# Patient Record
Sex: MALE | Race: WHITE | NOT HISPANIC OR LATINO | Employment: OTHER | URBAN - METROPOLITAN AREA
[De-identification: names, ages, dates, MRNs, and addresses within clinical notes are randomized per-mention and may not be internally consistent; named-entity substitution may affect disease eponyms.]

---

## 2017-03-31 DIAGNOSIS — E78.2 MIXED HYPERLIPIDEMIA: ICD-10-CM

## 2017-03-31 RX ORDER — FENOFIBRATE 160 MG/1
160 TABLET ORAL DAILY
Qty: 90 TABLET | Refills: 0 | Status: SHIPPED | OUTPATIENT
Start: 2017-03-31 | End: 2017-07-14

## 2017-04-27 ENCOUNTER — TRANSFERRED RECORDS (OUTPATIENT)
Dept: CARDIOLOGY | Facility: CLINIC | Age: 72
End: 2017-04-27

## 2017-07-14 DIAGNOSIS — E78.2 MIXED HYPERLIPIDEMIA: ICD-10-CM

## 2017-07-14 RX ORDER — FENOFIBRATE 160 MG/1
160 TABLET ORAL DAILY
Qty: 90 TABLET | Refills: 0 | Status: SHIPPED | OUTPATIENT
Start: 2017-07-14 | End: 2017-10-11

## 2017-08-10 ENCOUNTER — OFFICE VISIT (OUTPATIENT)
Dept: CARDIOLOGY | Facility: CLINIC | Age: 72
End: 2017-08-10
Attending: INTERNAL MEDICINE
Payer: COMMERCIAL

## 2017-08-10 VITALS
DIASTOLIC BLOOD PRESSURE: 87 MMHG | HEART RATE: 64 BPM | BODY MASS INDEX: 30.36 KG/M2 | HEIGHT: 69 IN | SYSTOLIC BLOOD PRESSURE: 135 MMHG | WEIGHT: 205 LBS

## 2017-08-10 DIAGNOSIS — E78.2 MIXED HYPERLIPIDEMIA: ICD-10-CM

## 2017-08-10 DIAGNOSIS — I73.9 PAD (PERIPHERAL ARTERY DISEASE) (H): ICD-10-CM

## 2017-08-10 DIAGNOSIS — I48.20 CHRONIC ATRIAL FIBRILLATION (H): Primary | ICD-10-CM

## 2017-08-10 DIAGNOSIS — I67.2 ATHEROSCLEROTIC CEREBROVASCULAR DISEASE: ICD-10-CM

## 2017-08-10 DIAGNOSIS — I10 ESSENTIAL HYPERTENSION WITH GOAL BLOOD PRESSURE LESS THAN 140/90: ICD-10-CM

## 2017-08-10 PROCEDURE — 99214 OFFICE O/P EST MOD 30 MIN: CPT | Performed by: INTERNAL MEDICINE

## 2017-08-10 NOTE — PROGRESS NOTES
HISTORY OF PRESENT ILLNESS:  I again had the pleasure of seeing your patient, Jonny Goldberg, at [facility] for evaluation of mixed hyperlipidemia, atherosclerosis and chronic atrial fibrillation.  The patient has a history of hypertriglyceridemia and has been placed on fenofibrate and Crestor.  One time he was on Zetia but this did little for lipid control.  He denies myalgias or myopathy.  He continues to have a sore right knee.  The patient was exercising, walking 3-1/2 miles each day in February and March, but as the summer came and did more fishing, he stopped exercising and changed his diet.  Now when he fishes with his buddies he eats more potatoes and bread.  He does not make time for exercise.  A fasting lipid profile performed on 04/26/2017 includes LDL 97, VLDL 23, HDL 46, triglycerides 115 and total cholesterol 166.  At that same time, his BUN was 26, glucose 102, AST of 46, ALT of 29.  He has a history of atrial fibrillation since at least 11/2011.  Stress echocardiogram indicated no areas of ischemia.  He had a possible atrial flutter ablation in the early 1980s.  He has had a small embolic stroke prior to that diagnosis and has remained on warfarin anticoagulation for chronic atrial fibrillation.  INR today is 2.7.  He has a history of hypertension which has been under reasonably good control, although borderline diastolic hypertension.  He continues on chlorthalidone and I do not have a recent creatinine.  Of note, he is also on verapamil, rosuvastatin and fenofibrate.  He is on metoprolol.  He denies myalgias or myopathy.      PHYSICAL EXAMINATION:  As listed below and relatively unchanged from last year.      ASSESSMENT:   1.  Jonny Goldberg is a pleasant 72-year-old male with history of mixed hyperlipidemia that has remained stable on his current medications.  He remains at goal and I have suggested continued weight loss, diet and exercise.  Of his 30-minute visit, greater than  50% of that time was spent on diet, exercise and weight loss.  He is concerned about his blood sugar of 102 and I suggested that this is readily improved with diet, exercise and weight loss.   2.  The patient's systolic and diastolic blood pressure is currently borderline.  His diastolic blood pressure is 87.  I suggested continued salt restriction and weight loss.  Exercise would also be of benefit.      It is my pleasure to assist in the care of Diana Moreira.  We continue monitoring his atrial fibrillation.  He has excellent rate control.  He denies bleeding diathesis.  It is my intention to see him again in 1 year or earlier on a p.r.n. basis.  All his questions were answered to his satisfaction.      cc:   Golden Chacko MD   [facility]   [address] [place] [address], [place] [address]         BAN FELIZ MD, [facility]             D: 08/10/2017 11:27   T: 08/10/2017 13:44   MT: [email or web address]      Name:     DIANA MOREIRA   MRN:      [id number]        Account:      [id number]   :      1945           Service Date: 08/10/2017      Document: [id number]

## 2017-08-10 NOTE — LETTER
8/10/2017    Golden Chacko MD  [address]  [place] [address]    RE: Jonny Goldberg       Dear Colleague,    I again had the pleasure of seeing your patient, Jonny Goldberg, at [facility] for evaluation of mixed hyperlipidemia, atherosclerosis and chronic atrial fibrillation.  The patient has a history of hypertriglyceridemia and has been placed on fenofibrate and Crestor.  One time he was on Zetia but this did little for lipid control.  He denies myalgias or myopathy.  He continues to have a sore right knee.  The patient was exercising, walking 3-1/2 miles each day in February and March, but as the summer came and did more fishing, he stopped exercising and changed his diet.  Now when he fishes with his buddies he eats more potatoes and bread.  He does not make time for exercise.  A fasting lipid profile performed on 04/26/2017 includes LDL 97, VLDL 23, HDL 46, triglycerides 115 and total cholesterol 166.  At that same time, his BUN was 26, glucose 102, AST of 46, ALT of 29.  He has a history of atrial fibrillation since at least 11/2011.  Stress echocardiogram indicated no areas of ischemia.  He had a possible atrial flutter ablation in the early 1980s.  He has had a small embolic stroke prior to that diagnosis and has remained on warfarin anticoagulation for chronic atrial fibrillation.  INR today is 2.7.  He has a history of hypertension which has been under reasonably good control, although borderline diastolic hypertension.  He continues on chlorthalidone and I do not have a recent creatinine.  Of note, he is also on verapamil, rosuvastatin and fenofibrate.  He is on metoprolol.  He denies myalgias or myopathy.      PHYSICAL EXAMINATION:  As listed below and relatively unchanged from last year.     Outpatient Encounter Prescriptions as of 8/10/2017   Medication Sig Dispense Refill     fenofibrate 160 MG tablet Take 1 tablet (160 mg) by mouth daily 90 tablet 0      chlorthalidone (HYGROTON) 50 MG tablet daily       rosuvastatin (CRESTOR) 40 MG tablet Take 40 mg by mouth daily       Omega-3 Fatty Acids (OMEGA 3 PO) Take by mouth daily 180 EPA/ 20 DHH       Ubiquinol 100 MG CAPS Take by mouth daily       Cholecalciferol (VITAMIN D) 400 UNITS tablet Take 1 tablet by mouth daily       multivitamin, therapeutic with minerals (MULTI-VITAMIN) TABS Take 1 tablet by mouth daily       ascorbic acid (VITAMIN C) 1000 MG TABS Take 1,000 mg by mouth daily       ALPHA LIPOIC ACID PO Take 600 mg by mouth daily       Verapamil HCl 240 MG CP24 Take by mouth daily        warfarin (COUMADIN) 5 MG tablet Take 5 mg by mouth daily        metoprolol (TOPROL-XL) 50 MG 24 hr tablet Take 50 mg by mouth daily.       Omeprazole 20 MG tablet Take 20 mg by mouth as needed        No facility-administered encounter medications on file as of 8/10/2017.       ASSESSMENT:   1.  Jonny Goldberg is a pleasant 72-year-old male with history of mixed hyperlipidemia that has remained stable on his current medications.  He remains at goal and I have suggested continued weight loss, diet and exercise.  Of his 30-minute visit, greater than 50% of that time was spent on diet, exercise and weight loss.  He is concerned about his blood sugar of 102 and I suggested that this is readily improved with diet, exercise and weight loss.   2.  The patient's systolic and diastolic blood pressure is currently borderline.  His diastolic blood pressure is 87.  I suggested continued salt restriction and weight loss.  Exercise would also be of benefit.      It is my pleasure to assist in the care of Jonny Goldberg.  We continue monitoring his atrial fibrillation.  He has excellent rate control.  He denies bleeding diathesis.  It is my intention to see him again in 1 year or earlier on a p.r.n. basis.  All his questions were answered to his satisfaction.     Sincerely,    Boy Jones MD     [facility]

## 2017-08-10 NOTE — MR AVS SNAPSHOT
"              After Visit Summary   8/10/2017    Jonny Goldberg    MRN: [id number]           Patient Information     Date Of Birth          1945        Visit Information        Provider Department      8/10/2017 10:45 AM Boy Jones MD [facility]        Today's Diagnoses     Chronic atrial fibrillation (H)    -  1    Mixed hyperlipidemia        Essential hypertension with goal blood pressure less than 140/90        Atherosclerotic cerebrovascular disease        PAD (peripheral artery disease) (H)           Follow-ups after your visit        Additional Services     Follow-Up with Cardiologist                 Future tests that were ordered for you today     Open Future Orders        Priority Expected Expires Ordered    Follow-Up with Cardiologist Routine 8/10/2018 8/11/2018 8/10/2017            Who to contact     If you have questions or need follow up information about today's clinic visit or your schedule please contact [facility] directly at [phone].  Normal or non-critical lab and imaging results will be communicated to you by MyChart, letter or phone within 4 business days after the clinic has received the results. If you do not hear from us within 7 days, please contact the clinic through [employer] or phone. If you have a critical or abnormal lab result, we will notify you by phone as soon as possible.  Submit refill requests through [employer] or call your pharmacy and they will forward the refill request to us. Please allow 3 business days for your refill to be completed.          Additional Information About Your Visit        MyChart Information     [employer] lets you send messages to your doctor, view your test results, renew your prescriptions, schedule appointments and more. To sign up, go to [email or web address] . Click on \"Log in\" on the left side of the screen, which will take you to the Welcome page. Then " "click on \"Sign up Now\" on the right side of the page.     You will be asked to enter the access code listed below, as well as some personal information. Please follow the directions to create your username and password.     Your access code is: [email or web address]  Expires: 2017 11:16 AM     Your access code will  in 90 days. If you need help or a new code, please call your [facility] clinic or [phone].        Care EveryWhere ID     This is your Care EveryWhere ID. This could be used by other organizations to access your [facility] medical records  [id number]        Your Vitals Were     Pulse Height BMI (Body Mass Index)             64 1.74 m (5' 8.5\") 30.72 kg/m2          Blood Pressure from Last 3 Encounters:   08/10/17 135/87   16 134/84   12/16/15 130/80    Weight from Last 3 Encounters:   08/10/17 93 kg (205 lb)   16 92.1 kg (203 lb)   12/16/15 96.2 kg (212 lb)              We Performed the Following     Follow-Up with Cardiologist        Primary Care Provider Office Phone # Fax #    Golden Chacko [phone] [address] [place]  [facility] [address]        Equal Access to Services     [facility] Center[facility]ARAY : Hadii aad ku hadasho Soomaali, waaxda luqadaha, qaybta kaalmada adeegyada, clayton chase. So [facility] [phone].    ATENCIÓN: Si habla español, tiene a hackett disposición servicios gratuitos de asistencia lingüística. Von al [phone].    We comply with applicable federal civil rights laws and [place] laws. We do not discriminate on the basis of race, color, national origin, age, disability sex, sexual orientation or gender identity.            Thank you!     Thank you for choosing [facility] [place]  for your care. Our goal is always to provide you with excellent care. Hearing back from our patients is one way we can continue to improve our services. Please take a few minutes to complete the written " survey that you may receive in the mail after your visit with us. Thank you!             Your Updated Medication List - Protect others around you: Learn how to safely use, store and throw away your medicines at www.disposemymeds.org.          This list is accurate as of: 8/10/17 11:16 AM.  Always use your most recent med list.                   Brand Name Dispense Instructions for use Diagnosis    ALPHA LIPOIC ACID PO      Take 600 mg by mouth daily        ascorbic acid 1000 MG Tabs    vitamin C     Take 1,000 mg by mouth daily        chlorthalidone 50 MG tablet    HYGROTON     daily        CRESTOR 40 MG tablet   Generic drug:  rosuvastatin      Take 40 mg by mouth daily        fenofibrate 160 MG tablet     90 tablet    Take 1 tablet (160 mg) by mouth daily    Mixed hyperlipidemia       metoprolol 50 MG 24 hr tablet    TOPROL-XL     Take 50 mg by mouth daily.        Multi-vitamin Tabs tablet      Take 1 tablet by mouth daily        OMEGA 3 PO      Take by mouth daily 180 EPA/ 20 DHH        omeprazole 20 MG tablet      Take 20 mg by mouth as needed        Ubiquinol 100 MG Caps      Take by mouth daily        verapamil HCl 240 MG Cp24      Take by mouth daily        vitamin D 400 UNITS tablet      Take 1 tablet by mouth daily        warfarin 5 MG tablet    COUMADIN     Take 5 mg by mouth daily

## 2017-08-10 NOTE — PROGRESS NOTES
HPI and Plan:   See dictation:[id number]    Orders Placed This Encounter   Procedures     Follow-Up with Cardiologist       No orders of the defined types were placed in this encounter.      There are no discontinued medications.      Encounter Diagnoses   Name Primary?     Chronic atrial fibrillation (H) Yes     Mixed hyperlipidemia      Essential hypertension with goal blood pressure less than 140/90      Atherosclerotic cerebrovascular disease      PAD (peripheral artery disease) (H)        CURRENT MEDICATIONS:  Current Outpatient Prescriptions   Medication Sig Dispense Refill     fenofibrate 160 MG tablet Take 1 tablet (160 mg) by mouth daily 90 tablet 0     chlorthalidone (HYGROTON) 50 MG tablet daily       rosuvastatin (CRESTOR) 40 MG tablet Take 40 mg by mouth daily       Omega-3 Fatty Acids (OMEGA 3 PO) Take by mouth daily 180 EPA/ 20 DHH       Ubiquinol 100 MG CAPS Take by mouth daily       Cholecalciferol (VITAMIN D) 400 UNITS tablet Take 1 tablet by mouth daily       multivitamin, therapeutic with minerals (MULTI-VITAMIN) TABS Take 1 tablet by mouth daily       ascorbic acid (VITAMIN C) 1000 MG TABS Take 1,000 mg by mouth daily       ALPHA LIPOIC ACID PO Take 600 mg by mouth daily       Verapamil HCl 240 MG CP24 Take by mouth daily        warfarin (COUMADIN) 5 MG tablet Take 5 mg by mouth daily        metoprolol (TOPROL-XL) 50 MG 24 hr tablet Take 50 mg by mouth daily.       Omeprazole 20 MG tablet Take 20 mg by mouth as needed          ALLERGIES     Allergies   Allergen Reactions     Penicillin G Hives     hives       PAST MEDICAL HISTORY:  Past Medical History:   Diagnosis Date     AF (atrial fibrillation) (H)     AFIB     Blood clot in the legs     left leg after surgery     Gastro-oesophageal reflux disease      Hodgkins lymphoma (H)     bone marrow biopsy     Hyperlipidemia      Hypertension      PAD (peripheral artery disease) (H)      Unspecified cerebral artery occlusion with cerebral infarction oct.  2011       PAST SURGICAL HISTORY:  Past Surgical History:   Procedure Laterality Date     APPENDECTOMY       ARTHROPLASTY REVISION HIP  12/27/2011    Procedure:ARTHROPLASTY REVISION HIP; RIGHT TOTAL HIP REVISION WITH BONE GRAFT  ; Surgeon:ANGIE HARRINGTON; Location:[facility] OR     BIOPSY  hodgkins lymphoma    bone marrow biopsy, chemo and radiation     CARDIAC SURGERY      cardiac ablation     COLONOSCOPY       ORTHOPEDIC SURGERY      hip and knee surgeries     ORTHOPEDIC SURGERY      carpal tunnel  right hand       FAMILY HISTORY:  Family History   Problem Relation Age of Onset     Hypertension Mother      Heart Failure Mother      Coronary Artery Disease Mother      CABG     Arrhythmia Mother      CEREBROVASCULAR DISEASE Father      Heart Failure Father      Colon Cancer Father      Coronary Artery Disease Father      CABG     Coronary Artery Disease Brother      CABG     Arrhythmia Brother        SOCIAL HISTORY:  Social History     Social History     Marital status:      Spouse name: N/A     Number of children: N/A     Years of education: N/A     Social History Main Topics     Smoking status: Former Smoker     Quit date: 4/5/1987     Smokeless tobacco: Never Used      Comment: quit late 1980s     Alcohol use 0.0 oz/week     0 Standard drinks or equivalent per week      Comment: 0-3 a day  ( beer, wine)     Drug use: No     Sexual activity: Not Asked     Other Topics Concern     Caffeine Concern No     2-3 a day     Sleep Concern Yes     depends     Weight Concern No     weight loss     Special Diet No     Exercise No     limited due to knee pain     Seat Belt Yes     Social History Narrative       Review of Systems:  Skin:  Positive for lumps or bumps;scaling psoriasis on legs    Eyes:  Positive for glasses    ENT:  Negative      Respiratory:  Negative       Cardiovascular:  Negative      Gastroenterology: Positive for heartburn    Genitourinary:  not assessed      Musculoskeletal:  Positive for joint  "pain;joint stiffness;arthritis    Neurologic:  Positive for numbness or tingling of feet    Psychiatric:  Negative      Heme/Lymph/Imm:  Positive for allergies    Endocrine:  Negative        Physical Exam:  Vitals: /87 (BP Location: Right arm, Cuff Size: Adult Large)  Pulse 64  Ht 1.74 m (5' 8.5\")  Wt 93 kg (205 lb)  BMI 30.72 kg/m2    Constitutional:  cooperative, alert and oriented, well developed, well nourished, in no acute distress        Skin:  warm and dry to the touch, no apparent skin lesions or masses noted        Head:  normocephalic, no masses or lesions        Eyes:  pupils equal and round, conjunctivae and lids unremarkable, sclera white, no xanthalasma, EOMS intact, no nystagmus        ENT:  no pallor or cyanosis, dentition good        Neck:  carotid pulses are full and equal bilaterally, JVP normal, no carotid bruit, no thyromegaly        Chest:  normal breath sounds, clear to auscultation, normal A-P diameter, normal symmetry, normal respiratory excursion, no use of accessory muscles          Cardiac: normal S1 and S2;no S3 or S4;apical impulse not displaced irregularly irregular rhythm   no presence of murmur            Abdomen:  abdomen soft, non-tender, BS normoactive, no mass, no HSM, no bruits        Vascular: pulses full and equal, no bruits auscultated                                        Extremities and Back:  no deformities, clubbing, cyanosis, erythema observed;no edema              Neurological:  affect appropriate, oriented to time, person and place;no gross motor deficits              CC  Boy Jones MD  [address]  [place] [address]              "

## 2017-10-11 DIAGNOSIS — E78.2 MIXED HYPERLIPIDEMIA: ICD-10-CM

## 2017-10-11 RX ORDER — FENOFIBRATE 160 MG/1
160 TABLET ORAL DAILY
Qty: 90 TABLET | Refills: 3 | Status: SHIPPED | OUTPATIENT
Start: 2017-10-11 | End: 2018-09-27

## 2017-10-17 ENCOUNTER — TRANSFERRED RECORDS (OUTPATIENT)
Dept: HEALTH INFORMATION MANAGEMENT | Facility: CLINIC | Age: 72
End: 2017-10-17

## 2017-10-17 LAB
ALBUMIN SERPL-MCNC: 4.5 G/DL
ALP SERPL-CCNC: 37 U/L
ALT SERPL-CCNC: 30 U/L
ANION GAP SERPL CALCULATED.3IONS-SCNC: NORMAL MMOL/L
AST SERPL-CCNC: 38 U/L
BILIRUB SERPL-MCNC: 0.6 MG/DL
BUN SERPL-MCNC: 27 MG/DL
CALCIUM SERPL-MCNC: 9.5 MG/DL
CHLORIDE SERPLBLD-SCNC: 99 MMOL/L
CHOLEST SERPL-MCNC: 168 MG/DL
CO2 SERPL-SCNC: 29 MMOL/L
CREAT SERPL-MCNC: 1.07 MG/DL
GFR SERPL CREATININE-BSD FRML MDRD: 70 ML/MIN/1.73M2
GLUCOSE SERPL-MCNC: 104 MG/DL (ref 70–99)
HBA1C MFR BLD: 6.1 % (ref 0–5.7)
HDLC SERPL-MCNC: 47 MG/DL
LDLC SERPL CALC-MCNC: 98 MG/DL
NONHDLC SERPL-MCNC: NORMAL MG/DL
POTASSIUM SERPL-SCNC: 4.7 MMOL/L
PROT SERPL-MCNC: 7.2 G/DL
SODIUM SERPL-SCNC: 143 MMOL/L
TRIGL SERPL-MCNC: 115 MG/DL
VLDL CHOLESTEROL: 23 MG/DL

## 2017-10-19 DIAGNOSIS — I73.9 PAD (PERIPHERAL ARTERY DISEASE) (H): Primary | ICD-10-CM

## 2018-04-30 ENCOUNTER — TRANSFERRED RECORDS (OUTPATIENT)
Dept: HEALTH INFORMATION MANAGEMENT | Facility: CLINIC | Age: 73
End: 2018-04-30

## 2018-09-27 DIAGNOSIS — E78.2 MIXED HYPERLIPIDEMIA: ICD-10-CM

## 2018-09-27 RX ORDER — FENOFIBRATE 160 MG/1
160 TABLET ORAL DAILY
Qty: 90 TABLET | Refills: 0 | Status: SHIPPED | OUTPATIENT
Start: 2018-09-27 | End: 2018-12-11

## 2018-10-18 ENCOUNTER — TRANSFERRED RECORDS (OUTPATIENT)
Dept: HEALTH INFORMATION MANAGEMENT | Facility: CLINIC | Age: 73
End: 2018-10-18

## 2018-10-19 LAB
ALBUMIN SERPL-MCNC: 4.5 G/DL (ref 3.5–4.8)
ALP SERPL-CCNC: 37 U/L (ref 39–117)
ALT SERPL-CCNC: 24 U/L (ref ?–44)
ANION GAP SERPL CALCULATED.3IONS-SCNC: ABNORMAL MMOL/L
AST SERPL-CCNC: 35 U/L (ref ?–40)
BILIRUB SERPL-MCNC: 0.7 MG/DL (ref 0–1.2)
BUN SERPL-MCNC: 21 MG/DL (ref 8–27)
CALCIUM SERPL-MCNC: 9.6 MG/DL (ref 8.6–10.2)
CHLORIDE SERPLBLD-SCNC: 99 MMOL/L (ref 96–106)
CHOLEST SERPL-MCNC: 155 MG/DL (ref ?–199)
CO2 SERPL-SCNC: 28 MMOL/L (ref 20–29)
CREAT SERPL-MCNC: 1.07 MG/DL (ref 0.76–1.27)
GFR SERPL CREATININE-BSD FRML MDRD: 69 ML/MIN/1.73M2 (ref 60–?)
GLUCOSE SERPL-MCNC: 103 MG/DL (ref 65–99)
HDLC SERPL-MCNC: 43 MG/DL (ref 39–?)
LDLC SERPL CALC-MCNC: 81 MG/DL (ref ?–99)
NONHDLC SERPL-MCNC: ABNORMAL MG/DL
POTASSIUM SERPL-SCNC: 3.9 MMOL/L (ref 3.5–5.2)
PROT SERPL-MCNC: 7 G/DL (ref 6–8.5)
SODIUM SERPL-SCNC: 140 MMOL/L (ref 134–144)
TRIGL SERPL-MCNC: 154 MG/DL (ref ?–149)

## 2018-10-25 ENCOUNTER — DOCUMENTATION ONLY (OUTPATIENT)
Dept: CARDIOLOGY | Facility: CLINIC | Age: 73
End: 2018-10-25

## 2018-10-30 ENCOUNTER — OFFICE VISIT (OUTPATIENT)
Dept: CARDIOLOGY | Facility: CLINIC | Age: 73
End: 2018-10-30
Payer: COMMERCIAL

## 2018-10-30 VITALS
DIASTOLIC BLOOD PRESSURE: 78 MMHG | BODY MASS INDEX: 31.1 KG/M2 | WEIGHT: 210 LBS | HEART RATE: 80 BPM | HEIGHT: 69 IN | SYSTOLIC BLOOD PRESSURE: 140 MMHG

## 2018-10-30 DIAGNOSIS — I73.9 PAD (PERIPHERAL ARTERY DISEASE) (H): ICD-10-CM

## 2018-10-30 DIAGNOSIS — I10 ESSENTIAL HYPERTENSION WITH GOAL BLOOD PRESSURE LESS THAN 140/90: ICD-10-CM

## 2018-10-30 DIAGNOSIS — I48.20 CHRONIC ATRIAL FIBRILLATION (H): Primary | ICD-10-CM

## 2018-10-30 DIAGNOSIS — E78.2 MIXED HYPERLIPIDEMIA: ICD-10-CM

## 2018-10-30 DIAGNOSIS — I67.2 ATHEROSCLEROTIC CEREBROVASCULAR DISEASE: ICD-10-CM

## 2018-10-30 PROCEDURE — 99214 OFFICE O/P EST MOD 30 MIN: CPT | Performed by: INTERNAL MEDICINE

## 2018-10-30 NOTE — MR AVS SNAPSHOT
"              After Visit Summary   10/30/2018    Jonny Goldberg    MRN: [id number]           Patient Information     Date Of Birth          1945        Visit Information        Provider Department      10/30/2018 2:45 PM Boy Jones MD [facility]   [place]        Today's Diagnoses     Chronic atrial fibrillation (H)    -  1    Essential hypertension with goal blood pressure less than 140/90        Atherosclerotic cerebrovascular disease        PAD (peripheral artery disease) (H)        Mixed hyperlipidemia           Follow-ups after your visit        Additional Services     Follow-Up with Cardiologist                 Future tests that were ordered for you today     Open Future Orders        Priority Expected Expires Ordered    Follow-Up with Cardiologist Routine 10/30/2019 10/31/2019 10/30/2018            Who to contact     If you have questions or need follow up information about today's clinic visit or your schedule please contact [facility]   [facility] directly at [phone].  Normal or non-critical lab and imaging results will be communicated to you by MyChart, letter or phone within 4 business days after the clinic has received the results. If you do not hear from us within 7 days, please contact the clinic through MyChart or phone. If you have a critical or abnormal lab result, we will notify you by phone as soon as possible.  Submit refill requests through [employer] or call your pharmacy and they will forward the refill request to us. Please allow 3 business days for your refill to be completed.          Additional Information About Your Visit        Care EveryWhere ID     This is your Care EveryWhere ID. This could be used by other organizations to access your [facility] medical records  [id number]        Your Vitals Were     Pulse Height BMI (Body Mass Index)             80 1.74 m (5' 8.5\") 31.47 kg/m2          Blood Pressure from Last 3 " Encounters:   10/30/18 140/78   08/10/17 135/87   07/21/16 134/84    Weight from Last 3 Encounters:   10/30/18 95.3 kg (210 lb)   08/10/17 93 kg (205 lb)   07/21/16 92.1 kg (203 lb)               Primary Care Provider Office Phone # Fax #    Golden Chacko [phone]       [facility] [address]  [facility] [address]        Equal Access to Services     REYNA DAVIS : Hadii aad ku hadasho Soomaali, waaxda luqadaha, qaybta kaalmada adeegyada, waxay tiagoin haygi blanco . So [facility] [phone].    ATENCIÓN: Si habla español, tiene a hackett disposición servicios gratuitos de asistencia lingüística. [facility] [phone].    We comply with applicable federal civil rights laws and [place] laws. We do not discriminate on the basis of race, color, national origin, age, disability, sex, sexual orientation, or gender identity.            Thank you!     Thank you for choosing [facility]  for your care. Our goal is always to provide you with excellent care. Hearing back from our patients is one way we can continue to improve our services. Please take a few minutes to complete the written survey that you may receive in the mail after your visit with us. Thank you!             Your Updated Medication List - Protect others around you: Learn how to safely use, store and throw away your medicines at www.disposemymeds.org.          This list is accurate as of 10/30/18  3:10 PM.  Always use your most recent med list.                   Brand Name Dispense Instructions for use Diagnosis    ALPHA LIPOIC ACID PO      Take 600 mg by mouth daily        ascorbic acid 1000 MG Tabs    vitamin C     Take 1,000 mg by mouth daily        chlorthalidone 50 MG tablet    HYGROTON     daily        CRESTOR 40 MG tablet   Generic drug:  rosuvastatin      Take 40 mg by mouth daily        fenofibrate 160 MG tablet     90 tablet    Take 1 tablet (160 mg) by mouth  daily    Mixed hyperlipidemia       metoprolol succinate 50 MG 24 hr tablet    TOPROL-XL     Take 50 mg by mouth daily.        Multi-vitamin Tabs tablet      Take 1 tablet by mouth daily        OMEGA 3 PO      Take by mouth daily 180 EPA/ 20 DHH        omeprazole 20 MG tablet      Take 20 mg by mouth as needed        Ubiquinol 100 MG Caps      Take by mouth daily        verapamil HCl 240 MG Cp24      Take by mouth daily        vitamin D 400 units tablet      Take 1 tablet by mouth daily        warfarin 5 MG tablet    COUMADIN     Take 5 mg by mouth daily

## 2018-10-30 NOTE — LETTER
10/30/2018      [facility] [address]  [place] [address]      RE: Jonny Goldberg       Dear Colleague,    I had the pleasure of seeing Jonny Goldberg in the [facility].    Service Date: 10/30/2018      PRIMARY CARE PHYSICIAN:  Dr. Golden Chacko.      HISTORY OF PRESENT ILLNESS:  I again had the pleasure of seeing your patient, Jonny Goldberg, at [facility] for evaluation of chronic atrial fibrillation and mixed hyperlipidemia and atherosclerosis.  The patient has a history of hypertriglyceridemia and has been placed on both Crestor and fenofibrate.  At one time he was on Zetia as well but this did little for his lipid control.  He denies myalgias or myopathy.  His most recent lipid profile from 10/18/2018 shows total cholesterol 155, HDL 43, LDL 81 and triglycerides 154.  This is compared to 04/30/2018 where his total cholesterol was 141, triglycerides 112, HDL 44, LDL 75.  The patient notes that he has not been exercising during the summer months but plans to walk around [facility] each day during the wintertime.  His summers tend to be filled with fishing more than exercising.  He eats more potatoes and bread during fishing season with his buddies.  He generally does not make time for exercise and then by February and March he is walking outside.  The patient's electrolytes on 10/19/2018 were normal.  Additionally, his hemoglobin A1c 1 year ago was 6.1% and on 04/30 5.9%.      The patient denies palpitations, syncope or presyncope.  He denies any bleeding diathesis.  He is currently on verapamil for Raynaud's in his hands.  He denies chest pain, shortness of breath or palpitations.  INR is being kept 2-2.5.      PHYSICAL EXAMINATION:  As listed below.  Of note, his weight is 5 pounds heavier than 1 year ago.      ASSESSMENT:   1.  Jonny Goldberg is a pleasant 73-year-old male with a history of mixed  hyperlipidemia that has remained stable on his current medications.  His triglycerides are a bit elevated due to his lack of exercise and diet.  I have again discussed the need for aerobic exercise 3-4 times a week and weight loss.  His hemoglobin A1c is just below 6 and I reminded him that this will improve with diet, exercise and weight loss.   2.  Chronic atrial fibrillation.  The patient remains on warfarin without bleeding diathesis.  Heart rate is well controlled.   3.  Borderline systolic hypertension with normal diastolic blood pressure.  The patient assures me his blood pressure has been normal in his primary care office.  We will continue to monitor.      It is my pleasure to assist in the care of Diana Moreira.  It is my intention to see him again in 1 year or earlier on a p.r.n. basis.  All his questions were answered to his satisfaction.      Ban Feliz MD      cc:   Golden Chacko MD    [facility]    [address] [place] [address], [address] [place] [address]  [phone]         BAN FELIZ MD, [facility]             D: 10/30/2018   T: 10/30/2018   MT: TOMASA      Name:     DIANA MOREIRA   MRN:      [id number]        Account:      [id number]   :      1945           Service Date: 10/30/2018      Document: [id number]         Outpatient Encounter Prescriptions as of 10/30/2018   Medication Sig Dispense Refill     ALPHA LIPOIC ACID PO Take 600 mg by mouth daily       ascorbic acid (VITAMIN C) 1000 MG TABS Take 1,000 mg by mouth daily       chlorthalidone (HYGROTON) 50 MG tablet daily       Cholecalciferol (VITAMIN D) 400 UNITS tablet Take 1 tablet by mouth daily       fenofibrate 160 MG tablet Take 1 tablet (160 mg) by mouth daily 90 tablet 0     metoprolol (TOPROL-XL) 50 MG 24 hr tablet Take 50 mg by mouth daily.       multivitamin, therapeutic with minerals (MULTI-VITAMIN) TABS Take 1 tablet by mouth daily       Omega-3 Fatty Acids (OMEGA 3 PO) Take by mouth daily 180 EPA/ 20 DHH        Omeprazole 20 MG tablet Take 20 mg by mouth as needed        rosuvastatin (CRESTOR) 40 MG tablet Take 40 mg by mouth daily       Ubiquinol 100 MG CAPS Take by mouth daily       Verapamil HCl 240 MG CP24 Take by mouth daily        warfarin (COUMADIN) 5 MG tablet Take 5 mg by mouth daily        No facility-administered encounter medications on file as of 10/30/2018.        Again, thank you for allowing me to participate in the care of your patient.      Sincerely,    Boy Jones MD     [facility]

## 2018-10-30 NOTE — LETTER
10/30/2018    [facility] [address]  [place] [address]    RE: Jonny Goldberg       Dear Colleague,    I had the pleasure of seeing Jonny Goldberg in the [facility].    HPI and Plan:   See dictation:[id number]    Orders Placed This Encounter   Procedures     Follow-Up with Cardiologist       No orders of the defined types were placed in this encounter.      There are no discontinued medications.      Encounter Diagnoses   Name Primary?     Chronic atrial fibrillation (H) Yes     Essential hypertension with goal blood pressure less than 140/90      Atherosclerotic cerebrovascular disease      PAD (peripheral artery disease) (H)      Mixed hyperlipidemia        CURRENT MEDICATIONS:  Current Outpatient Prescriptions   Medication Sig Dispense Refill     ALPHA LIPOIC ACID PO Take 600 mg by mouth daily       ascorbic acid (VITAMIN C) 1000 MG TABS Take 1,000 mg by mouth daily       chlorthalidone (HYGROTON) 50 MG tablet daily       Cholecalciferol (VITAMIN D) 400 UNITS tablet Take 1 tablet by mouth daily       fenofibrate 160 MG tablet Take 1 tablet (160 mg) by mouth daily 90 tablet 0     metoprolol (TOPROL-XL) 50 MG 24 hr tablet Take 50 mg by mouth daily.       multivitamin, therapeutic with minerals (MULTI-VITAMIN) TABS Take 1 tablet by mouth daily       Omega-3 Fatty Acids (OMEGA 3 PO) Take by mouth daily 180 EPA/ 20 DHH       Omeprazole 20 MG tablet Take 20 mg by mouth as needed        rosuvastatin (CRESTOR) 40 MG tablet Take 40 mg by mouth daily       Ubiquinol 100 MG CAPS Take by mouth daily       Verapamil HCl 240 MG CP24 Take by mouth daily        warfarin (COUMADIN) 5 MG tablet Take 5 mg by mouth daily          ALLERGIES     Allergies   Allergen Reactions     Penicillin G Hives     hives       PAST MEDICAL HISTORY:  Past Medical History:   Diagnosis Date     AF (atrial fibrillation) (H)     AFIB     Blood clot in the legs     left leg  after surgery     Gastro-oesophageal reflux disease      Hodgkins lymphoma (H)     bone marrow biopsy     Hyperlipidemia      Hypertension      PAD (peripheral artery disease) (H)      Unspecified cerebral artery occlusion with cerebral infarction oct. 2011       PAST SURGICAL HISTORY:  Past Surgical History:   Procedure Laterality Date     APPENDECTOMY       ARTHROPLASTY REVISION HIP  12/27/2011    Procedure:ARTHROPLASTY REVISION HIP; RIGHT TOTAL HIP REVISION WITH BONE GRAFT  ; Surgeon:ANGIE HARRINGTON; Location:[facility] OR     BIOPSY  hodgkins lymphoma    bone marrow biopsy, chemo and radiation     CARDIAC SURGERY      cardiac ablation     COLONOSCOPY       ORTHOPEDIC SURGERY      hip and knee surgeries     ORTHOPEDIC SURGERY      carpal tunnel  right hand       FAMILY HISTORY:  Family History   Problem Relation Age of Onset     Hypertension Mother      Heart Failure Mother      Coronary Artery Disease Mother      CABG     Arrhythmia Mother      Cerebrovascular Disease Father      Heart Failure Father      Colon Cancer Father      Coronary Artery Disease Father      CABG     Coronary Artery Disease Brother      CABG     Arrhythmia Brother        SOCIAL HISTORY:  Social History     Social History     Marital status:      Spouse name: N/A     Number of children: N/A     Years of education: N/A     Social History Main Topics     Smoking status: Former Smoker     Quit date: 4/5/1987     Smokeless tobacco: Never Used     Alcohol use 0.0 oz/week     0 Standard drinks or equivalent per week      Comment: 0-3 a day  ( beer, wine)     Drug use: No     Sexual activity: Not Asked     Other Topics Concern     Caffeine Concern No     2-3 a day     Sleep Concern Yes     depends     Weight Concern No     weight loss     Special Diet No     Exercise No     limited due to knee pain     Seat Belt Yes     Social History Narrative       Review of Systems:  Skin:  Positive for   psoriasis on legs    Eyes:  Positive for glasses   "  ENT:  Negative      Respiratory:  Negative       Cardiovascular:    lower extremity symptoms;Positive for (lower leg redness/ sore spots)    Gastroenterology: Negative      Genitourinary:  not assessed      Musculoskeletal:  Positive for joint pain;joint stiffness;arthritis;nocturnal cramping    Neurologic:  Positive for numbness or tingling of feet    Psychiatric:  Negative      Heme/Lymph/Imm:  Negative      Endocrine:  Negative        Physical Exam:  Vitals: /78  Pulse 80  Ht 1.74 m (5' 8.5\")  Wt 95.3 kg (210 lb)  BMI 31.47 kg/m2    Constitutional:  cooperative, alert and oriented, well developed, well nourished, in no acute distress        Skin:  warm and dry to the touch, no apparent skin lesions or masses noted          Head:  normocephalic, no masses or lesions        Eyes:  pupils equal and round, conjunctivae and lids unremarkable, sclera white, no xanthalasma, EOMS intact, no nystagmus        Lymph:      ENT:  no pallor or cyanosis, dentition good        Neck:           Respiratory:  normal breath sounds, clear to auscultation, normal A-P diameter, normal symmetry, normal respiratory excursion, no use of accessory muscles         Cardiac: normal S1 and S2;no S3 or S4;apical impulse not displaced irregularly irregular rhythm   no presence of murmur          pulses full and equal, no bruits auscultated                                        GI:  abdomen soft, non-tender, BS normoactive, no mass, no HSM, no bruits obese      Extremities and Muscular Skeletal:  no deformities, clubbing, cyanosis, erythema observed;no edema   varicose vein          Neurological:  no gross motor deficits;affect appropriate        Psych:  Alert and Oriented x 3        CC  No referring provider defined for this encounter.                Thank you for allowing me to participate in the care of your patient.      Sincerely,     Boy Jones MD     [facility]    cc:   No referring " provider defined for this encounter.

## 2018-10-30 NOTE — PROGRESS NOTES
HPI and Plan:   See dictation:[id number]    Orders Placed This Encounter   Procedures     Follow-Up with Cardiologist       No orders of the defined types were placed in this encounter.      There are no discontinued medications.      Encounter Diagnoses   Name Primary?     Chronic atrial fibrillation (H) Yes     Essential hypertension with goal blood pressure less than 140/90      Atherosclerotic cerebrovascular disease      PAD (peripheral artery disease) (H)      Mixed hyperlipidemia        CURRENT MEDICATIONS:  Current Outpatient Prescriptions   Medication Sig Dispense Refill     ALPHA LIPOIC ACID PO Take 600 mg by mouth daily       ascorbic acid (VITAMIN C) 1000 MG TABS Take 1,000 mg by mouth daily       chlorthalidone (HYGROTON) 50 MG tablet daily       Cholecalciferol (VITAMIN D) 400 UNITS tablet Take 1 tablet by mouth daily       fenofibrate 160 MG tablet Take 1 tablet (160 mg) by mouth daily 90 tablet 0     metoprolol (TOPROL-XL) 50 MG 24 hr tablet Take 50 mg by mouth daily.       multivitamin, therapeutic with minerals (MULTI-VITAMIN) TABS Take 1 tablet by mouth daily       Omega-3 Fatty Acids (OMEGA 3 PO) Take by mouth daily 180 EPA/ 20 DHH       Omeprazole 20 MG tablet Take 20 mg by mouth as needed        rosuvastatin (CRESTOR) 40 MG tablet Take 40 mg by mouth daily       Ubiquinol 100 MG CAPS Take by mouth daily       Verapamil HCl 240 MG CP24 Take by mouth daily        warfarin (COUMADIN) 5 MG tablet Take 5 mg by mouth daily          ALLERGIES     Allergies   Allergen Reactions     Penicillin G Hives     hives       PAST MEDICAL HISTORY:  Past Medical History:   Diagnosis Date     AF (atrial fibrillation) (H)     AFIB     Blood clot in the legs     left leg after surgery     Gastro-oesophageal reflux disease      Hodgkins lymphoma (H)     bone marrow biopsy     Hyperlipidemia      Hypertension      PAD (peripheral artery disease) (H)      Unspecified cerebral artery occlusion with cerebral infarction oct.  2011       PAST SURGICAL HISTORY:  Past Surgical History:   Procedure Laterality Date     APPENDECTOMY       ARTHROPLASTY REVISION HIP  12/27/2011    Procedure:ARTHROPLASTY REVISION HIP; RIGHT TOTAL HIP REVISION WITH BONE GRAFT  ; Surgeon:ANGIE HARRINGTON; Location:[facility] OR     BIOPSY  hodgkins lymphoma    bone marrow biopsy, chemo and radiation     CARDIAC SURGERY      cardiac ablation     COLONOSCOPY       ORTHOPEDIC SURGERY      hip and knee surgeries     ORTHOPEDIC SURGERY      carpal tunnel  right hand       FAMILY HISTORY:  Family History   Problem Relation Age of Onset     Hypertension Mother      Heart Failure Mother      Coronary Artery Disease Mother      CABG     Arrhythmia Mother      Cerebrovascular Disease Father      Heart Failure Father      Colon Cancer Father      Coronary Artery Disease Father      CABG     Coronary Artery Disease Brother      CABG     Arrhythmia Brother        SOCIAL HISTORY:  Social History     Social History     Marital status:      Spouse name: N/A     Number of children: N/A     Years of education: N/A     Social History Main Topics     Smoking status: Former Smoker     Quit date: 4/5/1987     Smokeless tobacco: Never Used     Alcohol use 0.0 oz/week     0 Standard drinks or equivalent per week      Comment: 0-3 a day  ( beer, wine)     Drug use: No     Sexual activity: Not Asked     Other Topics Concern     Caffeine Concern No     2-3 a day     Sleep Concern Yes     depends     Weight Concern No     weight loss     Special Diet No     Exercise No     limited due to knee pain     Seat Belt Yes     Social History Narrative       Review of Systems:  Skin:  Positive for   psoriasis on legs    Eyes:  Positive for glasses    ENT:  Negative      Respiratory:  Negative       Cardiovascular:    lower extremity symptoms;Positive for (lower leg redness/ sore spots)    Gastroenterology: Negative      Genitourinary:  not assessed      Musculoskeletal:  Positive for joint  "pain;joint stiffness;arthritis;nocturnal cramping    Neurologic:  Positive for numbness or tingling of feet    Psychiatric:  Negative      Heme/Lymph/Imm:  Negative      Endocrine:  Negative        Physical Exam:  Vitals: /78  Pulse 80  Ht 1.74 m (5' 8.5\")  Wt 95.3 kg (210 lb)  BMI 31.47 kg/m2    Constitutional:  cooperative, alert and oriented, well developed, well nourished, in no acute distress        Skin:  warm and dry to the touch, no apparent skin lesions or masses noted          Head:  normocephalic, no masses or lesions        Eyes:  pupils equal and round, conjunctivae and lids unremarkable, sclera white, no xanthalasma, EOMS intact, no nystagmus        Lymph:      ENT:  no pallor or cyanosis, dentition good        Neck:           Respiratory:  normal breath sounds, clear to auscultation, normal A-P diameter, normal symmetry, normal respiratory excursion, no use of accessory muscles         Cardiac: normal S1 and S2;no S3 or S4;apical impulse not displaced irregularly irregular rhythm   no presence of murmur          pulses full and equal, no bruits auscultated                                        GI:  abdomen soft, non-tender, BS normoactive, no mass, no HSM, no bruits obese      Extremities and Muscular Skeletal:  no deformities, clubbing, cyanosis, erythema observed;no edema   varicose vein          Neurological:  no gross motor deficits;affect appropriate        Psych:  Alert and Oriented x 3        CC  No referring provider defined for this encounter.              "

## 2018-10-31 NOTE — PROGRESS NOTES
Service Date: 10/30/2018      PRIMARY CARE PHYSICIAN:  Dr. Golden Chacko.      HISTORY OF PRESENT ILLNESS:  I again had the pleasure of seeing your patient, Jonny Goldberg, at [facility] for evaluation of chronic atrial fibrillation and mixed hyperlipidemia and atherosclerosis.  The patient has a history of hypertriglyceridemia and has been placed on both Crestor and fenofibrate.  At one time he was on Zetia as well but this did little for his lipid control.  He denies myalgias or myopathy.  His most recent lipid profile from 10/18/2018 shows total cholesterol 155, HDL 43, LDL 81 and triglycerides 154.  This is compared to 04/30/2018 where his total cholesterol was 141, triglycerides 112, HDL 44, LDL 75.  The patient notes that he has not been exercising during the summer months but plans to walk around [facility] each day during the wintertime.  His summers tend to be filled with fishing more than exercising.  He eats more potatoes and bread during fishing season with his buddies.  He generally does not make time for exercise and then by February and March he is walking outside.  The patient's electrolytes on 10/19/2018 were normal.  Additionally, his hemoglobin A1c 1 year ago was 6.1% and on 04/30 5.9%.      The patient denies palpitations, syncope or presyncope.  He denies any bleeding diathesis.  He is currently on verapamil for Raynaud's in his hands.  He denies chest pain, shortness of breath or palpitations.  INR is being kept 2-2.5.      PHYSICAL EXAMINATION:  As listed below.  Of note, his weight is 5 pounds heavier than 1 year ago.      ASSESSMENT:   1.  Jonny Goldberg is a pleasant 73-year-old male with a history of mixed hyperlipidemia that has remained stable on his current medications.  His triglycerides are a bit elevated due to his lack of exercise and diet.  I have again discussed the need for aerobic exercise 3-4 times a week and weight loss.  His hemoglobin A1c is just below  6 and I reminded him that this will improve with diet, exercise and weight loss.   2.  Chronic atrial fibrillation.  The patient remains on warfarin without bleeding diathesis.  Heart rate is well controlled.   3.  Borderline systolic hypertension with normal diastolic blood pressure.  The patient assures me his blood pressure has been normal in his primary care office.  We will continue to monitor.      It is my pleasure to assist in the care of Diana Moreira.  It is my intention to see him again in 1 year or earlier on a p.r.n. basis.  All his questions were answered to his satisfaction.      Ban Feliz MD      cc:   Golden Chacko MD    [facility]    [address] [facility], [address] [place] [address]  [phone]         BAN FELIZ MD, [facility]             D: 10/30/2018   T: 10/30/2018   MT: TOMASA      Name:     DIANA MOREIRA   MRN:      [id number]        Account:      [id number]   :      1945           Service Date: 10/30/2018      Document: [id number]

## 2018-12-11 DIAGNOSIS — E78.2 MIXED HYPERLIPIDEMIA: ICD-10-CM

## 2018-12-11 RX ORDER — FENOFIBRATE 160 MG/1
160 TABLET ORAL DAILY
Qty: 90 TABLET | Refills: 3 | Status: SHIPPED | OUTPATIENT
Start: 2018-12-11 | End: 2019-12-04

## 2019-05-01 ENCOUNTER — TRANSFERRED RECORDS (OUTPATIENT)
Dept: HEALTH INFORMATION MANAGEMENT | Facility: CLINIC | Age: 74
End: 2019-05-01

## 2019-11-05 ENCOUNTER — TRANSFERRED RECORDS (OUTPATIENT)
Dept: HEALTH INFORMATION MANAGEMENT | Facility: CLINIC | Age: 74
End: 2019-11-05

## 2019-11-15 ENCOUNTER — TRANSFERRED RECORDS (OUTPATIENT)
Dept: HEALTH INFORMATION MANAGEMENT | Facility: CLINIC | Age: 74
End: 2019-11-15

## 2019-11-19 ENCOUNTER — OFFICE VISIT (OUTPATIENT)
Dept: CARDIOLOGY | Facility: CLINIC | Age: 74
End: 2019-11-19
Payer: COMMERCIAL

## 2019-11-19 VITALS
HEIGHT: 69 IN | BODY MASS INDEX: 30.96 KG/M2 | SYSTOLIC BLOOD PRESSURE: 136 MMHG | DIASTOLIC BLOOD PRESSURE: 88 MMHG | HEART RATE: 84 BPM | WEIGHT: 209 LBS

## 2019-11-19 DIAGNOSIS — I10 ESSENTIAL HYPERTENSION WITH GOAL BLOOD PRESSURE LESS THAN 140/90: ICD-10-CM

## 2019-11-19 DIAGNOSIS — I67.2 ATHEROSCLEROTIC CEREBROVASCULAR DISEASE: ICD-10-CM

## 2019-11-19 DIAGNOSIS — E78.2 MIXED HYPERLIPIDEMIA: ICD-10-CM

## 2019-11-19 DIAGNOSIS — I73.9 PAD (PERIPHERAL ARTERY DISEASE) (H): ICD-10-CM

## 2019-11-19 DIAGNOSIS — I48.20 CHRONIC ATRIAL FIBRILLATION (H): Primary | ICD-10-CM

## 2019-11-19 PROCEDURE — 99214 OFFICE O/P EST MOD 30 MIN: CPT | Performed by: INTERNAL MEDICINE

## 2019-11-19 ASSESSMENT — MIFFLIN-ST. JEOR: SCORE: 1680.46

## 2019-11-19 NOTE — PROGRESS NOTES
HPI and Plan:   See dictation:[id number]    Orders Placed This Encounter   Procedures     Follow-Up with Cardiologist       No orders of the defined types were placed in this encounter.      There are no discontinued medications.      Encounter Diagnoses   Name Primary?     Chronic atrial fibrillation Yes     Atherosclerotic cerebrovascular disease      Mixed hyperlipidemia      Essential hypertension with goal blood pressure less than 140/90      PAD (peripheral artery disease) (H)        CURRENT MEDICATIONS:  Current Outpatient Medications   Medication Sig Dispense Refill     ALPHA LIPOIC ACID PO Take 600 mg by mouth daily       ascorbic acid (VITAMIN C) 1000 MG TABS Take 1,000 mg by mouth daily       chlorthalidone (HYGROTON) 50 MG tablet daily       Cholecalciferol (VITAMIN D) 400 UNITS tablet Take 1 tablet by mouth daily       fenofibrate (TRIGLIDE/LOFIBRA) 160 MG tablet Take 1 tablet (160 mg) by mouth daily 90 tablet 3     metoprolol (TOPROL-XL) 50 MG 24 hr tablet Take 50 mg by mouth daily.       multivitamin, therapeutic with minerals (MULTI-VITAMIN) TABS Take 1 tablet by mouth daily       Omega-3 Fatty Acids (OMEGA 3 PO) Take by mouth daily 180 EPA/ 20 DHH       Omeprazole 20 MG tablet Take 20 mg by mouth as needed        rosuvastatin (CRESTOR) 40 MG tablet Take 40 mg by mouth daily       Ubiquinol 100 MG CAPS Take by mouth daily       Verapamil HCl 240 MG CP24 Take by mouth daily        warfarin (COUMADIN) 5 MG tablet Take 5 mg by mouth daily          ALLERGIES     Allergies   Allergen Reactions     Penicillin G Hives     hives       PAST MEDICAL HISTORY:  Past Medical History:   Diagnosis Date     AF (atrial fibrillation) (H)     AFIB     Blood clot in the legs     left leg after surgery     Gastro-oesophageal reflux disease      Hodgkins lymphoma (H)     bone marrow biopsy     Hyperlipidemia      Hypertension      PAD (peripheral artery disease) (H)      Unspecified cerebral artery occlusion with cerebral  infarction oct. 2011       PAST SURGICAL HISTORY:  Past Surgical History:   Procedure Laterality Date     APPENDECTOMY       ARTHROPLASTY REVISION HIP  2011    Procedure:ARTHROPLASTY REVISION HIP; RIGHT TOTAL HIP REVISION WITH BONE GRAFT  ; Surgeon:ANGIE HARRINGTON; Location:[facility]     BIOPSY  hodgkins lymphoma    bone marrow biopsy, chemo and radiation     CARDIAC SURGERY      cardiac ablation     COLONOSCOPY       ORTHOPEDIC SURGERY      hip and knee surgeries     ORTHOPEDIC SURGERY      carpal tunnel  right hand       FAMILY HISTORY:  Family History   Problem Relation Age of Onset     Hypertension Mother      Heart Failure Mother      Coronary Artery Disease Mother         CABG     Arrhythmia Mother      Cerebrovascular Disease Father      Heart Failure Father      Colon Cancer Father      Coronary Artery Disease Father         CABG     Coronary Artery Disease Brother         CABG     Arrhythmia Brother        SOCIAL HISTORY:  Social History     Socioeconomic History     Marital status:      Spouse name: None     Number of children: None     Years of education: None     Highest education level: None   Occupational History     None   Social Needs     Financial resource strain: None     Food insecurity:     Worry: None     Inability: None     Transportation needs:     Medical: None     Non-medical: None   Tobacco Use     Smoking status: Former Smoker     Last attempt to quit: 1987     Years since quittin.6     Smokeless tobacco: Never Used   Substance and Sexual Activity     Alcohol use: Yes     Alcohol/week: 0.0 standard drinks     Comment: 0-3 a day  ( beer, wine)     Drug use: No     Sexual activity: None   Lifestyle     Physical activity:     Days per week: None     Minutes per session: None     Stress: None   Relationships     Social connections:     Talks on phone: None     Gets together: None     Attends Synagogue service: None     Active member of club or organization: None      "Attends meetings of clubs or organizations: None     Relationship status: None     Intimate partner violence:     Fear of current or ex partner: None     Emotionally abused: None     Physically abused: None     Forced sexual activity: None   Other Topics Concern     Parent/sibling w/ CABG, MI or angioplasty before 65F 55M? Not Asked      Service Not Asked     Blood Transfusions Not Asked     Caffeine Concern No     Comment: 2-3 a day     Occupational Exposure Not Asked     Hobby Hazards Not Asked     Sleep Concern Yes     Comment: depends     Stress Concern Not Asked     Weight Concern No     Comment: weight loss     Special Diet No     Back Care Not Asked     Exercise No     Comment: limited due to knee pain     Bike Helmet Not Asked     Seat Belt Yes     Self-Exams Not Asked   Social History Narrative     None       Review of Systems:  Skin:  Positive for lumps or bumps;scaling psoriasis on legs    Eyes:  Positive for glasses    ENT:  Negative      Respiratory:  Negative       Cardiovascular:    Positive for;edema(occasional. improved with walking)    Gastroenterology: Negative      Genitourinary:  not assessed      Musculoskeletal:  Positive for joint pain    Neurologic:  Positive for numbness or tingling of feet    Psychiatric:  Negative      Heme/Lymph/Imm:  Negative      Endocrine:  Negative        Physical Exam:  Vitals: /88   Pulse 84   Ht 1.74 m (5' 8.5\")   Wt 94.8 kg (209 lb)   BMI 31.32 kg/m      Constitutional:  cooperative, alert and oriented, well developed, well nourished, in no acute distress        Skin:  warm and dry to the touch, no apparent skin lesions or masses noted          Head:  normocephalic, no masses or lesions        Eyes:  pupils equal and round, conjunctivae and lids unremarkable, sclera white, no xanthalasma, EOMS intact, no nystagmus        Lymph:      ENT:  no pallor or cyanosis, dentition good        Neck:  carotid pulses are full and equal bilaterally, JVP " normal, no carotid bruit        Respiratory:  normal breath sounds, clear to auscultation, normal A-P diameter, normal symmetry, normal respiratory excursion, no use of accessory muscles         Cardiac: normal S1 and S2;no S3 or S4;apical impulse not displaced irregularly irregular rhythm   no presence of murmur          pulses full and equal, no bruits auscultated                                        GI:  abdomen soft, non-tender, BS normoactive, no mass, no HSM, no bruits obese      Extremities and Muscular Skeletal:  no deformities, clubbing, cyanosis, erythema observed;no edema   varicose vein          Neurological:  no gross motor deficits;affect appropriate        Psych:  Alert and Oriented x 3        CC  [facility]  [address]  [place] [address]

## 2019-11-19 NOTE — PROGRESS NOTES
Service Date: 11/19/2019      PRIMARY CARE PHYSICIAN:  Dr. Golden Chacko.      HISTORY OF PRESENT ILLNESS:  I again had the pleasure of seeing your patient, Jonny Goldberg, at [facility] in [place] for evaluation of chronic atrial fibrillation and mixed hyperlipidemia.  The patient has had hypertriglyceridemia for many years.  Initially he was placed on Lipitor and then changed to Crestor in 2014.  His triglycerides were greater than 300, HDL over 100 and LDL in the 60s on this regimen.  He was tried on Zetia which had little effect on his triglycerides and the patient felt unwell, sick and quite fatigued as well as possible myalgias with Zetia.  This was then changed to fenofibrate in 2015 with excellent control since that time.  Previous MRI of his brain found some right carotid narrowing and ultrasound in 04/2014 showing 50%-69% stenosis of the right ICA and less than 50% stenosis of the left ICA.  He has had a previous normal stress echo in 11/2011.  The patient was found at that time to be in atrial fibrillation.  He has had a history of possible atrial flutter ablation in the early 1980s.  He had a small embolic stroke prior to that diagnosis and has remained on warfarin anticoagulation since then.  He has a history of hypertension which has been under reasonably good control.  He has been treated for Raynaud's and hypertension with verapamil.  The patient has significant venous varicosities due to his previous job as a .  He had right hip replacement and left total knee arthroplasty due to his previous work.  He was placed on chlorthalidone for hypertension and peripheral edema.  The patient denies syncope or presyncope.  He had a history of Hodgkin's lymphoma treated in 2000 by Dr. Segura and managed with a 9-month course of chemotherapy and radiation without recurrence.  The patient is attempting to lose weight and maintain a normal sugar by reducing his carbohydrate intake.  He  has a tendency to not exercise during the summer months but instead fish.  In the winter he walks around [facility] [place] in 75 minutes and denies angina or shortness of breath.  He denies palpitations, syncope or presyncope.  INRs are kept 2-2.5.  His most recent INR is 2.7 on 11/15.  On  his total cholesterol was 165, triglycerides 135, HDL 41, VLDL 27 and LDL 97.  Electrolytes were also normal.  On  his total cholesterol was 156, triglycerides 126, HDL 46, LDL 85.      PHYSICAL EXAMINATION:  As listed below.      ASSESSMENT:   1.  Diana Moreira is a pleasant 74-year-old male with a history of mixed hyperlipidemia that has remained stable on his current medications.  He denies myalgias or myopathy.  His triglycerides are well controlled.  We use fenofibrate to prevent pancreatitis.  We have again discussed the need for aerobic exercise and he is walking on a daily basis.  Hemoglobin A1c has been just below 6 and he is endeavoring to lower his carbohydrate intake.   2.  Chronic atrial fibrillation.  The patient remains on warfarin without bleeding diathesis.  His heart rate is well controlled.   3.  Borderline systolic and diastolic hypertension being managed by his primary care office.  His numbers are a bit high and I have suggested again to him a low-salt diet.  He will discuss this with his primary care physician.      It is my pleasure to assist in the care of Diana Moreira.  I will see him again in 1 year or earlier on a p.r.n. basis.  All his questions were answered to his satisfaction.      cc:   Golden Chacko MD    [facility]    [address], [address] [place] [address]  [address]         BAN FELIZ MD, FACC             D: 2019   T: 2019   MT: TOMASA      Name:     DIANA MOREIRA   MRN:      [id number]        Account:      [id number]   :      1945           Service Date: 2019      Document: [id number]

## 2019-11-19 NOTE — LETTER
11/19/2019      [facility] [address]  [facility] [address]      RE: Jonny Goldberg       Dear Colleague,    I had the pleasure of seeing Jonny Goldberg in the [facility].    Service Date: 11/19/2019      PRIMARY CARE PHYSICIAN:  Dr. Golden Chacko.      HISTORY OF PRESENT ILLNESS:  I again had the pleasure of seeing your patient, Jonny Goldberg, at [facility] in [place] for evaluation of chronic atrial fibrillation and mixed hyperlipidemia.  The patient has had hypertriglyceridemia for many years.  Initially he was placed on Lipitor and then changed to Crestor in 2014.  His triglycerides were greater than 300, HDL over 100 and LDL in the 60s on this regimen.  He was tried on Zetia which had little effect on his triglycerides and the patient felt unwell, sick and quite fatigued as well as possible myalgias with Zetia.  This was then changed to fenofibrate in 2015 with excellent control since that time.  Previous MRI of his brain found some right carotid narrowing and ultrasound in 04/2014 showing 50%-69% stenosis of the right ICA and less than 50% stenosis of the left ICA.  He has had a previous normal stress echo in 11/2011.  The patient was found at that time to be in atrial fibrillation.  He has had a history of possible atrial flutter ablation in the early 1980s.  He had a small embolic stroke prior to that diagnosis and has remained on warfarin anticoagulation since then.  He has a history of hypertension which has been under reasonably good control.  He has been treated for Raynaud's and hypertension with verapamil.  The patient has significant venous varicosities due to his previous job as a .  He had right hip replacement and left total knee arthroplasty due to his previous work.  He was placed on chlorthalidone for hypertension and peripheral edema.  The patient denies syncope or presyncope.  He had a  history of Hodgkin's lymphoma treated in 2000 by Dr. Segura and managed with a 9-month course of chemotherapy and radiation without recurrence.  The patient is attempting to lose weight and maintain a normal sugar by reducing his carbohydrate intake.  He has a tendency to not exercise during the summer months but instead fish.  In the winter he walks around [facility] in 75 minutes and denies angina or shortness of breath.  He denies palpitations, syncope or presyncope.  INRs are kept 2-2.5.  His most recent INR is 2.7 on 11/15.  On 11/11 his total cholesterol was 165, triglycerides 135, HDL 41, VLDL 27 and LDL 97.  Electrolytes were also normal.  On 05/01 his total cholesterol was 156, triglycerides 126, HDL 46, LDL 85.      PHYSICAL EXAMINATION:  As listed below.      ASSESSMENT:   1.  Jonny Goldberg is a pleasant 74-year-old male with a history of mixed hyperlipidemia that has remained stable on his current medications.  He denies myalgias or myopathy.  His triglycerides are well controlled.  We use fenofibrate to prevent pancreatitis.  We have again discussed the need for aerobic exercise and he is walking on a daily basis.  Hemoglobin A1c has been just below 6 and he is endeavoring to lower his carbohydrate intake.   2.  Chronic atrial fibrillation.  The patient remains on warfarin without bleeding diathesis.  His heart rate is well controlled.   3.  Borderline systolic and diastolic hypertension being managed by his primary care office.  His numbers are a bit high and I have suggested again to him a low-salt diet.  He will discuss this with his primary care physician.      It is my pleasure to assist in the care of Jonny Goldberg.  I will see him again in 1 year or earlier on a p.r.n. basis.  All his questions were answered to his satisfaction.      cc:   Golden Chacko MD    [facility]    [address], [address]    [place]  [address]         BAN FELIZ MD, FAC              D: 2019   T: 2019   MT: LJ      Name:     DIANA MOREIRA   MRN:      [id number]        Account:      [id number]   :      1945           Service Date: 2019      Document: [id number]           Outpatient Encounter Medications as of 2019   Medication Sig Dispense Refill     ALPHA LIPOIC ACID PO Take 600 mg by mouth daily       ascorbic acid (VITAMIN C) 1000 MG TABS Take 1,000 mg by mouth daily       chlorthalidone (HYGROTON) 50 MG tablet daily       Cholecalciferol (VITAMIN D) 400 UNITS tablet Take 1 tablet by mouth daily       fenofibrate (TRIGLIDE/LOFIBRA) 160 MG tablet Take 1 tablet (160 mg) by mouth daily 90 tablet 3     metoprolol (TOPROL-XL) 50 MG 24 hr tablet Take 50 mg by mouth daily.       multivitamin, therapeutic with minerals (MULTI-VITAMIN) TABS Take 1 tablet by mouth daily       Omega-3 Fatty Acids (OMEGA 3 PO) Take by mouth daily 180 EPA/ 20 DHH       Omeprazole 20 MG tablet Take 20 mg by mouth as needed        rosuvastatin (CRESTOR) 40 MG tablet Take 40 mg by mouth daily       Ubiquinol 100 MG CAPS Take by mouth daily       Verapamil HCl 240 MG CP24 Take by mouth daily        warfarin (COUMADIN) 5 MG tablet Take 5 mg by mouth daily        No facility-administered encounter medications on file as of 2019.        Again, thank you for allowing me to participate in the care of your patient.      Sincerely,    Boy Jones MD     [facility]

## 2019-11-19 NOTE — LETTER
11/19/2019    [facility] [address]  [place] [address]    RE: Jonny Goldberg       Dear Colleague,    I had the pleasure of seeing Jonny Goldberg in the [facility].    HPI and Plan:   See dictation:[id number]    Orders Placed This Encounter   Procedures     Follow-Up with Cardiologist       No orders of the defined types were placed in this encounter.      There are no discontinued medications.      Encounter Diagnoses   Name Primary?     Chronic atrial fibrillation Yes     Atherosclerotic cerebrovascular disease      Mixed hyperlipidemia      Essential hypertension with goal blood pressure less than 140/90      PAD (peripheral artery disease) (H)        CURRENT MEDICATIONS:  Current Outpatient Medications   Medication Sig Dispense Refill     ALPHA LIPOIC ACID PO Take 600 mg by mouth daily       ascorbic acid (VITAMIN C) 1000 MG TABS Take 1,000 mg by mouth daily       chlorthalidone (HYGROTON) 50 MG tablet daily       Cholecalciferol (VITAMIN D) 400 UNITS tablet Take 1 tablet by mouth daily       fenofibrate (TRIGLIDE/LOFIBRA) 160 MG tablet Take 1 tablet (160 mg) by mouth daily 90 tablet 3     metoprolol (TOPROL-XL) 50 MG 24 hr tablet Take 50 mg by mouth daily.       multivitamin, therapeutic with minerals (MULTI-VITAMIN) TABS Take 1 tablet by mouth daily       Omega-3 Fatty Acids (OMEGA 3 PO) Take by mouth daily 180 EPA/ 20 DHH       Omeprazole 20 MG tablet Take 20 mg by mouth as needed        rosuvastatin (CRESTOR) 40 MG tablet Take 40 mg by mouth daily       Ubiquinol 100 MG CAPS Take by mouth daily       Verapamil HCl 240 MG CP24 Take by mouth daily        warfarin (COUMADIN) 5 MG tablet Take 5 mg by mouth daily          ALLERGIES     Allergies   Allergen Reactions     Penicillin G Hives     hives       PAST MEDICAL HISTORY:  Past Medical History:   Diagnosis Date     AF (atrial fibrillation) (H)     AFIB     Blood clot in the legs      left leg after surgery     Gastro-oesophageal reflux disease      Hodgkins lymphoma (H)     bone marrow biopsy     Hyperlipidemia      Hypertension      PAD (peripheral artery disease) (H)      Unspecified cerebral artery occlusion with cerebral infarction oct. 2011       PAST SURGICAL HISTORY:  Past Surgical History:   Procedure Laterality Date     APPENDECTOMY       ARTHROPLASTY REVISION HIP  2011    Procedure:ARTHROPLASTY REVISION HIP; RIGHT TOTAL HIP REVISION WITH BONE GRAFT  ; Surgeon:ANGIE HARRINGTON; Location:[facility]     BIOPSY  hodgkins lymphoma    bone marrow biopsy, chemo and radiation     CARDIAC SURGERY      cardiac ablation     COLONOSCOPY       ORTHOPEDIC SURGERY      hip and knee surgeries     ORTHOPEDIC SURGERY      carpal tunnel  right hand       FAMILY HISTORY:  Family History   Problem Relation Age of Onset     Hypertension Mother      Heart Failure Mother      Coronary Artery Disease Mother         CABG     Arrhythmia Mother      Cerebrovascular Disease Father      Heart Failure Father      Colon Cancer Father      Coronary Artery Disease Father         CABG     Coronary Artery Disease Brother         CABG     Arrhythmia Brother        SOCIAL HISTORY:  Social History     Socioeconomic History     Marital status:      Spouse name: None     Number of children: None     Years of education: None     Highest education level: None   Occupational History     None   Social Needs     Financial resource strain: None     Food insecurity:     Worry: None     Inability: None     Transportation needs:     Medical: None     Non-medical: None   Tobacco Use     Smoking status: Former Smoker     Last attempt to quit: 1987     Years since quittin.6     Smokeless tobacco: Never Used   Substance and Sexual Activity     Alcohol use: Yes     Alcohol/week: 0.0 standard drinks     Comment: 0-3 a day  ( beer, wine)     Drug use: No     Sexual activity: None   Lifestyle     Physical activity:      "Days per week: None     Minutes per session: None     Stress: None   Relationships     Social connections:     Talks on phone: None     Gets together: None     Attends Jehovah[employer] witness service: None     Active member of club or organization: None     Attends meetings of clubs or organizations: None     Relationship status: None     Intimate partner violence:     Fear of current or ex partner: None     Emotionally abused: None     Physically abused: None     Forced sexual activity: None   Other Topics Concern     Parent/sibling w/ CABG, MI or angioplasty before 65F 55M? Not Asked      Service Not Asked     Blood Transfusions Not Asked     Caffeine Concern No     Comment: 2-3 a day     Occupational Exposure Not Asked     Hobby Hazards Not Asked     Sleep Concern Yes     Comment: depends     Stress Concern Not Asked     Weight Concern No     Comment: weight loss     Special Diet No     Back Care Not Asked     Exercise No     Comment: limited due to knee pain     Bike Helmet Not Asked     Seat Belt Yes     Self-Exams Not Asked   Social History Narrative     None       Review of Systems:  Skin:  Positive for lumps or bumps;scaling psoriasis on legs    Eyes:  Positive for glasses    ENT:  Negative      Respiratory:  Negative       Cardiovascular:    Positive for;edema(occasional. improved with walking)    Gastroenterology: Negative      Genitourinary:  not assessed      Musculoskeletal:  Positive for joint pain    Neurologic:  Positive for numbness or tingling of feet    Psychiatric:  Negative      Heme/Lymph/Imm:  Negative      Endocrine:  Negative        Physical Exam:  Vitals: /88   Pulse 84   Ht 1.74 m (5' 8.5\")   Wt 94.8 kg (209 lb)   BMI 31.32 kg/m       Constitutional:  cooperative, alert and oriented, well developed, well nourished, in no acute distress        Skin:  warm and dry to the touch, no apparent skin lesions or masses noted          Head:  normocephalic, no masses or lesions        Eyes:  " pupils equal and round, conjunctivae and lids unremarkable, sclera white, no xanthalasma, EOMS intact, no nystagmus        Lymph:      ENT:  no pallor or cyanosis, dentition good        Neck:  carotid pulses are full and equal bilaterally, JVP normal, no carotid bruit        Respiratory:  normal breath sounds, clear to auscultation, normal A-P diameter, normal symmetry, normal respiratory excursion, no use of accessory muscles         Cardiac: normal S1 and S2;no S3 or S4;apical impulse not displaced irregularly irregular rhythm   no presence of murmur          pulses full and equal, no bruits auscultated                                        GI:  abdomen soft, non-tender, BS normoactive, no mass, no HSM, no bruits obese      Extremities and Muscular Skeletal:  no deformities, clubbing, cyanosis, erythema observed;no edema   varicose vein          Neurological:  no gross motor deficits;affect appropriate        Psych:  Alert and Oriented x 3        CC  [facility]  [email or web address]  [place] [address]                Thank you for allowing me to participate in the care of your patient.      Sincerely,     Boy Jones MD     [facility]    cc:   [email or web address]  [address]  [place] [address]

## 2019-12-04 DIAGNOSIS — E78.2 MIXED HYPERLIPIDEMIA: ICD-10-CM

## 2019-12-04 RX ORDER — FENOFIBRATE 160 MG/1
160 TABLET ORAL DAILY
Qty: 90 TABLET | Refills: 3 | Status: SHIPPED | OUTPATIENT
Start: 2019-12-04 | End: 2020-12-21

## 2020-05-04 ENCOUNTER — TRANSFERRED RECORDS (OUTPATIENT)
Dept: HEALTH INFORMATION MANAGEMENT | Facility: CLINIC | Age: 75
End: 2020-05-04

## 2020-08-04 ENCOUNTER — TRANSFERRED RECORDS (OUTPATIENT)
Dept: HEALTH INFORMATION MANAGEMENT | Facility: CLINIC | Age: 75
End: 2020-08-04

## 2020-08-04 LAB
ANION GAP SERPL CALCULATED.3IONS-SCNC: 1.9 MMOL/L
BUN SERPL-MCNC: 26 MG/DL
CALCIUM SERPL-MCNC: 9.5 MG/DL
CHLORIDE SERPLBLD-SCNC: 100 MMOL/L
CHOLEST SERPL-MCNC: 185 MG/DL
CO2 SERPL-SCNC: 28 MMOL/L
CREAT SERPL-MCNC: 1.18 MG/DL
ERYTHROCYTE [DISTWIDTH] IN BLOOD BY AUTOMATED COUNT: 13.4 %
GFR SERPL CREATININE-BSD FRML MDRD: 61 ML/MIN/1.73M2
GLUCOSE SERPL-MCNC: 101 MG/DL (ref 70–99)
HCT VFR BLD AUTO: 47 %
HEMOGLOBIN: 15.3 G/DL (ref 13.3–17.7)
MCH RBC QN AUTO: 31.6 PG
MCHC RBC AUTO-ENTMCNC: 32.7 G/DL
MCV RBC AUTO: 96.7 FL
PLATELET # BLD AUTO: 231 10^9/L
POTASSIUM SERPL-SCNC: 3.8 MMOL/L
RBC # BLD AUTO: 4.86 10^12/L
SODIUM SERPL-SCNC: 142 MMOL/L
WBC # BLD AUTO: 5.4 10^9/L

## 2020-12-21 DIAGNOSIS — E78.2 MIXED HYPERLIPIDEMIA: ICD-10-CM

## 2020-12-21 RX ORDER — FENOFIBRATE 160 MG/1
160 TABLET ORAL DAILY
Qty: 90 TABLET | Refills: 0 | Status: SHIPPED | OUTPATIENT
Start: 2020-12-21 | End: 2021-03-15

## 2021-01-13 ENCOUNTER — OFFICE VISIT (OUTPATIENT)
Dept: CARDIOLOGY | Facility: CLINIC | Age: 76
End: 2021-01-13
Payer: COMMERCIAL

## 2021-01-13 VITALS
HEART RATE: 58 BPM | SYSTOLIC BLOOD PRESSURE: 148 MMHG | OXYGEN SATURATION: 97 % | WEIGHT: 217 LBS | BODY MASS INDEX: 32.51 KG/M2 | DIASTOLIC BLOOD PRESSURE: 89 MMHG

## 2021-01-13 DIAGNOSIS — I73.9 PAD (PERIPHERAL ARTERY DISEASE) (H): ICD-10-CM

## 2021-01-13 DIAGNOSIS — I48.20 CHRONIC ATRIAL FIBRILLATION (H): Primary | ICD-10-CM

## 2021-01-13 DIAGNOSIS — R01.1 HEART MURMUR: ICD-10-CM

## 2021-01-13 PROCEDURE — 93000 ELECTROCARDIOGRAM COMPLETE: CPT | Performed by: INTERNAL MEDICINE

## 2021-01-13 PROCEDURE — 99214 OFFICE O/P EST MOD 30 MIN: CPT | Performed by: INTERNAL MEDICINE

## 2021-01-13 RX ORDER — EZETIMIBE 10 MG/1
5 TABLET ORAL
Qty: 90 TABLET | Refills: 11 | Status: SHIPPED | OUTPATIENT
Start: 2021-01-13 | End: 2022-03-08

## 2021-01-13 NOTE — LETTER
1/13/2021      [facility] [address]  [facility] [address]      RE: Jonny Goldberg       Dear Colleague,    I had the pleasure of seeing Jonny Goldberg in the [facility].    Service Date: 01/13/2021      CONSULT INDICATION:  Permanent atrial fibrillation, peripheral arterial disease.      HISTORY OF PRESENT ILLNESS:      I had the opportunity to see patient, Jonny Goldberg, today in Cardiology Clinic for followup.      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      The patient was previously followed by my late colleague, Dr. Jones, and was last seen in clinic on 11/19/2019.  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  When he was last seen by my colleague, Dr. Jones, he had been doing reasonably well and no changes were warranted to his cardiac regimen.      Mr. Goldberg has a history of dyslipidemia and unfortunately, has had adverse reactions to various medications.  He is currently on rosuvastatin 40 mg daily.  Previously, he had been trialled on ezetimibe; however, experienced fatigue and a general feeling of unwell.  Last lipids from his primary care team are notable for an LDL of 91.  The patient does note that his lab draw was done in a period where he was not engaging in healthy lifestyle habits.        At baseline, patient is quite physically active.  He walks 4 miles a day around a [facility].  He denies any associated chest pain,  chest pressure, or any recent change in exertional capacity.  He does note that he has numbness and tingling in his right second lower extremity digit.  He denies any ulcers or lesions.  He denies any pain in his calves or thighs with ambulation or physical exertion, though he does have a chronic baseline venostasis and generalized arthralgias.      The patient is an avid fisherman and spends much of his time outdoors in his shelter.  He does not smoke cigarettes (quit in 1987).  He does not drink alcohol regularly.      ECG today in clinic 01/13/2001 shows atrial fibrillation with left bundle branch block, rate 77 beats per minute.  Findings are similar to an ECG done on 09/21/2015.  Last cardiac evaluation was an exercise stress echocardiogram done on 11/02/2011 that showed borderline LVH, normal LVEF 55%-60%, no evidence of stress-induced wall motion abnormalities.      The patient reports that he has annual carotid ultrasounds done through his primary care team.  He denies any dizziness, lightheadedness or symptoms of presyncope/syncope.      ASSESSMENT, PLAN AND RECOMMENDATIONS:       1.  Peripheral arterial disease.  Carotid ultrasound 04/2014 showed right ICA 50%-69% stenosis, left ICA less than 50% stenosis.  The patient reports that he has had annual carotid ultrasounds done through his primary care team since then.  More recent results are not available for review.  Symptoms of right lower extremity second digit numbness are of unclear significance; however, certainly with his known diagnosis of carotid artery stenosis, he is at risk for lower extremity peripheral arterial disease as well.     On exam, he was noted to have a warm lower extremities bilaterally, though faint DP and PT pulses bilaterally.  He has overlying skin changes consistent with chronic venostasis and also noted to have various collections of varicose veins on his calves bilaterally.  No ulcers seen on limited exam.   2.  Permanent  atrial fibrillation, complicated by prior embolic CVA, on warfarin.   3.  Dyslipidemia, complicated by adverse reactions to lipid medications including ezetimibe.   4.  Hypertension, blood pressure in clinic 148/89 mmHg.  The patient reports that his blood pressures generally are much better controlled at home.   5.  Chronic left bundle branch block.   6.  Overweight (BMI 32.5 kg/m2).     -- Discussed options for further evaluation and management.   -- Will obtain bilateral exercise ABIs.   -- TTE.   -- Advised the patient to monitor his blood pressures at home and to notify our clinic if his blood pressures are consistently over 130/80 mmHg.  Tentatively, we would plan on starting lisinopril 5 mg daily if needed.   -- Will start ezetimibe 5 mg every Monday, Wednesday, and Friday.   -- Generally, would have patient obtain fasting lipids with our clinic; however, the patient has been having his lipids followed by his primary care team and prefers to continue to do so.  Recommended that he have fasting lipids done in a few months and to alert our clinic with results.   -- Continue cardiac regimen otherwise including warfarin, verapamil 240 mg daily, rosuvastatin 40 mg daily, omega-3 fatty acids, metoprolol succinate 50 mg daily, chlorthalidone 50 mg daily, fenofibrate 160 mg daily.   -- Follow up in 1 year or sooner as needed.      Thank you for allowing our team to participate in the care of patient, Diana Moreira.  Please do not hesitate to page or call with any questions or concerns.      Wili Guajardo MD, [facility]  Cardiology  Text Page   2021    D: 2021   T: 2021   MT: JAQUI      Name:     DIANA MOREIRA   MRN:      [id number]        Account:      [id number]   :      1945           Service Date: 2021      Document: [id number]      Outpatient Encounter Medications as of 2021   Medication Sig Dispense Refill     ALPHA LIPOIC ACID PO Take 600 mg by mouth daily        ascorbic acid (VITAMIN C) 1000 MG TABS Take 1,000 mg by mouth daily       chlorthalidone (HYGROTON) 50 MG tablet daily       Cholecalciferol (VITAMIN D) 400 UNITS tablet Take 1 tablet by mouth daily       ezetimibe (ZETIA) 10 MG tablet Take 0.5 tablets (5 mg) by mouth Every Mon, Wed, Fri Morning 90 tablet 11     fenofibrate (TRIGLIDE/LOFIBRA) 160 MG tablet Take 1 tablet (160 mg) by mouth daily 90 tablet 0     metoprolol (TOPROL-XL) 50 MG 24 hr tablet Take 50 mg by mouth daily.       multivitamin, therapeutic with minerals (MULTI-VITAMIN) TABS Take 1 tablet by mouth daily       Omega-3 Fatty Acids (OMEGA 3 PO) Take by mouth daily 180 EPA/ 20 DHH       Omeprazole 20 MG tablet Take 20 mg by mouth as needed        rosuvastatin (CRESTOR) 40 MG tablet Take 40 mg by mouth daily       Ubiquinol 100 MG CAPS Take by mouth daily       Verapamil HCl 240 MG CP24 Take by mouth daily        warfarin (COUMADIN) 5 MG tablet Take 5 mg by mouth daily        No facility-administered encounter medications on file as of 1/13/2021.      Again, thank you for allowing me to participate in the care of your patient.      Sincerely,    Wili Guajardo MD   [facility]

## 2021-01-13 NOTE — LETTER
1/13/2021    [facility] [address]  [facility] [address]    RE: Jonny Goldberg       Dear Colleague,    I had the pleasure of seeing Jonny Goldberg in the [facility].        Cardiology Clinic Progress Note:    January 13, 2021   Patient Name: Jonny Goldberg  Patient MRN: [id number]     Consult reason: PAD, atrial fibrillation    HPI and Assessment and Plan/Recommendations:    Please see dictation: [id number]    Thank you for allowing our team to participate in the care of Jonny Goldberg.  Please do not hesitate to call or page me with any questions or concerns.    Sincerely,     Wili Guajardo MD, [facility] [place]  Cardiology  Text Page   January 13, 2021    Past Medical History:     Patient Active Problem List   Diagnosis     Health Care Home     Mixed hyperlipidemia     PAD (peripheral artery disease) (H)     Cerebrovascular accident (H)     Chronic atrial fibrillation (H)     Hypertension     Atherosclerotic cerebrovascular disease     Essential hypertension with goal blood pressure less than 140/90       Past Surgical History:   Past Surgical History:   Procedure Laterality Date     APPENDECTOMY       ARTHROPLASTY REVISION HIP  12/27/2011    Procedure:ARTHROPLASTY REVISION HIP; RIGHT TOTAL HIP REVISION WITH BONE GRAFT  ; Surgeon:ANGIE HARRINGTON; Location:[facility] OR     BIOPSY  hodgkins lymphoma    bone marrow biopsy, chemo and radiation     CARDIAC SURGERY      cardiac ablation     COLONOSCOPY       ORTHOPEDIC SURGERY      hip and knee surgeries     ORTHOPEDIC SURGERY      carpal tunnel  right hand       Medications (outpatient):  Current Outpatient Medications   Medication Sig Dispense Refill     ALPHA LIPOIC ACID PO Take 600 mg by mouth daily       ascorbic acid (VITAMIN C) 1000 MG TABS Take 1,000 mg by mouth daily       chlorthalidone (HYGROTON) 50 MG tablet daily       Cholecalciferol (VITAMIN D) 400 UNITS tablet Take 1 tablet by  mouth daily       fenofibrate (TRIGLIDE/LOFIBRA) 160 MG tablet Take 1 tablet (160 mg) by mouth daily 90 tablet 0     metoprolol (TOPROL-XL) 50 MG 24 hr tablet Take 50 mg by mouth daily.       multivitamin, therapeutic with minerals (MULTI-VITAMIN) TABS Take 1 tablet by mouth daily       Omega-3 Fatty Acids (OMEGA 3 PO) Take by mouth daily 180 EPA/ 20 DHH       Omeprazole 20 MG tablet Take 20 mg by mouth as needed        rosuvastatin (CRESTOR) 40 MG tablet Take 40 mg by mouth daily       Ubiquinol 100 MG CAPS Take by mouth daily       Verapamil HCl 240 MG CP24 Take by mouth daily        warfarin (COUMADIN) 5 MG tablet Take 5 mg by mouth daily          Allergies:  Allergies   Allergen Reactions     Penicillin G Hives     hives       Social History:   History   Drug Use No      History   Smoking Status     Former Smoker     Quit date: 4/5/1987   Smokeless Tobacco     Never Used     Social History    Substance and Sexual Activity      Alcohol use: Yes        Alcohol/week: 0.0 standard drinks        Comment: 0-3 a day  ( beer, wine)       Family History:  Family History   Problem Relation Age of Onset     Hypertension Mother      Heart Failure Mother      Coronary Artery Disease Mother         CABG     Arrhythmia Mother      Cerebrovascular Disease Father      Heart Failure Father      Colon Cancer Father      Coronary Artery Disease Father         CABG     Coronary Artery Disease Brother         CABG     Arrhythmia Brother        Review of Systems:   A complete review of systems was negative except as mentioned in the History of Present Illness.     Objective & Physical Exam:  There were no vitals taken for this visit.  Wt Readings from Last 2 Encounters:   11/19/19 94.8 kg (209 lb)   10/30/18 95.3 kg (210 lb)     There is no height or weight on file to calculate BMI.   There is no height or weight on file to calculate BSA.    Constitutional: appears stated age, in no apparent distress, appears to be well  nourished  Eyes: sclera anicteric, conjunctiva normal  ENT: normocephalic, without obvious abnormality, atraumatic  Pulmonary: clear to auscultation bilaterally, no wheezes, no rales, no increased work of breathing  Cardiovascular: JVP normal, regular rate, irregular rhythm, 2/6 JUNIOR at the RUSB, 1+ BLE edema, feet are warm but with weak 1-2+ DP and PT pulses bilaterally  Gastrointestinal: abdominal exam benign  Neurologic: awake, alert, face symmetrical, moves all extremities  Skin: BLE with mild mottling, overlying skin changes consistent with chronic venous stasis, no ulcers on feet or toes  Psychiatric: affect is normal, answers questions appropriately, oriented to self and place    Data reviewed:  Lab Results   Component Value Date    WBC 6.8 12/29/2011    RBC 3.91 (L) 12/29/2011    HGB 12.6 (L) 12/30/2011    HCT 37.4 (L) 12/29/2011    MCV 96 12/29/2011    MCH 32.2 12/29/2011    MCHC 33.7 12/29/2011    RDW 12.9 12/29/2011     12/30/2011     Sodium   Date Value Ref Range Status   10/19/2018 140 134 - 144 mmol/L Final     Potassium   Date Value Ref Range Status   10/19/2018 3.9 3.5 - 5.2 mmol/L Final     Chloride   Date Value Ref Range Status   10/19/2018 99 96 - 106 mmol/L Final     Carbon Dioxide   Date Value Ref Range Status   10/19/2018 28 20 - 29 mmol/L Final     Anion Gap   Date Value Ref Range Status   12/30/2011 9 6 - 17 mmol/L Final     Glucose   Date Value Ref Range Status   10/19/2018 103 (A) 65 - 99 mg/dL Final     Urea Nitrogen   Date Value Ref Range Status   10/19/2018 21 8 - 27 mg/dL Final     Creatinine   Date Value Ref Range Status   10/19/2018 1.07 0.76 - 1.27 mg/dL Final     GFR Estimate   Date Value Ref Range Status   10/19/2018 69 60 ml/min/1.73m2 Final     Calcium   Date Value Ref Range Status   10/19/2018 9.6 8.6 - 10.2 mg/dL Final     Bilirubin Total   Date Value Ref Range Status   10/19/2018 0.7 0.0 - 1.2 mg/dL Final     Alkaline Phosphatase   Date Value Ref Range Status    10/19/2018 37 (A) 39 - 117 U/L Final     ALT   Date Value Ref Range Status   10/19/2018 24 44 U/L Final     AST   Date Value Ref Range Status   10/19/2018 35 40 U/L Final     Recent Labs   Lab Test 10/18/18 10/17/17   CHOL 155 168   HDL 43 47   LDL 81 98   TRIG 154* 115      Lab Results   Component Value Date    A1C 6.1 10/17/2017            Thank you for allowing me to participate in the care of your patient.      Sincerely,     Wili Guajardo MD     [facility]    cc:   No referring provider defined for this encounter.

## 2021-01-13 NOTE — PROGRESS NOTES
Cardiology Clinic Progress Note:    January 13, 2021   Patient Name: Jonny Goldberg  Patient MRN: [id number]     Consult reason: PAD, atrial fibrillation    HPI and Assessment and Plan/Recommendations:    Please see dictation: [id number]    Thank you for allowing our team to participate in the care of Jonny Goldberg.  Please do not hesitate to call or page me with any questions or concerns.    Sincerely,     Wili Guajardo MD, [facility]  Cardiology  Text Page   January 13, 2021    Past Medical History:     Patient Active Problem List   Diagnosis     Health Care Home     Mixed hyperlipidemia     PAD (peripheral artery disease) (H)     Cerebrovascular accident (H)     Chronic atrial fibrillation (H)     Hypertension     Atherosclerotic cerebrovascular disease     Essential hypertension with goal blood pressure less than 140/90       Past Surgical History:   Past Surgical History:   Procedure Laterality Date     APPENDECTOMY       ARTHROPLASTY REVISION HIP  12/27/2011    Procedure:ARTHROPLASTY REVISION HIP; RIGHT TOTAL HIP REVISION WITH BONE GRAFT  ; Surgeon:ANGIE HARRINGTON; Location:[facility] OR     BIOPSY  hodgkins lymphoma    bone marrow biopsy, chemo and radiation     CARDIAC SURGERY      cardiac ablation     COLONOSCOPY       ORTHOPEDIC SURGERY      hip and knee surgeries     ORTHOPEDIC SURGERY      carpal tunnel  right hand       Medications (outpatient):  Current Outpatient Medications   Medication Sig Dispense Refill     ALPHA LIPOIC ACID PO Take 600 mg by mouth daily       ascorbic acid (VITAMIN C) 1000 MG TABS Take 1,000 mg by mouth daily       chlorthalidone (HYGROTON) 50 MG tablet daily       Cholecalciferol (VITAMIN D) 400 UNITS tablet Take 1 tablet by mouth daily       fenofibrate (TRIGLIDE/LOFIBRA) 160 MG tablet Take 1 tablet (160 mg) by mouth daily 90 tablet 0     metoprolol (TOPROL-XL) 50 MG 24 hr tablet Take 50 mg by mouth daily.       multivitamin, therapeutic with minerals (MULTI-VITAMIN)  TABS Take 1 tablet by mouth daily       Omega-3 Fatty Acids (OMEGA 3 PO) Take by mouth daily 180 EPA/ 20 DHH       Omeprazole 20 MG tablet Take 20 mg by mouth as needed        rosuvastatin (CRESTOR) 40 MG tablet Take 40 mg by mouth daily       Ubiquinol 100 MG CAPS Take by mouth daily       Verapamil HCl 240 MG CP24 Take by mouth daily        warfarin (COUMADIN) 5 MG tablet Take 5 mg by mouth daily          Allergies:  Allergies   Allergen Reactions     Penicillin G Hives     hives       Social History:   History   Drug Use No      History   Smoking Status     Former Smoker     Quit date: 4/5/1987   Smokeless Tobacco     Never Used     Social History    Substance and Sexual Activity      Alcohol use: Yes        Alcohol/week: 0.0 standard drinks        Comment: 0-3 a day  ( beer, wine)       Family History:  Family History   Problem Relation Age of Onset     Hypertension Mother      Heart Failure Mother      Coronary Artery Disease Mother         CABG     Arrhythmia Mother      Cerebrovascular Disease Father      Heart Failure Father      Colon Cancer Father      Coronary Artery Disease Father         CABG     Coronary Artery Disease Brother         CABG     Arrhythmia Brother        Review of Systems:   A complete review of systems was negative except as mentioned in the History of Present Illness.     Objective & Physical Exam:  There were no vitals taken for this visit.  Wt Readings from Last 2 Encounters:   11/19/19 94.8 kg (209 lb)   10/30/18 95.3 kg (210 lb)     There is no height or weight on file to calculate BMI.   There is no height or weight on file to calculate BSA.    Constitutional: appears stated age, in no apparent distress, appears to be well nourished  Eyes: sclera anicteric, conjunctiva normal  ENT: normocephalic, without obvious abnormality, atraumatic  Pulmonary: clear to auscultation bilaterally, no wheezes, no rales, no increased work of breathing  Cardiovascular: JVP normal, regular rate,  irregular rhythm, 2/6 JUNIOR at the RUSB, 1+ BLE edema, feet are warm but with weak 1-2+ DP and PT pulses bilaterally  Gastrointestinal: abdominal exam benign  Neurologic: awake, alert, face symmetrical, moves all extremities  Skin: BLE with mild mottling, overlying skin changes consistent with chronic venous stasis, no ulcers on feet or toes  Psychiatric: affect is normal, answers questions appropriately, oriented to self and place    Data reviewed:  Lab Results   Component Value Date    WBC 6.8 12/29/2011    RBC 3.91 (L) 12/29/2011    HGB 12.6 (L) 12/30/2011    HCT 37.4 (L) 12/29/2011    MCV 96 12/29/2011    MCH 32.2 12/29/2011    MCHC 33.7 12/29/2011    RDW 12.9 12/29/2011     12/30/2011     Sodium   Date Value Ref Range Status   10/19/2018 140 134 - 144 mmol/L Final     Potassium   Date Value Ref Range Status   10/19/2018 3.9 3.5 - 5.2 mmol/L Final     Chloride   Date Value Ref Range Status   10/19/2018 99 96 - 106 mmol/L Final     Carbon Dioxide   Date Value Ref Range Status   10/19/2018 28 20 - 29 mmol/L Final     Anion Gap   Date Value Ref Range Status   12/30/2011 9 6 - 17 mmol/L Final     Glucose   Date Value Ref Range Status   10/19/2018 103 (A) 65 - 99 mg/dL Final     Urea Nitrogen   Date Value Ref Range Status   10/19/2018 21 8 - 27 mg/dL Final     Creatinine   Date Value Ref Range Status   10/19/2018 1.07 0.76 - 1.27 mg/dL Final     GFR Estimate   Date Value Ref Range Status   10/19/2018 69 60 ml/min/1.73m2 Final     Calcium   Date Value Ref Range Status   10/19/2018 9.6 8.6 - 10.2 mg/dL Final     Bilirubin Total   Date Value Ref Range Status   10/19/2018 0.7 0.0 - 1.2 mg/dL Final     Alkaline Phosphatase   Date Value Ref Range Status   10/19/2018 37 (A) 39 - 117 U/L Final     ALT   Date Value Ref Range Status   10/19/2018 24 44 U/L Final     AST   Date Value Ref Range Status   10/19/2018 35 40 U/L Final     Recent Labs   Lab Test 10/18/18 10/17/17   CHOL 155 168   HDL 43 47   LDL 81 98   TRIG 154*  115      Lab Results   Component Value Date    A1C 6.1 10/17/2017

## 2021-01-13 NOTE — PROGRESS NOTES
Service Date: 01/13/2021      CONSULT INDICATION:  Permanent atrial fibrillation, peripheral arterial disease.      HISTORY OF PRESENT ILLNESS:      I had the opportunity to see patient, Jonny Goldberg, today in Cardiology Clinic for followup.      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      The patient was previously followed by my late colleague, Dr. Jones, and was last seen in clinic on 11/19/2019.  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  When he was last seen by my colleague, Dr. Jones, he had been doing reasonably well and no changes were warranted to his cardiac regimen.      Mr. Goldberg has a history of dyslipidemia and unfortunately, has had adverse reactions to various medications.  He is currently on rosuvastatin 40 mg daily.  Previously, he had been trialled on ezetimibe; however, experienced fatigue and a general feeling of unwell.  Last lipids from his primary care team are notable for an LDL of 91.  The patient does note that his lab draw was done in a period where he was not engaging in healthy lifestyle habits.        At baseline, patient is quite physically active.  He walks 4 miles a day around a [facility].  He denies any associated chest pain, chest pressure, or any recent change in exertional capacity.  He does note that he has numbness and tingling in his right second lower extremity digit.  He denies any ulcers or lesions.  He denies any pain in his calves or thighs with ambulation or  physical exertion, though he does have a chronic baseline venostasis and generalized arthralgias.      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  He does not smoke cigarettes (quit in 1987).  He does not drink alcohol regularly.      ECG today in clinic 01/13/2001 shows atrial fibrillation with left bundle branch block, rate 77 beats per minute.  Findings are similar to an ECG done on 09/21/2015.  Last cardiac evaluation was an exercise stress echocardiogram done on 11/02/2011 that showed borderline LVH, normal LVEF 55%-60%, no evidence of stress-induced wall motion abnormalities.      The patient reports that he has annual carotid ultrasounds done through his primary care team.  He denies any dizziness, lightheadedness or symptoms of presyncope/syncope.      ASSESSMENT, PLAN AND RECOMMENDATIONS:       1.  Peripheral arterial disease.  Carotid ultrasound 04/2014 showed right ICA 50%-69% stenosis, left ICA less than 50% stenosis.  The patient reports that he has had annual carotid ultrasounds done through his primary care team since then.  More recent results are not available for review.  Symptoms of right lower extremity second digit numbness are of unclear significance; however, certainly with his known diagnosis of carotid artery stenosis, he is at risk for lower extremity peripheral arterial disease as well.     On exam, he was noted to have a warm lower extremities bilaterally, though faint DP and PT pulses bilaterally.  He has overlying skin changes consistent with chronic venostasis and also noted to have various collections of varicose veins on his calves bilaterally.  No ulcers seen on limited exam.   2.  Permanent atrial fibrillation, complicated by prior embolic CVA, on warfarin.   3.  Dyslipidemia, complicated by adverse reactions to lipid medications including ezetimibe.   4.  Hypertension, blood pressure in clinic 148/89 mmHg.  The patient reports that his  blood pressures generally are much better controlled at home.   5.  Chronic left bundle branch block.   6.  Overweight (BMI 32.5 kg/m2).     -- Discussed options for further evaluation and management.   -- Will obtain bilateral exercise ABIs.   -- TTE.   -- Advised the patient to monitor his blood pressures at home and to notify our clinic if his blood pressures are consistently over 130/80 mmHg.  Tentatively, we would plan on starting lisinopril 5 mg daily if needed.   -- Will start ezetimibe 5 mg every Monday, Wednesday, and Friday.   -- Generally, would have patient obtain fasting lipids with our clinic; however, the patient has been having his lipids followed by his primary care team and prefers to continue to do so.  Recommended that he have fasting lipids done in a few months and to alert our clinic with results.   -- Continue cardiac regimen otherwise including warfarin, verapamil 240 mg daily, rosuvastatin 40 mg daily, omega-3 fatty acids, metoprolol succinate 50 mg daily, chlorthalidone 50 mg daily, fenofibrate 160 mg daily.   -- Follow up in 1 year or sooner as needed.      Thank you for allowing our team to participate in the care of patient, Diana Moreira.  Please do not hesitate to page or call with any questions or concerns.      Wili Guajardo MD, [facility]  Cardiology  Text Page   2021        D: 2021   T: 2021   MTPhillip NAILS      Name:     DIANA MOREIRA   MRN:      [id number]        Account:      [id number]   :      1945           Service Date: 2021      Document: [id number]

## 2021-01-13 NOTE — PATIENT INSTRUCTIONS
January 13, 2021    Thank you for allowing our Cardiology team to participate in your care.     Please note the following changes to your heart treatment plan:     Medication changes:   - start ezetimibe 5mg every Monday, Wednesday, and Friday  - monitor your BP at home, if BPs are consistently >130/80, please call the number below, tentatively will plan on starting lisinopril 5mg daily     Tests to be done:  - Exercise DAVID ultrasounds (to evaluate for blockages in your leg arteries)  - Transthoracic echocardiogram (heart ultrasound)  - Obtain fasting cholesterol labs in a few months with PCP, please call the number below with the results when available    Follow up:  - Follow up in 1 year, or sooner as needed.      Please contact our team at [phone] for any questions or concerns.   If you are having a medical emergency, please call 911.       Sincerely,    Wili Guajardo MD, [facility]  Cardiology    [facility] - [facility] - [place]

## 2021-01-14 ENCOUNTER — HOSPITAL ENCOUNTER (OUTPATIENT)
Dept: ULTRASOUND IMAGING | Facility: CLINIC | Age: 76
End: 2021-01-14
Attending: INTERNAL MEDICINE
Payer: COMMERCIAL

## 2021-01-14 ENCOUNTER — HOSPITAL ENCOUNTER (OUTPATIENT)
Dept: CARDIOLOGY | Facility: CLINIC | Age: 76
End: 2021-01-14
Attending: INTERNAL MEDICINE
Payer: COMMERCIAL

## 2021-01-14 DIAGNOSIS — I73.9 PAD (PERIPHERAL ARTERY DISEASE) (H): ICD-10-CM

## 2021-01-14 DIAGNOSIS — R01.1 HEART MURMUR: ICD-10-CM

## 2021-01-14 PROCEDURE — 255N000002 HC RX 255 OP 636: Performed by: INTERNAL MEDICINE

## 2021-01-14 PROCEDURE — 93924 LWR XTR VASC STDY BILAT: CPT | Mod: 26 | Performed by: INTERNAL MEDICINE

## 2021-01-14 PROCEDURE — 93924 LWR XTR VASC STDY BILAT: CPT

## 2021-01-14 PROCEDURE — 999N000208 ECHOCARDIOGRAM COMPLETE

## 2021-01-14 PROCEDURE — 93306 TTE W/DOPPLER COMPLETE: CPT | Mod: 26 | Performed by: INTERNAL MEDICINE

## 2021-01-14 RX ADMIN — HUMAN ALBUMIN MICROSPHERES AND PERFLUTREN 2 ML: 10; .22 INJECTION, SOLUTION INTRAVENOUS at 14:29

## 2021-01-21 ENCOUNTER — TELEPHONE (OUTPATIENT)
Dept: CARDIOLOGY | Facility: CLINIC | Age: 76
End: 2021-01-21

## 2021-01-21 NOTE — TELEPHONE ENCOUNTER
Results of US doppler DAVID with exercise:   CONCLUSIONS:  Right lower extremity:  The right DAVID at rest measures 1.2 which is normal.  The right DAVID after treadmill exercise was 1.4  The response to exercise was normal/noncompressible.  Waveforms: triphasic     Left lower extremity:  The left DAVID at rest measured 1.16 which is normal  The left DAVID after treadmill exercise was 1.44  The response to exercise was normal/noncompressible.  Waveforms: triphasic    Results of Echo:  Interpretation Summary  1. Normal biventricular size and function. Left ventricular ejection fraction  of 55-60%. No segmental wall motion abnormalities noted.  2. The left atrium is severely dilated. The right atrium is mildly dilated.  3. Moderate-severe valvular aortic stenosis with calculated aortic valve area  of 1.0 cm^2 and mean AoV pressure gradient of 22.0 mmHg. LVOT 2.4 cms. SI 31  ml/m2.  4. The rhythm was atrial fibrillation.  No prior study for comparison. Technically adequate study.    ----- Message from Wili Guajardo MD sent at 1/18/2021  6:56 AM CST -----  Results reviewed-- DAVID did not show evidence of significant obstructive PAD. TTE shows evidence of borderline severe aortic stenosis, and so we should get another TTE in about 6 months. Please have him see me for follow up at next available to discuss, can be done as a virtual ([place]) visit if desired. At that time will discuss how BP was also elevated on the TTE, and was elevated in clinic, tentatively will plan to start lisinopril 5 mg daily, will discuss/order lisinopril and TTE at follow up visit.       Call placed to pt to review results and recommendations per Dr. Guajardo - pt scheduled for next available Tele visit with Dr. Guajardo per recommendation.   TEODORA Raymundo RN, BSN. 01/21/21 1:02 PM

## 2021-02-17 ENCOUNTER — VIRTUAL VISIT (OUTPATIENT)
Dept: CARDIOLOGY | Facility: CLINIC | Age: 76
End: 2021-02-17
Payer: COMMERCIAL

## 2021-02-17 DIAGNOSIS — I10 ESSENTIAL HYPERTENSION WITH GOAL BLOOD PRESSURE LESS THAN 140/90: ICD-10-CM

## 2021-02-17 DIAGNOSIS — I35.0 SEVERE AORTIC STENOSIS: Primary | ICD-10-CM

## 2021-02-17 DIAGNOSIS — E78.2 MIXED HYPERLIPIDEMIA: ICD-10-CM

## 2021-02-17 DIAGNOSIS — I67.2 ATHEROSCLEROTIC CEREBROVASCULAR DISEASE: ICD-10-CM

## 2021-02-17 DIAGNOSIS — I48.20 CHRONIC ATRIAL FIBRILLATION (H): ICD-10-CM

## 2021-02-17 PROCEDURE — 99214 OFFICE O/P EST MOD 30 MIN: CPT | Mod: 95 | Performed by: INTERNAL MEDICINE

## 2021-02-17 NOTE — PATIENT INSTRUCTIONS
February 17, 2021    Thank you for allowing our Cardiology team to participate in your care.     Please note the following changes to your heart treatment plan:     Medication changes:   - none  - monitor BPs at home, alert our team if readings are consistently >130/80 mmHg    Tests to be done:  - please send us the results of the cholesterol labs when you see your PCP team in May    Follow up:  - Follow up with [facility] (referral placed)  - Follow up with me in 1 year, or sooner as needed.      Please contact our team at [phone] for any questions or concerns.   If you are having a medical emergency, please call 911.       Sincerely,    Wili Guajardo MD, FACC  Cardiology    [facility] - [place]

## 2021-02-17 NOTE — PROGRESS NOTES
"Service Date: 02/17/2021      CARDIOLOGY CLINIC PROGRESS NOTE      CONSULT INDICATION:  Aortic stenosis.      HISTORY OF PRESENT ILLNESS:      I had the opportunity to speak with the patient, Jonny Goldberg, today in Cardiology Clinic for a virtual telephone visit.  The patient does not have the technical ability to participate in a video visit.  He is also followed by our colleague, Dr. Chacko, with Family Medicine.      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      The patient was previously followed by my late colleague, Dr. Jones, and I had last seen him in followup on 01/13/2021.  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        At baseline, he has been staying relatively physically active in his correction, of course with limitations related to musculoskeletal pain.  He is an avid outdoorsman.  When I had last seen him, he reported that he was doing fine. However, on further inquiry today, he reports that over the last year or so, he has had worsening dyspnea on exertion.  He states that he \"can't get started\" as quickly as he used to.  He denies any chest pain, chest pressure.  He denies any significant dizziness or lightheadedness.  He has been experiencing occasional numbness and tingling in his right lower extremity, second digit, though denies any symptoms consistent with claudication.      ECG 01/13/2021 was personally reviewed and demonstrates atrial " fibrillation with a left bundle branch block, rate 77 beats per minute.      The patient reports that he has annual carotid ultrasounds done through his primary care team.      When I had last seen in clinic on 01/13/2021, we had him undergo a bilateral exercise ABIs which were done on 01/14/2021 and were negative for evidence of PAD.  We also recommended that he monitor his blood pressures closely at home, as we had discussed possibly starting a low-dose lisinopril therapy.  He reports that his blood pressure readings at home have generally been in the 120s/70s mmHg range, with occasional values in the 140s/80s mmHg range.        We had also recommended starting ezetimibe 5 mg every Monday, Wednesday and Friday due to dyslipidemia.  LDL through his primary care team was elevated at 91.  He reports that initially he felt quite ill the day after starting this medication; however, after restarting it several days later, he has been tolerating this without side effects.      We had him undergo a TTE, which was done on 01/14/2021 and was personally reviewed.  It demonstrated LVEF 55%-60%, severe left atrial dilatation, normal RV function.  He was found to have aortic valve parameters consistent with a paradoxical low-flow, low-gradient severe aortic stenosis.  V max 3.2 M/sec, mean gradient 22 mmHg, DI 0.25, consistent with severe aortic stenosis, stroke volume index was low at 31 mL/m2.      ASSESSMENT, PLAN AND RECOMMENDATIONS:     1.  Borderline severe aortic stenosis, symptomatic with decreased exertional capacity over the last year.  TTE 01/14/2021 demonstrated findings consistent with a paradoxical low-flow, low-gradient severe aortic stenosis.  Stroke volume index was low at 31 mL/m2.  DI was 0.25, consistent with severe aortic stenosis.   2.  Permanent atrial fibrillation, complicated by prior embolic CVA, on warfarin.   3.  Dyslipidemia, complicated by prior adverse reactions to lipid medications.   4.   Peripheral arterial disease.  Carotid ultrasound 04/2014 demonstrated right ICA 50%-69% stenosis, left ICA less than 50% stenosis.  He has annual carotid ultrasounds done through his primary care team.   5.  Hypertension.  Blood pressure readings at home, generally in the 120s/70s mmHg range.   6.  Chronic left bundle branch block.   7.  Overweight (BMI 32 kg/m2).     -- Discussed options for further evaluation and management.   -- Since the severity of aortic stenosis is borderline severe, upon reviewing the study, I had initially planned on reassessment of the aortic valve with another TTE in 6 months; however, on further inquiry, patient does endorse gradually worsening dyspnea on exertion, concerning for symptomatic severe aortic stenosis.  The patient reported that his mother recently underwent a TAVR, and he also is aware of other people who have undergone the procedure and have had complications.  He would like to learn more about the procedure, so we will refer him to our colleagues with the Structural Heart team.  The severity of his aortic stenosis seems most consistent with paradoxical low-flow, low-gradient severe aortic stenosis; however, he may benefit from further evaluation with a cardiac CT of the valve for assessment of calcium burden.  Will defer further testing until he visits with the Structural Heart team.   -- Advised him to continue to monitor his blood pressures and to alert our clinic if he is seeing readings consistently over 130/80 mmHg.   -- Continue current cardiac regimen of warfarin, rosuvastatin 40 mg daily, ezetimibe 5 mg every Monday, Wednesday and Friday, fenofibrate 160 mg daily, chlorthalidone 50 mg daily, metoprolol succinate 50 mg daily, verapamil 240 mg daily.   -- The patient is planned for followup with his primary care team in May, at which point he will have lipids done.  Recommended that he contact our clinic when his lab results are available for review.   -- Follow up  in 1 year or sooner as needed.      Thank you for allowing our team to participate in the care of Diana Moreira.  Please do not hesitate to page or call with any questions or concerns.      Wili Guajardo MD, [facility]  Cardiology  Text Page   2021        D: 2021   T: 2021   MT: JAQUI      Name:     DIANA MOREIRA   MRN:      [id number]        Account:      [id number]   :      1945           Service Date: 2021      Document: [id number]

## 2021-02-17 NOTE — PROGRESS NOTES
Jonny is a 75 year old who is being evaluated via a billable telephone visit.      What phone number would you like to be contacted at? [phone] Home phone  How would you like to obtain your AVS? Mail a copy  Phone call duration: 17 minutes    Vitals reported by patient: 2/16/2021  B/P: 122/69 L arm, 122/71 R arm  Weight: 210 lbs    Review Of Systems  Skin: negative  Eyes: negative  Ears/Nose/Throat: negative  Respiratory: No shortness of breath, dyspnea on exertion, cough, or hemoptysis  Cardiovascular: lower extremity edema  Gastrointestinal: negative  Genitourinary: negative  Musculoskeletal: arthritis  Neurologic: negative  Psychiatric: negative  Hematologic/Lymphatic/Immunologic: negative  Endocrine: negative    Reviewed by: Patrica Ford MA      CARDIOLOGY CLINIC VIRTUAL TELEPHONE VISIT NOTE:    Past medical history, social history, family history, medication list, allergies, most recent labs, and additional data, all personally reviewed.     HPI and A/P:    Please see dictation # [id number]    Thank you for allowing our team to participate in the care of this patient. Please do not hesitate to page or call me with any questions or concerns.    Wili Guajardo MD, [facility]  Cardiology  Pager:  [phone]  Text Page   February 17, 2021    Telephone visit duration: 17 min    Past Medical History:     Past Medical History:   Diagnosis Date     AF (atrial fibrillation) (H)     AFIB     Blood clot in the legs     left leg after surgery     Gastro-oesophageal reflux disease      Hodgkins lymphoma (H)     bone marrow biopsy     Hyperlipidemia      Hypertension      PAD (peripheral artery disease) (H)      Unspecified cerebral artery occlusion with cerebral infarction oct. 2011        Past Surgical History:   Past Surgical History:   Procedure Laterality Date     APPENDECTOMY       ARTHROPLASTY REVISION HIP  12/27/2011    Procedure:ARTHROPLASTY REVISION HIP; RIGHT TOTAL HIP REVISION WITH BONE  GRAFT  ; Surgeon:ANGIE HARRINGTON; Location:[facility] OR     BIOPSY  hodgkins lymphoma    bone marrow biopsy, chemo and radiation     CARDIAC SURGERY      cardiac ablation     COLONOSCOPY       ORTHOPEDIC SURGERY      hip and knee surgeries     ORTHOPEDIC SURGERY      carpal tunnel  right hand       Medications (outpatient):  Current Outpatient Medications   Medication Sig Dispense Refill     ALPHA LIPOIC ACID PO Take 600 mg by mouth daily       ascorbic acid (VITAMIN C) 1000 MG TABS Take 1,000 mg by mouth daily       chlorthalidone (HYGROTON) 50 MG tablet daily       Cholecalciferol (VITAMIN D) 400 UNITS tablet Take 1 tablet by mouth daily       ezetimibe (ZETIA) 10 MG tablet Take 0.5 tablets (5 mg) by mouth Every Mon, Wed, Fri Morning 90 tablet 11     fenofibrate (TRIGLIDE/LOFIBRA) 160 MG tablet Take 1 tablet (160 mg) by mouth daily 90 tablet 0     metoprolol (TOPROL-XL) 50 MG 24 hr tablet Take 50 mg by mouth daily.       multivitamin, therapeutic with minerals (MULTI-VITAMIN) TABS Take 1 tablet by mouth daily       Omega-3 Fatty Acids (OMEGA 3 PO) Take by mouth daily 180 EPA/ 20 DHH       Omeprazole 20 MG tablet Take 20 mg by mouth as needed        rosuvastatin (CRESTOR) 40 MG tablet Take 40 mg by mouth daily       Ubiquinol 100 MG CAPS Take by mouth daily       Verapamil HCl 240 MG CP24 Take by mouth daily        warfarin (COUMADIN) 5 MG tablet Take 5 mg by mouth daily          Allergies:  Allergies   Allergen Reactions     Penicillin G Hives     hives       Social History:   History   Drug Use No      History   Smoking Status     Former Smoker     Quit date: 4/5/1987   Smokeless Tobacco     Never Used     Social History    Substance and Sexual Activity      Alcohol use: Yes        Alcohol/week: 0.0 standard drinks        Comment: 0-3 a day  ( beer, wine)       Family History:  Family History   Problem Relation Age of Onset     Hypertension Mother      Heart Failure Mother      Coronary Artery Disease Mother          CABG     Arrhythmia Mother      Cerebrovascular Disease Father      Heart Failure Father      Colon Cancer Father      Coronary Artery Disease Father         CABG     Coronary Artery Disease Brother         CABG     Arrhythmia Brother        Physical exam:  Gen: Alert, oriented, in no acute distress  Pulm: Speaking in full sentences without difficulty, no obvious wheezing appreciated   Psych: Answers questions appropriately, engaging in conversation appropriately    Labs reviewed:  Lab Results   Component Value Date    WBC 6.8 12/29/2011    RBC 3.91 (L) 12/29/2011    HGB 12.6 (L) 12/30/2011    HCT 37.4 (L) 12/29/2011    MCV 96 12/29/2011    MCH 32.2 12/29/2011    MCHC 33.7 12/29/2011    RDW 12.9 12/29/2011     12/30/2011     Sodium   Date Value Ref Range Status   10/19/2018 140 134 - 144 mmol/L Final     Potassium   Date Value Ref Range Status   10/19/2018 3.9 3.5 - 5.2 mmol/L Final     Chloride   Date Value Ref Range Status   10/19/2018 99 96 - 106 mmol/L Final     Carbon Dioxide   Date Value Ref Range Status   10/19/2018 28 20 - 29 mmol/L Final     Anion Gap   Date Value Ref Range Status   12/30/2011 9 6 - 17 mmol/L Final     Glucose   Date Value Ref Range Status   10/19/2018 103 (A) 65 - 99 mg/dL Final     Urea Nitrogen   Date Value Ref Range Status   10/19/2018 21 8 - 27 mg/dL Final     Creatinine   Date Value Ref Range Status   10/19/2018 1.07 0.76 - 1.27 mg/dL Final     GFR Estimate   Date Value Ref Range Status   10/19/2018 69 60 ml/min/1.73m2 Final     Calcium   Date Value Ref Range Status   10/19/2018 9.6 8.6 - 10.2 mg/dL Final     Bilirubin Total   Date Value Ref Range Status   10/19/2018 0.7 0.0 - 1.2 mg/dL Final     Alkaline Phosphatase   Date Value Ref Range Status   10/19/2018 37 (A) 39 - 117 U/L Final     ALT   Date Value Ref Range Status   10/19/2018 24 44 U/L Final     AST   Date Value Ref Range Status   10/19/2018 35 40 U/L Final     Recent Labs   Lab Test 10/18/18 10/17/17   CHOL 155 168    HDL 43 47   LDL 81 98   TRIG 154* 115      Lab Results   Component Value Date    A1C 6.1 10/17/2017        Prior select studies:  Recent Results (from the past 4320 hour(s))   Echocardiogram Complete    Narrative    [id number]  [id number]  [id number]  [id number]^CISCO^RACHID^JOANNE        [facility]  [facility]  Echocardiography Laboratory  [address]  [address]  [place] [address]  Phone [phone]  Fax [phone]        Name: DIANA MOREIRA  MRN: [id number]  : 1945  Study Date: 2021 01:30 PM  Age: 75 yrs  Gender: Male  Patient Location: [facility]  Reason For Study: Heart murmur  Ordering Physician: RACHID PECK  Referring Physician: Golden Chacko  Performed By: Oksana Lopez RDCS     BSA: 2.1 m2  Height: 69 in  Weight: 217 lb  BP: 148/74 mmHg  _____________________________________________________________________________  __        Procedure  Complete Echo Adult. Optison (NDC #[id number]) given intravenously.  _____________________________________________________________________________  __        Interpretation Summary     1. Normal biventricular size and function. Left ventricular ejection fraction  of 55-60%. No segmental wall motion abnormalities noted.  2. The left atrium is severely dilated. The right atrium is mildly dilated.  3. Moderate-severe valvular aortic stenosis with calculated aortic valve area  of 1.0 cm^2 and mean AoV pressure gradient of 22.0 mmHg. LVOT 2.4 cms. SI 31  ml/m2.  4. The rhythm was atrial fibrillation.  No prior study for comparison. Technically adequate study.  _____________________________________________________________________________  __        Left Ventricle  The left ventricle is normal in size. Left ventricular systolic function is  normal. The visual ejection fraction is estimated at 55-60%. Diastolic  function not assessed due to atrial fibrillation. No regional wall motion  abnormalities noted.     Right Ventricle  The  right ventricle is normal in size and function.     Atria  The left atrium is severely dilated. The right atrium is mildly dilated.     Mitral Valve  The mitral valve leaflets appear normal. There is no evidence of stenosis,  fluttering, or prolapse. There is trace mitral regurgitation.     Tricuspid Valve  Normal tricuspid valve. There is trace tricuspid regurgitation.        Aortic Valve  There is mild (1+) aortic regurgitation. Moderate to severe valvular aortic  stenosis. The calculated aortic valve are is 1.0 cm^2. The peak AoV pressure  gradient is 40.0 mmHg. The mean AoV pressure gradient is 22.0 mmHg.     Pulmonic Valve  The pulmonic valve is not well visualized.     Vessels  Mild aortic root dilatation. The ascending aorta is Borderline dilated. IVC  diameter <2.1 cm collapsing >50% with sniff suggests a normal RA pressure of 3  mmHg.     Pericardium  There is no pericardial effusion.     Rhythm  The rhythm was atrial fibrillation.     _____________________________________________________________________________  __  MMode/2D Measurements & Calculations  IVSd: 1.3 cm  LVIDd: 5.2 cm  LVIDs: 4.1 cm  LVPWd: 1.2 cm  FS: 21.4 %  LV mass(C)d: 268.9 grams  LV mass(C)dI: 125.8 grams/m2     Ao root diam: 3.9 cm  asc Aorta Diam: 3.7 cm  LVOT diam: 2.3 cm  LVOT area: 4.3 cm2  LA Volume Index (BP): 59.8 ml/m2  RWT: 0.46        Doppler Measurements & Calculations  MV E max papi: 107.5 cm/sec  MV dec time: 0.13 sec  Ao V2 max: 316.0 cm/sec  Ao max P.0 mmHg  Ao V2 mean: 222.9 cm/sec  Ao mean P.0 mmHg  Ao V2 VTI: 65.5 cm  JACOBY(I,D): 1.0 cm2  JACOBY(V,D): 1.1 cm2     LV V1 max P.4 mmHg  LV V1 max: 77.8 cm/sec  LV V1 VTI: 15.6 cm  SV(LVOT): 66.5 ml  SI(LVOT): 31.1 ml/m2  AV Papi Ratio (DI): 0.25  JACOBY Index (cm2/m2): 0.47  E/E': 10.1  Peak E' Papi: 10.6 cm/sec           _____________________________________________________________________________  __           Report approved by: Brigid Mortensen 2021 03:31  PM

## 2021-02-17 NOTE — LETTER
2/17/2021    [facility] [address]  [place] [address]    RE: Jonny Goldberg       Dear Colleague,    I had the pleasure of seeing Jonny Goldberg in the [facility].    Jonny is a 75 year old who is being evaluated via a billable telephone visit.      What phone number would you like to be contacted at? [phone] Home phone  How would you like to obtain your AVS? Mail a copy  Phone call duration: 17 minutes    Vitals reported by patient: 2/16/2021  B/P: 122/69 L arm, 122/71 R arm  Weight: 210 lbs    Review Of Systems  Skin: negative  Eyes: negative  Ears/Nose/Throat: negative  Respiratory: No shortness of breath, dyspnea on exertion, cough, or hemoptysis  Cardiovascular: lower extremity edema  Gastrointestinal: negative  Genitourinary: negative  Musculoskeletal: arthritis  Neurologic: negative  Psychiatric: negative  Hematologic/Lymphatic/Immunologic: negative  Endocrine: negative    Reviewed by: Patrica Ford MA      CARDIOLOGY CLINIC VIRTUAL TELEPHONE VISIT NOTE:    Past medical history, social history, family history, medication list, allergies, most recent labs, and additional data, all personally reviewed.     HPI and A/P:      Thank you for allowing our team to participate in the care of this patient. Please do not hesitate to page or call me with any questions or concerns.    Wili Guajardo MD, [facility]  Cardiology  Pager:  [phone]  Text Page   February 17, 2021    Telephone visit duration: 17 min    Past Medical History:     Past Medical History:   Diagnosis Date     AF (atrial fibrillation) (H)     AFIB     Blood clot in the legs     left leg after surgery     Gastro-oesophageal reflux disease      Hodgkins lymphoma (H)     bone marrow biopsy     Hyperlipidemia      Hypertension      PAD (peripheral artery disease) (H)      Unspecified cerebral artery occlusion with cerebral  infarction oct. 2011        Past Surgical History:   Past Surgical History:   Procedure Laterality Date     APPENDECTOMY       ARTHROPLASTY REVISION HIP  12/27/2011    Procedure:ARTHROPLASTY REVISION HIP; RIGHT TOTAL HIP REVISION WITH BONE GRAFT  ; Surgeon:ANGIE HARRINGTON; Location:[facility] OR     BIOPSY  hodgkins lymphoma    bone marrow biopsy, chemo and radiation     CARDIAC SURGERY      cardiac ablation     COLONOSCOPY       ORTHOPEDIC SURGERY      hip and knee surgeries     ORTHOPEDIC SURGERY      carpal tunnel  right hand       Medications (outpatient):  Current Outpatient Medications   Medication Sig Dispense Refill     ALPHA LIPOIC ACID PO Take 600 mg by mouth daily       ascorbic acid (VITAMIN C) 1000 MG TABS Take 1,000 mg by mouth daily       chlorthalidone (HYGROTON) 50 MG tablet daily       Cholecalciferol (VITAMIN D) 400 UNITS tablet Take 1 tablet by mouth daily       ezetimibe (ZETIA) 10 MG tablet Take 0.5 tablets (5 mg) by mouth Every Mon, Wed, Fri Morning 90 tablet 11     fenofibrate (TRIGLIDE/LOFIBRA) 160 MG tablet Take 1 tablet (160 mg) by mouth daily 90 tablet 0     metoprolol (TOPROL-XL) 50 MG 24 hr tablet Take 50 mg by mouth daily.       multivitamin, therapeutic with minerals (MULTI-VITAMIN) TABS Take 1 tablet by mouth daily       Omega-3 Fatty Acids (OMEGA 3 PO) Take by mouth daily 180 EPA/ 20 DHH       Omeprazole 20 MG tablet Take 20 mg by mouth as needed        rosuvastatin (CRESTOR) 40 MG tablet Take 40 mg by mouth daily       Ubiquinol 100 MG CAPS Take by mouth daily       Verapamil HCl 240 MG CP24 Take by mouth daily        warfarin (COUMADIN) 5 MG tablet Take 5 mg by mouth daily          Allergies:  Allergies   Allergen Reactions     Penicillin G Hives     hives       Social History:   History   Drug Use No      History   Smoking Status     Former Smoker     Quit date: 4/5/1987   Smokeless Tobacco     Never Used     Social History    Substance and Sexual Activity      Alcohol use: Yes         Alcohol/week: 0.0 standard drinks        Comment: 0-3 a day  ( beer, wine)       Family History:  Family History   Problem Relation Age of Onset     Hypertension Mother      Heart Failure Mother      Coronary Artery Disease Mother         CABG     Arrhythmia Mother      Cerebrovascular Disease Father      Heart Failure Father      Colon Cancer Father      Coronary Artery Disease Father         CABG     Coronary Artery Disease Brother         CABG     Arrhythmia Brother        Physical exam:  Gen: Alert, oriented, in no acute distress  Pulm: Speaking in full sentences without difficulty, no obvious wheezing appreciated   Psych: Answers questions appropriately, engaging in conversation appropriately    Labs reviewed:  Lab Results   Component Value Date    WBC 6.8 12/29/2011    RBC 3.91 (L) 12/29/2011    HGB 12.6 (L) 12/30/2011    HCT 37.4 (L) 12/29/2011    MCV 96 12/29/2011    MCH 32.2 12/29/2011    MCHC 33.7 12/29/2011    RDW 12.9 12/29/2011     12/30/2011     Sodium   Date Value Ref Range Status   10/19/2018 140 134 - 144 mmol/L Final     Potassium   Date Value Ref Range Status   10/19/2018 3.9 3.5 - 5.2 mmol/L Final     Chloride   Date Value Ref Range Status   10/19/2018 99 96 - 106 mmol/L Final     Carbon Dioxide   Date Value Ref Range Status   10/19/2018 28 20 - 29 mmol/L Final     Anion Gap   Date Value Ref Range Status   12/30/2011 9 6 - 17 mmol/L Final     Glucose   Date Value Ref Range Status   10/19/2018 103 (A) 65 - 99 mg/dL Final     Urea Nitrogen   Date Value Ref Range Status   10/19/2018 21 8 - 27 mg/dL Final     Creatinine   Date Value Ref Range Status   10/19/2018 1.07 0.76 - 1.27 mg/dL Final     GFR Estimate   Date Value Ref Range Status   10/19/2018 69 60 ml/min/1.73m2 Final     Calcium   Date Value Ref Range Status   10/19/2018 9.6 8.6 - 10.2 mg/dL Final     Bilirubin Total   Date Value Ref Range Status   10/19/2018 0.7 0.0 - 1.2 mg/dL Final     Alkaline Phosphatase   Date Value Ref Range  Status   10/19/2018 37 (A) 39 - 117 U/L Final     ALT   Date Value Ref Range Status   10/19/2018 24 44 U/L Final     AST   Date Value Ref Range Status   10/19/2018 35 40 U/L Final     Recent Labs   Lab Test 10/18/18 10/17/17   CHOL 155 168   HDL 43 47   LDL 81 98   TRIG 154* 115      Lab Results   Component Value Date    A1C 6.1 10/17/2017        Prior select studies:  Recent Results (from the past 4320 hour(s))   Echocardiogram Complete    Narrative    [id number]  [id number]  [id number]  [id number]^CISCO^RACHID^JOANNE        [facility]  [facility]  Echocardiography Laboratory  [address]  [address]  [place] [address]  Phone [phone]  Fax [phone]        Name: DIANA MOREIRA  MRN: [id number]  : 1945  Study Date: 2021 01:30 PM  Age: 75 yrs  Gender: Male  Patient [facility]  Reason For Study: Heart murmur  Ordering Physician: RACHID PECK  Referring Physician: Golden Chacko  Performed By: Oksana Lopez RDCS     BSA: 2.1 m2  Height: 69 in  Weight: 217 lb  BP: 148/74 mmHg  _____________________________________________________________________________  __        Procedure  Complete Echo Adult. Optison (NDC #2560-5892) given intravenously.  _____________________________________________________________________________  __        Interpretation Summary     1. Normal biventricular size and function. Left ventricular ejection fraction  of 55-60%. No segmental wall motion abnormalities noted.  2. The left atrium is severely dilated. The right atrium is mildly dilated.  3. Moderate-severe valvular aortic stenosis with calculated aortic valve area  of 1.0 cm^2 and mean AoV pressure gradient of 22.0 mmHg. LVOT 2.4 cms. SI 31  ml/m2.  4. The rhythm was atrial fibrillation.  No prior study for comparison. Technically adequate study.  _____________________________________________________________________________  __        Left Ventricle  The left ventricle is normal in  size. Left ventricular systolic function is  normal. The visual ejection fraction is estimated at 55-60%. Diastolic  function not assessed due to atrial fibrillation. No regional wall motion  abnormalities noted.     Right Ventricle  The right ventricle is normal in size and function.     Atria  The left atrium is severely dilated. The right atrium is mildly dilated.     Mitral Valve  The mitral valve leaflets appear normal. There is no evidence of stenosis,  fluttering, or prolapse. There is trace mitral regurgitation.     Tricuspid Valve  Normal tricuspid valve. There is trace tricuspid regurgitation.        Aortic Valve  There is mild (1+) aortic regurgitation. Moderate to severe valvular aortic  stenosis. The calculated aortic valve are is 1.0 cm^2. The peak AoV pressure  gradient is 40.0 mmHg. The mean AoV pressure gradient is 22.0 mmHg.     Pulmonic Valve  The pulmonic valve is not well visualized.     Vessels  Mild aortic root dilatation. The ascending aorta is Borderline dilated. IVC  diameter <2.1 cm collapsing >50% with sniff suggests a normal RA pressure of 3  mmHg.     Pericardium  There is no pericardial effusion.     Rhythm  The rhythm was atrial fibrillation.     _____________________________________________________________________________  __  MMode/2D Measurements & Calculations  IVSd: 1.3 cm  LVIDd: 5.2 cm  LVIDs: 4.1 cm  LVPWd: 1.2 cm  FS: 21.4 %  LV mass(C)d: 268.9 grams  LV mass(C)dI: 125.8 grams/m2     Ao root diam: 3.9 cm  asc Aorta Diam: 3.7 cm  LVOT diam: 2.3 cm  LVOT area: 4.3 cm2  LA Volume Index (BP): 59.8 ml/m2  RWT: 0.46        Doppler Measurements & Calculations  MV E max papi: 107.5 cm/sec  MV dec time: 0.13 sec  Ao V2 max: 316.0 cm/sec  Ao max P.0 mmHg  Ao V2 mean: 222.9 cm/sec  Ao mean P.0 mmHg  Ao V2 VTI: 65.5 cm  JACOBY(I,D): 1.0 cm2  JACOBY(V,D): 1.1 cm2     LV V1 max P.4 mmHg  LV V1 max: 77.8 cm/sec  LV V1 VTI: 15.6 cm  SV(LVOT): 66.5 ml  SI(LVOT): 31.1 ml/m2  AV Papi  "Ratio (DI): 0.25  JACOBY Index (cm2/m2): 0.47  E/E': 10.1  Peak E' Papi: 10.6 cm/sec           _____________________________________________________________________________  __           Report approved by: Brigid Mortensen 01/14/2021 03:31 PM             Service Date: 02/17/2021      CARDIOLOGY CLINIC PROGRESS NOTE      CONSULT INDICATION:  Aortic stenosis.      HISTORY OF PRESENT ILLNESS:      I had the opportunity to speak with the patient, Jonny Goldberg, today in Cardiology Clinic for a virtual telephone visit.  The patient does not have the technical ability to participate in a video visit.  He is also followed by our colleague, Dr. Chacko, with Family Medicine.      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      The patient was previously followed by my late colleague, Dr. Jones, and I had last seen him in followup on 01/13/2021.  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        At baseline, he has been staying relatively physically active in his shelter, of course with limitations related to musculoskeletal pain.  He is an avid outdoorsman.  When I had last seen him, he reported that he was doing fine. However, on further inquiry today, he reports that over the last year or so, he has had worsening dyspnea on exertion.  He states that he \"can't get started\" as quickly as he used to.  He denies any chest pain, chest pressure.  He denies any significant dizziness or " lightheadedness.  He has been experiencing occasional numbness and tingling in his right lower extremity, second digit, though denies any symptoms consistent with claudication.      ECG 01/13/2021 was personally reviewed and demonstrates atrial fibrillation with a left bundle branch block, rate 77 beats per minute.      The patient reports that he has annual carotid ultrasounds done through his primary care team.      When I had last seen in clinic on 01/13/2021, we had him undergo a bilateral exercise ABIs which were done on 01/14/2021 and were negative for evidence of PAD.  We also recommended that he monitor his blood pressures closely at home, as we had discussed possibly starting a low-dose lisinopril therapy.  He reports that his blood pressure readings at home have generally been in the 120s/70s mmHg range, with occasional values in the 140s/80s mmHg range.        We had also recommended starting ezetimibe 5 mg every Monday, Wednesday and Friday due to dyslipidemia.  LDL through his primary care team was elevated at 91.  He reports that initially he felt quite ill the day after starting this medication; however, after restarting it several days later, he has been tolerating this without side effects.      We had him undergo a TTE, which was done on 01/14/2021 and was personally reviewed.  It demonstrated LVEF 55%-60%, severe left atrial dilatation, normal RV function.  He was found to have aortic valve parameters consistent with a paradoxical low-flow, low-gradient severe aortic stenosis.  V max 3.2 M/sec, mean gradient 22 mmHg, DI 0.25, consistent with severe aortic stenosis, stroke volume index was low at 31 mL/m2.      ASSESSMENT, PLAN AND RECOMMENDATIONS:     1.  Borderline severe aortic stenosis, symptomatic with decreased exertional capacity over the last year.  TTE 01/14/2021 demonstrated findings consistent with a paradoxical low-flow, low-gradient severe aortic stenosis.  Stroke volume index was low  at 31 mL/m2.  DI was 0.25, consistent with severe aortic stenosis.   2.  Permanent atrial fibrillation, complicated by prior embolic CVA, on warfarin.   3.  Dyslipidemia, complicated by prior adverse reactions to lipid medications.   4.  Peripheral arterial disease.  Carotid ultrasound 04/2014 demonstrated right ICA 50%-69% stenosis, left ICA less than 50% stenosis.  He has annual carotid ultrasounds done through his primary care team.   5.  Hypertension.  Blood pressure readings at home, generally in the 120s/70s mmHg range.   6.  Chronic left bundle branch block.   7.  Overweight (BMI 32 kg/m2).     -- Discussed options for further evaluation and management.   -- Since the severity of aortic stenosis is borderline severe, upon reviewing the study, I had initially planned on reassessment of the aortic valve with another TTE in 6 months; however, on further inquiry, patient does endorse gradually worsening dyspnea on exertion, concerning for symptomatic severe aortic stenosis.  The patient reported that his mother recently underwent a TAVR, and he also is aware of other people who have undergone the procedure and have had complications.  He would like to learn more about the procedure, so we will refer him to our colleagues with the Structural Heart team.  The severity of his aortic stenosis seems most consistent with paradoxical low-flow, low-gradient severe aortic stenosis; however, he may benefit from further evaluation with a cardiac CT of the valve for assessment of calcium burden.  Will defer further testing until he visits with the Structural Heart team.   -- Advised him to continue to monitor his blood pressures and to alert our clinic if he is seeing readings consistently over 130/80 mmHg.   -- Continue current cardiac regimen of warfarin, rosuvastatin 40 mg daily, ezetimibe 5 mg every Monday, Wednesday and Friday, fenofibrate 160 mg daily, chlorthalidone 50 mg daily, metoprolol succinate 50 mg daily,  verapamil 240 mg daily.   -- The patient is planned for followup with his primary care team in May, at which point he will have lipids done.  Recommended that he contact our clinic when his lab results are available for review.   -- Follow up in 1 year or sooner as needed.      Thank you for allowing our team to participate in the care of Diana Moreira.  Please do not hesitate to page or call with any questions or concerns.      Wili Guajardo MD, [facility]  Cardiology  Text Page   2021        D: 2021   T: 2021   MT: JAQUI      Name:     DIANA MOREIRA   MRN:      [id number]        Account:      [id number]   :      1945           Service Date: 2021      Document: [id number]        Thank you for allowing me to participate in the care of your patient.    Sincerely,     Wili Guajardo MD     [facility]

## 2021-02-26 ENCOUNTER — DOCUMENTATION ONLY (OUTPATIENT)
Dept: CARDIOLOGY | Facility: CLINIC | Age: 76
End: 2021-02-26

## 2021-02-26 DIAGNOSIS — I35.0 AORTIC VALVE STENOSIS, ETIOLOGY OF CARDIAC VALVE DISEASE UNSPECIFIED: Primary | ICD-10-CM

## 2021-02-26 NOTE — PROGRESS NOTES
Called and spoke with Jonny in regards to his referral to TAVR clinic. He stated that Dr. Guajardo ws very through in describing the procedure to him and he understands that his valve has now reach the severe range. He does say he has slowed down and does notice more SOB. We talked about the work up needed and I set him up for a TAVR CT before his visit with us on the 11th. I also let him know that I will place a TAVR packet in the mail for him to look at before coming to visit us. I let him my number should any questions arise.Araceli Madden RN

## 2021-03-11 ENCOUNTER — OFFICE VISIT (OUTPATIENT)
Dept: CARDIOLOGY | Facility: CLINIC | Age: 76
End: 2021-03-11
Attending: INTERNAL MEDICINE
Payer: COMMERCIAL

## 2021-03-11 ENCOUNTER — HOSPITAL ENCOUNTER (OUTPATIENT)
Dept: CARDIOLOGY | Facility: CLINIC | Age: 76
Discharge: HOME | End: 2021-03-11
Attending: INTERNAL MEDICINE | Admitting: INTERNAL MEDICINE
Payer: COMMERCIAL

## 2021-03-11 VITALS
BODY MASS INDEX: 33.43 KG/M2 | SYSTOLIC BLOOD PRESSURE: 142 MMHG | HEART RATE: 59 BPM | WEIGHT: 213 LBS | HEIGHT: 67 IN | OXYGEN SATURATION: 97 % | DIASTOLIC BLOOD PRESSURE: 85 MMHG

## 2021-03-11 DIAGNOSIS — I73.9 PAD (PERIPHERAL ARTERY DISEASE) (H): ICD-10-CM

## 2021-03-11 DIAGNOSIS — E78.2 MIXED HYPERLIPIDEMIA: Primary | ICD-10-CM

## 2021-03-11 DIAGNOSIS — I48.20 CHRONIC ATRIAL FIBRILLATION (H): ICD-10-CM

## 2021-03-11 DIAGNOSIS — I35.0 SEVERE AORTIC STENOSIS: ICD-10-CM

## 2021-03-11 DIAGNOSIS — I35.0 AORTIC VALVE STENOSIS, ETIOLOGY OF CARDIAC VALVE DISEASE UNSPECIFIED: ICD-10-CM

## 2021-03-11 LAB
CREAT BLD-MCNC: 1.2 MG/DL (ref 0.66–1.25)
GFR SERPL CREATININE-BSD FRML MDRD: 59 ML/MIN/{1.73_M2}

## 2021-03-11 PROCEDURE — 74174 CTA ABD&PLVS W/CONTRAST: CPT | Mod: 26 | Performed by: INTERNAL MEDICINE

## 2021-03-11 PROCEDURE — 71275 CT ANGIOGRAPHY CHEST: CPT

## 2021-03-11 PROCEDURE — 82565 ASSAY OF CREATININE: CPT

## 2021-03-11 PROCEDURE — 99204 OFFICE O/P NEW MOD 45 MIN: CPT | Mod: 25 | Performed by: INTERNAL MEDICINE

## 2021-03-11 PROCEDURE — 71275 CT ANGIOGRAPHY CHEST: CPT | Mod: 26 | Performed by: INTERNAL MEDICINE

## 2021-03-11 PROCEDURE — 250N000011 HC RX IP 250 OP 636: Performed by: INTERNAL MEDICINE

## 2021-03-11 RX ORDER — IOPAMIDOL 755 MG/ML
120 INJECTION, SOLUTION INTRAVASCULAR ONCE
Status: COMPLETED | OUTPATIENT
Start: 2021-03-11 | End: 2021-03-11

## 2021-03-11 RX ADMIN — IOPAMIDOL 120 ML: 755 INJECTION, SOLUTION INTRAVENOUS at 09:44

## 2021-03-11 ASSESSMENT — MIFFLIN-ST. JEOR: SCORE: 1661.85

## 2021-03-11 NOTE — PROGRESS NOTES
HPI and Plan:     Jonny Berumen is a pleasant 75-year-old gentleman comes in with his wife today for consideration of aortic valve replacement.  Jonny feels overall well he has no cardiovascular specific complaints.  When questioned further perhaps he has slowed down somewhat in his daily walks which are 4 miles around [employer].  He has no real complaints of shortness of breath chest pain chest pressure syncope near syncope any orthopnea or pedal edema.    I have personally reviewed the images of the echocardiogram.  The aortic valve is opening reasonably well there is some moderate restriction mild insufficiency with a mean gradient of 22.  Stroke-volume index is reduced at 31 and dimensionless index is at 0.25.    He has other comorbidities including atrial fibrillation with left bundle branch block, hyperlipidemia, peripheral arterial disease, hypertension, and previous CVA.  He had 9 months of chemotherapy with Hodgkin's lymphoma in 2000.    His laboratory shows potassium 3.9 creatinine 1.07 and GFR of 69.  Hemoglobin is 4.5 hemoglobin is 12.6 and platelet count is 181.  He is on Coumadin metoprolol Crestor and verapamil.    His EKG shows left bundle branch block with atrial fibrillation persistent.    Assessment: Patient is a minimally symptomatic 75-year-old who walks daily for miles without difficulty my assessment overall is that it is moderate to moderately severe aortic valve stenosis and does not warrant aortic valve replacement at this time.  I would recommend a follow-up with us in an echocardiogram in the next 6 to 12 months time he has been told that should there be a change in his symptoms to please notify us soon as possible.    Encounter Diagnoses   Name Primary?     Severe aortic stenosis      Mixed hyperlipidemia Yes     Chronic atrial fibrillation (H)      PAD (peripheral artery disease) (H)        CURRENT MEDICATIONS:  Current Outpatient Medications   Medication Sig Dispense Refill      ALPHA LIPOIC ACID PO Take 600 mg by mouth daily       ascorbic acid (VITAMIN C) 1000 MG TABS Take 1,000 mg by mouth daily       chlorthalidone (HYGROTON) 50 MG tablet daily       Cholecalciferol (VITAMIN D) 400 UNITS tablet Take 1 tablet by mouth daily       ezetimibe (ZETIA) 10 MG tablet Take 0.5 tablets (5 mg) by mouth Every Mon, Wed, Fri Morning 90 tablet 11     fenofibrate (TRIGLIDE/LOFIBRA) 160 MG tablet Take 1 tablet (160 mg) by mouth daily 90 tablet 0     metoprolol (TOPROL-XL) 50 MG 24 hr tablet Take 50 mg by mouth daily.       multivitamin, therapeutic with minerals (MULTI-VITAMIN) TABS Take 1 tablet by mouth daily       Omega-3 Fatty Acids (OMEGA 3 PO) Take by mouth daily 180 EPA/ 20 DHH       Omeprazole 20 MG tablet Take 20 mg by mouth as needed        rosuvastatin (CRESTOR) 40 MG tablet Take 40 mg by mouth daily       Ubiquinol 100 MG CAPS Take by mouth daily       Verapamil HCl 240 MG CP24 Take by mouth daily        warfarin (COUMADIN) 5 MG tablet Take 5 mg by mouth daily          ALLERGIES     Allergies   Allergen Reactions     Penicillin G Hives     hives       PAST MEDICAL HISTORY:  Past Medical History:   Diagnosis Date     AF (atrial fibrillation) (H)     AFIB     Blood clot in the legs     left leg after surgery     Gastro-oesophageal reflux disease      Hodgkins lymphoma (H)     bone marrow biopsy     Hyperlipidemia      Hypertension      PAD (peripheral artery disease) (H)      Unspecified cerebral artery occlusion with cerebral infarction oct. 2011       PAST SURGICAL HISTORY:  Past Surgical History:   Procedure Laterality Date     APPENDECTOMY       ARTHROPLASTY REVISION HIP  12/27/2011    Procedure:ARTHROPLASTY REVISION HIP; RIGHT TOTAL HIP REVISION WITH BONE GRAFT  ; Surgeon:ANGIE HARRINGTON; Location:[facility] OR     BIOPSY  hodgkins lymphoma    bone marrow biopsy, chemo and radiation     CARDIAC SURGERY      cardiac ablation     COLONOSCOPY       ORTHOPEDIC SURGERY      hip and knee surgeries      ORTHOPEDIC SURGERY      carpal tunnel  right hand       FAMILY HISTORY:  Family History   Problem Relation Age of Onset     Hypertension Mother      Heart Failure Mother      Coronary Artery Disease Mother         CABG     Arrhythmia Mother      Cerebrovascular Disease Father      Heart Failure Father      Colon Cancer Father      Coronary Artery Disease Father         CABG     Coronary Artery Disease Brother         CABG     Arrhythmia Brother        SOCIAL HISTORY:  Social History     Socioeconomic History     Marital status:      Spouse name: None     Number of children: None     Years of education: None     Highest education level: None   Occupational History     None   Social Needs     Financial resource strain: None     Food insecurity     Worry: None     Inability: None     Transportation needs     Medical: None     Non-medical: None   Tobacco Use     Smoking status: Former Smoker     Quit date: 1987     Years since quittin.9     Smokeless tobacco: Never Used   Substance and Sexual Activity     Alcohol use: Yes     Alcohol/week: 0.0 standard drinks     Comment: 0-3 a day  ( beer, wine)     Drug use: No     Sexual activity: None   Lifestyle     Physical activity     Days per week: None     Minutes per session: None     Stress: None   Relationships     Social connections     Talks on phone: None     Gets together: None     Attends Synagogue service: None     Active member of club or organization: None     Attends meetings of clubs or organizations: None     Relationship status: None     Intimate partner violence     Fear of current or ex partner: None     Emotionally abused: None     Physically abused: None     Forced sexual activity: None   Other Topics Concern     Parent/sibling w/ CABG, MI or angioplasty before 65F 55M? Not Asked      Service Not Asked     Blood Transfusions Not Asked     Caffeine Concern No     Comment: 2-3 a day     Occupational Exposure Not Asked     Hobby  "Hazards Not Asked     Sleep Concern Yes     Comment: depends     Stress Concern Not Asked     Weight Concern No     Comment: weight loss     Special Diet No     Back Care Not Asked     Exercise No     Comment: limited due to knee pain     Bike Helmet Not Asked     Seat Belt Yes     Self-Exams Not Asked   Social History Narrative     None       Review of Systems:  Skin:  Positive for lumps or bumps;scaling   Eyes:  Positive for glasses  ENT:  Negative sinus trouble;postnasal drainage  Respiratory:  Positive for dyspnea on exertion  Cardiovascular:  Negative Positive for;edema(occasional. improved with walking)  Gastroenterology: Negative heartburn  Genitourinary:  not assessed nocturia  Musculoskeletal:  Positive for joint pain  Neurologic:  Positive for numbness or tingling of feet  Psychiatric:  Negative sleep disturbances  Heme/Lymph/Imm:  Negative allergies  Endocrine:  Negative      Physical Exam:  Vitals: BP (!) 142/85 (BP Location: Left arm, Patient Position: Sitting, Cuff Size: Adult Large)   Pulse 59   Ht 1.689 m (5' 6.5\")   Wt 96.6 kg (213 lb)   SpO2 97%   BMI 33.86 kg/m      Constitutional:           Skin:             Head:           Eyes:           Lymph:      ENT:           Neck:           Respiratory:            Cardiac:                                                           GI:           Extremities and Muscular Skeletal:                 Neurological:           Psych:         Recent Lab Results:  LIPID RESULTS:  Lab Results   Component Value Date    CHOL 185 08/04/2020    HDL 43 10/18/2018    LDL 81 10/18/2018    TRIG 154 (A) 10/18/2018       LIVER ENZYME RESULTS:  Lab Results   Component Value Date    AST 35 10/19/2018    ALT 24 10/19/2018       CBC RESULTS:  Lab Results   Component Value Date    WBC 5.4 08/04/2020    RBC 4.86 08/04/2020    HGB 15.3 08/04/2020    HCT 47.0 08/04/2020    MCV 96.7 08/04/2020    MCH 31.6 08/04/2020    MCHC 32.7 08/04/2020    RDW 13.4 08/04/2020     " 08/04/2020       BMP RESULTS:  Lab Results   Component Value Date     08/04/2020    POTASSIUM 3.8 08/04/2020    CHLORIDE 100 08/04/2020    CO2 28 08/04/2020    ANIONGAP 1.9 08/04/2020     (A) 08/04/2020    BUN 26 08/04/2020    CR 1.18 08/04/2020    GFRESTIMATED 59 (L) 03/11/2021    GFRESTBLACK 72 03/11/2021    SARA 9.5 08/04/2020        A1C RESULTS:  Lab Results   Component Value Date    A1C 6.1 10/17/2017       INR RESULTS:  Lab Results   Component Value Date    INR 1.15 (H) 12/30/2011    INR 1.05 12/29/2011           CC  Wili Guajardo MD  [address], [address]  [place] [id number]

## 2021-03-11 NOTE — PATIENT INSTRUCTIONS
It was nice to meet you today!     We will see you back in 1 years time with a repeat echocardiogram (ultrasound of your heart).    Please call me before then if you notice worsening shortness of breath, chest tightness, worsening fatigue: [phone]    Gaby NASCIMENTO  Valve Coordinator  [facility]

## 2021-03-11 NOTE — LETTER
3/11/2021    [facility] [address]  [place] [address]    RE: Jonny Goldberg       Dear Colleague,    I had the pleasure of seeing Jonny Goldberg in the [facility].    HPI and Plan:     Jonny Berumen is a pleasant 75-year-old gentleman comes in with his wife today for consideration of aortic valve replacement.  Jonny feels overall well he has no cardiovascular specific complaints.  When questioned further perhaps he has slowed down somewhat in his daily walks which are 4 miles around [place].  He has no real complaints of shortness of breath chest pain chest pressure syncope near syncope any orthopnea or pedal edema.    I have personally reviewed the images of the echocardiogram.  The aortic valve is opening reasonably well there is some moderate restriction mild insufficiency with a mean gradient of 22.  Stroke-volume index is reduced at 31 and dimensionless index is at 0.25.    He has other comorbidities including atrial fibrillation with left bundle branch block, hyperlipidemia, peripheral arterial disease, hypertension, and previous CVA.  He had 9 months of chemotherapy with Hodgkin's lymphoma in 2000.    His laboratory shows potassium 3.9 creatinine 1.07 and GFR of 69.  Hemoglobin is 4.5 hemoglobin is 12.6 and platelet count is 181.  He is on Coumadin metoprolol Crestor and verapamil.    His EKG shows left bundle branch block with atrial fibrillation persistent.    Assessment: Patient is a minimally symptomatic 75-year-old who walks daily for miles without difficulty my assessment overall is that it is moderate to moderately severe aortic valve stenosis and does not warrant aortic valve replacement at this time.  I would recommend a follow-up with us in an echocardiogram in the next 6 to 12 months time he has been told that should there be a change in his symptoms to please notify us soon as  possible.    Encounter Diagnoses   Name Primary?     Severe aortic stenosis      Mixed hyperlipidemia Yes     Chronic atrial fibrillation (H)      PAD (peripheral artery disease) (H)        CURRENT MEDICATIONS:  Current Outpatient Medications   Medication Sig Dispense Refill     ALPHA LIPOIC ACID PO Take 600 mg by mouth daily       ascorbic acid (VITAMIN C) 1000 MG TABS Take 1,000 mg by mouth daily       chlorthalidone (HYGROTON) 50 MG tablet daily       Cholecalciferol (VITAMIN D) 400 UNITS tablet Take 1 tablet by mouth daily       ezetimibe (ZETIA) 10 MG tablet Take 0.5 tablets (5 mg) by mouth Every Mon, Wed, Fri Morning 90 tablet 11     fenofibrate (TRIGLIDE/LOFIBRA) 160 MG tablet Take 1 tablet (160 mg) by mouth daily 90 tablet 0     metoprolol (TOPROL-XL) 50 MG 24 hr tablet Take 50 mg by mouth daily.       multivitamin, therapeutic with minerals (MULTI-VITAMIN) TABS Take 1 tablet by mouth daily       Omega-3 Fatty Acids (OMEGA 3 PO) Take by mouth daily 180 EPA/ 20 DHH       Omeprazole 20 MG tablet Take 20 mg by mouth as needed        rosuvastatin (CRESTOR) 40 MG tablet Take 40 mg by mouth daily       Ubiquinol 100 MG CAPS Take by mouth daily       Verapamil HCl 240 MG CP24 Take by mouth daily        warfarin (COUMADIN) 5 MG tablet Take 5 mg by mouth daily          ALLERGIES     Allergies   Allergen Reactions     Penicillin G Hives     hives       PAST MEDICAL HISTORY:  Past Medical History:   Diagnosis Date     AF (atrial fibrillation) (H)     AFIB     Blood clot in the legs     left leg after surgery     Gastro-oesophageal reflux disease      Hodgkins lymphoma (H)     bone marrow biopsy     Hyperlipidemia      Hypertension      PAD (peripheral artery disease) (H)      Unspecified cerebral artery occlusion with cerebral infarction oct. 2011       PAST SURGICAL HISTORY:  Past Surgical History:   Procedure Laterality Date     APPENDECTOMY       ARTHROPLASTY REVISION HIP  12/27/2011    Procedure:ARTHROPLASTY  REVISION HIP; RIGHT TOTAL HIP REVISION WITH BONE GRAFT  ; Surgeon:ANGIE HARRINGTON; Location:[facility] OR     BIOPSY  hodgkins lymphoma    bone marrow biopsy, chemo and radiation     CARDIAC SURGERY      cardiac ablation     COLONOSCOPY       ORTHOPEDIC SURGERY      hip and knee surgeries     ORTHOPEDIC SURGERY      carpal tunnel  right hand       FAMILY HISTORY:  Family History   Problem Relation Age of Onset     Hypertension Mother      Heart Failure Mother      Coronary Artery Disease Mother         CABG     Arrhythmia Mother      Cerebrovascular Disease Father      Heart Failure Father      Colon Cancer Father      Coronary Artery Disease Father         CABG     Coronary Artery Disease Brother         CABG     Arrhythmia Brother        SOCIAL HISTORY:  Social History     Socioeconomic History     Marital status:      Spouse name: None     Number of children: None     Years of education: None     Highest education level: None   Occupational History     None   Social Needs     Financial resource strain: None     Food insecurity     Worry: None     Inability: None     Transportation needs     Medical: None     Non-medical: None   Tobacco Use     Smoking status: Former Smoker     Quit date: 1987     Years since quittin.9     Smokeless tobacco: Never Used   Substance and Sexual Activity     Alcohol use: Yes     Alcohol/week: 0.0 standard drinks     Comment: 0-3 a day  ( beer, wine)     Drug use: No     Sexual activity: None   Lifestyle     Physical activity     Days per week: None     Minutes per session: None     Stress: None   Relationships     Social connections     Talks on phone: None     Gets together: None     Attends Buddhist service: None     Active member of club or organization: None     Attends meetings of clubs or organizations: None     Relationship status: None     Intimate partner violence     Fear of current or ex partner: None     Emotionally abused: None     Physically abused: None      "Forced sexual activity: None   Other Topics Concern     Parent/sibling w/ CABG, MI or angioplasty before 65F 55M? Not Asked      Service Not Asked     Blood Transfusions Not Asked     Caffeine Concern No     Comment: 2-3 a day     Occupational Exposure Not Asked     Hobby Hazards Not Asked     Sleep Concern Yes     Comment: depends     Stress Concern Not Asked     Weight Concern No     Comment: weight loss     Special Diet No     Back Care Not Asked     Exercise No     Comment: limited due to knee pain     Bike Helmet Not Asked     Seat Belt Yes     Self-Exams Not Asked   Social History Narrative     None       Review of Systems:  Skin:  Positive for lumps or bumps;scaling   Eyes:  Positive for glasses  ENT:  Negative sinus trouble;postnasal drainage  Respiratory:  Positive for dyspnea on exertion  Cardiovascular:  Negative Positive for;edema(occasional. improved with walking)  Gastroenterology: Negative heartburn  Genitourinary:  not assessed nocturia  Musculoskeletal:  Positive for joint pain  Neurologic:  Positive for numbness or tingling of feet  Psychiatric:  Negative sleep disturbances  Heme/Lymph/Imm:  Negative allergies  Endocrine:  Negative      Physical Exam:  Vitals: BP (!) 142/85 (BP Location: Left arm, Patient Position: Sitting, Cuff Size: Adult Large)   Pulse 59   Ht 1.689 m (5' 6.5\")   Wt 96.6 kg (213 lb)   SpO2 97%   BMI 33.86 kg/m      Constitutional:           Skin:             Head:           Eyes:           Lymph:      ENT:           Neck:           Respiratory:            Cardiac:                                                           GI:           Extremities and Muscular Skeletal:                 Neurological:           Psych:         Recent Lab Results:  LIPID RESULTS:  Lab Results   Component Value Date    CHOL 185 08/04/2020    HDL 43 10/18/2018    LDL 81 10/18/2018    TRIG 154 (A) 10/18/2018       LIVER ENZYME RESULTS:  Lab Results   Component Value Date    AST 35 " 10/19/2018    ALT 24 10/19/2018       CBC RESULTS:  Lab Results   Component Value Date    WBC 5.4 08/04/2020    RBC 4.86 08/04/2020    HGB 15.3 08/04/2020    HCT 47.0 08/04/2020    MCV 96.7 08/04/2020    MCH 31.6 08/04/2020    MCHC 32.7 08/04/2020    RDW 13.4 08/04/2020     08/04/2020       BMP RESULTS:  Lab Results   Component Value Date     08/04/2020    POTASSIUM 3.8 08/04/2020    CHLORIDE 100 08/04/2020    CO2 28 08/04/2020    ANIONGAP 1.9 08/04/2020     (A) 08/04/2020    BUN 26 08/04/2020    CR 1.18 08/04/2020    GFRESTIMATED 59 (L) 03/11/2021    GFRESTBLACK 72 03/11/2021    SARA 9.5 08/04/2020        A1C RESULTS:  Lab Results   Component Value Date    A1C 6.1 10/17/2017       INR RESULTS:  Lab Results   Component Value Date    INR 1.15 (H) 12/30/2011    INR 1.05 12/29/2011     Thank you for allowing me to participate in the care of your patient.      Sincerely,     GUEVARA KUMAR MD     [facility]    cc:   Wili Guajardo MD  [address], [address]  [place] [id number]

## 2021-03-15 DIAGNOSIS — E78.2 MIXED HYPERLIPIDEMIA: ICD-10-CM

## 2021-03-15 RX ORDER — FENOFIBRATE 160 MG/1
160 TABLET ORAL DAILY
Qty: 90 TABLET | Refills: 3 | Status: SHIPPED | OUTPATIENT
Start: 2021-03-15 | End: 2022-03-07

## 2021-05-05 ENCOUNTER — TRANSFERRED RECORDS (OUTPATIENT)
Dept: HEALTH INFORMATION MANAGEMENT | Facility: CLINIC | Age: 76
End: 2021-05-05

## 2021-05-06 ENCOUNTER — EXTERNAL ORDER RESULTS (OUTPATIENT)
Dept: CARDIOLOGY | Facility: CLINIC | Age: 76
End: 2021-05-06

## 2021-05-06 LAB
ALBUMIN SERPL-MCNC: 4.6 G/DL
ALP SERPL-CCNC: 42 U/L
ALT SERPL-CCNC: 28 U/L
ANION GAP SERPL CALCULATED.3IONS-SCNC: NORMAL MMOL/L
AST SERPL-CCNC: 42 U/L
BILIRUB SERPL-MCNC: 0.7 MG/DL
BUN SERPL-MCNC: 27 MG/DL
CALCIUM SERPL-MCNC: 1.16 MG/DL
CHLORIDE SERPLBLD-SCNC: 100 MMOL/L
CHOLEST SERPL-MCNC: 128 MG/DL
CO2 SERPL-SCNC: 28 MMOL/L
CREAT SERPL-MCNC: 1 MG/DL
GFR SERPL CREATININE-BSD FRML MDRD: 62 ML/MIN/1.73M2
GLUCOSE SERPL-MCNC: 98 MG/DL (ref 70–99)
HBA1C MFR BLD: 5.9 % (ref 0–5.6)
HDLC SERPL-MCNC: 38 MG/DL
LDLC SERPL CALC-MCNC: 21 MG/DL
NONHDLC SERPL-MCNC: 69 MG/DL
POTASSIUM SERPL-SCNC: 3.7 MMOL/L
PROT SERPL-MCNC: 7.2 G/DL
PSA SERPL-MCNC: 3.4 NG/ML
SODIUM SERPL-SCNC: 141 MMOL/L
TRIGL SERPL-MCNC: 117 MG/DL

## 2021-11-03 ENCOUNTER — TRANSFERRED RECORDS (OUTPATIENT)
Dept: HEALTH INFORMATION MANAGEMENT | Facility: CLINIC | Age: 76
End: 2021-11-03
Payer: COMMERCIAL

## 2022-01-28 ENCOUNTER — HOSPITAL ENCOUNTER (OUTPATIENT)
Dept: CARDIOLOGY | Facility: CLINIC | Age: 77
Discharge: HOME | End: 2022-01-28
Attending: INTERNAL MEDICINE | Admitting: INTERNAL MEDICINE
Payer: COMMERCIAL

## 2022-01-28 DIAGNOSIS — I35.0 SEVERE AORTIC STENOSIS: ICD-10-CM

## 2022-01-28 LAB — LVEF ECHO: NORMAL

## 2022-01-28 PROCEDURE — 255N000002 HC RX 255 OP 636: Performed by: INTERNAL MEDICINE

## 2022-01-28 PROCEDURE — 93306 TTE W/DOPPLER COMPLETE: CPT | Mod: 26 | Performed by: INTERNAL MEDICINE

## 2022-01-28 PROCEDURE — 999N000208 ECHOCARDIOGRAM COMPLETE

## 2022-01-28 RX ADMIN — HUMAN ALBUMIN MICROSPHERES AND PERFLUTREN 9 ML: 10; .22 INJECTION, SOLUTION INTRAVENOUS at 12:36

## 2022-01-31 ENCOUNTER — OFFICE VISIT (OUTPATIENT)
Dept: CARDIOLOGY | Facility: CLINIC | Age: 77
End: 2022-01-31
Payer: COMMERCIAL

## 2022-01-31 VITALS
HEART RATE: 59 BPM | OXYGEN SATURATION: 96 % | HEIGHT: 67 IN | WEIGHT: 216 LBS | BODY MASS INDEX: 33.9 KG/M2 | SYSTOLIC BLOOD PRESSURE: 159 MMHG | DIASTOLIC BLOOD PRESSURE: 89 MMHG

## 2022-01-31 DIAGNOSIS — I44.7 LEFT BUNDLE BRANCH BLOCK: ICD-10-CM

## 2022-01-31 DIAGNOSIS — E78.2 MIXED HYPERLIPIDEMIA: Primary | ICD-10-CM

## 2022-01-31 DIAGNOSIS — I35.0 SEVERE AORTIC STENOSIS: ICD-10-CM

## 2022-01-31 DIAGNOSIS — I10 ESSENTIAL HYPERTENSION WITH GOAL BLOOD PRESSURE LESS THAN 140/90: ICD-10-CM

## 2022-01-31 DIAGNOSIS — I48.20 CHRONIC ATRIAL FIBRILLATION (H): ICD-10-CM

## 2022-01-31 DIAGNOSIS — I65.23 BILATERAL CAROTID ARTERY STENOSIS: ICD-10-CM

## 2022-01-31 PROCEDURE — 99215 OFFICE O/P EST HI 40 MIN: CPT | Performed by: INTERNAL MEDICINE

## 2022-01-31 RX ORDER — AMLODIPINE BESYLATE 10 MG/1
10 TABLET ORAL DAILY
Status: DISCONTINUED | OUTPATIENT
Start: 2022-01-31 | End: 2022-02-07

## 2022-01-31 ASSESSMENT — MIFFLIN-ST. JEOR: SCORE: 1670.46

## 2022-01-31 NOTE — LETTER
1/31/2022    [facility] [address]  [place] [address]    RE: Jonny Goldberg       Dear Colleague,     I had the pleasure of seeing Jonny Goldberg in the [facility].  HPI and Plan:     Jonny Goldberg is pleasant 75 y/o male with history of aortic valve stenosis hypertension carotid artery disease chronic atrial fibrillation history of Raynaud's disease who is here for follow-up.  He had previously seen Dr. Guajardo but I believe this is interested in transferring his care to me.  I saw him from a structural heart standpoint for his aortic valve disease.  He had moderate aortic valve stenosis.  He is still asymptomatic.  I reviewed the echocardiogram and agree that it is still moderate this year.  He has no chest pain chest pressure.  He keeps active but much more active in the summer months walking.  His biggest concern is that of worsening hypertension.  Looking in his medications he is on both verapamil and metoprolol to negative chronotropic agents probably not ideal.  He is on verapamil for Raynaud's.  I making a change from verapamil to Norvasc hopefully this can better control his blood pressure and also treat his Raynaud's phenomenon.  If his blood pressure still uncontrolled recommend adding an considering diuretic.  We also talked about his renal insufficiency his GFR down to 30s 2.  He is to follow-up with [facility] family physicians on this and question whether he needs nephrology consultation.    Today's clinic visit entailed:  Review of prior external note(s) from - [facility] information from [address] [facility] reviewed   Personally interpreted the echocardiogram.  No LOS data to display   Time spent doing chart review, history and exam, documentation and further activities per the note  Provider  Link to [facility] Help Grid     The level of medical decision making during this visit was of moderate complexity.      Orders Placed This  Encounter   Procedures     Follow-Up with Cardiology MORENA     Orders Placed This Encounter   Medications     amLODIPine (NORVASC) tablet 10 mg     Medications Discontinued During This Encounter   Medication Reason     Verapamil HCl 240 MG CP24          Encounter Diagnoses   Name Primary?     Severe aortic stenosis      Mixed hyperlipidemia Yes     Chronic atrial fibrillation (H)      Essential hypertension with goal blood pressure less than 140/90      Left bundle branch block      Bilateral carotid artery stenosis        CURRENT MEDICATIONS:  Current Outpatient Medications   Medication Sig Dispense Refill     ALPHA LIPOIC ACID PO Take 600 mg by mouth daily       ascorbic acid (VITAMIN C) 1000 MG TABS Take 1,000 mg by mouth daily       chlorthalidone (HYGROTON) 50 MG tablet daily       Cholecalciferol (VITAMIN D) 400 UNITS tablet Take 1 tablet by mouth daily       ezetimibe (ZETIA) 10 MG tablet Take 0.5 tablets (5 mg) by mouth Every Mon, Wed, Fri Morning 90 tablet 11     fenofibrate (TRIGLIDE/LOFIBRA) 160 MG tablet Take 1 tablet (160 mg) by mouth daily 90 tablet 3     metoprolol (TOPROL-XL) 50 MG 24 hr tablet Take 50 mg by mouth daily.       multivitamin, therapeutic with minerals (MULTI-VITAMIN) TABS Take 1 tablet by mouth daily       Omega-3 Fatty Acids (OMEGA 3 PO) Take by mouth daily 180 EPA/ 20 DHH       Omeprazole 20 MG tablet Take 20 mg by mouth as needed        rosuvastatin (CRESTOR) 40 MG tablet Take 40 mg by mouth daily       Ubiquinol 100 MG CAPS Take by mouth daily       warfarin (COUMADIN) 5 MG tablet Take 5 mg by mouth daily 4.5mg Tuesday and Thursday and 3mg all other days         ALLERGIES     Allergies   Allergen Reactions     Penicillin G Hives     hives       PAST MEDICAL HISTORY:  Past Medical History:   Diagnosis Date     AF (atrial fibrillation) (H)     AFIB     Blood clot in the legs     left leg after surgery     Gastro-oesophageal reflux disease      Hodgkins lymphoma (H)     bone marrow  biopsy     Hyperlipidemia      Hypertension      PAD (peripheral artery disease) (H)      Unspecified cerebral artery occlusion with cerebral infarction oct. 2011       PAST SURGICAL HISTORY:  Past Surgical History:   Procedure Laterality Date     APPENDECTOMY       ARTHROPLASTY REVISION HIP  2011    Procedure:ARTHROPLASTY REVISION HIP; RIGHT TOTAL HIP REVISION WITH BONE GRAFT  ; Surgeon:ANGIE HARRINGTON; Location:[facility] OR     BIOPSY  hodgkins lymphoma    bone marrow biopsy, chemo and radiation     CARDIAC SURGERY      cardiac ablation     COLONOSCOPY       ORTHOPEDIC SURGERY      hip and knee surgeries     ORTHOPEDIC SURGERY      carpal tunnel  right hand       FAMILY HISTORY:  Family History   Problem Relation Age of Onset     Hypertension Mother      Heart Failure Mother      Coronary Artery Disease Mother         CABG     Arrhythmia Mother      Cerebrovascular Disease Father      Heart Failure Father      Colon Cancer Father      Coronary Artery Disease Father         CABG     Coronary Artery Disease Brother         CABG     Arrhythmia Brother        SOCIAL HISTORY:  Social History     Socioeconomic History     Marital status:      Spouse name: None     Number of children: None     Years of education: None     Highest education level: None   Occupational History     None   Tobacco Use     Smoking status: Former Smoker     Quit date: 1987     Years since quittin.8     Smokeless tobacco: Never Used   Substance and Sexual Activity     Alcohol use: Yes     Alcohol/week: 0.0 standard drinks     Comment: 0-3 a day  ( beer, wine)     Drug use: No     Sexual activity: None   Other Topics Concern     Parent/sibling w/ CABG, MI or angioplasty before 65F 55M? Not Asked      Service Not Asked     Blood Transfusions Not Asked     Caffeine Concern No     Comment: 2-3 a day     Occupational Exposure Not Asked     Hobby Hazards Not Asked     Sleep Concern Yes     Comment: depends     Stress Concern  "Not Asked     Weight Concern No     Comment: weight loss     Special Diet No     Back Care Not Asked     Exercise No     Comment: limited due to knee pain     Bike Helmet Not Asked     Seat Belt Yes     Self-Exams Not Asked   Social History Narrative     None     Social Determinants of Health     Financial Resource Strain: Not on file   Food Insecurity: Not on file   Transportation Needs: Not on file   Physical Activity: Not on file   Stress: Not on file   Social Connections: Not on file   Intimate Partner Violence: Not on file   Housing Stability: Not on file       Review of Systems:  Skin:  Negative     Eyes:  Positive for glasses  ENT:  Negative    Respiratory:  Positive for dyspnea on exertion  Cardiovascular:  Negative  (occasional. improved with walking)  Gastroenterology: Negative    Genitourinary:  Negative    Musculoskeletal:  Negative    Neurologic:  Negative    Psychiatric:  Negative    Heme/Lymph/Imm:  Negative    Endocrine:  Negative      Physical Exam:  Vitals: BP (!) 159/89   Pulse 59   Ht 1.689 m (5' 6.5\")   Wt 98 kg (216 lb)   SpO2 96%   BMI 34.34 kg/m      Constitutional:           Skin:             Head:           Eyes:           Lymph:      ENT:           Neck:           Respiratory:            Cardiac:                                                           GI:           Extremities and Muscular Skeletal:                 Neurological:           Psych:         Recent Lab Results:  LIPID RESULTS:  Lab Results   Component Value Date    CHOL 128 05/05/2021    HDL 38 05/05/2021    LDL 21 05/05/2021    TRIG 117 05/05/2021       LIVER ENZYME RESULTS:  Lab Results   Component Value Date    AST 42 05/05/2021    ALT 28 05/05/2021       CBC RESULTS:  Lab Results   Component Value Date    WBC 5.4 08/04/2020    RBC 4.86 08/04/2020    HGB 15.3 08/04/2020    HCT 47.0 08/04/2020    MCV 96.7 08/04/2020    MCH 31.6 08/04/2020    MCHC 32.7 08/04/2020    RDW 13.4 08/04/2020     08/04/2020       BMP " RESULTS:  Lab Results   Component Value Date     05/05/2021    POTASSIUM 3.7 05/05/2021    CHLORIDE 100 05/05/2021    CO2 28 05/05/2021    ANIONGAP 1.9 08/04/2020    GLC 98 05/05/2021    BUN 27 05/05/2021    CR 1 05/05/2021    GFRESTIMATED 62 05/05/2021    GFRESTBLACK 72 05/05/2021    SARA 1.16 05/05/2021        A1C RESULTS:  Lab Results   Component Value Date    A1C 5.9 (A) 05/06/2021       INR RESULTS:  Lab Results   Component Value Date    INR 1.15 (H) 12/30/2011    INR 1.05 12/29/2011     Thank you for allowing me to participate in the care of your patient.      Sincerely,     GERMAN KUMAR MD     [facility]  cc:   German Kumar MD  [address]  [place] [address]

## 2022-01-31 NOTE — PROGRESS NOTES
HPI and Plan:     Jonny Goldberg is pleasant 77 y/o male with history of aortic valve stenosis hypertension carotid artery disease chronic atrial fibrillation history of Raynaud's disease who is here for follow-up.  He had previously seen Dr. Guajardo but I believe this is interested in transferring his care to me.  I saw him from a structural heart standpoint for his aortic valve disease.  He had moderate aortic valve stenosis.  He is still asymptomatic.  I reviewed the echocardiogram and agree that it is still moderate this year.  He has no chest pain chest pressure.  He keeps active but much more active in the summer months walking.  His biggest concern is that of worsening hypertension.  Looking in his medications he is on both verapamil and metoprolol to negative chronotropic agents probably not ideal.  He is on verapamil for Raynaud's.  I making a change from verapamil to Norvasc hopefully this can better control his blood pressure and also treat his Raynaud's phenomenon.  If his blood pressure still uncontrolled recommend adding an considering diuretic.  We also talked about his renal insufficiency his GFR down to 30s 2.  He is to follow-up with [facility] family physicians on this and question whether he needs nephrology consultation.    Today's clinic visit entailed:  Review of prior external note(s) from - [facility] information from [address] [facility] reviewed   Personally interpreted the echocardiogram.  No LOS data to display   Time spent doing chart review, history and exam, documentation and further activities per the note  Provider  Link to [facility] Help Grid     The level of medical decision making during this visit was of moderate complexity.      Orders Placed This Encounter   Procedures     Follow-Up with Cardiology MORENA     Orders Placed This Encounter   Medications     amLODIPine (NORVASC) tablet 10 mg     Medications Discontinued During This Encounter   Medication Reason     Verapamil HCl 240  MG CP24          Encounter Diagnoses   Name Primary?     Severe aortic stenosis      Mixed hyperlipidemia Yes     Chronic atrial fibrillation (H)      Essential hypertension with goal blood pressure less than 140/90      Left bundle branch block      Bilateral carotid artery stenosis        CURRENT MEDICATIONS:  Current Outpatient Medications   Medication Sig Dispense Refill     ALPHA LIPOIC ACID PO Take 600 mg by mouth daily       ascorbic acid (VITAMIN C) 1000 MG TABS Take 1,000 mg by mouth daily       chlorthalidone (HYGROTON) 50 MG tablet daily       Cholecalciferol (VITAMIN D) 400 UNITS tablet Take 1 tablet by mouth daily       ezetimibe (ZETIA) 10 MG tablet Take 0.5 tablets (5 mg) by mouth Every Mon, Wed, Fri Morning 90 tablet 11     fenofibrate (TRIGLIDE/LOFIBRA) 160 MG tablet Take 1 tablet (160 mg) by mouth daily 90 tablet 3     metoprolol (TOPROL-XL) 50 MG 24 hr tablet Take 50 mg by mouth daily.       multivitamin, therapeutic with minerals (MULTI-VITAMIN) TABS Take 1 tablet by mouth daily       Omega-3 Fatty Acids (OMEGA 3 PO) Take by mouth daily 180 EPA/ 20 DHH       Omeprazole 20 MG tablet Take 20 mg by mouth as needed        rosuvastatin (CRESTOR) 40 MG tablet Take 40 mg by mouth daily       Ubiquinol 100 MG CAPS Take by mouth daily       warfarin (COUMADIN) 5 MG tablet Take 5 mg by mouth daily 4.5mg Tuesday and Thursday and 3mg all other days         ALLERGIES     Allergies   Allergen Reactions     Penicillin G Hives     hives       PAST MEDICAL HISTORY:  Past Medical History:   Diagnosis Date     AF (atrial fibrillation) (H)     AFIB     Blood clot in the legs     left leg after surgery     Gastro-oesophageal reflux disease      Hodgkins lymphoma (H)     bone marrow biopsy     Hyperlipidemia      Hypertension      PAD (peripheral artery disease) (H)      Unspecified cerebral artery occlusion with cerebral infarction oct. 2011       PAST SURGICAL HISTORY:  Past Surgical History:   Procedure Laterality  Date     APPENDECTOMY       ARTHROPLASTY REVISION HIP  2011    Procedure:ARTHROPLASTY REVISION HIP; RIGHT TOTAL HIP REVISION WITH BONE GRAFT  ; Surgeon:ANGIE HARRINGTON; Location:[facility]     BIOPSY  hodgkins lymphoma    bone marrow biopsy, chemo and radiation     CARDIAC SURGERY      cardiac ablation     COLONOSCOPY       ORTHOPEDIC SURGERY      hip and knee surgeries     ORTHOPEDIC SURGERY      carpal tunnel  right hand       FAMILY HISTORY:  Family History   Problem Relation Age of Onset     Hypertension Mother      Heart Failure Mother      Coronary Artery Disease Mother         CABG     Arrhythmia Mother      Cerebrovascular Disease Father      Heart Failure Father      Colon Cancer Father      Coronary Artery Disease Father         CABG     Coronary Artery Disease Brother         CABG     Arrhythmia Brother        SOCIAL HISTORY:  Social History     Socioeconomic History     Marital status:      Spouse name: None     Number of children: None     Years of education: None     Highest education level: None   Occupational History     None   Tobacco Use     Smoking status: Former Smoker     Quit date: 1987     Years since quittin.8     Smokeless tobacco: Never Used   Substance and Sexual Activity     Alcohol use: Yes     Alcohol/week: 0.0 standard drinks     Comment: 0-3 a day  ( beer, wine)     Drug use: No     Sexual activity: None   Other Topics Concern     Parent/sibling w/ CABG, MI or angioplasty before 65F 55M? Not Asked      Service Not Asked     Blood Transfusions Not Asked     Caffeine Concern No     Comment: 2-3 a day     Occupational Exposure Not Asked     Hobby Hazards Not Asked     Sleep Concern Yes     Comment: depends     Stress Concern Not Asked     Weight Concern No     Comment: weight loss     Special Diet No     Back Care Not Asked     Exercise No     Comment: limited due to knee pain     Bike Helmet Not Asked     Seat Belt Yes     Self-Exams Not Asked   Social  "History Narrative     None     Social Determinants of Health     Financial Resource Strain: Not on file   Food Insecurity: Not on file   Transportation Needs: Not on file   Physical Activity: Not on file   Stress: Not on file   Social Connections: Not on file   Intimate Partner Violence: Not on file   Housing Stability: Not on file       Review of Systems:  Skin:  Negative     Eyes:  Positive for glasses  ENT:  Negative    Respiratory:  Positive for dyspnea on exertion  Cardiovascular:  Negative  (occasional. improved with walking)  Gastroenterology: Negative    Genitourinary:  Negative    Musculoskeletal:  Negative    Neurologic:  Negative    Psychiatric:  Negative    Heme/Lymph/Imm:  Negative    Endocrine:  Negative      Physical Exam:  Vitals: BP (!) 159/89   Pulse 59   Ht 1.689 m (5' 6.5\")   Wt 98 kg (216 lb)   SpO2 96%   BMI 34.34 kg/m      Constitutional:           Skin:             Head:           Eyes:           Lymph:      ENT:           Neck:           Respiratory:            Cardiac:                                                           GI:           Extremities and Muscular Skeletal:                 Neurological:           Psych:         Recent Lab Results:  LIPID RESULTS:  Lab Results   Component Value Date    CHOL 128 05/05/2021    HDL 38 05/05/2021    LDL 21 05/05/2021    TRIG 117 05/05/2021       LIVER ENZYME RESULTS:  Lab Results   Component Value Date    AST 42 05/05/2021    ALT 28 05/05/2021       CBC RESULTS:  Lab Results   Component Value Date    WBC 5.4 08/04/2020    RBC 4.86 08/04/2020    HGB 15.3 08/04/2020    HCT 47.0 08/04/2020    MCV 96.7 08/04/2020    MCH 31.6 08/04/2020    MCHC 32.7 08/04/2020    RDW 13.4 08/04/2020     08/04/2020       BMP RESULTS:  Lab Results   Component Value Date     05/05/2021    POTASSIUM 3.7 05/05/2021    CHLORIDE 100 05/05/2021    CO2 28 05/05/2021    ANIONGAP 1.9 08/04/2020    GLC 98 05/05/2021    BUN 27 05/05/2021    CR 1 05/05/2021    " GFRESTIMATED 62 05/05/2021    GFRESTBLACK 72 05/05/2021    SARA 1.16 05/05/2021        A1C RESULTS:  Lab Results   Component Value Date    A1C 5.9 (A) 05/06/2021       INR RESULTS:  Lab Results   Component Value Date    INR 1.15 (H) 12/30/2011    INR 1.05 12/29/2011           CC  German Patel MD  [address]  [place] [id number]

## 2022-02-07 DIAGNOSIS — I10 PRIMARY HYPERTENSION: Primary | ICD-10-CM

## 2022-02-07 RX ORDER — AMLODIPINE BESYLATE 10 MG/1
10 TABLET ORAL DAILY
Qty: 90 TABLET | Refills: 3 | Status: SHIPPED | OUTPATIENT
Start: 2022-02-07 | End: 2023-02-01

## 2022-02-07 NOTE — PROGRESS NOTES
Jonny called to state his pharmacy never received new amlodipine prescription. Noted was ordered as CAM, new Rx sent to preferred pharmacy. Pt verbalized understanding.    Gaby Parikh, RN  Structural Heart Coordinator  [facility]

## 2022-02-18 ENCOUNTER — TELEPHONE (OUTPATIENT)
Dept: CARDIOLOGY | Facility: CLINIC | Age: 77
End: 2022-02-18
Payer: COMMERCIAL

## 2022-02-18 NOTE — TELEPHONE ENCOUNTER
[place] Community Measures Blood Pressure guideline reviewed.  Patients recent blood pressure is outside of guideline parameters.  Called pt to review, no answer.  Left voicemail message asking patient to check their blood pressure using a home blood pressure cuff or by going to a [facility].  Patient instructed to then call [phone] ([place]) and leave a message with their name, date of birth, and blood pressure reading that was completed within the last 24 hours and where it was completed.  Will await call back for further review.  2/18/22 Patty Walter CMA

## 2022-02-25 ENCOUNTER — OFFICE VISIT (OUTPATIENT)
Dept: CARDIOLOGY | Facility: CLINIC | Age: 77
End: 2022-02-25
Attending: INTERNAL MEDICINE
Payer: COMMERCIAL

## 2022-02-25 VITALS
OXYGEN SATURATION: 97 % | WEIGHT: 214 LBS | HEART RATE: 87 BPM | DIASTOLIC BLOOD PRESSURE: 84 MMHG | SYSTOLIC BLOOD PRESSURE: 140 MMHG | BODY MASS INDEX: 33.59 KG/M2 | HEIGHT: 67 IN

## 2022-02-25 DIAGNOSIS — E78.2 MIXED HYPERLIPIDEMIA: ICD-10-CM

## 2022-02-25 DIAGNOSIS — I35.0 NONRHEUMATIC AORTIC VALVE STENOSIS: Primary | ICD-10-CM

## 2022-02-25 DIAGNOSIS — I48.20 CHRONIC ATRIAL FIBRILLATION (H): ICD-10-CM

## 2022-02-25 DIAGNOSIS — I10 ESSENTIAL HYPERTENSION WITH GOAL BLOOD PRESSURE LESS THAN 140/90: ICD-10-CM

## 2022-02-25 PROCEDURE — 99214 OFFICE O/P EST MOD 30 MIN: CPT | Performed by: NURSE PRACTITIONER

## 2022-02-25 NOTE — PATIENT INSTRUCTIONS
Thanks for participating in a office visit with the [facility] today.    Blood pressure elevated today   Recently started on amlodipine (< 1 week ago)  in addition to metoprolol and chlorthalidone.   Monitor blood pressures x 1 week and call in with readings.   Encourage exercise, weight loss, and strict low sodium diet.      Follow up in 1 year with Dr. Patel    Please call my nurse at [phone] with any questions or concerns.    Scheduling phone number: [phone]  Reminder: Please bring in all current medications, over the counter supplements and vitamin bottles to your next appointment.

## 2022-02-25 NOTE — LETTER
2/25/2022    Reid Hogue MD  [facility] [address]  [facility] [address]    RE: Jonny Goldberg       Dear Colleague,     I had the pleasure of seeing Jonny Goldberg in the [facility].  Cardiology Clinic Progress Note  Jonny Goldberg MRN# [id number]   YOB: 1945 Age: 76 year old     Reason For Visit:  1 month follow up    Primary Cardiologist:   Dr. Patel           History of Presenting Illness:      Jonny Goldberg is a pleasant 76 year old patient who carries a past medical history significant for moderate aortic stenosis, hypertension, carotid artery disease, atrial fibrillation on chronic anticoagulation, and Raynaud's disease.    He was last seen on 1/31/2022 to establish care with Dr. Patel.  Please refer to that office visit for more complete HPI.  He was noted to be hypertensive.  Verapamil was discontinued and Norvasc was added to his current regimen of metoprolol for better blood pressure control as well as Raynaud's management.  He recently underwent a left carpal tunnel release and admits to not starting his medication until 5 days ago.  He states he was asked by his surgeon to not make any changes until after his surgery was complete.    He returns to the office today for a 1 month follow-up, denies chest pain, shortness of breath, PND, orthopnea, presyncope, syncope, heart racing, or palpitations.  His primary complaint is right foot pain, improving over the last few days.  Upon exam, lungs are clear bilaterally, heart rhythm irregular with regular rate, audible systolic murmur, and trace right pedal edema.     Last echocardiogram showed an ejection fraction estimated at 50 to 55%, moderate to severe biatrial enlargement, moderate to severe aortic stenosis with a mean AOV pressure gradient of 22.6 mmHg and aortic valve area of 1.0 cm .    Blood pressure is mildly elevated at 145/75 with repeat reading reduced to 140/84.  BMP on 2/9/2022 ([facility])  showed a sodium of 144, potassium 4.2, BUN 31, creatinine 1.36, and GFR 51  CBC was unremarkable  Last lipid panel was excellent with a total cholesterol of 111, HDL 45, LDL 50, and triglycerides 77, on high-dose rosuvastatin.  BMI 34.02    Activity is primarily sedentary  Remains compliant with all medications.                   Assessment and Plan:     Jonny Goldberg is a pleasant 76 year old patient who carries a past medical history significant for moderate aortic stenosis, hypertension, carotid artery disease, atrial fibrillation on chronic anticoagulation, and Raynaud's disease.  Overall, he is feeling well on a cardiac standpoint.  Reviewed echocardiogram showing aortic stenosis moderate, consistent with previous study.  We will continue to monitor with annual surveillance echocardiograms.  Blood pressure mildly elevated today.  Previously prescribed amlodipine was started less than 1 week ago. Continue current medical therapy.  Monitor blood pressures daily with a goal of 140/90 or less and notify office with 1 week of  readings.  If blood pressure remains consistently greater than 140s systolic will consider starting low-dose lisinopril 5 mg daily.  Encourage exercise, weight loss, and strict low-sodium diet.         Thank you for allowing me to participate in this delightful patient's care. I have recommended he follow up in 1 year with Dr. Patel with echocardiogram prior..       Theodora Nuñez, DREW CNP         Review of Systems:     Review of Systems:  Skin:  Negative lumps or bumps;scaling   Eyes:  Positive for glasses  ENT:  Negative sinus trouble;postnasal drainage  Respiratory:  Positive for dyspnea on exertion  Cardiovascular:  Negative edema (occasional. improved with walking)  Gastroenterology: Negative heartburn  Genitourinary:  Negative nocturia  Musculoskeletal:  Positive for joint pain;gout  Neurologic:  Negative numbness or tingling of feet  Psychiatric:  Negative sleep  disturbances  Heme/Lymph/Imm:  Negative allergies  Endocrine:  Negative                Physical Exam:     GEN:  In general, this is a overweight male in no acute distress.  HEENT:  Pupils equal, round. Sclerae nonicteric. Clear oropharynx. Mucous membranes moist.  NECK: Supple, no masses appreciated. Trachea midline.  No JVD   C/V:  Regular rate and irregular rhythm, systolic murmur, no rub or gallop. No S3 or RV heave.   RESP: Respirations are unlabored. No use of accessory muscles. Clear to auscultation bilaterally without wheezing, rales, or rhonchi.  GI: Abdomen soft, nontender, nondistended. No HSM appreciated.   EXTREM: Right pedal edema. No cyanosis or clubbing.  NEURO: Alert and oriented, cooperative. Gait steady. No obvious focal deficits.   PSYCH: Normal affect.  SKIN: Warm and dry. No rashes or petechiae appreciated.          Past Medical History:     Past Medical History:   Diagnosis Date     AF (atrial fibrillation) (H)     AFIB     Blood clot in the legs     left leg after surgery     Gastro-oesophageal reflux disease      Hodgkins lymphoma (H)     bone marrow biopsy     Hyperlipidemia      Hypertension      PAD (peripheral artery disease) (H)      Unspecified cerebral artery occlusion with cerebral infarction oct. 2011              Past Surgical History:     Past Surgical History:   Procedure Laterality Date     APPENDECTOMY       ARTHROPLASTY REVISION HIP  12/27/2011    Procedure:ARTHROPLASTY REVISION HIP; RIGHT TOTAL HIP REVISION WITH BONE GRAFT  ; Surgeon:ANGIE HARRINGTON; Location:[facility]     BIOPSY  hodgkins lymphoma    bone marrow biopsy, chemo and radiation     CARDIAC SURGERY      cardiac ablation     COLONOSCOPY       ORTHOPEDIC SURGERY      hip and knee surgeries     ORTHOPEDIC SURGERY      carpal tunnel  right hand              Allergies:   Penicillin g       Data:   All laboratory data reviewed:    LAST CHOLESTEROL:  Lab Results   Component Value Date    CHOL 128 05/05/2021     Lab  Results   Component Value Date    HDL 38 05/05/2021     Lab Results   Component Value Date    LDL 21 05/05/2021     Lab Results   Component Value Date    TRIG 117 05/05/2021     No results found for: CHOLHDLRATIO    LAST BMP:  Lab Results   Component Value Date     05/05/2021      Lab Results   Component Value Date    POTASSIUM 3.7 05/05/2021     Lab Results   Component Value Date    CHLORIDE 102 11/03/2021    CHLORIDE 100 05/05/2021     Lab Results   Component Value Date    SARA 1.16 05/05/2021     Lab Results   Component Value Date    CO2 28 05/05/2021     Lab Results   Component Value Date    BUN 27 05/05/2021     Lab Results   Component Value Date    CR 1 05/05/2021     Lab Results   Component Value Date    GLC 98 05/05/2021       LAST CBC:  Lab Results   Component Value Date    WBC 5.4 08/04/2020     Lab Results   Component Value Date    RBC 4.86 08/04/2020     Lab Results   Component Value Date    HGB 15.3 08/04/2020     Lab Results   Component Value Date    HCT 47.0 08/04/2020     Lab Results   Component Value Date    MCV 96.7 08/04/2020     Lab Results   Component Value Date    MCH 31.6 08/04/2020     Lab Results   Component Value Date    MCHC 32.7 08/04/2020     Lab Results   Component Value Date    RDW 13.4 08/04/2020     Lab Results   Component Value Date     08/04/2020     Thank you for allowing me to participate in the care of your patient.      Sincerely,     DREW Stauffer [facility]  cc:   German Patel MD  [address]  [place] [id number]

## 2022-02-25 NOTE — PROGRESS NOTES
Cardiology Clinic Progress Note  Jonny Goldberg MRN# [id number]   YOB: 1945 Age: 76 year old     Reason For Visit:  1 month follow up    Primary Cardiologist:   Dr. Patel           History of Presenting Illness:      Jonny Goldberg is a pleasant 76 year old patient who carries a past medical history significant for moderate aortic stenosis, hypertension, carotid artery disease, atrial fibrillation on chronic anticoagulation, and Raynaud's disease.    He was last seen on 1/31/2022 to establish care with Dr. Patel.  Please refer to that office visit for more complete HPI.  He was noted to be hypertensive.  Verapamil was discontinued and Norvasc was added to his current regimen of metoprolol for better blood pressure control as well as Raynaud's management.  He recently underwent a left carpal tunnel release and admits to not starting his medication until 5 days ago.  He states he was asked by his surgeon to not make any changes until after his surgery was complete.    He returns to the office today for a 1 month follow-up, denies chest pain, shortness of breath, PND, orthopnea, presyncope, syncope, heart racing, or palpitations.  His primary complaint is right foot pain, improving over the last few days.  Upon exam, lungs are clear bilaterally, heart rhythm irregular with regular rate, audible systolic murmur, and trace right pedal edema.     Last echocardiogram showed an ejection fraction estimated at 50 to 55%, moderate to severe biatrial enlargement, moderate to severe aortic stenosis with a mean AOV pressure gradient of 22.6 mmHg and aortic valve area of 1.0 cm .    Blood pressure is mildly elevated at 145/75 with repeat reading reduced to 140/84.  BMP on 2/9/2022 ([facility])  showed a sodium of 144, potassium 4.2, BUN 31, creatinine 1.36, and GFR 51  CBC was unremarkable  Last lipid panel was excellent with a total cholesterol of 111, HDL 45, LDL 50, and triglycerides 77, on  high-dose rosuvastatin.  BMI 34.02    Activity is primarily sedentary  Remains compliant with all medications.                   Assessment and Plan:     Jonny Goldberg is a pleasant 76 year old patient who carries a past medical history significant for moderate aortic stenosis, hypertension, carotid artery disease, atrial fibrillation on chronic anticoagulation, and Raynaud's disease.  Overall, he is feeling well on a cardiac standpoint.  Reviewed echocardiogram showing aortic stenosis moderate, consistent with previous study.  We will continue to monitor with annual surveillance echocardiograms.  Blood pressure mildly elevated today.  Previously prescribed amlodipine was started less than 1 week ago. Continue current medical therapy.  Monitor blood pressures daily with a goal of 140/90 or less and notify office with 1 week of  readings.  If blood pressure remains consistently greater than 140s systolic will consider starting low-dose lisinopril 5 mg daily.  Encourage exercise, weight loss, and strict low-sodium diet.         Thank you for allowing me to participate in this delightful patient's care. I have recommended he follow up in 1 year with Dr. Patel with echocardiogram prior..       DREW Stauffer CNP         Review of Systems:     Review of Systems:  Skin:  Negative lumps or bumps;scaling   Eyes:  Positive for glasses  ENT:  Negative sinus trouble;postnasal drainage  Respiratory:  Positive for dyspnea on exertion  Cardiovascular:  Negative edema (occasional. improved with walking)  Gastroenterology: Negative heartburn  Genitourinary:  Negative nocturia  Musculoskeletal:  Positive for joint pain;gout  Neurologic:  Negative numbness or tingling of feet  Psychiatric:  Negative sleep disturbances  Heme/Lymph/Imm:  Negative allergies  Endocrine:  Negative                Physical Exam:     GEN:  In general, this is a overweight male in no acute distress.  HEENT:  Pupils equal, round. Sclerae nonicteric.  Clear oropharynx. Mucous membranes moist.  NECK: Supple, no masses appreciated. Trachea midline.  No JVD   C/V:  Regular rate and irregular rhythm, systolic murmur, no rub or gallop. No S3 or RV heave.   RESP: Respirations are unlabored. No use of accessory muscles. Clear to auscultation bilaterally without wheezing, rales, or rhonchi.  GI: Abdomen soft, nontender, nondistended. No HSM appreciated.   EXTREM: Right pedal edema. No cyanosis or clubbing.  NEURO: Alert and oriented, cooperative. Gait steady. No obvious focal deficits.   PSYCH: Normal affect.  SKIN: Warm and dry. No rashes or petechiae appreciated.          Past Medical History:     Past Medical History:   Diagnosis Date     AF (atrial fibrillation) (H)     AFIB     Blood clot in the legs     left leg after surgery     Gastro-oesophageal reflux disease      Hodgkins lymphoma (H)     bone marrow biopsy     Hyperlipidemia      Hypertension      PAD (peripheral artery disease) (H)      Unspecified cerebral artery occlusion with cerebral infarction oct. 2011              Past Surgical History:     Past Surgical History:   Procedure Laterality Date     APPENDECTOMY       ARTHROPLASTY REVISION HIP  12/27/2011    Procedure:ARTHROPLASTY REVISION HIP; RIGHT TOTAL HIP REVISION WITH BONE GRAFT  ; Surgeon:ANGIE HARRINGTON; Location:[facility]     BIOPSY  hodgkins lymphoma    bone marrow biopsy, chemo and radiation     CARDIAC SURGERY      cardiac ablation     COLONOSCOPY       ORTHOPEDIC SURGERY      hip and knee surgeries     ORTHOPEDIC SURGERY      carpal tunnel  right hand              Allergies:   Penicillin g       Data:   All laboratory data reviewed:    LAST CHOLESTEROL:  Lab Results   Component Value Date    CHOL 128 05/05/2021     Lab Results   Component Value Date    HDL 38 05/05/2021     Lab Results   Component Value Date    LDL 21 05/05/2021     Lab Results   Component Value Date    TRIG 117 05/05/2021     No results found for: CHOLHDLRATIO    LAST  BMP:  Lab Results   Component Value Date     05/05/2021      Lab Results   Component Value Date    POTASSIUM 3.7 05/05/2021     Lab Results   Component Value Date    CHLORIDE 102 11/03/2021    CHLORIDE 100 05/05/2021     Lab Results   Component Value Date    SARA 1.16 05/05/2021     Lab Results   Component Value Date    CO2 28 05/05/2021     Lab Results   Component Value Date    BUN 27 05/05/2021     Lab Results   Component Value Date    CR 1 05/05/2021     Lab Results   Component Value Date    GLC 98 05/05/2021       LAST CBC:  Lab Results   Component Value Date    WBC 5.4 08/04/2020     Lab Results   Component Value Date    RBC 4.86 08/04/2020     Lab Results   Component Value Date    HGB 15.3 08/04/2020     Lab Results   Component Value Date    HCT 47.0 08/04/2020     Lab Results   Component Value Date    MCV 96.7 08/04/2020     Lab Results   Component Value Date    MCH 31.6 08/04/2020     Lab Results   Component Value Date    MCHC 32.7 08/04/2020     Lab Results   Component Value Date    RDW 13.4 08/04/2020     Lab Results   Component Value Date     08/04/2020

## 2022-03-04 NOTE — TELEPHONE ENCOUNTER
Received call from pt reporting his BP readings for the last week  2/26 136/78  2/27 134/79  2/28 123/84  3/01 126/89  3/02 127/68  3/03 118/74  3/04 109/76    Will message Theodora Nuñez NP to review. Mayank NASCIMENTO

## 2022-03-07 DIAGNOSIS — E78.2 MIXED HYPERLIPIDEMIA: ICD-10-CM

## 2022-03-07 RX ORDER — FENOFIBRATE 160 MG/1
160 TABLET ORAL DAILY
Qty: 90 TABLET | Refills: 3 | Status: SHIPPED | OUTPATIENT
Start: 2022-03-07 | End: 2023-03-14

## 2022-03-07 NOTE — TELEPHONE ENCOUNTER
Attempted to call pt with recommendations from Theodora Nuñez NP, left message for pt to call back. Mayank NASCIMENTO

## 2022-03-07 NOTE — TELEPHONE ENCOUNTER
Called pt with recommendations from Theodora Nuñez NP to continue current meds. Pt verbalized understanding. Mayank NASCIMENTO

## 2022-03-07 NOTE — TELEPHONE ENCOUNTER
[facility] Cardiology Refill Guideline reviewed.  Medication meets criteria for refill. Fenofibrate 160mg sent to [facility] pharmacy in [place].

## 2022-03-08 DIAGNOSIS — I73.9 PAD (PERIPHERAL ARTERY DISEASE) (H): ICD-10-CM

## 2022-03-08 RX ORDER — EZETIMIBE 10 MG/1
5 TABLET ORAL
Qty: 20 TABLET | Refills: 3 | Status: SHIPPED | OUTPATIENT
Start: 2022-03-09 | End: 2023-03-16

## 2022-06-01 ENCOUNTER — ANCILLARY PROCEDURE (OUTPATIENT)
Dept: GENERAL RADIOLOGY | Facility: CLINIC | Age: 77
End: 2022-06-01
Attending: FAMILY MEDICINE
Payer: COMMERCIAL

## 2022-06-01 DIAGNOSIS — R06.09 DOE (DYSPNEA ON EXERTION): ICD-10-CM

## 2022-06-01 DIAGNOSIS — Z85.72 HISTORY OF LYMPHOMA: ICD-10-CM

## 2022-06-01 DIAGNOSIS — R53.83 FATIGUE: ICD-10-CM

## 2022-06-01 PROCEDURE — 71046 X-RAY EXAM CHEST 2 VIEWS: CPT

## 2022-06-16 ENCOUNTER — TELEPHONE (OUTPATIENT)
Dept: CARDIOLOGY | Facility: CLINIC | Age: 77
End: 2022-06-16
Payer: COMMERCIAL

## 2022-06-16 NOTE — TELEPHONE ENCOUNTER
[place] Community Measures Blood Pressure guideline reviewed.  Patients recent blood pressure is outside of guideline parameters.  Called pt to review, no answer.  Left voicemail message asking patient to check their blood pressure using a home blood pressure cuff or by going to a [facility].  Patient instructed to then call [phone] (Yanni) and leave a message with their name, date of birth, and blood pressure reading that was completed within the last 24 hours and where it was completed.  Will await call back for further review.    KHANG Bledsoe

## 2022-07-25 NOTE — TELEPHONE ENCOUNTER
Patient called today to follow up on his blood pressures. He states that he has been checking them regularly at home since June and they have been well controlled and range from 114//84 with SBPs primally in the 110-120s. Informed patient that these are excellent blood pressures and we don't need to make any changes at this time.

## 2022-09-12 ENCOUNTER — TELEPHONE (OUTPATIENT)
Dept: CARDIOLOGY | Facility: CLINIC | Age: 77
End: 2022-09-12

## 2022-09-12 ENCOUNTER — TRANSFERRED RECORDS (OUTPATIENT)
Dept: HEALTH INFORMATION MANAGEMENT | Facility: CLINIC | Age: 77
End: 2022-09-12

## 2022-09-12 DIAGNOSIS — I35.0 NONRHEUMATIC AORTIC VALVE STENOSIS: Primary | ICD-10-CM

## 2022-09-12 NOTE — TELEPHONE ENCOUNTER
Health Call Center    Phone Message    May a detailed message be left on voicemail: yes     Reason for Call: Symptoms or Concerns     If patient has red-flag symptoms, warm transfer to triage line    Current symptom or concern: Dizziness    Episodes periodically starting 9/10/22    Symptoms have been present for:   Friday 9/10/22  [place]    Family checked for stroke symptoms.     Norma Fuentes MORENA at [facility] did Labs and EKG today 09/12/22  Cat scan of his brain will be done today also.    Has patient previously been seen for this? yes    [facility]    Date: 9/12/22    Are there any new or worsening symptoms? Same    Patient needs to be seen asap per PCP; unable to meet this time frame. Please call to schedule after review.    Action Taken: Other: Cardiology    Travel Screening: Not Applicable

## 2022-09-12 NOTE — TELEPHONE ENCOUNTER
[facility] Call Center    Phone Message    May a detailed message be left on voicemail: yes     Reason for Call: Other: Spouse returning a call for Ingrid Latif. Please call spouse back to further discuss, thank you.    Action Taken: Message routed to:  Other: Cardiology    Travel Screening: Not Applicable

## 2022-09-12 NOTE — TELEPHONE ENCOUNTER
Received call from patient stating that he has been experiencing dizziness over the weekend. He states that on Friday he went for a walk and felt dizzy when he got back. He sat down and thinks that he may have passed out for a second. He did not fall or get injured. He states that he has continued to have intermittent dizziness through the weekend. He has been monitoring his BP at home and it has been 133//85 prior to taking his morning medications. He has not checked it after his morning medications. He notes that his heart rate has been steady in the 80s. He does not have any chest pain, shortness of breath or other cardiac symptoms. He is scheduled for follow up with his PCP's office today. He acknowledged that he has a history of vertigo. I asked that he call back today after his PCP appointment if he needs cardiology follow up.

## 2022-09-13 ENCOUNTER — HOSPITAL ENCOUNTER (OUTPATIENT)
Dept: CARDIOLOGY | Facility: CLINIC | Age: 77
Discharge: HOME | End: 2022-09-13
Attending: NURSE PRACTITIONER | Admitting: NURSE PRACTITIONER
Payer: COMMERCIAL

## 2022-09-13 DIAGNOSIS — R55 SYNCOPE: ICD-10-CM

## 2022-09-13 DIAGNOSIS — I48.20 CHRONIC ATRIAL FIBRILLATION (H): Primary | ICD-10-CM

## 2022-09-13 DIAGNOSIS — I10 PRIMARY HYPERTENSION: ICD-10-CM

## 2022-09-13 DIAGNOSIS — I35.0 NONRHEUMATIC AORTIC VALVE STENOSIS: ICD-10-CM

## 2022-09-13 DIAGNOSIS — I35.0 AORTIC VALVE STENOSIS, ETIOLOGY OF CARDIAC VALVE DISEASE UNSPECIFIED: ICD-10-CM

## 2022-09-13 LAB — LVEF ECHO: NORMAL

## 2022-09-13 PROCEDURE — 93306 TTE W/DOPPLER COMPLETE: CPT

## 2022-09-13 PROCEDURE — 93306 TTE W/DOPPLER COMPLETE: CPT | Mod: 26 | Performed by: INTERNAL MEDICINE

## 2022-09-13 NOTE — TELEPHONE ENCOUNTER
Spoke with patient to follow up. His BP this morning prior to his morning medications was 123/70 with HR 61. About an hour after his morning medications today his BP was 124/66 HR 42. He states that he does not use a watch, pulse oximeter or anything else to check his HR. We discussed that his HR while in afib can be variable and is not always detected correctly by a single BP cuff reading.     Patient's last echo 1/28/22 showed moderate to severe aortic stenosis. Discussed patient's symptoms with Bere Jackson NP who recommended that we follow up with an echo asap. He is scheduled for an echo today at 3:00pm. Updated patient with plan. Will review echo results once available

## 2022-09-13 NOTE — TELEPHONE ENCOUNTER
Spoke with patient. He notes that he had labs, EKG, and CT of his head yesterday at [facility] and they did not find anything abnormal. He notes that he does have some slight dizziness yet this morning. Informed patient that I would request records from his PCP's office this morning (fax request was sent). I asked that he check his BP now, take his morning medications, and then check is BP about an hour after his morning medications. He states understanding of plan. Will follow up with patient later today to discuss further.

## 2022-09-14 ENCOUNTER — HOSPITAL ENCOUNTER (OUTPATIENT)
Facility: CLINIC | Age: 77
End: 2022-09-14
Payer: COMMERCIAL

## 2022-09-14 ENCOUNTER — OFFICE VISIT (OUTPATIENT)
Dept: CARDIOLOGY | Facility: CLINIC | Age: 77
End: 2022-09-14
Attending: FAMILY MEDICINE
Payer: COMMERCIAL

## 2022-09-14 ENCOUNTER — HOSPITAL ENCOUNTER (OUTPATIENT)
Dept: CARDIOLOGY | Facility: CLINIC | Age: 77
Discharge: HOME | End: 2022-09-14
Attending: INTERNAL MEDICINE | Admitting: INTERNAL MEDICINE
Payer: COMMERCIAL

## 2022-09-14 VITALS
SYSTOLIC BLOOD PRESSURE: 124 MMHG | WEIGHT: 209.5 LBS | OXYGEN SATURATION: 96 % | DIASTOLIC BLOOD PRESSURE: 69 MMHG | HEIGHT: 67 IN | HEART RATE: 67 BPM | BODY MASS INDEX: 32.88 KG/M2

## 2022-09-14 DIAGNOSIS — I48.20 CHRONIC ATRIAL FIBRILLATION (H): ICD-10-CM

## 2022-09-14 DIAGNOSIS — I35.0 AORTIC VALVE STENOSIS, ETIOLOGY OF CARDIAC VALVE DISEASE UNSPECIFIED: ICD-10-CM

## 2022-09-14 DIAGNOSIS — R42 DIZZINESS: Primary | ICD-10-CM

## 2022-09-14 DIAGNOSIS — R93.1 ABNORMAL FINDINGS DIAGNOSTIC IMAGING OF HEART AND CORONARY CIRCULATION: ICD-10-CM

## 2022-09-14 DIAGNOSIS — I10 PRIMARY HYPERTENSION: ICD-10-CM

## 2022-09-14 DIAGNOSIS — R55 SYNCOPE, UNSPECIFIED SYNCOPE TYPE: ICD-10-CM

## 2022-09-14 PROCEDURE — 99214 OFFICE O/P EST MOD 30 MIN: CPT | Mod: 25 | Performed by: INTERNAL MEDICINE

## 2022-09-14 PROCEDURE — 93242 EXT ECG>48HR<7D RECORDING: CPT

## 2022-09-14 PROCEDURE — 93244 EXT ECG>48HR<7D REV&INTERPJ: CPT | Performed by: INTERNAL MEDICINE

## 2022-09-14 NOTE — LETTER
9/14/2022    Reid Hogue MD  [facility] [address]  [place] [address]    RE: Jonny Goldberg       Dear Colleague,     I had the pleasure of seeing Jonny PHILIP Goldberg in the [facility].  HPI and Plan:   See dictation    Today's clinic visit entailed:  30 minutes spent on the date of the encounter doing chart review, history and exam, documentation and further activities per the note  Provider  Link to [facility] Help Grid       Orders Placed This Encounter   Procedures     Physical Therapy Referral     Adult Leadless EKG Monitor 3 to 7 Days     Case Request Cath Lab: Coronary Angiogram, Percutaneous Coronary Intervention       Orders Placed This Encounter   Medications     CINNAMON PO       There are no discontinued medications.      Encounter Diagnoses   Name Primary?     Chronic atrial fibrillation (H)      Syncope, unspecified syncope type      Aortic valve stenosis, etiology of cardiac valve disease unspecified      Primary hypertension      Dizziness Yes       CURRENT MEDICATIONS:  Current Outpatient Medications   Medication Sig Dispense Refill     ALPHA LIPOIC ACID PO Take 600 mg by mouth daily       amLODIPine (NORVASC) 10 MG tablet Take 1 tablet (10 mg) by mouth daily 90 tablet 3     ascorbic acid (VITAMIN C) 1000 MG TABS Take 1,000 mg by mouth daily       chlorthalidone (HYGROTON) 50 MG tablet daily       Cholecalciferol (VITAMIN D) 400 UNITS tablet Take 1 tablet by mouth daily       CINNAMON PO        ezetimibe (ZETIA) 10 MG tablet Take 0.5 tablets (5 mg) by mouth Every Mon, Wed, Fri Morning 20 tablet 3     fenofibrate (TRIGLIDE/LOFIBRA) 160 MG tablet Take 1 tablet (160 mg) by mouth daily 90 tablet 3     metoprolol (TOPROL-XL) 50 MG 24 hr tablet Take 50 mg by mouth daily.       multivitamin w/minerals (THERA-VIT-M) tablet Take 1 tablet by mouth daily       Omega-3 Fatty Acids (OMEGA 3 PO) Take by mouth daily 180 EPA/ 20 DHH       Omeprazole 20 MG tablet Take 20 mg by  mouth as needed        rosuvastatin (CRESTOR) 40 MG tablet Take 40 mg by mouth daily       Ubiquinol 100 MG CAPS Take by mouth daily       warfarin (COUMADIN) 5 MG tablet Take 5 mg by mouth daily 4 mg Mon, Wed, Fri and 3 mg all other days.         ALLERGIES     Allergies   Allergen Reactions     Penicillin G Hives     hives       PAST MEDICAL HISTORY:  Past Medical History:   Diagnosis Date     AF (atrial fibrillation) (H)     AFIB     Blood clot in the legs     left leg after surgery     Gastro-oesophageal reflux disease      Hodgkins lymphoma (H)     bone marrow biopsy     Hyperlipidemia      Hypertension      PAD (peripheral artery disease) (H)      Unspecified cerebral artery occlusion with cerebral infarction oct. 2011       PAST SURGICAL HISTORY:  Past Surgical History:   Procedure Laterality Date     APPENDECTOMY       ARTHROPLASTY REVISION HIP  12/27/2011    Procedure:ARTHROPLASTY REVISION HIP; RIGHT TOTAL HIP REVISION WITH BONE GRAFT  ; Surgeon:ANGIE HARRINGTON; Location:[facility]     BIOPSY  hodgkins lymphoma    bone marrow biopsy, chemo and radiation     CARDIAC SURGERY      cardiac ablation     COLONOSCOPY       ORTHOPEDIC SURGERY      hip and knee surgeries     ORTHOPEDIC SURGERY      carpal tunnel  right hand       FAMILY HISTORY:  Family History   Problem Relation Age of Onset     Hypertension Mother      Heart Failure Mother      Coronary Artery Disease Mother         CABG     Arrhythmia Mother      Cerebrovascular Disease Father      Heart Failure Father      Colon Cancer Father      Coronary Artery Disease Father         CABG     Coronary Artery Disease Brother         CABG     Arrhythmia Brother        SOCIAL HISTORY:  Social History     Socioeconomic History     Marital status:      Spouse name: None     Number of children: None     Years of education: None     Highest education level: None   Tobacco Use     Smoking status: Former Smoker     Quit date: 4/5/1987     Years since quitting:  "35.4     Smokeless tobacco: Never Used   Substance and Sexual Activity     Alcohol use: Yes     Alcohol/week: 0.0 standard drinks     Comment: occasionally     Drug use: No   Other Topics Concern     Caffeine Concern No     Comment: 2-3 a day     Sleep Concern Yes     Comment: depends     Weight Concern No     Comment: weight loss     Special Diet No     Exercise No     Comment: limited due to knee pain     Seat Belt Yes       Review of Systems:  Skin:          Eyes:         ENT:         Respiratory:  Negative shortness of breath;dyspnea on exertion     Cardiovascular:  Negative;palpitations;chest pain;fatigue;edema Positive for;lightheadedness;dizziness;edema left foot swelling  Gastroenterology:        Genitourinary:         Musculoskeletal:         Neurologic:         Psychiatric:         Heme/Lymph/Imm:         Endocrine:  Negative thyroid disorder;diabetes      Physical Exam:  Vitals: /69   Pulse 67   Ht 1.689 m (5' 6.5\")   Wt 95 kg (209 lb 8 oz)   SpO2 96%   BMI 33.31 kg/m      Constitutional:  cooperative;in no acute distress        Skin:  warm and dry to the touch          Head:  normocephalic        Eyes:  conjunctivae and lids unremarkable        Lymph:No Cervical lymphadenopathy present     ENT:  no pallor or cyanosis        Neck:  carotid pulses are full and equal bilaterally;JVP normal        Respiratory:  clear to auscultation         Cardiac:   irregularly irregular rhythm     systolic ejection murmur;grade 2        pulses full and equal                                        GI:  abdomen soft;non-tender        Extremities and Muscular Skeletal:  no edema              Neurological:  no gross motor deficits        Psych:  Alert and Oriented x 3        CC  Maegan Aponte PA-C  [facility]  [address] [facility] [address] [place] [id number]                Service Date: 09/14/2022    REASON FOR CONSULTATION:    1.  Recent dizzy spells.  2.  Known aortic stenosis.    HISTORY OF PRESENT " ILLNESS:  I had the pleasure of seeing Jonny today in followup at the [facility] in [place] this afternoon.      Jonny is a pleasant 77-year-old gentleman with known aortic stenosis, hypertension, carotid artery disease, chronic atrial fibrillation and peripheral arterial disease, who is here for followup.  He was previously seen by Dr. Patel earlier this year for evaluation of aortic stenosis, which was thought to be moderate in severity at that time.    The patient has been stable from a cardiopulmonary point of view.  He is here today because he developed recent lightheaded spells.  These spells have been ongoing for the last several weeks.  He has not had dane syncope, although he has had what he describes as brief blackouts.  The episodes for the most part have been at anytime of the day.  There is no clear association with exertion, although per the wife, he had 1 episode where he became lightheaded with exertion.  The episodes can happen when he is sitting.  They tend to be more common when he is standing, however.  He was seen by his primary provider.  He was referred for CT of the head, which was negative for any acute pathology.  He subsequently underwent repeat echocardiogram and was asked to see us.    The echo was performed 09/2013.  It revealed normal LV size and function with an EF of 55%-60% and no wall motion abnormality.  His aortic stenosis has progressed since his last echo.  The aortic stenosis is thought to be severe.  His valve area of 0.91.  His mean gradient is 27 mmHg.  His DI is 0.23 and a stroke volume index is 33 mL/m2.    The patient does not endorse chest pain.  He does not endorse any significant shortness of breath.  No palpitations.    ASSESSMENT AND PLAN:  A very pleasant 77-year-old gentleman with probable severe aortic stenosis, now with ongoing dizziness/lightheaded spells.  These lightheaded spells have been for the most part constant.  There is no  clear association with exertion, although there has been at least 1 episode where exertion made it worse.    Although the aortic stenosis has progressed since his last echo and is likely now severe, I doubt the patient's symptoms are related to his aortic stenosis.  His symptoms could certainly be related to inner ear problem.  We will, however, perform additional cardiovascular evaluation including event monitor to make sure this is not related to conduction issues.  Additionally, we will proceed with right and left heart catheterization to rule out obstructive coronary artery disease, but also to the valve and assess the severity of aortic stenosis.    It was a pleasure seeing Mr. Moreira in the clinic this afternoon.  I appreciate the opportunity to participate in his care.    Gennaro Cook MD        D: 2022   T: 2022   MT: JAQUI    Name:     DIANA MOREIRA  MRN:      [id number]        Account:      [id number]   :      1945           Service Date: 2022       Document: [id number]      Thank you for allowing me to participate in the care of your patient.      Sincerely,     Gennaro Cook MD, MD     [facility]  cc:   Maegan Aponte PA-C  [facility]  [address] [place] [id number]

## 2022-09-14 NOTE — PROGRESS NOTES
Service Date: 09/14/2022    REASON FOR CONSULTATION:    1.  Recent dizzy spells.  2.  Known aortic stenosis.    HISTORY OF PRESENT ILLNESS:  I had the pleasure of seeing Jonny today in followup at the [facility] in [place] this afternoon.      Jonny is a pleasant 77-year-old gentleman with known aortic stenosis, hypertension, carotid artery disease, chronic atrial fibrillation and peripheral arterial disease, who is here for followup.  He was previously seen by Dr. Patel earlier this year for evaluation of aortic stenosis, which was thought to be moderate in severity at that time.    The patient has been stable from a cardiopulmonary point of view.  He is here today because he developed recent lightheaded spells.  These spells have been ongoing for the last several weeks.  He has not had dane syncope, although he has had what he describes as brief blackouts.  The episodes for the most part have been at anytime of the day.  There is no clear association with exertion, although per the wife, he had 1 episode where he became lightheaded with exertion.  The episodes can happen when he is sitting.  They tend to be more common when he is standing, however.  He was seen by his primary provider.  He was referred for CT of the head, which was negative for any acute pathology.  He subsequently underwent repeat echocardiogram and was asked to see us.    The echo was performed 09/2013.  It revealed normal LV size and function with an EF of 55%-60% and no wall motion abnormality.  His aortic stenosis has progressed since his last echo.  The aortic stenosis is thought to be severe.  His valve area of 0.91.  His mean gradient is 27 mmHg.  His DI is 0.23 and a stroke volume index is 33 mL/m2.    The patient does not endorse chest pain.  He does not endorse any significant shortness of breath.  No palpitations.    ASSESSMENT AND PLAN:  A very pleasant 77-year-old gentleman with probable severe aortic  stenosis, now with ongoing dizziness/lightheaded spells.  These lightheaded spells have been for the most part constant.  There is no clear association with exertion, although there has been at least 1 episode where exertion made it worse.    Although the aortic stenosis has progressed since his last echo and is likely now severe, I doubt the patient's symptoms are related to his aortic stenosis.  His symptoms could certainly be related to inner ear problem.  We will, however, perform additional cardiovascular evaluation including event monitor to make sure this is not related to conduction issues.  Additionally, we will proceed with right and left heart catheterization to rule out obstructive coronary artery disease, but also to the valve and assess the severity of aortic stenosis.    It was a pleasure seeing Mr. Moreira in the clinic this afternoon.  I appreciate the opportunity to participate in his care.    Gennaro Cook MD        D: 2022   T: 2022   MT: JAQUI    Name:     DIANA MOREIRA  MRN:      [id number]        Account:      [id number]   :      1945           Service Date: 2022       Document: [id number]

## 2022-09-14 NOTE — PROGRESS NOTES
HPI and Plan:   See dictation    Today's clinic visit entailed:  30 minutes spent on the date of the encounter doing chart review, history and exam, documentation and further activities per the note  Provider  Link to [facility] Help Grid       Orders Placed This Encounter   Procedures     Physical Therapy Referral     Adult Leadless EKG Monitor 3 to 7 Days     Case Request Cath Lab: Coronary Angiogram, Percutaneous Coronary Intervention       Orders Placed This Encounter   Medications     CINNAMON PO       There are no discontinued medications.      Encounter Diagnoses   Name Primary?     Chronic atrial fibrillation (H)      Syncope, unspecified syncope type      Aortic valve stenosis, etiology of cardiac valve disease unspecified      Primary hypertension      Dizziness Yes       CURRENT MEDICATIONS:  Current Outpatient Medications   Medication Sig Dispense Refill     ALPHA LIPOIC ACID PO Take 600 mg by mouth daily       amLODIPine (NORVASC) 10 MG tablet Take 1 tablet (10 mg) by mouth daily 90 tablet 3     ascorbic acid (VITAMIN C) 1000 MG TABS Take 1,000 mg by mouth daily       chlorthalidone (HYGROTON) 50 MG tablet daily       Cholecalciferol (VITAMIN D) 400 UNITS tablet Take 1 tablet by mouth daily       CINNAMON PO        ezetimibe (ZETIA) 10 MG tablet Take 0.5 tablets (5 mg) by mouth Every Mon, Wed, Fri Morning 20 tablet 3     fenofibrate (TRIGLIDE/LOFIBRA) 160 MG tablet Take 1 tablet (160 mg) by mouth daily 90 tablet 3     metoprolol (TOPROL-XL) 50 MG 24 hr tablet Take 50 mg by mouth daily.       multivitamin w/minerals (THERA-VIT-M) tablet Take 1 tablet by mouth daily       Omega-3 Fatty Acids (OMEGA 3 PO) Take by mouth daily 180 EPA/ 20 DHH       Omeprazole 20 MG tablet Take 20 mg by mouth as needed        rosuvastatin (CRESTOR) 40 MG tablet Take 40 mg by mouth daily       Ubiquinol 100 MG CAPS Take by mouth daily       warfarin (COUMADIN) 5 MG tablet Take 5 mg by mouth daily 4 mg Mon, Wed, Fri and 3 mg all  other days.         ALLERGIES     Allergies   Allergen Reactions     Penicillin G Hives     hives       PAST MEDICAL HISTORY:  Past Medical History:   Diagnosis Date     AF (atrial fibrillation) (H)     AFIB     Blood clot in the legs     left leg after surgery     Gastro-oesophageal reflux disease      Hodgkins lymphoma (H)     bone marrow biopsy     Hyperlipidemia      Hypertension      PAD (peripheral artery disease) (H)      Unspecified cerebral artery occlusion with cerebral infarction oct. 2011       PAST SURGICAL HISTORY:  Past Surgical History:   Procedure Laterality Date     APPENDECTOMY       ARTHROPLASTY REVISION HIP  2011    Procedure:ARTHROPLASTY REVISION HIP; RIGHT TOTAL HIP REVISION WITH BONE GRAFT  ; Surgeon:ANGIE HARRINGTON; Location:[facility]     BIOPSY  hodgkins lymphoma    bone marrow biopsy, chemo and radiation     CARDIAC SURGERY      cardiac ablation     COLONOSCOPY       ORTHOPEDIC SURGERY      hip and knee surgeries     ORTHOPEDIC SURGERY      carpal tunnel  right hand       FAMILY HISTORY:  Family History   Problem Relation Age of Onset     Hypertension Mother      Heart Failure Mother      Coronary Artery Disease Mother         CABG     Arrhythmia Mother      Cerebrovascular Disease Father      Heart Failure Father      Colon Cancer Father      Coronary Artery Disease Father         CABG     Coronary Artery Disease Brother         CABG     Arrhythmia Brother        SOCIAL HISTORY:  Social History     Socioeconomic History     Marital status:      Spouse name: None     Number of children: None     Years of education: None     Highest education level: None   Tobacco Use     Smoking status: Former Smoker     Quit date: 1987     Years since quittin.4     Smokeless tobacco: Never Used   Substance and Sexual Activity     Alcohol use: Yes     Alcohol/week: 0.0 standard drinks     Comment: occasionally     Drug use: No   Other Topics Concern     Caffeine Concern No      "Comment: 2-3 a day     Sleep Concern Yes     Comment: depends     Weight Concern No     Comment: weight loss     Special Diet No     Exercise No     Comment: limited due to knee pain     Seat Belt Yes       Review of Systems:  Skin:          Eyes:         ENT:         Respiratory:  Negative shortness of breath;dyspnea on exertion     Cardiovascular:  Negative;palpitations;chest pain;fatigue;edema Positive for;lightheadedness;dizziness;edema left foot swelling  Gastroenterology:        Genitourinary:         Musculoskeletal:         Neurologic:         Psychiatric:         Heme/Lymph/Imm:         Endocrine:  Negative thyroid disorder;diabetes      Physical Exam:  Vitals: /69   Pulse 67   Ht 1.689 m (5' 6.5\")   Wt 95 kg (209 lb 8 oz)   SpO2 96%   BMI 33.31 kg/m      Constitutional:  cooperative;in no acute distress        Skin:  warm and dry to the touch          Head:  normocephalic        Eyes:  conjunctivae and lids unremarkable        Lymph:No Cervical lymphadenopathy present     ENT:  no pallor or cyanosis        Neck:  carotid pulses are full and equal bilaterally;JVP normal        Respiratory:  clear to auscultation         Cardiac:   irregularly irregular rhythm     systolic ejection murmur;grade 2        pulses full and equal                                        GI:  abdomen soft;non-tender        Extremities and Muscular Skeletal:  no edema              Neurological:  no gross motor deficits        Psych:  Alert and Oriented x 3        CC  Maegan Aponte PA-C  [facility]  [address] [facility] [facility] [address] [place] [id number]              "

## 2022-09-16 ENCOUNTER — TELEPHONE (OUTPATIENT)
Dept: CARDIOLOGY | Facility: CLINIC | Age: 77
End: 2022-09-16

## 2022-09-16 DIAGNOSIS — I48.20 CHRONIC ATRIAL FIBRILLATION (H): Primary | ICD-10-CM

## 2022-09-16 RX ORDER — ENOXAPARIN SODIUM 100 MG/ML
1 INJECTION SUBCUTANEOUS 2 TIMES DAILY
Qty: 2 ML | Refills: 0 | Status: SHIPPED | OUTPATIENT
Start: 2022-09-16 | End: 2022-09-17

## 2022-09-16 NOTE — TELEPHONE ENCOUNTER
Received orders for coronary angiogram scheduled on 9/23/22. Reviewed anticoagulation plane with Dr. Cook due to pt is on warfarin with hx of CVA. Per Dr. Cook will hold warfarin x 4 days prior to procedure and bridge w/Lovenox 1 mg/kg twice daily, pt to take only on Thursday morning and evening. Called pt and reviewed plan. Pt verbalized understanding and agreement with plan. Pt's wife stated she is familiar with Lovenox injections and will assist pt with this. Pt's warfarin is managed by PCP Dr. Hogue, called office and updated nursing of plan.

## 2022-09-19 ENCOUNTER — TELEPHONE (OUTPATIENT)
Dept: CARDIOLOGY | Facility: CLINIC | Age: 77
End: 2022-09-19

## 2022-09-19 DIAGNOSIS — R55 SYNCOPE, UNSPECIFIED SYNCOPE TYPE: Primary | ICD-10-CM

## 2022-09-19 DIAGNOSIS — R93.1 ABNORMAL FINDINGS DIAGNOSTIC IMAGING OF HEART AND CORONARY CIRCULATION: ICD-10-CM

## 2022-09-19 RX ORDER — SODIUM CHLORIDE 9 MG/ML
INJECTION, SOLUTION INTRAVENOUS CONTINUOUS
Status: CANCELLED | OUTPATIENT
Start: 2022-09-19

## 2022-09-19 RX ORDER — ASPIRIN 81 MG/1
243 TABLET, CHEWABLE ORAL ONCE
Status: CANCELLED | OUTPATIENT
Start: 2022-09-19

## 2022-09-19 RX ORDER — ASPIRIN 325 MG
325 TABLET ORAL ONCE
Status: CANCELLED | OUTPATIENT
Start: 2022-09-19

## 2022-09-19 RX ORDER — POTASSIUM CHLORIDE 1500 MG/1
20 TABLET, EXTENDED RELEASE ORAL
Status: CANCELLED | OUTPATIENT
Start: 2022-09-19

## 2022-09-19 RX ORDER — LIDOCAINE 40 MG/G
CREAM TOPICAL
Status: CANCELLED | OUTPATIENT
Start: 2022-09-19

## 2022-09-19 NOTE — TELEPHONE ENCOUNTER
[facility] - ANGIOGRAM INSTRUCTIONS:  - Jonny Goldberg is scheduled for a coronary angiogram at [facility] on 22. Check in time is at 6:30am, scheduled as 8:30am case.  - Advised patient not eat or drink after midnight on day of procedure.   - Patient advised to take morning medications with a sip of water on the day of the procedure, noting the followin. Aspirin: Patient does not currently take aspirin, patient will take 325 mg the morning of the procedure.  2. Diabetic Medications: Patient does not take Metformin or other diabetic medications.  3. Anticoagulants:  Reviewed anticoagulation plan with Dr. Cook due to pt being on warfarin with hx of CVA. Per Dr. Cook will hold warfarin x 4 days prior to procedure and bridge w/Lovenox 1 mg/kg twice daily, pt to take only on Thursday morning and evening.  4. Diuretics: Patient advised to hold clorthalidone the morning of the procedure.   5. All vitamins and supplements to be held the morning of the procedure.  - Verified patient does not have an allergy to contrast dye.  - Verified patient has someone available to drive them home from the hospital and can stay with them for 24 hours after the procedure. They understand that one visitor may accompany them into the hospital on the day of angiogram, with both patient and visitor to wear a mask.  - COVID testing: To be done at home by patient. He will bring a photo of his result.   - Patient was instructed to take their temperature the morning of procedure and if temperature is >100 degrees F patient should not come in and call [phone].  - Angiogram orders have been signed & held.    Ingrid Latif RN  [facility]

## 2022-09-23 ENCOUNTER — HOSPITAL ENCOUNTER (OUTPATIENT)
Facility: CLINIC | Age: 77
Discharge: HOME | End: 2022-09-23
Payer: COMMERCIAL

## 2022-09-23 VITALS
HEART RATE: 60 BPM | DIASTOLIC BLOOD PRESSURE: 80 MMHG | OXYGEN SATURATION: 97 % | TEMPERATURE: 97.6 F | SYSTOLIC BLOOD PRESSURE: 113 MMHG | RESPIRATION RATE: 16 BRPM

## 2022-09-23 DIAGNOSIS — R93.1 ABNORMAL FINDINGS DIAGNOSTIC IMAGING OF HEART AND CORONARY CIRCULATION: ICD-10-CM

## 2022-09-23 DIAGNOSIS — I48.20 CHRONIC ATRIAL FIBRILLATION (H): ICD-10-CM

## 2022-09-23 DIAGNOSIS — I35.0 AORTIC VALVE STENOSIS, ETIOLOGY OF CARDIAC VALVE DISEASE UNSPECIFIED: ICD-10-CM

## 2022-09-23 LAB
ANION GAP SERPL CALCULATED.3IONS-SCNC: 5 MMOL/L (ref 3–14)
APTT PPP: 34 SECONDS (ref 22–38)
BUN SERPL-MCNC: 35 MG/DL (ref 7–30)
CALCIUM SERPL-MCNC: 8.9 MG/DL (ref 8.5–10.1)
CHLORIDE BLD-SCNC: 107 MMOL/L (ref 94–109)
CO2 SERPL-SCNC: 28 MMOL/L (ref 20–32)
CREAT SERPL-MCNC: 1.07 MG/DL (ref 0.66–1.25)
ERYTHROCYTE [DISTWIDTH] IN BLOOD BY AUTOMATED COUNT: 13.1 % (ref 10–15)
GFR SERPL CREATININE-BSD FRML MDRD: 72 ML/MIN/1.73M2
GLUCOSE BLD-MCNC: 111 MG/DL (ref 70–99)
HCO3 BLDV-SCNC: 28 MMOL/L (ref 21–28)
HCT VFR BLD AUTO: 46.1 % (ref 40–53)
HGB BLD-MCNC: 15.8 G/DL (ref 13.3–17.7)
INR PPP: 1.4 (ref 0.85–1.15)
LACTATE BLD-SCNC: 0.6 MMOL/L
MCH RBC QN AUTO: 32.4 PG (ref 26.5–33)
MCHC RBC AUTO-ENTMCNC: 34.3 G/DL (ref 31.5–36.5)
MCV RBC AUTO: 95 FL (ref 78–100)
PCO2 BLDV: 46 MM HG (ref 40–50)
PH BLDV: 7.38 [PH] (ref 7.32–7.43)
PLATELET # BLD AUTO: 250 10E3/UL (ref 150–450)
PO2 BLDV: 36 MM HG (ref 25–47)
POTASSIUM BLD-SCNC: 3.2 MMOL/L (ref 3.4–5.3)
RBC # BLD AUTO: 4.88 10E6/UL (ref 4.4–5.9)
SAO2 % BLDV: 67 % (ref 94–100)
SODIUM SERPL-SCNC: 140 MMOL/L (ref 133–144)
WBC # BLD AUTO: 5.1 10E3/UL (ref 4–11)

## 2022-09-23 PROCEDURE — 93456 R HRT CORONARY ARTERY ANGIO: CPT | Mod: 26 | Performed by: INTERNAL MEDICINE

## 2022-09-23 PROCEDURE — 99152 MOD SED SAME PHYS/QHP 5/>YRS: CPT | Performed by: INTERNAL MEDICINE

## 2022-09-23 PROCEDURE — 93005 ELECTROCARDIOGRAM TRACING: CPT

## 2022-09-23 PROCEDURE — 99153 MOD SED SAME PHYS/QHP EA: CPT | Performed by: INTERNAL MEDICINE

## 2022-09-23 PROCEDURE — 83605 ASSAY OF LACTIC ACID: CPT

## 2022-09-23 PROCEDURE — 250N000011 HC RX IP 250 OP 636: Performed by: INTERNAL MEDICINE

## 2022-09-23 PROCEDURE — 999N000071 HC STATISTIC HEART CATH LAB OR EP LAB

## 2022-09-23 PROCEDURE — 36591 DRAW BLOOD OFF VENOUS DEVICE: CPT

## 2022-09-23 PROCEDURE — C1769 GUIDE WIRE: HCPCS | Performed by: INTERNAL MEDICINE

## 2022-09-23 PROCEDURE — 36415 COLL VENOUS BLD VENIPUNCTURE: CPT | Performed by: INTERNAL MEDICINE

## 2022-09-23 PROCEDURE — 258N000003 HC RX IP 258 OP 636: Performed by: INTERNAL MEDICINE

## 2022-09-23 PROCEDURE — 272N000001 HC OR GENERAL SUPPLY STERILE: Performed by: INTERNAL MEDICINE

## 2022-09-23 PROCEDURE — C1887 CATHETER, GUIDING: HCPCS | Performed by: INTERNAL MEDICINE

## 2022-09-23 PROCEDURE — 250N000013 HC RX MED GY IP 250 OP 250 PS 637: Performed by: INTERNAL MEDICINE

## 2022-09-23 PROCEDURE — 999N000184 HC STATISTIC TELEMETRY

## 2022-09-23 PROCEDURE — 93460 R&L HRT ART/VENTRICLE ANGIO: CPT | Performed by: INTERNAL MEDICINE

## 2022-09-23 PROCEDURE — 999N000054 HC STATISTIC EKG NON-CHARGEABLE

## 2022-09-23 PROCEDURE — 93010 ELECTROCARDIOGRAM REPORT: CPT | Performed by: INTERNAL MEDICINE

## 2022-09-23 PROCEDURE — 250N000009 HC RX 250: Performed by: INTERNAL MEDICINE

## 2022-09-23 PROCEDURE — 85730 THROMBOPLASTIN TIME PARTIAL: CPT | Performed by: INTERNAL MEDICINE

## 2022-09-23 PROCEDURE — 85027 COMPLETE CBC AUTOMATED: CPT | Performed by: INTERNAL MEDICINE

## 2022-09-23 PROCEDURE — 85610 PROTHROMBIN TIME: CPT | Performed by: INTERNAL MEDICINE

## 2022-09-23 PROCEDURE — C1894 INTRO/SHEATH, NON-LASER: HCPCS | Performed by: INTERNAL MEDICINE

## 2022-09-23 PROCEDURE — 80048 BASIC METABOLIC PNL TOTAL CA: CPT | Performed by: INTERNAL MEDICINE

## 2022-09-23 RX ORDER — IOPAMIDOL 755 MG/ML
INJECTION, SOLUTION INTRAVASCULAR
Status: DISCONTINUED | OUTPATIENT
Start: 2022-09-23 | End: 2022-09-23 | Stop reason: HOSPADM

## 2022-09-23 RX ORDER — DOBUTAMINE HYDROCHLORIDE 200 MG/100ML
2-20 INJECTION INTRAVENOUS CONTINUOUS PRN
Status: DISCONTINUED | OUTPATIENT
Start: 2022-09-23 | End: 2022-09-23 | Stop reason: HOSPADM

## 2022-09-23 RX ORDER — ASPIRIN 81 MG/1
243 TABLET, CHEWABLE ORAL ONCE
Status: DISCONTINUED | OUTPATIENT
Start: 2022-09-23 | End: 2022-09-23 | Stop reason: HOSPADM

## 2022-09-23 RX ORDER — HEPARIN SODIUM 1000 [USP'U]/ML
INJECTION, SOLUTION INTRAVENOUS; SUBCUTANEOUS
Status: DISCONTINUED | OUTPATIENT
Start: 2022-09-23 | End: 2022-09-23 | Stop reason: HOSPADM

## 2022-09-23 RX ORDER — ASPIRIN 325 MG
325 TABLET ORAL ONCE
Status: DISCONTINUED | OUTPATIENT
Start: 2022-09-23 | End: 2022-09-23 | Stop reason: HOSPADM

## 2022-09-23 RX ORDER — EPTIFIBATIDE 2 MG/ML
180 INJECTION, SOLUTION INTRAVENOUS EVERY 10 MIN PRN
Status: DISCONTINUED | OUTPATIENT
Start: 2022-09-23 | End: 2022-09-23 | Stop reason: HOSPADM

## 2022-09-23 RX ORDER — SODIUM CHLORIDE 9 MG/ML
INJECTION, SOLUTION INTRAVENOUS CONTINUOUS
Status: DISCONTINUED | OUTPATIENT
Start: 2022-09-23 | End: 2022-09-23 | Stop reason: HOSPADM

## 2022-09-23 RX ORDER — HEPARIN SODIUM 10000 [USP'U]/100ML
100-1000 INJECTION, SOLUTION INTRAVENOUS CONTINUOUS PRN
Status: DISCONTINUED | OUTPATIENT
Start: 2022-09-23 | End: 2022-09-23 | Stop reason: HOSPADM

## 2022-09-23 RX ORDER — POTASSIUM CHLORIDE 1500 MG/1
20 TABLET, EXTENDED RELEASE ORAL
Status: COMPLETED | OUTPATIENT
Start: 2022-09-23 | End: 2022-09-23

## 2022-09-23 RX ORDER — ARGATROBAN 1 MG/ML
150 INJECTION, SOLUTION INTRAVENOUS
Status: DISCONTINUED | OUTPATIENT
Start: 2022-09-23 | End: 2022-09-23 | Stop reason: HOSPADM

## 2022-09-23 RX ORDER — ARGATROBAN 1 MG/ML
350 INJECTION, SOLUTION INTRAVENOUS
Status: DISCONTINUED | OUTPATIENT
Start: 2022-09-23 | End: 2022-09-23 | Stop reason: HOSPADM

## 2022-09-23 RX ORDER — DOPAMINE HYDROCHLORIDE 160 MG/100ML
2-20 INJECTION, SOLUTION INTRAVENOUS CONTINUOUS PRN
Status: DISCONTINUED | OUTPATIENT
Start: 2022-09-23 | End: 2022-09-23 | Stop reason: HOSPADM

## 2022-09-23 RX ORDER — LIDOCAINE 40 MG/G
CREAM TOPICAL
Status: DISCONTINUED | OUTPATIENT
Start: 2022-09-23 | End: 2022-09-23 | Stop reason: HOSPADM

## 2022-09-23 RX ORDER — FENTANYL CITRATE 50 UG/ML
INJECTION, SOLUTION INTRAMUSCULAR; INTRAVENOUS
Status: DISCONTINUED | OUTPATIENT
Start: 2022-09-23 | End: 2022-09-23 | Stop reason: HOSPADM

## 2022-09-23 RX ADMIN — SODIUM CHLORIDE: 9 INJECTION, SOLUTION INTRAVENOUS at 07:31

## 2022-09-23 RX ADMIN — POTASSIUM CHLORIDE 20 MEQ: 1500 TABLET, EXTENDED RELEASE ORAL at 07:57

## 2022-09-23 ASSESSMENT — ACTIVITIES OF DAILY LIVING (ADL)
ADLS_ACUITY_SCORE: 35

## 2022-09-23 NOTE — PROGRESS NOTES
Care Suites Admission Nursing Note    Patient Information  Name: Jonny Goldberg  Age: 77 year old  Reason for admission: Left heart cath  Care Suites arrival time: 0630    Visitor Information  Name: Sisi   Informed of visitor restrictions: Yes  1 visitor allowed per patient   Visitor must screen negative for COVID symptoms   Visitor must wear a mask  Waiting rooms closed to visitors    Patient Admission/Assessment   Pre-procedure assessment complete: Yes  If abnormal assessment/labs, provider notified: N/A  NPO: Yes  Medications held per instructions/orders: Yes  Consent: obtained  If applicable, pregnancy test status: deferred  Patient oriented to room: Yes  Education/questions answered: Yes  Plan/other: getting prepped for procedure    Discharge Planning  Discharge name/phone number: Sisi  [phone]  Overnight post sedation caregiver: Sisi  Discharge location: home    Zeinab Pinzon RN

## 2022-09-23 NOTE — PROGRESS NOTES
Care Suites Post Procedure Note    Patient Information  Name: Jonny Goldberg  Age: 77 year old    Post Procedure  Time patient returned to Care Suites: 1030am  Concerns/abnormal assessment: none  If abnormal assessment, provider notified: N/A  Plan/Other: TR band on, few hours of bedrest     Zeinab Pinzon RN

## 2022-09-23 NOTE — PROGRESS NOTES
Care Suites Discharge Nursing Note    Patient Information  Name: Jonny Goldberg  Age: 77 year old    Discharge Education:  Discharge instructions reviewed: Yes  Additional education/resources provided: No  Patient/patient representative verbalizes understanding: Yes  Patient discharging on new medications: No  Medication education completed: N/A    Discharge Plans:   Discharge location: home  Discharge ride contacted: Yes  Approximate discharge time: 1400    Discharge Criteria:  Discharge criteria met and vital signs stable: Yes    Patient Belongs:  Patient belongings returned to patient: Yes    Zeinab Pinzon RN

## 2022-09-23 NOTE — DISCHARGE INSTRUCTIONS
Cardiac Angiogram Discharge Instructions - Neck & Radial    After you go home:    Have an adult stay with you until tomorrow.  Drink extra fluids for 2 days.  You may resume your normal diet.  No smoking       For 24 hours - due to the sedation you received:  Relax and take it easy.  Do NOT make any important or legal decisions.  Do NOT drive or operate machines at home or at work.  Do NOT drink alcohol.    Care of Neck & Wrist Puncture Sites:    For the first 24 hrs - check the puncture site every 1-2 hours while awake.  It is normal to have soreness at the puncture site and mild tingling in your hand for up to 3 days.  Remove the bandaid after 24 hours. If there is minor oozing, apply another bandaid and remove it after 12 hours.  You may shower tomorrow. Do NOT take a bath, or use a hot tub or pool for at least 3 days. Do NOT scrub the site. Do not use lotion or powder near the puncture site.           Activity - For 2 days:    Neck site:  Avoid heavy lifting or the overuse of your shoulder.        Wrist site:  do not use your hand or arm to support your weight (such as rising from a chair)   do not bend your wrist (such as lifting a garage door).  do not lift more than 5 pounds or exercise your arm (such as tennis, golf or bowling).  Do NOT do any heavy activity such as exercise, lifting, or straining.     Bleeding:     Neck site:  If you start bleeding from the site in your neck, sit down and press gently on the site for 10 minutes.   Once bleeding stops, sit still for 2 hours.   Call [facility] as soon as you can    Wrist site:  If you start bleeding from the site in your wrist, sit down and press firmly on/above the site for 10 minutes.   Once bleeding stops, keep arm still for 2 hours.   Call [facility] as soon as you can.    Call 911 right away if you have heavy bleeding or bleeding that does not stop.      Medicines:    If you are taking an antiplatelet medication such as Plavix, Brilinta or Effient, do  not stop taking it until you talk to your cardiologist.      If you are on Metformin (Glucophage), do not restart it until you have blood tests (within 2 to 3 days after discharge).  After you have your blood drawn, you may restart the Metformin.   Take your medications, including blood thinners, unless your provider tells you not to.    If you take Coumadin (Warfarin), have your INR checked by your provider in  3-5 days. Call your clinic to schedule this.  If you have stopped any medicines, check with your provider about when to restart them.    Follow Up Appointments:    Follow up with [facility] Nurse Practitioner at [facility] of patient preference in 7-10 days.    Call the clinic if:    You have increased pain or a large or growing hard lump around the site.  The site is red, swollen, hot or tender.  Blood or fluid is draining from the site.  You have chills or a fever greater than 101 F (38 C).  Your arm feels numb, cool or changes color.  You have hives, a rash or unusual itching.  Any questions or concerns.      [facility] [place]:    [phone] [facility] (7 days a week)

## 2022-09-26 ENCOUNTER — TELEPHONE (OUTPATIENT)
Dept: CARDIOLOGY | Facility: CLINIC | Age: 77
End: 2022-09-26

## 2022-09-26 DIAGNOSIS — I35.0 SEVERE AORTIC STENOSIS: Primary | ICD-10-CM

## 2022-09-26 NOTE — TELEPHONE ENCOUNTER
Called patient this morning and spoke with his wife Sisi. Dicussed that that his angiogram last week showed only mild coronary artery disease. Dr. Cook recommended that we proceed with CT TAVR. Ordered CT and will have scheduling reach out to arrange.

## 2022-09-28 ENCOUNTER — TELEPHONE (OUTPATIENT)
Dept: CARDIOLOGY | Facility: CLINIC | Age: 77
End: 2022-09-28

## 2022-09-28 ENCOUNTER — HOSPITAL ENCOUNTER (INPATIENT)
Facility: CLINIC | Age: 77
LOS: 1 days | Discharge: HOME | DRG: 309 | End: 2022-09-29
Attending: EMERGENCY MEDICINE | Admitting: INTERNAL MEDICINE
Payer: COMMERCIAL

## 2022-09-28 DIAGNOSIS — I48.19 PERSISTENT ATRIAL FIBRILLATION (H): Primary | ICD-10-CM

## 2022-09-28 DIAGNOSIS — I45.5 SINUS PAUSE: ICD-10-CM

## 2022-09-28 DIAGNOSIS — I47.29 PAROXYSMAL VENTRICULAR TACHYCARDIA (H): ICD-10-CM

## 2022-09-28 PROBLEM — I47.20 PAROXYSMAL VENTRICULAR TACHYCARDIA (H): Status: ACTIVE | Noted: 2022-09-28

## 2022-09-28 LAB
ANION GAP SERPL CALCULATED.3IONS-SCNC: 8 MMOL/L (ref 3–14)
ATRIAL RATE - MUSE: 54 BPM
BASOPHILS # BLD AUTO: 0 10E3/UL (ref 0–0.2)
BASOPHILS NFR BLD AUTO: 1 %
BUN SERPL-MCNC: 32 MG/DL (ref 7–30)
CALCIUM SERPL-MCNC: 9 MG/DL (ref 8.5–10.1)
CHLORIDE BLD-SCNC: 105 MMOL/L (ref 94–109)
CO2 SERPL-SCNC: 27 MMOL/L (ref 20–32)
CREAT SERPL-MCNC: 1.1 MG/DL (ref 0.66–1.25)
DIASTOLIC BLOOD PRESSURE - MUSE: NORMAL MMHG
EOSINOPHIL # BLD AUTO: 0.2 10E3/UL (ref 0–0.7)
EOSINOPHIL NFR BLD AUTO: 3 %
ERYTHROCYTE [DISTWIDTH] IN BLOOD BY AUTOMATED COUNT: 13.1 % (ref 10–15)
GFR SERPL CREATININE-BSD FRML MDRD: 70 ML/MIN/1.73M2
GLUCOSE BLD-MCNC: 121 MG/DL (ref 70–99)
HCT VFR BLD AUTO: 44.4 % (ref 40–53)
HGB BLD-MCNC: 15.4 G/DL (ref 13.3–17.7)
HOLD SPECIMEN: NORMAL
IMM GRANULOCYTES # BLD: 0 10E3/UL
IMM GRANULOCYTES NFR BLD: 0 %
INR PPP: 1.71 (ref 0.85–1.15)
INTERPRETATION ECG - MUSE: NORMAL
LYMPHOCYTES # BLD AUTO: 0.7 10E3/UL (ref 0.8–5.3)
LYMPHOCYTES NFR BLD AUTO: 12 %
MCH RBC QN AUTO: 32.5 PG (ref 26.5–33)
MCHC RBC AUTO-ENTMCNC: 34.7 G/DL (ref 31.5–36.5)
MCV RBC AUTO: 94 FL (ref 78–100)
MONOCYTES # BLD AUTO: 0.8 10E3/UL (ref 0–1.3)
MONOCYTES NFR BLD AUTO: 14 %
NEUTROPHILS # BLD AUTO: 4.4 10E3/UL (ref 1.6–8.3)
NEUTROPHILS NFR BLD AUTO: 70 %
NRBC # BLD AUTO: 0 10E3/UL
NRBC BLD AUTO-RTO: 0 /100
P AXIS - MUSE: NORMAL DEGREES
PLATELET # BLD AUTO: 244 10E3/UL (ref 150–450)
POTASSIUM BLD-SCNC: 3 MMOL/L (ref 3.4–5.3)
PR INTERVAL - MUSE: NORMAL MS
QRS DURATION - MUSE: 138 MS
QT - MUSE: 426 MS
QTC - MUSE: 466 MS
R AXIS - MUSE: -78 DEGREES
RBC # BLD AUTO: 4.74 10E6/UL (ref 4.4–5.9)
SODIUM SERPL-SCNC: 140 MMOL/L (ref 133–144)
SYSTOLIC BLOOD PRESSURE - MUSE: NORMAL MMHG
T AXIS - MUSE: 45 DEGREES
TROPONIN I SERPL HS-MCNC: 22 NG/L
VENTRICULAR RATE- MUSE: 72 BPM
WBC # BLD AUTO: 6.1 10E3/UL (ref 4–11)

## 2022-09-28 PROCEDURE — 99223 1ST HOSP IP/OBS HIGH 75: CPT | Mod: AI | Performed by: INTERNAL MEDICINE

## 2022-09-28 PROCEDURE — 99285 EMERGENCY DEPT VISIT HI MDM: CPT | Mod: 25

## 2022-09-28 PROCEDURE — 36415 COLL VENOUS BLD VENIPUNCTURE: CPT | Performed by: EMERGENCY MEDICINE

## 2022-09-28 PROCEDURE — 93005 ELECTROCARDIOGRAM TRACING: CPT

## 2022-09-28 PROCEDURE — 80048 BASIC METABOLIC PNL TOTAL CA: CPT | Performed by: EMERGENCY MEDICINE

## 2022-09-28 PROCEDURE — C9803 HOPD COVID-19 SPEC COLLECT: HCPCS

## 2022-09-28 PROCEDURE — 120N000001 HC R&B MED SURG/OB

## 2022-09-28 PROCEDURE — U0003 INFECTIOUS AGENT DETECTION BY NUCLEIC ACID (DNA OR RNA); SEVERE ACUTE RESPIRATORY SYNDROME CORONAVIRUS 2 (SARS-COV-2) (CORONAVIRUS DISEASE [COVID-19]), AMPLIFIED PROBE TECHNIQUE, MAKING USE OF HIGH THROUGHPUT TECHNOLOGIES AS DESCRIBED BY CMS-2020-01-R: HCPCS | Performed by: EMERGENCY MEDICINE

## 2022-09-28 PROCEDURE — 85025 COMPLETE CBC W/AUTO DIFF WBC: CPT | Performed by: EMERGENCY MEDICINE

## 2022-09-28 PROCEDURE — 85610 PROTHROMBIN TIME: CPT | Performed by: EMERGENCY MEDICINE

## 2022-09-28 PROCEDURE — 84484 ASSAY OF TROPONIN QUANT: CPT | Performed by: EMERGENCY MEDICINE

## 2022-09-28 PROCEDURE — 83735 ASSAY OF MAGNESIUM: CPT | Performed by: STUDENT IN AN ORGANIZED HEALTH CARE EDUCATION/TRAINING PROGRAM

## 2022-09-28 RX ORDER — LIDOCAINE 40 MG/G
CREAM TOPICAL
Status: CANCELLED | OUTPATIENT
Start: 2022-09-28

## 2022-09-28 ASSESSMENT — ENCOUNTER SYMPTOMS
NUMBNESS: 0
DYSURIA: 0
FREQUENCY: 0
WEAKNESS: 0
LIGHT-HEADEDNESS: 1
ABDOMINAL PAIN: 0
HEADACHES: 0
HEMATURIA: 0
FATIGUE: 1
DIFFICULTY URINATING: 0

## 2022-09-28 ASSESSMENT — ACTIVITIES OF DAILY LIVING (ADL): ADLS_ACUITY_SCORE: 35

## 2022-09-28 NOTE — TELEPHONE ENCOUNTER
Received patient's ziopatch results today which shows 288 pauses lasting 3-6.0 seconds. He did have dizziness associated with these episodes. He also has VT noted lasting 15 beats with rate in the 180s. He has known chronic afib.   Reviewed results with Dr. Cook who recommended that patient hold his metoprolol and present to the ED tonight with plan for pacemaker implant tomorrow. [place] but will plan to drive home now and present to the [facility] ED tonight. He is aware that he is not allowed to drive.   Patient states understanding and agreement with plan.

## 2022-09-29 ENCOUNTER — TELEPHONE (OUTPATIENT)
Dept: CARDIOLOGY | Facility: CLINIC | Age: 77
End: 2022-09-29

## 2022-09-29 ENCOUNTER — APPOINTMENT (OUTPATIENT)
Dept: CARDIOLOGY | Facility: CLINIC | Age: 77
DRG: 309 | End: 2022-09-29
Attending: INTERNAL MEDICINE
Payer: COMMERCIAL

## 2022-09-29 VITALS
HEIGHT: 66 IN | SYSTOLIC BLOOD PRESSURE: 125 MMHG | HEART RATE: 66 BPM | TEMPERATURE: 97.8 F | RESPIRATION RATE: 17 BRPM | WEIGHT: 210 LBS | DIASTOLIC BLOOD PRESSURE: 82 MMHG | BODY MASS INDEX: 33.75 KG/M2 | OXYGEN SATURATION: 95 %

## 2022-09-29 LAB
MAGNESIUM SERPL-MCNC: 2 MG/DL (ref 1.6–2.3)
SARS-COV-2 RNA RESP QL NAA+PROBE: NEGATIVE

## 2022-09-29 PROCEDURE — 250N000013 HC RX MED GY IP 250 OP 250 PS 637: Performed by: STUDENT IN AN ORGANIZED HEALTH CARE EDUCATION/TRAINING PROGRAM

## 2022-09-29 PROCEDURE — 99239 HOSP IP/OBS DSCHRG MGMT >30: CPT | Performed by: STUDENT IN AN ORGANIZED HEALTH CARE EDUCATION/TRAINING PROGRAM

## 2022-09-29 PROCEDURE — 99222 1ST HOSP IP/OBS MODERATE 55: CPT | Mod: 25 | Performed by: INTERNAL MEDICINE

## 2022-09-29 PROCEDURE — 93272 ECG/REVIEW INTERPRET ONLY: CPT | Performed by: INTERNAL MEDICINE

## 2022-09-29 PROCEDURE — 93270 REMOTE 30 DAY ECG REV/REPORT: CPT

## 2022-09-29 RX ORDER — ACETAMINOPHEN 650 MG/1
650 SUPPOSITORY RECTAL EVERY 6 HOURS PRN
Status: DISCONTINUED | OUTPATIENT
Start: 2022-09-29 | End: 2022-09-29 | Stop reason: HOSPADM

## 2022-09-29 RX ORDER — PROCHLORPERAZINE MALEATE 5 MG
5 TABLET ORAL EVERY 6 HOURS PRN
Status: DISCONTINUED | OUTPATIENT
Start: 2022-09-29 | End: 2022-09-29 | Stop reason: HOSPADM

## 2022-09-29 RX ORDER — PROCHLORPERAZINE 25 MG
12.5 SUPPOSITORY, RECTAL RECTAL EVERY 12 HOURS PRN
Status: DISCONTINUED | OUTPATIENT
Start: 2022-09-29 | End: 2022-09-29 | Stop reason: HOSPADM

## 2022-09-29 RX ORDER — POTASSIUM CHLORIDE 1500 MG/1
40 TABLET, EXTENDED RELEASE ORAL ONCE
Status: COMPLETED | OUTPATIENT
Start: 2022-09-29 | End: 2022-09-29

## 2022-09-29 RX ORDER — POTASSIUM CHLORIDE 1500 MG/1
20 TABLET, EXTENDED RELEASE ORAL ONCE
Status: COMPLETED | OUTPATIENT
Start: 2022-09-29 | End: 2022-09-29

## 2022-09-29 RX ORDER — ACETAMINOPHEN 325 MG/1
650 TABLET ORAL EVERY 6 HOURS PRN
Status: DISCONTINUED | OUTPATIENT
Start: 2022-09-29 | End: 2022-09-29 | Stop reason: HOSPADM

## 2022-09-29 RX ADMIN — POTASSIUM CHLORIDE 40 MEQ: 1500 TABLET, EXTENDED RELEASE ORAL at 07:51

## 2022-09-29 RX ADMIN — POTASSIUM CHLORIDE 20 MEQ: 1500 TABLET, EXTENDED RELEASE ORAL at 09:27

## 2022-09-29 ASSESSMENT — ACTIVITIES OF DAILY LIVING (ADL)
ADLS_ACUITY_SCORE: 35

## 2022-09-29 NOTE — ED TRIAGE NOTES
He states he got a call that his holter monitor shows his heart stopped 280 times.  Complains of dizziness.     Triage Assessment     Row Name 09/28/22 2038       Triage Assessment (Adult)    Airway WDL WDL       Respiratory WDL    Respiratory WDL WDL       Skin Circulation/Temperature WDL    Skin Circulation/Temperature WDL WDL       Cardiac WDL    Cardiac WDL X  Holter monitor shows an issue       Peripheral/Neurovascular WDL    Peripheral Neurovascular WDL WDL       Cognitive/Neuro/Behavioral WDL    Cognitive/Neuro/Behavioral WDL WDL

## 2022-09-29 NOTE — TELEPHONE ENCOUNTER
[facility] Call Center    Phone Message    May a detailed message be left on voicemail: yes     Reason for Call: Other: Vi calling from [employer] they have a high priority EKG result to report. Unfortunately they disconnected.      Action Taken: Other: cardiology    Travel Screening: Not Applicable

## 2022-09-29 NOTE — ED PROVIDER NOTES
History   Chief Complaint:  Dizziness       HPI   Jonny Goldberg is a 77 year old male, on Warfarin, with history of atrial fibrillation, hypertension, severe aortic stenosis, CVA who presents with lightheadedness. He has been wearing a heart monitor since he had an episode of syncope after sitting down following a walk. He received a call today that his holter monitor showed that his heart had stopped 280 times lasting 3-6 seconds.  The monitor also showed a 15 beat run of VT.  He reports of multiple waves of lightheadedness today as well as fatigue. He reported that this morning while drinking his coffee he felt his first wave of lightheadedness.  These episodes have been occurring now for couple of weeks.  He has a history of atrial fibrillation and is on warfarin.  He reports that he has had fairly consistent INRs. He has not taken his warfarin today as he was advised not to. Denies headache, vision problem, hearing problem, chest pain, abdominal pain, weakness, numbness or urinary symptoms.     Review of Systems   Constitutional: Positive for fatigue.   HENT: Negative for hearing loss.    Eyes: Negative for visual disturbance.   Cardiovascular: Negative for chest pain.   Gastrointestinal: Negative for abdominal pain.   Genitourinary: Negative for difficulty urinating, dysuria, frequency, hematuria and urgency.   Neurological: Positive for light-headedness. Negative for weakness, numbness and headaches.   All other systems reviewed and are negative.      Allergies:  Penicillin G    Medications:  Norvasc  Vitamin C  Hygroton  Vitamin D  Zetia  Triglide  Toprol  Omeprazole  Crestor  Ubiquinol  Coumadin    Past Medical History:     Atrial fibrillation  CVA  PAD  Hyperlipidemia  Hypertension  Severe aortic stenosis    Past Surgical History:    Appendectomy  Arthroplasty revision hip  Biopsy  Cardiac ablation  Colonoscopy  Coronary angiogram  PCI  Right heart cath  Orthopedic surgery    Family History:    Mother -  "hypertension, heart failure, CAD, arrhythmia  Father - CVD, heart failure, colon cancer, CABG  Brother - CABG, arrhythmia    Social History:  The patient presents to the ED with his wife  PCP: Reid Hogue       Physical Exam     Patient Vitals for the past 24 hrs:   BP Temp Temp src Pulse Resp SpO2 Height Weight   09/28/22 2041 121/68 97.8  F (36.6  C) Temporal 79 18 98 % 1.676 m (5' 6\") 95.3 kg (210 lb)       Physical Exam    General: Laying on the ED bed, no distress  HEENT: Normocephalic, atraumatic  Cardiac: Radial pulses 2+, irregularly irregular  Pulm: Breathing comfortably, no accessory muscle usage, no conversational dyspnea  GI: Abdomen soft, nontender, no rigidity or guarding  MSK: No deformities  Skin: Warm and dry  Neuro: Moves all extremities x4, no focal deficits  Psych: Normal mood and affect      Emergency Department Course   ECG  Atrial fibrillation with left axis and right bundle branch block and a rate of 72 bpm.  No acute ST-T changes.  When compared to EKG dated 9/23/2022, no significant change.    Laboratory:  Labs Ordered and Resulted from Time of ED Arrival to Time of ED Departure   BASIC METABOLIC PANEL - Abnormal       Result Value    Sodium 140      Potassium 3.0 (*)     Chloride 105      Carbon Dioxide (CO2) 27      Anion Gap 8      Urea Nitrogen 32 (*)     Creatinine 1.10      Calcium 9.0      Glucose 121 (*)     GFR Estimate 70     INR - Abnormal    INR 1.71 (*)    CBC WITH PLATELETS AND DIFFERENTIAL - Abnormal    WBC Count 6.1      RBC Count 4.74      Hemoglobin 15.4      Hematocrit 44.4      MCV 94      MCH 32.5      MCHC 34.7      RDW 13.1      Platelet Count 244      % Neutrophils 70      % Lymphocytes 12      % Monocytes 14      % Eosinophils 3      % Basophils 1      % Immature Granulocytes 0      NRBCs per 100 WBC 0      Absolute Neutrophils 4.4      Absolute Lymphocytes 0.7 (*)     Absolute Monocytes 0.8      Absolute Eosinophils 0.2      Absolute Basophils 0.0      Absolute " Immature Granulocytes 0.0      Absolute NRBCs 0.0     TROPONIN I - Normal    Troponin I High Sensitivity 22          Procedures  None    Emergency Department Course:       Reviewed:  I reviewed nursing notes, vitals, past medical history and Care Everywhere    Assessments:   I obtained history and examined the patient as noted above.    I rechecked the patient and explained findings.     Consults:   I spoke to Dr. Brianna Simmons, hospitalist, who accepts the patient.    Disposition:  The patient was admitted to the hospital under the care of Dr. Brianna Simmons.     Impression & Plan     Medical Decision Makin-year-old male presents with sinus dysfunction and nonsustained ventricular tachycardia as above.  He is hemodynamically stable on presentation to the ED.  He was placed on the monitor as well as the [place] given his known propensity to life-threatening arrhythmia.  Plan this time is for admission to the hospital service with electrophysiology consultation for device placement tomorrow.    Critical Care Time: was 0 minutes for this patient excluding procedures    Diagnosis:    ICD-10-CM    1. Sinus pause  I45.5    2. Paroxysmal ventricular tachycardia  I47.2        Scribe Disclosure:  I, Francisca Cheatham, am serving as a scribe at 9:46 PM on 2022 to document services personally performed by Justin Zuñiga MD based on my observations and the provider's statements to me.     MD Yogesh PIPER Colin, MD  [phone]

## 2022-09-29 NOTE — ED NOTES
RN at bedside, pt reports feeling well at this time and has no complaints at this time. Pt updated on plan of care. Pt repositioning self on bed, call light within reach. Zoll monitor at bedside if necessary.

## 2022-09-29 NOTE — ED NOTES
"[facility]  ED Nurse Handoff Report    ED Chief complaint: Dizziness      ED Diagnosis:   Final diagnoses:   Sinus pause   Paroxysmal ventricular tachycardia       Code Status: Full Code    Allergies:   Allergies   Allergen Reactions     Penicillin G Hives     hives       Patient Story: He states he got a call that his holter monitor shows his heart stopped 280 times.  Complains of dizziness  Focused Assessment:  Dizziness, needs pacemaker    Treatments and/or interventions provided: see MAR. Needs pacemaker  Patient's response to treatments and/or interventions:     To be done/followed up on inpatient unit:      Does this patient have any cognitive concerns?: alert and oriented    Activity level - Baseline/Home:  Independent  Activity Level - Current:   Stand with Assist    Patient's Preferred language: English   Needed?: No    Isolation: None  Infection: Not Applicable  Patient tested for COVID 19 prior to admission: YES  Bariatric?: No    Vital Signs:   Vitals:    09/28/22 2041   BP: 121/68   Pulse: 79   Resp: 18   Temp: 97.8  F (36.6  C)   TempSrc: Temporal   SpO2: 98%   Weight: 95.3 kg (210 lb)   Height: 1.676 m (5' 6\")       Cardiac Rhythm:     Was the PSS-3 completed:   Yes  What interventions are required if any?               Family Comments:   OBS brochure/video discussed/provided to patient/family: N/A              Name of person given brochure if not patient:               Relationship to patient:     For the majority of the shift this patient's behavior was Green.   Behavioral interventions performed were .    ED NURSE PHONE NUMBER: [phone]       "

## 2022-09-29 NOTE — PROGRESS NOTES
77 year old white male with persistent AF, presented with recurrent dizziness recently.   A Zioptach was placed around 9/14/2022. Episodes of asystole and 1 VT of 15 beats detected. Pt was called to come to the ER.  He feels fine now.  Was on metoprolol 50 mg daily, stopped.  Current HR 60-70 bpm at rest. No long pauses.    Angiography showed mild CAD.  Severe aortic stenosis.  Hypertension, Hodgkins lymphoma, GERD, CVA, PAD.    Discussed the options:  1. Discontinue metoprolol and go home on an ECT event monitor. Reconsider pacemaker if there is recurrent long asystole >3 seconds while awake.  2. Implant pacemaker during this admission.  Pt does not like the idea of pacemaker. Agreed on option 1.  Aortic stenosis will be addressed in the near future.  I will see him for follow up in 1 month.  Ok for discharge from ER today.  Sign off

## 2022-09-29 NOTE — H&P
[facility]  History and Physical - Hospitalist Service     Date of Admission:  9/28/2022  Assessment & Plan    Jnony Goldberg is a delightful 77 year old male with severe aortic stenosis, atrial fibrillation on warfarin, hypertension, dyslipidemia, peripheral arterial disease and history of stroke who was admitted on 9/28/2022 for symptomatic arrhythmias and need for pacemaker placement.    Paroxysmal Ventricular Tachycardia and symptomatic sinus pauses  Severe aortic stenosis  Atrial fibrillation on warfarin  Hypertension  Dyslipidemia  Follows closely with [facility] cardiology clinic and while undergoing outpatient work-up in preparation for TAVR he was noted to have symptomatic arrhythmias. Cardiac medications PTA include amlodipine, chlorthalidone, zetia, fenofibrate, metoprolol, rosuvastatin, warfarin. Recent TTE 9/13/22 showed normal left ventricular systolic function with ejection fraction 55 to 60%, severe valvular aortic stenosis with peak gradient 48.7 mmHg and mean pressure gradient 26.6 mmHg, aortic valve area 0.91 cm  with mild aortic regurgitation.  -admit to inpatient with telemetry  -Zoll machine in place  -hold metoprolol and avoid any swetha blocking or rate control agents  -also hold for now: amlodipine, chlorthalidone, zetia, fenofibrate, rosuvastatin, omega 3 fatty acids, warfarin and any supplements including vit C, alpha lipoic acid, cinnamon, ubiquinol  -EP cardiology consultation  -NPO after midnight for presumed pacer placement tomorrow  -Defer to cardiology for further discussion about upcoming plan regarding TAVR    Hypokalemia  K 3.0 on admission.  -replace per protocol    Chronic, stable problems:  Peripheral arterial disease  H/o CVA in 2011     Diet: regular, NPO after midnight  DVT Prophylaxis: Pneumatic Compression Devices and Ambulate every shift  Miranda Catheter: Not present  Central Lines: None  Cardiac Monitoring: ACTIVE order. Indication:  "Tachyarrhythmias, acute (48 hours)  Code Status:  full    Clinically Significant Risk Factors Present on Admission               # Coagulation Defect: home medication list includes an anticoagulant medication      # Obesity: Estimated body mass index is 33.89 kg/m  as calculated from the following:    Height as of this encounter: 1.676 m (5' 6\").    Weight as of this encounter: 95.3 kg (210 lb).        Disposition Plan      Expected Discharge Date: 09/30/2022                The patient's care was discussed with the Attending Physician, Dr. Zuñiga, Bedside Nurse and Patient.    Brianna Simmons MD  Hospitalist Service  [facility]  Securely message with the Vocera Web Console (learn more here)  Text page via [employer] Paging/Directory   ______________________________________________________________________    Chief Complaint   Abnormal Zio patch results    History is obtained from the patient, electronic health record and emergency department physician    History of Present Illness   Jonny Goldberg is a 77 year old male who, as part of his evaluation for arrhythmias and getting a TAVR work-up, had a Zio patch in place.  This was reviewed from [facility] by the cardiology clinic.  It showed almost 300 sinus pauses of 3 to 6 seconds each and ventricular tachycardic runs of up to 15 beats with a rate of 180.  He was dizzy around the time of these spells.  At the time he was notified of these results he was about 3 hours away at his cabin.  He was instructed to have someone else drive him as soon as possible to the ER here at [facility] so that he could be safely monitored with a portable defibrillator in place.  The neck step is to prepare him for anticipated procedure tomorrow by EP cardiology.    No recent fever, chills, headache, chest pain or pressure.  Endorses palpitations and the dizzy spells which correlated with the sinus pauses.  No recent changes in his medications.  [facility] closely at [facility] cardi[facility] clinic.    Discussed with Dr. Zuñiga. Labs and available rhythm strips reviewed.     Review of Systems    The 10 point Review of Systems is negative other than noted in the HPI or here.     Past Medical History    I have reviewed this patient's medical history and updated it with pertinent information if needed.   Past Medical History:   Diagnosis Date     AF (atrial fibrillation) (H)     AFIB     Blood clot in the legs     left leg after surgery     Gastro-oesophageal reflux disease      Hodgkins lymphoma (H)     bone marrow biopsy     Hyperlipidemia      Hypertension      PAD (peripheral artery disease) (H)      Unspecified cerebral artery occlusion with cerebral infarction oct. 2011       Past Surgical History   I have reviewed this patient's surgical history and updated it with pertinent information if needed.  Past Surgical History:   Procedure Laterality Date     APPENDECTOMY       ARTHROPLASTY REVISION HIP  12/27/2011    Procedure:ARTHROPLASTY REVISION HIP; RIGHT TOTAL HIP REVISION WITH BONE GRAFT  ; Surgeon:ANGIE HARRINGTON; Location: OR     BIOPSY  hodgkins lymphoma    bone marrow biopsy, chemo and radiation     CARDIAC SURGERY      cardiac ablation     COLONOSCOPY       CV CORONARY ANGIOGRAM N/A 9/23/2022    Procedure: Coronary Angiogram;  Surgeon: Gennaro Cook MD;  Location: [facility] CARDIAC CATH LAB     CV PCI N/A 9/23/2022    Procedure: Percutaneous Coronary Intervention;  Surgeon: Gennaro Cook MD;  Location:  HEART CARDIAC CATH LAB     CV RIGHT HEART CATH MEASUREMENTS RECORDED N/A 9/23/2022    Procedure: Right Heart Catheterization;  Surgeon: Gennaro Cook MD;  Location:  HEART CARDIAC CATH LAB     ORTHOPEDIC SURGERY      hip and knee surgeries     ORTHOPEDIC SURGERY      carpal tunnel  right hand       Social History   I have reviewed this patient's social history and updated it with pertinent information if needed.  Social History     Tobacco Use     Smoking  status: Former Smoker     Quit date: 1987     Years since quittin.5     Smokeless tobacco: Never Used   Vaping Use     Vaping Use: Never used   Substance Use Topics     Alcohol use: Yes     Alcohol/week: 0.0 standard drinks     Comment: occasionally     Drug use: No       Family History   I have reviewed this patient's family history and updated it with pertinent information if needed.  Family History   Problem Relation Age of Onset     Hypertension Mother      Heart Failure Mother      Coronary Artery Disease Mother         CABG     Arrhythmia Mother      Cerebrovascular Disease Father      Heart Failure Father      Colon Cancer Father      Coronary Artery Disease Father         CABG     Coronary Artery Disease Brother         CABG     Arrhythmia Brother        Prior to Admission Medications   Prior to Admission Medications   Prescriptions Last Dose Informant Patient Reported? Taking?   ALPHA LIPOIC ACID PO 2022 at am  Yes Yes   Sig: Take 600 mg by mouth daily   CINNAMON PO 2022 at pm  Yes Yes   Sig: Take 1 capsule by mouth every evening   Cholecalciferol (VITAMIN D) 400 UNITS tablet 2022 at pm  Yes Yes   Sig: Take 1 tablet by mouth every evening   Ubiquinol 100 MG CAPS 2022 at am  Yes Yes   Sig: Take 100 mg by mouth daily   amLODIPine (NORVASC) 10 MG tablet 2022 at am  No Yes   Sig: Take 1 tablet (10 mg) by mouth daily   ascorbic acid (VITAMIN C) 1000 MG TABS 2022 at am  Yes Yes   Sig: Take 1,000 mg by mouth daily   chlorthalidone (HYGROTON) 50 MG tablet 2022 at am  Yes Yes   Sig: Take 25 mg by mouth daily   ezetimibe (ZETIA) 10 MG tablet 2022 at pm  No Yes   Sig: Take 0.5 tablets (5 mg) by mouth Every Mon, Wed, Fri Morning   Patient taking differently: Take 5 mg by mouth Every Mon, Wed, Fri Morning PM   fenofibrate (TRIGLIDE/LOFIBRA) 160 MG tablet 2022 at pm  No Yes   Sig: Take 1 tablet (160 mg) by mouth daily   Patient taking differently: Take 160 mg by mouth  every evening   metoprolol (TOPROL-XL) 50 MG 24 hr tablet 9/28/2022 at am  Yes Yes   Sig: Take 50 mg by mouth daily.   multivitamin w/minerals (THERA-VIT-M) tablet 9/28/2022 at pm  Yes Yes   Sig: Take 1 tablet by mouth every evening   omega 3 1000 MG CAPS 9/28/2022 at pm  Yes Yes   Sig: Take 1 capsule by mouth every evening 180 EPA/ 20 DHH   rosuvastatin (CRESTOR) 40 MG tablet 9/28/2022 at pm  Yes Yes   Sig: Take 40 mg by mouth every evening   warfarin (COUMADIN) 5 MG tablet 9/27/2022 at pm  Yes Yes   Sig: daily 4 mg Mon, Wed, Fri and 3 mg all other days.      Facility-Administered Medications: None     Allergies   Allergies   Allergen Reactions     Penicillin G Hives     hives     Physical Exam   Vital Signs: Temp: 97.8  F (36.6  C) Temp src: Temporal BP: 108/75 Pulse: 73   Resp: 20 SpO2: 94 % O2 Device: None (Room air)    Weight: 210 lbs 0 oz    Constitutional: awake, alert, cooperative, no apparent distress, and appears stated age  Eyes: sclera clear and conjunctiva normal  ENT: normocepalic, without obvious abnormality, atraumatic  Hematologic / Lymphatic: no cervical lymphadenopathy and no supraclavicular lymphadenopathy  Respiratory: No increased work of breathing, good air exchange, clear to auscultation bilaterally, no crackles or wheezing  Cardiovascular: regular rate and rhythm and murmurs include systolic murmur III/VI  GI: protuberant, soft, nontender  Skin: no redness, warmth, or swelling, no rashes and no lesions  Musculoskeletal: no lower extremity pitting edema present  motor strength is 5 out of 5 all extremities bilaterally  tone is normal  Neurologic: Awake, alert, oriented to name, place and time.  Speech normal. Face symmetric.     Data   Data reviewed today: I reviewed all medications, new labs and imaging results over the last 24 hours. I personally reviewed no images or EKGs today.    Recent Labs   Lab 09/28/22 2058 09/23/22  0726   WBC 6.1 5.1   HGB 15.4 15.8   MCV 94 95    250   INR  1.71* 1.40*    140   POTASSIUM 3.0* 3.2*   CHLORIDE 105 107   CO2 27 28   BUN 32* 35*   CR 1.10 1.07   ANIONGAP 8 5   SARA 9.0 8.9   * 111*

## 2022-09-29 NOTE — ED NOTES
RN paged Hospital MD regarding pt's potassium level being 3.0 yesterday. RN asking for RN replacement protocol and possible morning labs to recheck K level. Awaiting provider response.

## 2022-09-29 NOTE — CONSULTS
Consult Date: 09/29/2022    REASON FOR CONSULTATION:  Bradycardia.    HISTORY OF PRESENT ILLNESS:  Mr. Goldberg is a 77-year-old white male with a history of persistent atrial fibrillation for years.  He was treated with warfarin and metoprolol with no recent changes in medications.  He was previously seen by Dr. Jones and recently seen by Dr. Cook.  The patient complained about recurrent dizziness in the last month.  He had echocardiography that showed normal LV ejection fraction, but there was severe aortic valve stenosis.  He underwent coronary angiography, 09/23/2022, that showed mild coronary artery disease.  The patient was put on a Zio patch monitor that detected multiple episodes of asystole.  The patient did not have syncope or near syncope while on the Zio patch monitor.  Because of the bradycardia detected on the Zio Patch monitor, the patient was asked to come to the ER for possible pacemaker implantation.  He stopped metoprolol yesterday.  The dose of metoprolol was 50 mg once a day prior to the presentation.  The current heart rate is in the 60s and 70s when he is at rest.  While he is awake, I did not see any asystole.  In addition, the Zio patch monitor reported one episode of nonsustained VT, 15 beats per minute, without symptoms.    PAST MEDICAL HISTORY:  Remarkable for GI reflux, Hodgkin's lymphoma, hypertension, peripheral vascular disease and possible stroke.    FAMILY HISTORY:  Noncontributory to atrial fibrillation.    SOCIAL HISTORY:  He is FULL CODE.  Does not smoke or abuse alcohol.    REVIEW OF SYSTEMS:  Comprehensive review of the systems showed no fever, cough or diarrhea.    CURRENT MEDICATIONS:  Potassium 20 mEq once.    ALLERGIES:  PENICILLIN.    PHYSICAL EXAMINATION:    GENERAL:  He is alert and oriented with no acute distress.  VITAL SIGNS:  Blood pressure 122/97, heart rate 67 beats per minute, temperature 97.8 degrees, oxygen saturation 98% on room air.  HEENT:  The eyes and ENT  were unremarkable.  LUNGS:  Clear.  SKIN:  There was no skin rash or lymph node enlargement.  NECK:  Jugular vein was not elevated.  HEART:  The cardiac rhythm was regular and heart sounds were normal.  There was apparent third-degree systolic murmur in the aortic valve area.  ABDOMEN:  No hepatomegaly.  EXTREMITIES:  There was no pedal edema.  NEUROLOGIC:  Showed no focal deficit.    DIAGNOSTIC DATA:  A 12-lead electrocardiogram showed atrial fibrillation with right bundle branch block and controlled ventricular rate.    ASSESSMENT AND RECOMMENDATIONS:  Mr. Moreira is a 77-year-old white male with recurrent dizziness.  He had a documented bradycardia while on Toprol XL 50 mg p.o. daily.  The current heart rate appears to be relatively stable.  I discussed with the patient regarding the treatment options, offering pacemaker implantation during this admission, and also gave him the alternative of discontinuation of metoprolol with further monitoring by using an EKG event monitor.  He did not like the idea of getting a pacemaker right now and would like to have further monitoring without metoprolol.  Based on that, he will go home with the EKG event monitor.  He is aware that if the event monitor detects long asystole during awake time, we may call him in to reconsider pacemaker implantation.  The above discussion was undertaken with his wife on the phone and we all agreed on the plan.  I will see him for followup in 1 month.    Camila Avalos MD        D: 2022   T: 2022   MT: JJ    Name:     DIANA MOREIRA  MRN:      [id number]        Account:      [id number]   :      1945           Consult Date: 2022     Document: [id number]    cc:  Brianna Simmons MD

## 2022-09-29 NOTE — PHARMACY-ADMISSION MEDICATION HISTORY
Pharmacy Medication History  Admission medication history interview status for the 9/28/2022  admission is complete. See EPIC admission navigator for prior to admission medications     Location of Interview: Patient room  Medication history sources: Patient, Surescripts    Significant changes made to the medication list:  none    In the past week, patient estimated taking medication this percent of the time: greater than 90%    Additional medication history information:   none    Medication reconciliation completed by provider prior to medication history? No    Time spent in this activity: 15 minutes    Prior to Admission medications    Medication Sig Last Dose Taking? Auth Provider Long Term End Date   ALPHA LIPOIC ACID PO Take 600 mg by mouth daily 9/28/2022 at am Yes Reported, Patient     amLODIPine (NORVASC) 10 MG tablet Take 1 tablet (10 mg) by mouth daily 9/28/2022 at am Yes German Patel MD Yes    ascorbic acid (VITAMIN C) 1000 MG TABS Take 1,000 mg by mouth daily 9/28/2022 at am Yes Reported, Patient     chlorthalidone (HYGROTON) 50 MG tablet Take 25 mg by mouth daily 9/28/2022 at am Yes Reported, Patient Yes    Cholecalciferol (VITAMIN D) 400 UNITS tablet Take 1 tablet by mouth every evening 9/28/2022 at pm Yes Reported, Patient     CINNAMON PO Take 1 capsule by mouth every evening 9/28/2022 at pm Yes Reported, Patient     ezetimibe (ZETIA) 10 MG tablet Take 0.5 tablets (5 mg) by mouth Every Mon, Wed, Fri Morning  Patient taking differently: Take 5 mg by mouth Every Mon, Wed, Fri Morning PM 9/28/2022 at pm Yes German Patel MD Yes    fenofibrate (TRIGLIDE/LOFIBRA) 160 MG tablet Take 1 tablet (160 mg) by mouth daily  Patient taking differently: Take 160 mg by mouth every evening 9/28/2022 at pm Yes Theodora Nuñez, DREW CNP Yes    metoprolol (TOPROL-XL) 50 MG 24 hr tablet Take 50 mg by mouth daily. 9/28/2022 at am Yes Reported, Patient     multivitamin w/minerals (THERA-VIT-M) tablet Take  1 tablet by mouth every evening 9/28/2022 at pm Yes Reported, Patient     omega 3 1000 MG CAPS Take 1 capsule by mouth every evening 180 EPA/ 20 DHH 9/28/2022 at pm Yes Reported, Patient     rosuvastatin (CRESTOR) 40 MG tablet Take 40 mg by mouth every evening 9/28/2022 at pm Yes Reported, Patient Yes    Ubiquinol 100 MG CAPS Take 100 mg by mouth daily 9/28/2022 at am Yes Reported, Patient     warfarin (COUMADIN) 5 MG tablet daily 4 mg Mon, Wed, Fri and 3 mg all other days. 9/27/2022 at pm Yes Reported, Patient         The information provided in this note is only as accurate as the sources available at the time of update(s)     Melia Cueva, PharmD  Inpatient Clinical Pharmacist  [phone]

## 2022-09-29 NOTE — DISCHARGE SUMMARY
[facility]  Hospitalist Discharge Summary      Date of Admission:  9/28/2022  Date of Discharge:  9/29/2022  Discharging Provider: Kj Millard MD  Discharge Service: Hospitalist Service    Discharge Diagnoses   Paroxysmal Ventricular Tachycardia and symptomatic sinus pauses  Severe aortic stenosis  Atrial fibrillation on warfarin  Hypertension  Dyslipidemia  Hypokalemia, treated   Peripheral arterial disease  H/o CVA in 2011    Follow-ups Needed After Discharge   Follow-up Appointments     Follow-up and recommended labs and tests       Follow up with primary care provider, Reid Hogue, within 7 days for   hospital follow- up.  No follow up labs or test are needed.    Follow up with Dr. Avalos in 1 month.     Follow up with the structural heart team as scheduled.           Unresulted Labs Ordered in the Past 30 Days of this Admission     No orders found for last 31 day(s).      These results will be followed up by n/a    Discharge Disposition   Discharged to home  Condition at discharge: Stable      Hospital Course   Jonny Goldberg is a delightful 77 year old male with severe aortic stenosis, atrial fibrillation on warfarin, hypertension, dyslipidemia, peripheral arterial disease and history of stroke who was admitted on 9/28/2022 for symptomatic arrhythmias. Patient met with Dr. Avalos from electrophysiology who gave patient option to stop metoprolol and discharge with event monitor or to have event monitor placed. Patient opted to stop metoprolol. He will discharge with event monitor and plan to follow up with Dr. Avalos in 1 month.      Consultations This Hospital Stay   ELECTROPHYSIOLOGY IP CONSULT    Code Status   Full Code    Time Spent on this Encounter   I, Kj Millard MD, personally saw the patient today and spent greater than 30 minutes discharging this patient.       Kj Millard MD  [facility] EMERGENCY DEPT  [address] [place] [address]  [address]  Phone: [phone]  Fax: [phone]  ______________________________________________________________________    Physical Exam   Vital Signs: Temp: 97.8  F (36.6  C) Temp src: Temporal BP: (!) 131/95 Pulse: 57   Resp: 12 SpO2: 96 % O2 Device: None (Room air)    Weight: 210 lbs 0 oz  Constitutional: Awake, alert, cooperative, no apparent distress  Respiratory: Clear to auscultation bilaterally, no crackles or wheezing  Cardiovascular: Regular rate and rhythm, normal S1 and S2, 3/6 sys murmur  GI: Normal bowel sounds, soft, non-distended, non-tender  Skin/Integumen: No rashes, no cyanosis, no edema  Other:          Primary Care Physician   Reid Hogue    Discharge Orders      Follow-Up with Cardiology EP      Reason for your hospital stay    You were in the hospital due to atypical heart rhythms. You will stop your metoprolol and use and event monitor. You should return the ED if you have fainting, chest pain. You may be called to return to the hospital or consider pacemaker implantation in the coming days pending the result of the heart monitor.     Follow-up and recommended labs and tests     Follow up with primary care provider, Reid Hogue, within 7 days for hospital follow- up.  No follow up labs or test are needed.    Follow up with Dr. Avalos in 1 month.     Follow up with the structural heart team as scheduled.     Activity    Your activity upon discharge: activity as tolerated     Adult Cardiac Event Monitor     Diet    Follow this diet upon discharge: Orders Placed This Encounter      NPO per Anesthesia Guidelines for Procedure/Surgery Except for: Meds, Ice Chips       Significant Results and Procedures   Most Recent 3 CBC's:Recent Labs   Lab Test 09/28/22 2058 09/23/22  0726 08/04/20  0000   WBC 6.1 5.1 5.4   HGB 15.4 15.8 15.3   MCV 94 95 96.7    250 231     Most Recent 3 BMP's:Recent Labs   Lab Test 09/28/22 2058 09/23/22  0726 09/12/22  0000 11/03/21  1151 05/05/21  0000  08/04/20  0000    140  --   --  141 142   POTASSIUM 3.0* 3.2*  --   --  3.7 3.8   CHLORIDE 105 107 104   < > 100 100   CO2 27 28  --   --  28 28   BUN 32* 35*  --   --  27 26   CR 1.10 1.07  --   --  1 1.18   ANIONGAP 8 5  --   --   --  1.9   SARA 9.0 8.9  --   --  1.16 9.5   * 111*  --   --  98 101*    < > = values in this interval not displayed.   ,   Results for orders placed or performed during the hospital encounter of 09/23/22   Cardiac Catheterization    Narrative    1. Mild coronary artery disease   2. Moderately elevated right heart filling pressures (mean RA 13 mmHg)  3. Mildly elevated left heart filling pressure (mean wedge 18 mmHg)  4. Mild PH       Discharge Medications   Current Discharge Medication List      CONTINUE these medications which have NOT CHANGED    Details   ALPHA LIPOIC ACID PO Take 600 mg by mouth daily      amLODIPine (NORVASC) 10 MG tablet Take 1 tablet (10 mg) by mouth daily  Qty: 90 tablet, Refills: 3    Associated Diagnoses: Primary hypertension      ascorbic acid (VITAMIN C) 1000 MG TABS Take 1,000 mg by mouth daily      chlorthalidone (HYGROTON) 50 MG tablet Take 25 mg by mouth daily      Cholecalciferol (VITAMIN D) 400 UNITS tablet Take 1 tablet by mouth every evening      CINNAMON PO Take 1 capsule by mouth every evening      ezetimibe (ZETIA) 10 MG tablet Take 0.5 tablets (5 mg) by mouth Every Mon, Wed, Fri Morning  Qty: 20 tablet, Refills: 3    Associated Diagnoses: PAD (peripheral artery disease) (H)      fenofibrate (TRIGLIDE/LOFIBRA) 160 MG tablet Take 1 tablet (160 mg) by mouth daily  Qty: 90 tablet, Refills: 3    Associated Diagnoses: Mixed hyperlipidemia      multivitamin w/minerals (THERA-VIT-M) tablet Take 1 tablet by mouth every evening      omega 3 1000 MG CAPS Take 1 capsule by mouth every evening 180 EPA/ 20 DHH      rosuvastatin (CRESTOR) 40 MG tablet Take 40 mg by mouth every evening      Ubiquinol 100 MG CAPS Take 100 mg by mouth daily      warfarin  (COUMADIN) 5 MG tablet daily 4 mg Mon, Wed, Fri and 3 mg all other days.         STOP taking these medications       metoprolol (TOPROL-XL) 50 MG 24 hr tablet Comments:   Reason for Stopping:             Allergies   Allergies   Allergen Reactions     Penicillin G Hives     hives

## 2022-09-30 ENCOUNTER — PATIENT OUTREACH (OUTPATIENT)
Dept: CARE COORDINATION | Facility: CLINIC | Age: 77
End: 2022-09-30

## 2022-09-30 ENCOUNTER — CARE COORDINATION (OUTPATIENT)
Dept: CARDIOLOGY | Facility: CLINIC | Age: 77
End: 2022-09-30

## 2022-09-30 NOTE — TELEPHONE ENCOUNTER
Called and spoke with Harrison at [place].  He stated that patient had event monitor placed yesterday 9/29/22 and they had an episode of AFib logged after that that patient is still currently in. They were not sure if AFib was new or known for patient.  Rates are controlled.    Per Dr. Avalos's note, patient has been in Persistent AFib for years.  He is on Warfarin.      Event monitor was placed to monitor for bradycardic episodes and pauses due to recurrent dizziness.     Will continue to monitor at this time.    PILY Benítez

## 2022-09-30 NOTE — PROGRESS NOTES
Called and spoke with patient to see what questions he had regarding his recent hospitalization.  Patient stated that his questions were actually regarding his recent angiogram and what he could and could not do as he had some yard work planned.  Warm transferred to Dr. Cook's team to discuss.    PILY Benítez

## 2022-09-30 NOTE — PROGRESS NOTES
Spoke with Jonny. He says he is feeling well after discharging from [facility]. He is now back up in [place], [place] to close up his cabin.     States there are logs that weigh hundreds of pounds and that he has been lifting them for a few hours so far today, but he thought he should probably check with the heart team to make sure his radial access site from 9/23 would be OK.    Jonny denies pain/numbness/tingling/circulation issues/swelling/bruising to radial site. Advised that radial site is likely to be OK since it's been a week since his cath.    However, I did explain that I have concerns about him lifting such heavy objects given his mod/severe aortic stenosis and recent significant arrhythmias, and that I worry that lifting will put too much stress on his heart.    Says he is feeling well since stopping metoprolol yesterday and that he's taken his BP a few times since then (confirms it is at baseline).     Jonny says he would certainly get a pacemaker if he needed one. For now, since he is feeling well, he will plan to stay up at the cabin this weekend.     Recommended that Jonny limit lifting as much as possible, take frequent breaks while active (every 5-10 min or more frequently if needed), and to sit down and call for emergent care if experiencing chest pain, palpitations, dizziness/lightheadedness, n/v, syncope/presyncope, or any other concerning symptoms. In addition, provided phone # for cardiologist on-call and asked him to call back on Monday with an update on how he is feeling.    Joan Bennett, АЛЕКСАНДРN, RN, PHN, CHFN, HNB-BC   9/30/2022 at 4:42 PM

## 2022-09-30 NOTE — PROGRESS NOTES
Clinic Care Coordination Contact  [facility]/Voicemail    Referral Source: IP Report  Clinical Data: Care Coordinator Outreach  Outreach attempted x 1.  Left message on patient's voicemail with call back information and requested return call.  . Care Coordinator will try to reach patient again in 1-2 business days.

## 2022-09-30 NOTE — PROGRESS NOTES
Incoming VM from pt re: hospitalization 9/28-9/29/22. [place] and would like to talk about next steps after his discharge.     Hospitalized for abnormal Holter, saw Dr. Avalos and Melanie Deshpande NP during admission.    Routing to EP team to review and call pt.     АЛЕКСАНДР JimenezN, RN, PHN, CHFN, HNB-BC   9/30/2022 at 11:50 AM

## 2022-10-01 LAB
ATRIAL RATE - MUSE: 68 BPM
DIASTOLIC BLOOD PRESSURE - MUSE: NORMAL MMHG
INTERPRETATION ECG - MUSE: NORMAL
P AXIS - MUSE: NORMAL DEGREES
PR INTERVAL - MUSE: NORMAL MS
QRS DURATION - MUSE: 154 MS
QT - MUSE: 476 MS
QTC - MUSE: 438 MS
R AXIS - MUSE: -74 DEGREES
SYSTOLIC BLOOD PRESSURE - MUSE: NORMAL MMHG
T AXIS - MUSE: 0 DEGREES
VENTRICULAR RATE- MUSE: 51 BPM

## 2022-10-04 ENCOUNTER — TELEPHONE (OUTPATIENT)
Dept: CARDIOLOGY | Facility: CLINIC | Age: 77
End: 2022-10-04

## 2022-10-04 ENCOUNTER — DOCUMENTATION ONLY (OUTPATIENT)
Dept: CARDIOLOGY | Facility: CLINIC | Age: 77
End: 2022-10-04

## 2022-10-04 NOTE — PROGRESS NOTES
Received Bio[employer] strip showing AF. Pt has history of AF and takes warfarin.     Per Dr. Avalos's [facility] note from 9/29/2022:   The patient was put on a Zio patch monitor that detected multiple episodes of asystole.  The patient did not have syncope or near syncope while on the Zio patch monitor.  Because of the bradycardia detected on the Zio Patch monitor, the patient was asked to come to the ER for possible pacemaker implantation.  He stopped metoprolol yesterday.  The dose of metoprolol was 50 mg once a day prior to the presentation.  The current heart rate is in the 60s and 70s when he is at rest.  While he is awake, I did not see any asystole.  In addition, the Zio patch monitor reported one episode of nonsustained VT, 15 beats per minute, without symptoms.    Will monitor for any pauses now that pt is off metoprolol. Strip filed.

## 2022-10-04 NOTE — TELEPHONE ENCOUNTER
"Received call from patient and his wife Sisi. They were feeling overwhelmed and scared about Jonny's heart rhythm and had questions about the plan after he left the emergency room. We reviewed Dr. Avalos's role in patient's care as well as the options presented to him in the emergency room.     Per Dr. Avalos's note from 9/29/22, \"Discussed the options:  1. Discontinue metoprolol and go home on an ECT event monitor. Reconsider pacemaker if there is recurrent long asystole >3 seconds while awake.  2. Implant pacemaker during this admission.  Pt does not like the idea of pacemaker. Agreed on option 1.  Aortic stenosis will be addressed in the near future.  I will see him for follow up in 1 month.\"     Informed Jonny that I reviewed the strips from his monitor on the [employer] site this morning. So far the longest pause noted on his monitor is 2.8seconds. Patient notes that he has not had any waves of dizziness since his stopped taking his metoprolol. He described an episode on 10/1/22 at 5am where he was startled awake and had a hard time catching his breath. Informed patient that I do not yet see any abnormal heart rhythms that would have been associated with this.     I reviewed with patient that we are looking for pauses lasting >3seconds while awake that may indicate need for permanent pacemaker. I also asked that he inform us of any dizziness that he experiences. He states that his heart rate at home as been ranging from 62-93bpm when he checks it.     Patient states understanding and agreement with plan. He will call back with any concerns.   "

## 2022-10-05 ENCOUNTER — PATIENT OUTREACH (OUTPATIENT)
Dept: CARE COORDINATION | Facility: CLINIC | Age: 77
End: 2022-10-05

## 2022-10-05 ASSESSMENT — ACTIVITIES OF DAILY LIVING (ADL): DEPENDENT_IADLS:: INDEPENDENT

## 2022-10-05 NOTE — PROGRESS NOTES
Clinic Care Coordination Contact    Clinic Care Coordination Contact  OUTREACH    Referral Information:  Referral Source: IP Report         Chief Complaint   Patient presents with     Clinic Care Coordination - [facility]        [place] Utilization:   Clinic Utilization  Difficulty keeping appointments:: No  Compliance Concerns: No  No-Show Concerns: No  No PCP office visit in Past Year: No  Utilization    Hospital Admissions  1             ED Visits  1             No Show Count (past year)  0                Current as of: 10/4/2022  9:01 PM              Clinical Concerns:history of atrial fibrillation, hypertension, severe aortic stenosis,  Current Medical Concerns:     Current Behavioral Concerns: none ntoed  Education Provided to patient: introduced CC.      Health Maintenance Reviewed: Up to date  Clinical Pathway: None    Medication Management:  Medication review status: Medications reviewed.  Changes noted per patient report.       Functional Status:  Dependent ADLs:: Independent  Dependent IADLs:: Independent  Bed or wheelchair confined:: No  Mobility Status: Independent  Fallen 2 or more times in the past year?: No  Any fall with injury in the past year?: No    Living Situation:  Current living arrangement:: I live in a private home with spouse  [facility]    Lifestyle & Psychosocial Needs:PC to Jonny post ED visit on 9/28. Pt has since seen cardiology and reports that he has stopped taking the Metropolol and now has a Zio patch. He will follow up with cardiology on the 12th and 18th of October. He will have a scan on 10/18. Pt reports he is checking his bp daily and will schedule a follow up with PCP after his appt on the 18th. Pt would like to do complete blood work at that appt as well.  Pt has no additional concerns at this time. CC will close out CC.    Social Determinants of Health     Tobacco Use: Medium Risk     Smoking Tobacco Use: Former Smoker     Smokeless  Tobacco Use: Never Used   Alcohol Use: Not on file   Financial Resource Strain: Not on file   Food Insecurity: Not on file   Transportation Needs: Not on file   Physical Activity: Not on file   Stress: Not on file   Social Connections: Not on file   Intimate Partner Violence: Not on file   Depression: Not on file   Housing Stability: Not on file     Diet:: Regular  Tube Feeding: No  Transportation means:: Regular car     Methodist or spiritual beliefs that impact treatment:: No  Chemical Dependency Status: No Current Concerns  Informal Support system:: Family, Friends             Resources and Interventions:  Current Resources:      Community Resources: None        Employment Status: retired         Advance Care Plan/Directive  Advanced Care Plans/Directives on file:: Yes              Care Plan:      Patient/Caregiver understanding:        Future Appointments              In 1 week Melanie Deshpande Radha, APRN CNP [facility]    In 1 week [facility], [facility]          Plan: pt to follow up with Cardiology next week. Pt will follow with pcp for INR next week.

## 2022-10-12 ENCOUNTER — OFFICE VISIT (OUTPATIENT)
Dept: CARDIOLOGY | Facility: CLINIC | Age: 77
End: 2022-10-12
Payer: COMMERCIAL

## 2022-10-12 VITALS
DIASTOLIC BLOOD PRESSURE: 82 MMHG | HEART RATE: 90 BPM | SYSTOLIC BLOOD PRESSURE: 120 MMHG | BODY MASS INDEX: 33.15 KG/M2 | HEIGHT: 67 IN | WEIGHT: 211.2 LBS | OXYGEN SATURATION: 96 %

## 2022-10-12 DIAGNOSIS — R42 DIZZINESS: ICD-10-CM

## 2022-10-12 DIAGNOSIS — I10 PRIMARY HYPERTENSION: ICD-10-CM

## 2022-10-12 DIAGNOSIS — I35.0 SEVERE AORTIC STENOSIS: Primary | ICD-10-CM

## 2022-10-12 PROCEDURE — 99214 OFFICE O/P EST MOD 30 MIN: CPT | Performed by: NURSE PRACTITIONER

## 2022-10-12 NOTE — LETTER
10/12/2022    Reid Hogue MD  [address]  [facility] [address]    RE: Jonny Goldberg       Dear Colleague,     I had the pleasure of seeing Jonny Goldberg in the [facility].    General Cardiology Clinic Progress Note  Jonny Goldberg MRN# [id number]   YOB: 1945 Age: 77 year old     Primary cardiologist: Dr. Cook (structural), Dr. Avalos (EP) and Dr. Guajardo (general)     Reason for visit: Discharge follow up    History of presenting illness:    Jonny Goldberg, a pleasant 77 year old patient who has a past medical history significant for persistent atrial fibrillation, severe aortic stenosis, hypertension, PAD, HLP and bradycardia    He was evaluated by Dr. Cook on 9/14/2022 with concerns for dizziness and for aortic stenosis. The echo was performed 09/13/2022. It revealed normal LV size and function with an EF of 55%-60% and no wall motion abnormality. The aortic stenosis was thought to be severe with valve area of 0.91 cm2,  mean gradient is 27 mmHg, DI is 0.23 and a stroke volume index is 33 mL/m2. At the visit the patient had concerns for lightheadedness and dizziness and it was recommended a Zio Patch. As well as a RHC and coronary angiogram that showed mild CAD, moderately elevated right heart filling pressures (mean RA 13 mmHg), mildly elevated left heart filling pressure (mean wedge 18 mmHg) and mild PH.     The Zio Patch noted 288 pauses lasting 3-6.0 seconds with associated dizziness. He also has VT noted lasting 15 beats with rate in the 180s. It was recommended to hold metoprolol and present to the ED at [facility]. At the time the patient is at his cabin 3 hours away, but had his wife drive him to [facility].    He was evaluated in the hospital by Dr. Avalos. Metoprolol was completely discontinued and his heart rates remained in the 60s and 70s on telemetry.  They discussed treatment off impressions regarding permanent pacemaker during admission versus discontinuation of metoprolol and  following with an event monitor.  The patient elected for the latter and is currently wearing the monitor.  Upon preliminary review of the rhythm strips there have been multiple ~2-second pauses during nighttime hours and 1 to 2.8-second pause at around 3 PM on 9/29/2022 with likely some metoprolol still in his system.     Today Jonny presents with his wife.  He states that since discontinuation of the metoprolol he has had no recurrent episodes of lightheadedness or dizziness.  There was 1 episode on October 1 where he awoke at 5 AM and felt that he could not move his arms and legs and the symptoms quickly resolved.  Upon review of the current event monitor strips there were no events recorded at that time.            Assessment and Plan:     ASSESSMENT:    1. Symptomatic sinus pauses between 3 and 6 seconds    Noted on Zio patch while on metoprolol    Symptomatic improvement with discontinuation of beta blocker    Event monitor summary not available, but strips that are available have not shown any significant sinus pauses during waking hours.  They were multiple nighttime pauses 2 seconds or less.    2. Severe valve stenosis    Most recent echocardiogram from 9/13/2022 noted preserved LVEF with JACOBY of 0.9 cm , mean gradient 27 mmHg and DI of 0.23 and stroke-volume of 33 mL/m .    Following with TAVR clinic and ongoing work-up pending    Right and left heart cath completed noting mild CAD with moderately elevated right heart filling pressures and mildly elevated left heart filling pressures.    PLAN:     1. Remain off beta-blocker and continue wearing event monitor  2. Once monitor is complete we will make recommendations regarding proceeding with permanent pacemaker or not       Orders this Visit:  No orders of the defined types were placed in this encounter.    No orders of the defined types were placed in this encounter.    There are no discontinued medications.    Today's clinic visit entailed:  Review of the  "result(s) of each unique test - echo, ZIo, Event  Assessment requiring an independent historian(s) - significant other - Wife  15 minutes spent on the date of the encounter doing chart review, history and exam, documentation and further activities per the note  Provider  Link to [facility] Help Grid     The level of medical decision making during this visit was of moderate complexity.           Review of Systems:     Review of Systems:  Skin:        Eyes:       ENT:       Respiratory:  Negative    Cardiovascular:  Negative;chest pain;palpitations;dizziness;lightheadedness;edema;fatigue    Gastroenterology:      Genitourinary:       Musculoskeletal:       Neurologic:       Psychiatric:       Heme/Lymph/Imm:       Endocrine:  Negative              Physical Exam:     Vitals: /82   Pulse 90   Ht 1.689 m (5' 6.5\")   Wt 95.8 kg (211 lb 3.2 oz)   SpO2 96%   BMI 33.58 kg/m    Constitutional: Well nourished and in no apparent distress.  Eyes: Pupils equal, round. Sclerae anicteric.   HEENT: Normocephalic, atraumatic.   Neck: Supple. JVD   Respiratory: Breathing non-labored. Lungs clear to auscultation bilaterally. No crackles, wheezes, rhonchi, or rales.  Cardiovascular: IRR normal S1 and S2. Grade 2 SEJM  Skin: Warm, dry. No rashes, cyanosis, or xanthelasma.  Extremities: No edema.  Neurologic: No gross motor deficits. Alert, awake, and oriented to person, place and time.  Psychiatric: Affect appropriate.             Medications:     Current Outpatient Medications   Medication Sig Dispense Refill     ALPHA LIPOIC ACID PO Take 600 mg by mouth daily       amLODIPine (NORVASC) 10 MG tablet Take 1 tablet (10 mg) by mouth daily 90 tablet 3     ascorbic acid (VITAMIN C) 1000 MG TABS Take 1,000 mg by mouth daily       chlorthalidone (HYGROTON) 50 MG tablet Take 25 mg by mouth daily       Cholecalciferol (VITAMIN D) 400 UNITS tablet Take 1 tablet by mouth every evening       CINNAMON PO Take 1 capsule by mouth every evening   "     ezetimibe (ZETIA) 10 MG tablet Take 0.5 tablets (5 mg) by mouth Every Mon, Wed, Fri Morning (Patient taking differently: Take 5 mg by mouth Every Mon, Wed, Fri Morning PM) 20 tablet 3     fenofibrate (TRIGLIDE/LOFIBRA) 160 MG tablet Take 1 tablet (160 mg) by mouth daily (Patient taking differently: Take 160 mg by mouth every evening) 90 tablet 3     multivitamin w/minerals (THERA-VIT-M) tablet Take 1 tablet by mouth every evening       omega 3 1000 MG CAPS Take 1 capsule by mouth every evening 180 EPA/ 20 DHH       rosuvastatin (CRESTOR) 40 MG tablet Take 40 mg by mouth every evening       Ubiquinol 100 MG CAPS Take 100 mg by mouth daily       warfarin (COUMADIN) 5 MG tablet daily 4 mg Mon, Wed, Fri and 3 mg all other days.         Family History   Problem Relation Age of Onset     Hypertension Mother      Heart Failure Mother      Coronary Artery Disease Mother         CABG     Arrhythmia Mother      Cerebrovascular Disease Father      Heart Failure Father      Colon Cancer Father      Coronary Artery Disease Father         CABG     Coronary Artery Disease Brother         CABG     Arrhythmia Brother        Social History     Socioeconomic History     Marital status:      Spouse name: Not on file     Number of children: Not on file     Years of education: Not on file     Highest education level: Not on file   Occupational History     Not on file   Tobacco Use     Smoking status: Former     Types: Cigarettes     Quit date: 1987     Years since quittin.5     Smokeless tobacco: Never   Vaping Use     Vaping Use: Never used   Substance and Sexual Activity     Alcohol use: Yes     Alcohol/week: 0.0 standard drinks     Comment: occasionally     Drug use: No     Sexual activity: Not on file   Other Topics Concern     Parent/sibling w/ CABG, MI or angioplasty before 65F 55M? Not Asked      Service Not Asked     Blood Transfusions Not Asked     Caffeine Concern No     Comment: 2-3 a day      Occupational Exposure Not Asked     Hobby Hazards Not Asked     Sleep Concern Yes     Comment: depends     Stress Concern Not Asked     Weight Concern No     Comment: weight loss     Special Diet No     Back Care Not Asked     Exercise No     Comment: limited due to knee pain     Bike Helmet Not Asked     Seat Belt Yes     Self-Exams Not Asked   Social History Narrative     Not on file     Social Determinants of Health     Financial Resource Strain: Not on file   Food Insecurity: Not on file   Transportation Needs: Not on file   Physical Activity: Not on file   Stress: Not on file   Social Connections: Not on file   Intimate Partner Violence: Not on file   Housing Stability: Not on file            Past Medical History:     Past Medical History:   Diagnosis Date     Aortic valve stenosis     severe     Blood clot in the legs     left leg after surgery     Gastro-oesophageal reflux disease      Hodgkins lymphoma (H)     bone marrow biopsy     Hypertension      PAD (peripheral artery disease) (H)      persistent atrial fibrillation     AFIB     Unspecified cerebral artery occlusion with cerebral infarction 10/01/2011              Past Surgical History:     Past Surgical History:   Procedure Laterality Date     APPENDECTOMY       ARTHROPLASTY REVISION HIP  12/27/2011    Procedure:ARTHROPLASTY REVISION HIP; RIGHT TOTAL HIP REVISION WITH BONE GRAFT  ; Surgeon:ANGIE HARRINGTON; Location: OR     BIOPSY  hodgkins lymphoma    bone marrow biopsy, chemo and radiation     CARDIAC SURGERY      cardiac ablation     COLONOSCOPY       CV CORONARY ANGIOGRAM N/A 9/23/2022    Procedure: Coronary Angiogram;  Surgeon: Gennaro Cook MD;  Location:  HEART CARDIAC CATH LAB     CV PCI N/A 9/23/2022    Procedure: Percutaneous Coronary Intervention;  Surgeon: Gennaro Cook MD;  Location:  HEART CARDIAC CATH LAB     CV RIGHT HEART CATH MEASUREMENTS RECORDED N/A 9/23/2022    Procedure: Right Heart Catheterization;  Surgeon:  Gennaro Cook MD;  Location:  HEART CARDIAC CATH LAB     ORTHOPEDIC SURGERY      hip and knee surgeries     ORTHOPEDIC SURGERY      carpal tunnel  right hand              Allergies:   Penicillin g       Data:   All laboratory data reviewed:    Recent Labs   Lab Test 11/03/21  1151 05/05/21  0000 08/04/20  0000 10/18/18  0000 10/17/17  0000 07/21/16  0835 12/16/15  0934   LDL  --  21  --  81 98 81 95   HDL  --  38  --  43 47 47 43   NHDL  --  69  --   --   --  112 133*   CHOL  --  128 185 155 168 159 176   TRIG 77 117  --  154* 115 156* 190*       Lab Results   Component Value Date    WBC 6.1 09/28/2022    WBC 5.4 08/04/2020    RBC 4.74 09/28/2022    RBC 4.86 08/04/2020    HGB 15.4 09/28/2022    HGB 15.3 08/04/2020    HCT 44.4 09/28/2022    HCT 47.0 08/04/2020    MCV 94 09/28/2022    MCV 96.7 08/04/2020    MCH 32.5 09/28/2022    MCH 31.6 08/04/2020    MCHC 34.7 09/28/2022    MCHC 32.7 08/04/2020    RDW 13.1 09/28/2022    RDW 13.4 08/04/2020     09/28/2022     08/04/2020       Lab Results   Component Value Date     09/28/2022     05/05/2021    POTASSIUM 3.0 (L) 09/28/2022    POTASSIUM 3.7 05/05/2021    CHLORIDE 105 09/28/2022    CHLORIDE 104 09/12/2022    CHLORIDE 100 05/05/2021    CO2 27 09/28/2022    CO2 28 05/05/2021    ANIONGAP 8 09/28/2022    ANIONGAP 1.9 08/04/2020     (H) 09/28/2022    GLC 98 05/05/2021    BUN 32 (H) 09/28/2022    BUN 27 05/05/2021    CR 1.10 09/28/2022    CR 1 05/05/2021    GFRESTIMATED 70 09/28/2022    GFRESTIMATED 62 05/05/2021    GFRESTBLACK 72 05/05/2021    SARA 9.0 09/28/2022    SARA 1.16 05/05/2021      Lab Results   Component Value Date    AST 42 05/05/2021    ALT 28 05/05/2021       Lab Results   Component Value Date    A1C 5.9 (A) 05/06/2021       Lab Results   Component Value Date    INR 1.71 (H) 09/28/2022    INR 1.40 (H) 09/23/2022    INR 2.4 09/12/2022    INR 1.15 (H) 12/30/2011    INR 1.05 12/29/2011         DREW KING [facility]  Pager: [phone]  RN phone: [phone]      Thank you for allowing me to participate in the care of your patient.      Sincerely,     DREW KING [facility]  cc:   No referring provider defined for this encounter.

## 2022-10-12 NOTE — PROGRESS NOTES
General Cardiology Clinic Progress Note  Jonny Goldberg MRN# [id number]   YOB: 1945 Age: 77 year old     Primary cardiologist: Dr. Cook (structural), Dr. Avalos (EP) and Dr. Guajardo (general)     Reason for visit: Discharge follow up    History of presenting illness:    Jonny Goldberg, a pleasant 77 year old patient who has a past medical history significant for persistent atrial fibrillation, severe aortic stenosis, hypertension, PAD, HLP and bradycardia    He was evaluated by Dr. Cook on 9/14/2022 with concerns for dizziness and for aortic stenosis. The echo was performed 09/13/2022. It revealed normal LV size and function with an EF of 55%-60% and no wall motion abnormality. The aortic stenosis was thought to be severe with valve area of 0.91 cm2,  mean gradient is 27 mmHg, DI is 0.23 and a stroke volume index is 33 mL/m2. At the visit the patient had concerns for lightheadedness and dizziness and it was recommended a Zio Patch. As well as a RHC and coronary angiogram that showed mild CAD, moderately elevated right heart filling pressures (mean RA 13 mmHg), mildly elevated left heart filling pressure (mean wedge 18 mmHg) and mild PH.     The Zio Patch noted 288 pauses lasting 3-6.0 seconds with associated dizziness. He also has VT noted lasting 15 beats with rate in the 180s. It was recommended to hold metoprolol and present to the ED at [facility]. At the time the patient is at his cabin 3 hours away, but had his wife drive him to [facility].    He was evaluated in the hospital by Dr. Avalos. Metoprolol was completely discontinued and his heart rates remained in the 60s and 70s on telemetry.  They discussed treatment off impressions regarding permanent pacemaker during admission versus discontinuation of metoprolol and following with an event monitor.  The patient elected for the latter and is currently wearing the monitor.  Upon preliminary review of the rhythm strips there have been multiple ~2-second pauses during  nighttime hours and 1 to 2.8-second pause at around 3 PM on 9/29/2022 with likely some metoprolol still in his system.     Today Jonny presents with his wife.  He states that since discontinuation of the metoprolol he has had no recurrent episodes of lightheadedness or dizziness.  There was 1 episode on October 1 where he awoke at 5 AM and felt that he could not move his arms and legs and the symptoms quickly resolved.  Upon review of the current event monitor strips there were no events recorded at that time.            Assessment and Plan:     ASSESSMENT:    1. Symptomatic sinus pauses between 3 and 6 seconds    Noted on Zio patch while on metoprolol    Symptomatic improvement with discontinuation of beta blocker    Event monitor summary not available, but strips that are available have not shown any significant sinus pauses during waking hours.  They were multiple nighttime pauses 2 seconds or less.    2. Severe valve stenosis    Most recent echocardiogram from 9/13/2022 noted preserved LVEF with JACOBY of 0.9 cm , mean gradient 27 mmHg and DI of 0.23 and stroke-volume of 33 mL/m .    Following with TAVR clinic and ongoing work-up pending    Right and left heart cath completed noting mild CAD with moderately elevated right heart filling pressures and mildly elevated left heart filling pressures.    PLAN:     1. Remain off beta-blocker and continue wearing event monitor  2. Once monitor is complete we will make recommendations regarding proceeding with permanent pacemaker or not       Orders this Visit:  No orders of the defined types were placed in this encounter.    No orders of the defined types were placed in this encounter.    There are no discontinued medications.    Today's clinic visit entailed:  Review of the result(s) of each unique test - echo, ZIo, Event  Assessment requiring an independent historian(s) - significant other - Wife  15 minutes spent on the date of the encounter doing chart review, history  "and exam, documentation and further activities per the note  Provider  Link to [facility] Help Grid     The level of medical decision making during this visit was of moderate complexity.           Review of Systems:     Review of Systems:  Skin:        Eyes:       ENT:       Respiratory:  Negative    Cardiovascular:  Negative;chest pain;palpitations;dizziness;lightheadedness;edema;fatigue    Gastroenterology:      Genitourinary:       Musculoskeletal:       Neurologic:       Psychiatric:       Heme/Lymph/Imm:       Endocrine:  Negative              Physical Exam:     Vitals: /82   Pulse 90   Ht 1.689 m (5' 6.5\")   Wt 95.8 kg (211 lb 3.2 oz)   SpO2 96%   BMI 33.58 kg/m    Constitutional: Well nourished and in no apparent distress.  Eyes: Pupils equal, round. Sclerae anicteric.   HEENT: Normocephalic, atraumatic.   Neck: Supple. JVD   Respiratory: Breathing non-labored. Lungs clear to auscultation bilaterally. No crackles, wheezes, rhonchi, or rales.  Cardiovascular: IRR normal S1 and S2. Grade 2 SEJM  Skin: Warm, dry. No rashes, cyanosis, or xanthelasma.  Extremities: No edema.  Neurologic: No gross motor deficits. Alert, awake, and oriented to person, place and time.  Psychiatric: Affect appropriate.             Medications:     Current Outpatient Medications   Medication Sig Dispense Refill     ALPHA LIPOIC ACID PO Take 600 mg by mouth daily       amLODIPine (NORVASC) 10 MG tablet Take 1 tablet (10 mg) by mouth daily 90 tablet 3     ascorbic acid (VITAMIN C) 1000 MG TABS Take 1,000 mg by mouth daily       chlorthalidone (HYGROTON) 50 MG tablet Take 25 mg by mouth daily       Cholecalciferol (VITAMIN D) 400 UNITS tablet Take 1 tablet by mouth every evening       CINNAMON PO Take 1 capsule by mouth every evening       ezetimibe (ZETIA) 10 MG tablet Take 0.5 tablets (5 mg) by mouth Every Mon, Wed, Fri Morning (Patient taking differently: Take 5 mg by mouth Every Mon, Wed, Fri Morning PM) 20 tablet 3     " fenofibrate (TRIGLIDE/LOFIBRA) 160 MG tablet Take 1 tablet (160 mg) by mouth daily (Patient taking differently: Take 160 mg by mouth every evening) 90 tablet 3     multivitamin w/minerals (THERA-VIT-M) tablet Take 1 tablet by mouth every evening       omega 3 1000 MG CAPS Take 1 capsule by mouth every evening 180 EPA/ 20 DHH       rosuvastatin (CRESTOR) 40 MG tablet Take 40 mg by mouth every evening       Ubiquinol 100 MG CAPS Take 100 mg by mouth daily       warfarin (COUMADIN) 5 MG tablet daily 4 mg Mon, Wed, Fri and 3 mg all other days.         Family History   Problem Relation Age of Onset     Hypertension Mother      Heart Failure Mother      Coronary Artery Disease Mother         CABG     Arrhythmia Mother      Cerebrovascular Disease Father      Heart Failure Father      Colon Cancer Father      Coronary Artery Disease Father         CABG     Coronary Artery Disease Brother         CABG     Arrhythmia Brother        Social History     Socioeconomic History     Marital status:      Spouse name: Not on file     Number of children: Not on file     Years of education: Not on file     Highest education level: Not on file   Occupational History     Not on file   Tobacco Use     Smoking status: Former     Types: Cigarettes     Quit date: 1987     Years since quittin.5     Smokeless tobacco: Never   Vaping Use     Vaping Use: Never used   Substance and Sexual Activity     Alcohol use: Yes     Alcohol/week: 0.0 standard drinks     Comment: occasionally     Drug use: No     Sexual activity: Not on file   Other Topics Concern     Parent/sibling w/ CABG, MI or angioplasty before 65F 55M? Not Asked      Service Not Asked     Blood Transfusions Not Asked     Caffeine Concern No     Comment: 2-3 a day     Occupational Exposure Not Asked     Hobby Hazards Not Asked     Sleep Concern Yes     Comment: depends     Stress Concern Not Asked     Weight Concern No     Comment: weight loss     Special Diet No      Back Care Not Asked     Exercise No     Comment: limited due to knee pain     Bike Helmet Not Asked     Seat Belt Yes     Self-Exams Not Asked   Social History Narrative     Not on file     Social Determinants of Health     Financial Resource Strain: Not on file   Food Insecurity: Not on file   Transportation Needs: Not on file   Physical Activity: Not on file   Stress: Not on file   Social Connections: Not on file   Intimate Partner Violence: Not on file   Housing Stability: Not on file            Past Medical History:     Past Medical History:   Diagnosis Date     Aortic valve stenosis     severe     Blood clot in the legs     left leg after surgery     Gastro-oesophageal reflux disease      Hodgkins lymphoma (H)     bone marrow biopsy     Hypertension      PAD (peripheral artery disease) (H)      persistent atrial fibrillation     AFIB     Unspecified cerebral artery occlusion with cerebral infarction 10/01/2011              Past Surgical History:     Past Surgical History:   Procedure Laterality Date     APPENDECTOMY       ARTHROPLASTY REVISION HIP  12/27/2011    Procedure:ARTHROPLASTY REVISION HIP; RIGHT TOTAL HIP REVISION WITH BONE GRAFT  ; Surgeon:ANGIE HARRINGTON; Location: OR     BIOPSY  hodgkins lymphoma    bone marrow biopsy, chemo and radiation     CARDIAC SURGERY      cardiac ablation     COLONOSCOPY       CV CORONARY ANGIOGRAM N/A 9/23/2022    Procedure: Coronary Angiogram;  Surgeon: Gennaro Cook MD;  Location:  HEART CARDIAC CATH LAB     CV PCI N/A 9/23/2022    Procedure: Percutaneous Coronary Intervention;  Surgeon: Gennaro Cook MD;  Location:  HEART CARDIAC CATH LAB     CV RIGHT HEART CATH MEASUREMENTS RECORDED N/A 9/23/2022    Procedure: Right Heart Catheterization;  Surgeon: Gennaro Cook MD;  Location:  HEART CARDIAC CATH LAB     ORTHOPEDIC SURGERY      hip and knee surgeries     ORTHOPEDIC SURGERY      carpal tunnel  right hand              Allergies:   Penicillin  g       Data:   All laboratory data reviewed:    Recent Labs   Lab Test 11/03/21  1151 05/05/21  0000 08/04/20  0000 10/18/18  0000 10/17/17  0000 07/21/16  0835 12/16/15  0934   LDL  --  21  --  81 98 81 95   HDL  --  38  --  43 47 47 43   NHDL  --  69  --   --   --  112 133*   CHOL  --  128 185 155 168 159 176   TRIG 77 117  --  154* 115 156* 190*       Lab Results   Component Value Date    WBC 6.1 09/28/2022    WBC 5.4 08/04/2020    RBC 4.74 09/28/2022    RBC 4.86 08/04/2020    HGB 15.4 09/28/2022    HGB 15.3 08/04/2020    HCT 44.4 09/28/2022    HCT 47.0 08/04/2020    MCV 94 09/28/2022    MCV 96.7 08/04/2020    MCH 32.5 09/28/2022    MCH 31.6 08/04/2020    MCHC 34.7 09/28/2022    MCHC 32.7 08/04/2020    RDW 13.1 09/28/2022    RDW 13.4 08/04/2020     09/28/2022     08/04/2020       Lab Results   Component Value Date     09/28/2022     05/05/2021    POTASSIUM 3.0 (L) 09/28/2022    POTASSIUM 3.7 05/05/2021    CHLORIDE 105 09/28/2022    CHLORIDE 104 09/12/2022    CHLORIDE 100 05/05/2021    CO2 27 09/28/2022    CO2 28 05/05/2021    ANIONGAP 8 09/28/2022    ANIONGAP 1.9 08/04/2020     (H) 09/28/2022    GLC 98 05/05/2021    BUN 32 (H) 09/28/2022    BUN 27 05/05/2021    CR 1.10 09/28/2022    CR 1 05/05/2021    GFRESTIMATED 70 09/28/2022    GFRESTIMATED 62 05/05/2021    GFRESTBLACK 72 05/05/2021    SARA 9.0 09/28/2022    SARA 1.16 05/05/2021      Lab Results   Component Value Date    AST 42 05/05/2021    ALT 28 05/05/2021       Lab Results   Component Value Date    A1C 5.9 (A) 05/06/2021       Lab Results   Component Value Date    INR 1.71 (H) 09/28/2022    INR 1.40 (H) 09/23/2022    INR 2.4 09/12/2022    INR 1.15 (H) 12/30/2011    INR 1.05 12/29/2011         DREW KING [facility]  Pager: [phone]  RN phone: [phone]

## 2022-10-12 NOTE — PATIENT INSTRUCTIONS
Today's Recommendations    Remain off metoprolol  Continue monitor for the rest of the month and will alert you regarding findings and recommendations  Continue all medications without changes    Please send a GoChime message or call [phone] to the RN team with questions or concerns.     Scheduling number [phone]    DREW Lema, CNP

## 2022-10-18 ENCOUNTER — HOSPITAL ENCOUNTER (OUTPATIENT)
Dept: CARDIOLOGY | Facility: CLINIC | Age: 77
Discharge: HOME | End: 2022-10-18
Attending: INTERNAL MEDICINE | Admitting: INTERNAL MEDICINE
Payer: COMMERCIAL

## 2022-10-18 DIAGNOSIS — I35.0 SEVERE AORTIC STENOSIS: ICD-10-CM

## 2022-10-18 PROCEDURE — 74174 CTA ABD&PLVS W/CONTRAST: CPT

## 2022-10-18 PROCEDURE — 74174 CTA ABD&PLVS W/CONTRAST: CPT | Mod: 26 | Performed by: INTERNAL MEDICINE

## 2022-10-18 PROCEDURE — 71275 CT ANGIOGRAPHY CHEST: CPT | Mod: 26 | Performed by: INTERNAL MEDICINE

## 2022-10-18 PROCEDURE — 71275 CT ANGIOGRAPHY CHEST: CPT

## 2022-10-18 PROCEDURE — 250N000011 HC RX IP 250 OP 636: Performed by: INTERNAL MEDICINE

## 2022-10-18 RX ORDER — METHYLPREDNISOLONE SODIUM SUCCINATE 125 MG/2ML
125 INJECTION, POWDER, LYOPHILIZED, FOR SOLUTION INTRAMUSCULAR; INTRAVENOUS
Status: DISCONTINUED | OUTPATIENT
Start: 2022-10-18 | End: 2022-10-19 | Stop reason: HOSPADM

## 2022-10-18 RX ORDER — IOPAMIDOL 755 MG/ML
500 INJECTION, SOLUTION INTRAVASCULAR ONCE
Status: COMPLETED | OUTPATIENT
Start: 2022-10-18 | End: 2022-10-18

## 2022-10-18 RX ORDER — DIPHENHYDRAMINE HCL 25 MG
25 CAPSULE ORAL
Status: DISCONTINUED | OUTPATIENT
Start: 2022-10-18 | End: 2022-10-19 | Stop reason: HOSPADM

## 2022-10-18 RX ORDER — ACYCLOVIR 200 MG/1
0-1 CAPSULE ORAL
Status: DISCONTINUED | OUTPATIENT
Start: 2022-10-18 | End: 2022-10-19 | Stop reason: HOSPADM

## 2022-10-18 RX ORDER — DIPHENHYDRAMINE HYDROCHLORIDE 50 MG/ML
25-50 INJECTION INTRAMUSCULAR; INTRAVENOUS
Status: DISCONTINUED | OUTPATIENT
Start: 2022-10-18 | End: 2022-10-19 | Stop reason: HOSPADM

## 2022-10-18 RX ORDER — ONDANSETRON 2 MG/ML
4 INJECTION INTRAMUSCULAR; INTRAVENOUS
Status: DISCONTINUED | OUTPATIENT
Start: 2022-10-18 | End: 2022-10-19 | Stop reason: HOSPADM

## 2022-10-18 RX ADMIN — IOPAMIDOL 115 ML: 755 INJECTION, SOLUTION INTRAVENOUS at 11:42

## 2022-10-21 ENCOUNTER — TELEPHONE (OUTPATIENT)
Dept: CARDIOLOGY | Facility: CLINIC | Age: 77
End: 2022-10-21

## 2022-10-21 DIAGNOSIS — I35.0 AORTIC VALVE STENOSIS, ETIOLOGY OF CARDIAC VALVE DISEASE UNSPECIFIED: Primary | ICD-10-CM

## 2022-10-21 NOTE — TELEPHONE ENCOUNTER
Spoke with patient and informed him that Dr. Cook reviewed the results of his TAVR CT. Patient confirmed that he has been feeling well since stopping metoprolol. He is not having recurrent dizziness, shortness of breath, or fatigue. Per Dr. Cook, patient can follow up with a repeat echocardiogram in 3 months. Reviewed plan with patient. I asked that he call sooner if he becomes symptomatic. Patient states understanding and agreement with plan.   Sent message to scheduling to arrange echo.

## 2023-01-23 ENCOUNTER — HOSPITAL ENCOUNTER (OUTPATIENT)
Dept: CARDIOLOGY | Facility: CLINIC | Age: 78
Discharge: HOME | End: 2023-01-23
Attending: INTERNAL MEDICINE | Admitting: INTERNAL MEDICINE
Payer: COMMERCIAL

## 2023-01-23 DIAGNOSIS — I35.0 AORTIC VALVE STENOSIS, ETIOLOGY OF CARDIAC VALVE DISEASE UNSPECIFIED: ICD-10-CM

## 2023-01-23 LAB — LVEF ECHO: NORMAL

## 2023-01-23 PROCEDURE — 999N000208 ECHOCARDIOGRAM COMPLETE

## 2023-01-23 PROCEDURE — 93306 TTE W/DOPPLER COMPLETE: CPT | Mod: 26 | Performed by: INTERNAL MEDICINE

## 2023-01-23 PROCEDURE — 255N000002 HC RX 255 OP 636: Performed by: INTERNAL MEDICINE

## 2023-01-23 RX ADMIN — HUMAN ALBUMIN MICROSPHERES AND PERFLUTREN 9 ML: 10; .22 INJECTION, SOLUTION INTRAVENOUS at 11:44

## 2023-01-25 DIAGNOSIS — I35.0 SEVERE AORTIC STENOSIS: Primary | ICD-10-CM

## 2023-01-25 NOTE — PROGRESS NOTES
Received results of patient's echo 1/23/23. Reviewed results with Bere Jackson NP. Results show moderate aortic stenosis.   Called patient. He states that he is feeling very well. He has been active without symptoms. No longer experiencing dizziness since his metoprolol was stopped. Per Bere, will repeat echo in 6 months. Placed order.   In the meantime, patient advised to call clinic and/or seek emergent care if experiencing any new or worsening symptoms. Patient verbalized understanding and agreed to plan.

## 2023-02-01 DIAGNOSIS — I10 PRIMARY HYPERTENSION: ICD-10-CM

## 2023-02-01 RX ORDER — AMLODIPINE BESYLATE 10 MG/1
10 TABLET ORAL DAILY
Qty: 90 TABLET | Refills: 0 | Status: SHIPPED | OUTPATIENT
Start: 2023-02-01 | End: 2023-05-04

## 2023-02-09 ENCOUNTER — TELEPHONE (OUTPATIENT)
Dept: CARDIOLOGY | Facility: CLINIC | Age: 78
End: 2023-02-09

## 2023-02-09 NOTE — TELEPHONE ENCOUNTER
Hi,    Sending a recall letter to this pt please ext orders so pt can schedule when he calls ack.    Thank you,  Aj

## 2023-02-26 ENCOUNTER — HOSPITAL ENCOUNTER (INPATIENT)
Facility: CLINIC | Age: 78
LOS: 2 days | Discharge: HOME | DRG: 243 | End: 2023-02-28
Attending: EMERGENCY MEDICINE | Admitting: STUDENT IN AN ORGANIZED HEALTH CARE EDUCATION/TRAINING PROGRAM
Payer: COMMERCIAL

## 2023-02-26 ENCOUNTER — APPOINTMENT (OUTPATIENT)
Dept: CT IMAGING | Facility: CLINIC | Age: 78
DRG: 243 | End: 2023-02-26
Attending: EMERGENCY MEDICINE
Payer: COMMERCIAL

## 2023-02-26 ENCOUNTER — APPOINTMENT (OUTPATIENT)
Dept: ULTRASOUND IMAGING | Facility: CLINIC | Age: 78
DRG: 243 | End: 2023-02-26
Attending: EMERGENCY MEDICINE
Payer: COMMERCIAL

## 2023-02-26 DIAGNOSIS — I48.20 CHRONIC ATRIAL FIBRILLATION (H): Primary | ICD-10-CM

## 2023-02-26 DIAGNOSIS — I35.0 AORTIC VALVE STENOSIS, ETIOLOGY OF CARDIAC VALVE DISEASE UNSPECIFIED: ICD-10-CM

## 2023-02-26 DIAGNOSIS — R55 SYNCOPE, UNSPECIFIED SYNCOPE TYPE: ICD-10-CM

## 2023-02-26 DIAGNOSIS — S09.90XA INJURY OF HEAD, INITIAL ENCOUNTER: ICD-10-CM

## 2023-02-26 DIAGNOSIS — E87.6 HYPOKALEMIA: ICD-10-CM

## 2023-02-26 LAB
ANION GAP SERPL CALCULATED.3IONS-SCNC: 9 MMOL/L (ref 7–15)
BASOPHILS # BLD AUTO: 0.1 10E3/UL (ref 0–0.2)
BASOPHILS NFR BLD AUTO: 1 %
BUN SERPL-MCNC: 33 MG/DL (ref 8–23)
CALCIUM SERPL-MCNC: 9.7 MG/DL (ref 8.8–10.2)
CHLORIDE SERPL-SCNC: 102 MMOL/L (ref 98–107)
CREAT SERPL-MCNC: 1.16 MG/DL (ref 0.67–1.17)
DEPRECATED HCO3 PLAS-SCNC: 30 MMOL/L (ref 22–29)
EOSINOPHIL # BLD AUTO: 0.2 10E3/UL (ref 0–0.7)
EOSINOPHIL NFR BLD AUTO: 3 %
ERYTHROCYTE [DISTWIDTH] IN BLOOD BY AUTOMATED COUNT: 13.2 % (ref 10–15)
GFR SERPL CREATININE-BSD FRML MDRD: 66 ML/MIN/1.73M2
GLUCOSE SERPL-MCNC: 125 MG/DL (ref 70–99)
HCT VFR BLD AUTO: 46.3 % (ref 40–53)
HGB BLD-MCNC: 16.3 G/DL (ref 13.3–17.7)
IMM GRANULOCYTES # BLD: 0 10E3/UL
IMM GRANULOCYTES NFR BLD: 1 %
INR PPP: 2.2 (ref 0.85–1.15)
LYMPHOCYTES # BLD AUTO: 1.1 10E3/UL (ref 0.8–5.3)
LYMPHOCYTES NFR BLD AUTO: 19 %
MCH RBC QN AUTO: 32.3 PG (ref 26.5–33)
MCHC RBC AUTO-ENTMCNC: 35.2 G/DL (ref 31.5–36.5)
MCV RBC AUTO: 92 FL (ref 78–100)
MONOCYTES # BLD AUTO: 0.6 10E3/UL (ref 0–1.3)
MONOCYTES NFR BLD AUTO: 11 %
NEUTROPHILS # BLD AUTO: 3.8 10E3/UL (ref 1.6–8.3)
NEUTROPHILS NFR BLD AUTO: 65 %
NRBC # BLD AUTO: 0 10E3/UL
NRBC BLD AUTO-RTO: 0 /100
PLATELET # BLD AUTO: 253 10E3/UL (ref 150–450)
POTASSIUM SERPL-SCNC: 3.5 MMOL/L (ref 3.4–5.3)
RBC # BLD AUTO: 5.04 10E6/UL (ref 4.4–5.9)
SODIUM SERPL-SCNC: 141 MMOL/L (ref 136–145)
TROPONIN T SERPL HS-MCNC: 26 NG/L
TROPONIN T SERPL HS-MCNC: 27 NG/L
WBC # BLD AUTO: 5.8 10E3/UL (ref 4–11)

## 2023-02-26 PROCEDURE — 70450 CT HEAD/BRAIN W/O DYE: CPT

## 2023-02-26 PROCEDURE — 72125 CT NECK SPINE W/O DYE: CPT

## 2023-02-26 PROCEDURE — 250N000013 HC RX MED GY IP 250 OP 250 PS 637: Performed by: STUDENT IN AN ORGANIZED HEALTH CARE EDUCATION/TRAINING PROGRAM

## 2023-02-26 PROCEDURE — 36415 COLL VENOUS BLD VENIPUNCTURE: CPT | Performed by: EMERGENCY MEDICINE

## 2023-02-26 PROCEDURE — 84484 ASSAY OF TROPONIN QUANT: CPT | Mod: 91 | Performed by: STUDENT IN AN ORGANIZED HEALTH CARE EDUCATION/TRAINING PROGRAM

## 2023-02-26 PROCEDURE — 84484 ASSAY OF TROPONIN QUANT: CPT | Performed by: EMERGENCY MEDICINE

## 2023-02-26 PROCEDURE — 99285 EMERGENCY DEPT VISIT HI MDM: CPT | Mod: 25

## 2023-02-26 PROCEDURE — 93005 ELECTROCARDIOGRAM TRACING: CPT

## 2023-02-26 PROCEDURE — 85610 PROTHROMBIN TIME: CPT | Performed by: EMERGENCY MEDICINE

## 2023-02-26 PROCEDURE — 36415 COLL VENOUS BLD VENIPUNCTURE: CPT | Performed by: STUDENT IN AN ORGANIZED HEALTH CARE EDUCATION/TRAINING PROGRAM

## 2023-02-26 PROCEDURE — 99223 1ST HOSP IP/OBS HIGH 75: CPT | Mod: AI | Performed by: STUDENT IN AN ORGANIZED HEALTH CARE EDUCATION/TRAINING PROGRAM

## 2023-02-26 PROCEDURE — 85004 AUTOMATED DIFF WBC COUNT: CPT | Performed by: EMERGENCY MEDICINE

## 2023-02-26 PROCEDURE — 93880 EXTRACRANIAL BILAT STUDY: CPT

## 2023-02-26 PROCEDURE — 210N000002 HC R&B HEART CARE

## 2023-02-26 PROCEDURE — 80048 BASIC METABOLIC PNL TOTAL CA: CPT | Performed by: EMERGENCY MEDICINE

## 2023-02-26 RX ORDER — NALOXONE HYDROCHLORIDE 0.4 MG/ML
0.2 INJECTION, SOLUTION INTRAMUSCULAR; INTRAVENOUS; SUBCUTANEOUS
Status: DISCONTINUED | OUTPATIENT
Start: 2023-02-26 | End: 2023-02-28 | Stop reason: HOSPADM

## 2023-02-26 RX ORDER — NALOXONE HYDROCHLORIDE 0.4 MG/ML
0.4 INJECTION, SOLUTION INTRAMUSCULAR; INTRAVENOUS; SUBCUTANEOUS
Status: DISCONTINUED | OUTPATIENT
Start: 2023-02-26 | End: 2023-02-28 | Stop reason: HOSPADM

## 2023-02-26 RX ORDER — ACETAMINOPHEN 650 MG/1
650 SUPPOSITORY RECTAL EVERY 6 HOURS PRN
Status: DISCONTINUED | OUTPATIENT
Start: 2023-02-26 | End: 2023-02-28 | Stop reason: HOSPADM

## 2023-02-26 RX ORDER — POLYETHYLENE GLYCOL 3350 17 G/17G
17 POWDER, FOR SOLUTION ORAL DAILY PRN
Status: DISCONTINUED | OUTPATIENT
Start: 2023-02-26 | End: 2023-02-28 | Stop reason: HOSPADM

## 2023-02-26 RX ORDER — ONDANSETRON 4 MG/1
4 TABLET, ORALLY DISINTEGRATING ORAL EVERY 6 HOURS PRN
Status: DISCONTINUED | OUTPATIENT
Start: 2023-02-26 | End: 2023-02-28 | Stop reason: HOSPADM

## 2023-02-26 RX ORDER — LIDOCAINE 40 MG/G
CREAM TOPICAL
Status: DISCONTINUED | OUTPATIENT
Start: 2023-02-26 | End: 2023-02-28 | Stop reason: HOSPADM

## 2023-02-26 RX ORDER — CHLORTHALIDONE 25 MG/1
50 TABLET ORAL DAILY
Status: DISCONTINUED | OUTPATIENT
Start: 2023-02-27 | End: 2023-02-28 | Stop reason: HOSPADM

## 2023-02-26 RX ORDER — OXYCODONE HYDROCHLORIDE 5 MG/1
5 TABLET ORAL EVERY 4 HOURS PRN
Status: DISCONTINUED | OUTPATIENT
Start: 2023-02-26 | End: 2023-02-28 | Stop reason: HOSPADM

## 2023-02-26 RX ORDER — FENOFIBRATE 160 MG/1
160 TABLET ORAL EVERY EVENING
Status: DISCONTINUED | OUTPATIENT
Start: 2023-02-26 | End: 2023-02-28 | Stop reason: HOSPADM

## 2023-02-26 RX ORDER — HYDROMORPHONE HCL IN WATER/PF 6 MG/30 ML
0.4 PATIENT CONTROLLED ANALGESIA SYRINGE INTRAVENOUS
Status: DISCONTINUED | OUTPATIENT
Start: 2023-02-26 | End: 2023-02-28 | Stop reason: HOSPADM

## 2023-02-26 RX ORDER — PROCHLORPERAZINE MALEATE 5 MG
5 TABLET ORAL EVERY 6 HOURS PRN
Status: DISCONTINUED | OUTPATIENT
Start: 2023-02-26 | End: 2023-02-28 | Stop reason: HOSPADM

## 2023-02-26 RX ORDER — AMOXICILLIN 250 MG
2 CAPSULE ORAL 2 TIMES DAILY PRN
Status: DISCONTINUED | OUTPATIENT
Start: 2023-02-26 | End: 2023-02-28 | Stop reason: HOSPADM

## 2023-02-26 RX ORDER — ONDANSETRON 2 MG/ML
4 INJECTION INTRAMUSCULAR; INTRAVENOUS EVERY 6 HOURS PRN
Status: DISCONTINUED | OUTPATIENT
Start: 2023-02-26 | End: 2023-02-28 | Stop reason: HOSPADM

## 2023-02-26 RX ORDER — BISACODYL 10 MG
10 SUPPOSITORY, RECTAL RECTAL DAILY PRN
Status: DISCONTINUED | OUTPATIENT
Start: 2023-02-26 | End: 2023-02-28 | Stop reason: HOSPADM

## 2023-02-26 RX ORDER — AMOXICILLIN 250 MG
1 CAPSULE ORAL 2 TIMES DAILY PRN
Status: DISCONTINUED | OUTPATIENT
Start: 2023-02-26 | End: 2023-02-28 | Stop reason: HOSPADM

## 2023-02-26 RX ORDER — PROCHLORPERAZINE 25 MG
12.5 SUPPOSITORY, RECTAL RECTAL EVERY 12 HOURS PRN
Status: DISCONTINUED | OUTPATIENT
Start: 2023-02-26 | End: 2023-02-28 | Stop reason: HOSPADM

## 2023-02-26 RX ORDER — ACETAMINOPHEN 325 MG/1
650 TABLET ORAL EVERY 6 HOURS PRN
Status: DISCONTINUED | OUTPATIENT
Start: 2023-02-26 | End: 2023-02-28 | Stop reason: HOSPADM

## 2023-02-26 RX ORDER — AMLODIPINE BESYLATE 10 MG/1
10 TABLET ORAL DAILY
Status: DISCONTINUED | OUTPATIENT
Start: 2023-02-27 | End: 2023-02-28 | Stop reason: HOSPADM

## 2023-02-26 RX ORDER — HYDROMORPHONE HCL IN WATER/PF 6 MG/30 ML
0.2 PATIENT CONTROLLED ANALGESIA SYRINGE INTRAVENOUS
Status: DISCONTINUED | OUTPATIENT
Start: 2023-02-26 | End: 2023-02-28 | Stop reason: HOSPADM

## 2023-02-26 RX ORDER — ROSUVASTATIN CALCIUM 10 MG/1
40 TABLET, COATED ORAL EVERY EVENING
Status: DISCONTINUED | OUTPATIENT
Start: 2023-02-26 | End: 2023-02-28 | Stop reason: HOSPADM

## 2023-02-26 RX ADMIN — ROSUVASTATIN CALCIUM 40 MG: 10 TABLET, FILM COATED ORAL at 20:57

## 2023-02-26 RX ADMIN — FENOFIBRATE 160 MG: 160 TABLET ORAL at 20:57

## 2023-02-26 ASSESSMENT — ACTIVITIES OF DAILY LIVING (ADL)
ADLS_ACUITY_SCORE: 35

## 2023-02-26 NOTE — PROGRESS NOTES
RECEIVING UNIT ED HANDOFF REVIEW    ED Nurse Handoff Report was reviewed by: Keenan Vazquez RN on February 26, 2023 at 5:14 PM

## 2023-02-26 NOTE — PHARMACY-ADMISSION MEDICATION HISTORY
Pharmacy Medication History  Admission medication history interview status for the 2/26/2023  admission is complete. See EPIC admission navigator for prior to admission medications     Location of Interview: Patient room  Medication history sources: Patient and Surescripts      In the past week, patient estimated taking medication this percent of the time: greater than 90%    Medication Affordability:   Not including over the counter (OTC) medications, was there a time in the past 12 months when you did not take your medications as prescribed because of cost? No    Additional medication history information:   The medication list was verified by the patient and is accurate per [employer]. The patient states that he was supposed to get his INR checked tomorrow at [facility].     Medication reconciliation completed by provider prior to medication history? No    Time spent in this activity: 20 minutes    Prior to Admission medications    Medication Sig Last Dose Taking? Auth Provider Long Term End Date   ALPHA LIPOIC ACID PO Take 600 mg by mouth daily 2/26/2023 at AM Yes Reported, Patient     amLODIPine (NORVASC) 10 MG tablet Take 1 tablet (10 mg) by mouth daily Appointment required for further refills 2/26/2023 at AM Yes German Patel MD Yes    ascorbic acid (VITAMIN C) 1000 MG TABS Take 1,000 mg by mouth daily 2/26/2023 at AM Yes Reported, Patient     chlorthalidone (HYGROTON) 50 MG tablet Take 50 mg by mouth daily 2/26/2023 at AM Yes Reported, Patient Yes    Cholecalciferol (VITAMIN D) 400 UNITS tablet Take 1 tablet by mouth every evening 2/25/2023 at PM Yes Reported, Patient     CINNAMON PO Take 1 capsule by mouth every evening Pt unsure of strength 2/25/2023 at PM Yes Reported, Patient     ezetimibe (ZETIA) 10 MG tablet Take 0.5 tablets (5 mg) by mouth Every Mon, Wed, Fri Morning  Patient taking differently: Take 5 mg by mouth Every Mon, Wed, Fri Morning PM 2/25/2023 at PM Yes Jorge  German Valencia MD Yes    fenofibrate (TRIGLIDE/LOFIBRA) 160 MG tablet Take 1 tablet (160 mg) by mouth daily  Patient taking differently: Take 160 mg by mouth every evening 2/25/2023 at PM Yes Theodora Nuñez APRN CNP Yes    multivitamin w/minerals (THERA-VIT-M) tablet Take 1 tablet by mouth every evening 2/25/2023 at PM Yes Reported, Patient     omega 3 1000 MG CAPS Take 1 capsule by mouth every evening 180 EPA/ 20 DHH 2/25/2023 at PM Yes Reported, Patient     rosuvastatin (CRESTOR) 40 MG tablet Take 40 mg by mouth every evening 2/25/2023 at PM Yes Reported, Patient Yes    Ubiquinol 100 MG CAPS Take 100 mg by mouth daily 2/26/2023 at AM Yes Reported, Patient     warfarin (COUMADIN) 5 MG tablet daily 4 mg Mon, Wed, Fri and 3 mg all other days. 2/25/2023 at PM Yes Reported, Patient         The information provided in this note is only as accurate as the sources available at the time of update(s)   Odilia Barrera  Pharmacy Intern

## 2023-02-26 NOTE — H&P
[facility]    History and Physical - Hospitalist Service       Date of Admission:  2/26/2023    Assessment & Plan      Jonny Goldberg is a 77 year old male admitted on 2/26/2023.     Syncope with loss of consciousness  History of symptomatic sinus pauses  Severe aortic valve stenosis  History of moderate carotid stenosis  *Patient has history of prior symptomatic pauses that reportedly resolved after stopping metoprolol.  For the last month prior to admission he has had episodes that are almost exactly the same.  They last several seconds while at rest and then resolved.  On day of admission he was walking and felt lightheaded before losing consciousness and falling to the ground.  *Patient does not appear to be on any rate control meds.  -Inpatient  - Cardiology consult  - Telemetry  - Hold warfarin for possible pacemaker  - Carotid ultrasound completed in ED on 2/26, formal read pending, follow-up    A-fib on chronic anticoagulation  -Hold warfarin due to possible need for pacemaker  - If no plan for pacemaker resume warfarin    Head trauma while on warfarin  *CT head and cervical spine on admission without any acute abnormality.  -Monitor for any changes    Hypertension  - Continue amlodipine chlorthalidone    HLD  - Continue Zetia, rosuvastatin, and fenofibrate    Elevated troponin  *Suspect this is related to presumed sinus pause, patient is without current of injury or anginal symptoms.  -Recheck troponin  -Monitor for change in symptoms       Diet:  Cardiac  DVT Prophylaxis: Warfarin  Miranda Catheter: Not present  Lines: None     Cardiac Monitoring: None  Code Status: Full CodeFull code, discussed on admission    Clinically Significant Risk Factors Present on Admission               # Drug Induced Coagulation Defect: home medication list includes an anticoagulant medication                 Disposition Plan      Expected Discharge Date: 02/28/2023                  Kj Millard,  MD  Hospitalist Service  [facility]  Securely message with [employer] (more info)  Text page via [employer] Paging/Directory     ______________________________________________________________________    Chief Complaint   Syncope    History is obtained from the patient, electronic health record and emergency department physician    History of Present Illness   Jonny Goldberg is a 77 year old male who presents to the hospital after a syncopal episode.  He was walking around [facility] as he does every day.  He then felt extremely lightheaded and dizzy and began to fall.  The next thing he knew he was on the ground having struck his face on the pavement.  He does not remember the fall in its entirety.  He was quite lightheaded and dizzy for approximately 30 seconds after the fall as well.  Some bystanders saw him and called his wife who picked him up and brought him to the emergency department.    Patient says that over the last month he has been having frequent periods of lightheadedness.  These happen 1-5 times per day.  They last for several seconds at most.  They typically are at rest.  He says that these are similar to the episodes that he had previously when he was diagnosed with symptomatic sinus pauses that reportedly resolved after stopping metoprolol.      Past Medical History    Past Medical History:   Diagnosis Date     Aortic valve stenosis     severe     Blood clot in the legs     left leg after surgery     Gastro-oesophageal reflux disease      Hodgkins lymphoma (H)     bone marrow biopsy     Hypertension      PAD (peripheral artery disease) (H)      persistent atrial fibrillation     AFIB     Unspecified cerebral artery occlusion with cerebral infarction 10/01/2011       Past Surgical History   Past Surgical History:   Procedure Laterality Date     APPENDECTOMY       ARTHROPLASTY REVISION HIP  12/27/2011    Procedure:ARTHROPLASTY REVISION HIP; RIGHT TOTAL HIP REVISION WITH BONE  GRAFT  ; Surgeon:ANGIE HARRINGTON; Location: OR     BIOPSY  hodgkins lymphoma    bone marrow biopsy, chemo and radiation     CARDIAC SURGERY      cardiac ablation     COLONOSCOPY       CV CORONARY ANGIOGRAM N/A 9/23/2022    Procedure: Coronary Angiogram;  Surgeon: Gennaro Cook MD;  Location:  HEART CARDIAC CATH LAB     CV PCI N/A 9/23/2022    Procedure: Percutaneous Coronary Intervention;  Surgeon: Gennaro Cook MD;  Location:  HEART CARDIAC CATH LAB     CV RIGHT HEART CATH MEASUREMENTS RECORDED N/A 9/23/2022    Procedure: Right Heart Catheterization;  Surgeon: Gennaro Cook MD;  Location:  HEART CARDIAC CATH LAB     ORTHOPEDIC SURGERY      hip and knee surgeries     ORTHOPEDIC SURGERY      carpal tunnel  right hand       Prior to Admission Medications   Prior to Admission Medications   Prescriptions Last Dose Informant Patient Reported? Taking?   ALPHA LIPOIC ACID PO 2/26/2023 at AM  Yes Yes   Sig: Take 600 mg by mouth daily   CINNAMON PO 2/25/2023 at PM  Yes Yes   Sig: Take 1 capsule by mouth every evening Pt unsure of strength   Cholecalciferol (VITAMIN D) 400 UNITS tablet 2/25/2023 at PM  Yes Yes   Sig: Take 1 tablet by mouth every evening   Ubiquinol 100 MG CAPS 2/26/2023 at AM  Yes Yes   Sig: Take 100 mg by mouth daily   amLODIPine (NORVASC) 10 MG tablet 2/26/2023 at AM  No Yes   Sig: Take 1 tablet (10 mg) by mouth daily Appointment required for further refills   ascorbic acid (VITAMIN C) 1000 MG TABS 2/26/2023 at AM  Yes Yes   Sig: Take 1,000 mg by mouth daily   chlorthalidone (HYGROTON) 50 MG tablet 2/26/2023 at AM  Yes Yes   Sig: Take 50 mg by mouth daily   ezetimibe (ZETIA) 10 MG tablet 2/25/2023 at PM  No Yes   Sig: Take 0.5 tablets (5 mg) by mouth Every Mon, Wed, Fri Morning   Patient taking differently: Take 5 mg by mouth Every Mon, Wed, Fri Morning PM   fenofibrate (TRIGLIDE/LOFIBRA) 160 MG tablet 2/25/2023 at PM  No Yes   Sig: Take 1 tablet (160 mg) by mouth daily   Patient  taking differently: Take 160 mg by mouth every evening   multivitamin w/minerals (THERA-VIT-M) tablet 2/25/2023 at PM  Yes Yes   Sig: Take 1 tablet by mouth every evening   omega 3 1000 MG CAPS 2/25/2023 at PM  Yes Yes   Sig: Take 1 capsule by mouth every evening 180 EPA/ 20 DHH   rosuvastatin (CRESTOR) 40 MG tablet 2/25/2023 at PM  Yes Yes   Sig: Take 40 mg by mouth every evening   warfarin (COUMADIN) 5 MG tablet 2/25/2023 at PM  Yes Yes   Sig: daily 4 mg Mon, Wed, Fri and 3 mg all other days.      Facility-Administered Medications: None           Physical Exam   Vital Signs: Temp: 97.8  F (36.6  C)   BP: 125/71 Pulse: 93   Resp: 16 SpO2: 95 % O2 Device: None (Room air)    Weight: 0 lbs 0 oz    Constitutional: Awake, alert, cooperative, no apparent distress  Respiratory: Clear to auscultation bilaterally, no crackles or wheezing  Cardiovascular: IRR, rates 80s on tele, normal S1 and S2, and no murmur noted  GI: Normal bowel sounds, soft, non-distended, non-tender  Skin/Integumen: No rashes, no cyanosis, no edema  Other:       Medical Decision Making       80 MINUTES SPENT BY ME on the date of service doing chart review, history, exam, documentation & further activities per the note.      Data     I have personally reviewed the following data over the past 24 hrs:    5.8  \   16.3   / 253     141 102 33.0 (H) /  125 (H)   3.5 30 (H) 1.16 \       Trop: 27 (H) BNP: N/A       INR:  2.20 (H) PTT:  N/A   D-dimer:  N/A Fibrinogen:  N/A       Imaging results reviewed over the past 24 hrs:   Recent Results (from the past 24 hour(s))   Cervical spine CT w/o contrast    Narrative    EXAM: CT CERVICAL SPINE W/O CONTRAST, CT HEAD W/O CONTRAST  LOCATION: [facility]  DATE/TIME: 2/26/2023 1:15 PM    INDICATION: Fall, mild neck discomfort, rule out fracture  COMPARISON: None.  TECHNIQUE:   1) Routine CT Head without IV contrast. Multiplanar reformats. Dose reduction techniques were used.  2) Routine CT  Cervical Spine without IV contrast. Multiplanar reformats. Dose reduction techniques were used.    FINDINGS:   HEAD CT:   INTRACRANIAL CONTENTS: No intracranial hemorrhage, extraaxial collection, or mass effect.  No CT evidence of acute infarct. Ancef malacia is noted in the bilateral frontal lobes and in the right occipital lobe. Mild presumed chronic small vessel ischemic   changes. Mild generalized volume loss. No hydrocephalus.     VISUALIZED ORBITS/SINUSES/MASTOIDS: No intraorbital abnormality. No paranasal sinus mucosal disease. No middle ear or mastoid effusion.    BONES/SOFT TISSUES: No acute abnormality. A small metallic foreign body is noted in the soft tissues along the superomedial aspect of the left orbit.    CERVICAL SPINE CT:   VERTEBRA: Reversal of the normal cervical lordosis apex at C4-C5. Trace retrolisthesis C4 on C5 and C5 on C6. Vertebral body heights are maintained. No fracture or posttraumatic subluxation.     CANAL/FORAMINA: Moderate to advanced cervical spondylosis. There is no severe spinal canal stenosis. Moderate to severe osseous foraminal stenosis bilaterally at C3-C4 and C4-C5. Severe osseous foraminal stenosis bilaterally at C5-C6: No canal or neural   foraminal stenosis.    PARASPINAL: No extraspinal abnormality. Visualized lung fields are clear.      Impression    IMPRESSION:  HEAD CT:  1.  No acute intracranial process.  2.  Probable mild sequela chronic vessel ischemic disease and mild brain parenchymal volume loss.  3.  Lateral frontal and right occipital lobe encephalomalacia.    CERVICAL SPINE CT:  1.  No CT evidence for acute fracture or post traumatic subluxation.  2.  Cervical spondylosis with multiple moderate and severe osseous foraminal stenoses as above.   Head CT w/o contrast    Narrative    EXAM: CT CERVICAL SPINE W/O CONTRAST, CT HEAD W/O CONTRAST  LOCATION: [facility]  DATE/TIME: 2/26/2023 1:15 PM    INDICATION: Fall, mild neck discomfort,  rule out fracture  COMPARISON: None.  TECHNIQUE:   1) Routine CT Head without IV contrast. Multiplanar reformats. Dose reduction techniques were used.  2) Routine CT Cervical Spine without IV contrast. Multiplanar reformats. Dose reduction techniques were used.    FINDINGS:   HEAD CT:   INTRACRANIAL CONTENTS: No intracranial hemorrhage, extraaxial collection, or mass effect.  No CT evidence of acute infarct. Ancef malacia is noted in the bilateral frontal lobes and in the right occipital lobe. Mild presumed chronic small vessel ischemic   changes. Mild generalized volume loss. No hydrocephalus.     VISUALIZED ORBITS/SINUSES/MASTOIDS: No intraorbital abnormality. No paranasal sinus mucosal disease. No middle ear or mastoid effusion.    BONES/SOFT TISSUES: No acute abnormality. A small metallic foreign body is noted in the soft tissues along the superomedial aspect of the left orbit.    CERVICAL SPINE CT:   VERTEBRA: Reversal of the normal cervical lordosis apex at C4-C5. Trace retrolisthesis C4 on C5 and C5 on C6. Vertebral body heights are maintained. No fracture or posttraumatic subluxation.     CANAL/FORAMINA: Moderate to advanced cervical spondylosis. There is no severe spinal canal stenosis. Moderate to severe osseous foraminal stenosis bilaterally at C3-C4 and C4-C5. Severe osseous foraminal stenosis bilaterally at C5-C6: No canal or neural   foraminal stenosis.    PARASPINAL: No extraspinal abnormality. Visualized lung fields are clear.      Impression    IMPRESSION:  HEAD CT:  1.  No acute intracranial process.  2.  Probable mild sequela chronic vessel ischemic disease and mild brain parenchymal volume loss.  3.  Lateral frontal and right occipital lobe encephalomalacia.    CERVICAL SPINE CT:  1.  No CT evidence for acute fracture or post traumatic subluxation.  2.  Cervical spondylosis with multiple moderate and severe osseous foraminal stenoses as above.

## 2023-02-26 NOTE — ED TRIAGE NOTES
"Pt reports syncopal episode while walking today, witnessed by couple following pt, pt reports broke dental bridge, on warfarin, scraped nose. Pt reports feeling very dizzy prior to syncopal episode. Pt denies chest pain, denies SOB, no neuro deficits.      Pt reports: \"I need carotid arteries checked they are block\", \"There are small clots being sent to the [facility]\" \"I need heart valve replacement.\"      Triage Assessment     Row Name 02/26/23 1226       Triage Assessment (Adult)    Airway WDL WDL              "

## 2023-02-26 NOTE — ED PROVIDER NOTES
History     Chief Complaint:  Fall, Syncope, and Head Injury     HPI   Jonny Goldberg is a 77 year old male with a history of moderate aortic stenosis who presents with his wife after a syncopal fall and head injury. [place] had a 3 second syncopal episode. The next thing he remembers is a passerby helping him and called his wife who then took him to the Emergency Department. He reports he broke his dental bridge and scraped his nose from breaking his sunglasses. Patient is on warfarin. He endorses neck pain. However, he denies having any fevers, cough, abdominal pain, vomiting, nausea, diarrhea, back pain, numbness, speech changes, or leg swelling.     Of note, his last syncopal episode was in September 2022.  From there, he had workup with cardiology and others. He had an echocardiogram last month.      Independent Historian:   None - Patient Only    Review of External Notes: I reviewed the note from [facility] for the echocardiogram ultrasound on 3/11/2021.  I reviewed patient's Zio patch monitoring from September 2022 demonstrating over 200 pauses lasting up to 6 seconds.    ROS:  Review of Systems  See HPI above.    Allergies:  Penicillin G     Medications:    alpha lipoic acid  amLODIPine   ascorbic acid   chlorthalidone   Cholecalciferol  ezetimibe   fenofibrate   multivitamin w/minerals   omega 3 1000   rosuvastatin   Ubiquinol   warfarin     Past Medical History:    Aortic valve stenosis   Blood clot in the legs   Gastro-oesophageal reflux disease   Hodgkins lymphoma    Hypertension   PAD  persistent atrial fibrillation   Unspecified cerebral artery occlusion with cerebral infarction     Past Surgical History:    Appendectomy  Arthroplasty revision hip  Biopsy  Cardiac surgery  Colonoscopy  Coronary angiogram  CV PCI  CV right heart cath measurements recorded  Orthopedic surgery (x2)     Family History:     Mother: Arrhythmia, CAD,  Hypertension, Heart Failure  Brother: Arrhythmia, CAD,   Father: Cerebrovascular Disease, Colon Cancer, CAD, Heart Failure    Social History:  Patient is a former smoker.  Patient presents with his wife.  PCP: Reid Hogue     Physical Exam     Patient Vitals for the past 24 hrs:   BP Temp Pulse Resp SpO2   02/26/23 1300 125/71 -- 93 16 95 %   02/26/23 1230 (!) 145/80 97.8  F (36.6  C) 89 18 97 %      Physical Exam  General: Alert and cooperative with exam. Patient in mild distress. Normal mentation.  Head:  Abrasion to bridge of nose without evidence of nasal deformity or epistaxis  Eyes:  No scleral icterus, PERRL  ENT:  The external nose and ears are normal. The oropharynx is normal and without erythema; mucus membranes are moist. Uvula midline, no evidence of deep space infection.  Neck:  Normal range of motion without rigidity.  CV:  Irregular rate and rhythm    Moderate systolic murmur  Resp:  Breath sounds are clear bilaterally    Non-labored, no retractions or accessory muscle use  GI:  Abdomen is soft, no distension, no tenderness. No peritoneal signs  MS:  No lower extremity edema   Skin:  Warm and dry, No rash or lesions noted.  Neuro: Oriented x 3. No gross motor deficits.        Emergency Department Course     ECG results from 09/28/22   EKG 12-lead, tracing only     Value    Systolic Blood Pressure     Diastolic Blood Pressure     Ventricular Rate 72    Atrial Rate 54    AK Interval     QRS Duration 138        QTc 466    P Axis     R AXIS -78    T Axis 45    Interpretation ECG      Atrial fibrillation  Left axis deviation  Right bundle branch block  Abnormal ECG  When compared with ECG of 23-SEP-2022 07:34, (unconfirmed)  T wave inversion no longer evident in Inferior leads  Confirmed by GENERATED REPORT, COMPUTER ([id number]),  Aasen, Bradley ([id number]) on 9/28/2022 8:48:20 PM       Imaging:  Head CT w/o contrast   Final Result   IMPRESSION:   HEAD CT:   1.  No acute intracranial process.   2.   Probable mild sequela chronic vessel ischemic disease and mild brain parenchymal volume loss.   3.  Lateral frontal and right occipital lobe encephalomalacia.      CERVICAL SPINE CT:   1.  No CT evidence for acute fracture or post traumatic subluxation.   2.  Cervical spondylosis with multiple moderate and severe osseous foraminal stenoses as above.      Cervical spine CT w/o contrast   Final Result   IMPRESSION:   HEAD CT:   1.  No acute intracranial process.   2.  Probable mild sequela chronic vessel ischemic disease and mild brain parenchymal volume loss.   3.  Lateral frontal and right occipital lobe encephalomalacia.      CERVICAL SPINE CT:   1.  No CT evidence for acute fracture or post traumatic subluxation.   2.  Cervical spondylosis with multiple moderate and severe osseous foraminal stenoses as above.      US Carotid Bilateral    (Results Pending)      Report per radiology    Laboratory:  Labs Ordered and Resulted from Time of ED Arrival to Time of ED Departure   INR - Abnormal       Result Value    INR 2.20 (*)    BASIC METABOLIC PANEL - Abnormal    Sodium 141      Potassium 3.5      Chloride 102      Carbon Dioxide (CO2) 30 (*)     Anion Gap 9      Urea Nitrogen 33.0 (*)     Creatinine 1.16      Calcium 9.7      Glucose 125 (*)     GFR Estimate 66     CBC WITH PLATELETS AND DIFFERENTIAL    WBC Count 5.8      RBC Count 5.04      Hemoglobin 16.3      Hematocrit 46.3      MCV 92      MCH 32.3      MCHC 35.2      RDW 13.2      Platelet Count 253      % Neutrophils 65      % Lymphocytes 19      % Monocytes 11      % Eosinophils 3      % Basophils 1      % Immature Granulocytes 1      NRBCs per 100 WBC 0      Absolute Neutrophils 3.8      Absolute Lymphocytes 1.1      Absolute Monocytes 0.6      Absolute Eosinophils 0.2      Absolute Basophils 0.1      Absolute Immature Granulocytes 0.0      Absolute NRBCs 0.0     TROPONIN T, HIGH SENSITIVITY        Emergency Department Course & Assessments:      Interventions:  Medications - No data to display     Independent Interpretation (X-rays, CTs, rhythm strip):  I reviewed the scans and agree with Radiology.    Consultations/Discussion of Management or Tests:  ED Course as of 02/26/23 1444   Sun Feb 26, 2023   1249 I consulted with Dr. Sterling Hospitalist who accepted the patient for admission.      Social Determinants of Health affecting care:   None    Assessments:  1240 I met with patient after reviewing notes, past charts, and vitals.    Disposition:  The patient was admitted to the hospital under the care of Dr. Wiley    Impression & Plan      CMS Diagnoses: None    Medical Decision Making:  Patient is a 77-year-old male who presents following syncopal episode while walking just prior to ED arrival.  Patient with history of atrial fibrillation, anticoagulated on warfarin, as well as cardiac monitoring last fall demonstrating multiple pauses up to 6 seconds and known aortic stenosis as well as carotid disease.  Patient's medical history and records reviewed.  On evaluation patient is neuro intact and without complaint.  EKG demonstrates known atrial fibrillation without evidence of ischemia, infarction, or significant arrhythmia.  Patient denies chest pain or shortness of breath.  CT head and cervical spine without acute pathology.  Pulmonary resolved carotid ultrasound without significant change from previous or severe stenosis.  Labs demonstrate therapeutic INR without other significant findings.  Given concern for exertional syncopal episode in setting of known structural heart disease and heart monitoring last fall demonstrating frequent pauses, will admit to [facility] with the hospitalist service for further cardiac evaluation and care.  Patient remained stable throughout ED course.    Diagnosis:    ICD-10-CM    1. Syncope, unspecified syncope type  R55       2. Injury of head, initial encounter  S09.90XA       3. Aortic valve stenosis, etiology of cardiac  valve disease unspecified  I35.0             Scribe Disclosure:  I, Rachel Ng, am serving as a scribe at 12:38 PM on 2/26/2023 to document services personally performed by Julius Torres DO based on my observations and the provider's statements to me.     2/26/2023   Julius Torres DO O'Neill, Christopher Warren, DO  02/26/23 1445

## 2023-02-26 NOTE — ED NOTES
[facility]  ED Nurse Handoff Report    ED Chief complaint: Fall, Syncope, and Head Injury      ED Diagnosis:   Final diagnoses:   Syncope, unspecified syncope type   Injury of head, initial encounter   Aortic valve stenosis, etiology of cardiac valve disease unspecified       Code Status: Full Code    Allergies:   Allergies   Allergen Reactions     Penicillin G Hives     hives       Patient Story: Pt had a passing out episode while walking around [facility].  Witnessed by bystanders.  Pt states had felt dizzy prior to the passing out episode. Hx of carotid stenosis, aortic stenosis. Denies CP/SOB.  Focused Assessment:  Pt ambulated into triage - abrasion to nose, no other injuries from fall.  Pt in controlled a fib. CT head/neck negative.  US carotids pending.  On coumadin, INR therapeutic. A/o x4.  Spouse at bedside.    Treatments and/or interventions provided: cardiac monitoring  Patient's response to treatments and/or interventions:     To be done/followed up on inpatient unit:  n/a    Does this patient have any cognitive concerns?: n/a    Activity level - Baseline/Home:  Independent  Activity Level - Current:   Independent    Patient's Preferred language: English   Needed?: No    Isolation: None  Infection: Not Applicable  Patient tested for COVID 19 prior to admission: NO  Bariatric?: No    Vital Signs:   Vitals:    02/26/23 1230 02/26/23 1300   BP: (!) 145/80 125/71   Pulse: 89 93   Resp: 18 16   Temp: 97.8  F (36.6  C)    SpO2: 97% 95%       Cardiac Rhythm:Cardiac Rhythm: Atrial fibrillation    Was the PSS-3 completed:   Yes  What interventions are required if any?               Family Comments: spouse @ bedside  OBS brochure/video discussed/provided to patient/family: N/A              Name of person given brochure if not patient:               Relationship to patient:     For the majority of the shift this patient's behavior was Green.   Behavioral interventions performed were  .    ED NURSE PHONE NUMBER: *[phone]

## 2023-02-27 LAB
ANION GAP SERPL CALCULATED.3IONS-SCNC: 11 MMOL/L (ref 7–15)
BUN SERPL-MCNC: 25.3 MG/DL (ref 8–23)
CALCIUM SERPL-MCNC: 9.3 MG/DL (ref 8.8–10.2)
CHLORIDE SERPL-SCNC: 99 MMOL/L (ref 98–107)
CREAT SERPL-MCNC: 0.96 MG/DL (ref 0.67–1.17)
DEPRECATED HCO3 PLAS-SCNC: 28 MMOL/L (ref 22–29)
ERYTHROCYTE [DISTWIDTH] IN BLOOD BY AUTOMATED COUNT: 13.2 % (ref 10–15)
GFR SERPL CREATININE-BSD FRML MDRD: 82 ML/MIN/1.73M2
GLUCOSE SERPL-MCNC: 100 MG/DL (ref 70–99)
HCT VFR BLD AUTO: 44.5 % (ref 40–53)
HGB BLD-MCNC: 15.6 G/DL (ref 13.3–17.7)
INR PPP: 2.14 (ref 0.85–1.15)
MCH RBC QN AUTO: 32.1 PG (ref 26.5–33)
MCHC RBC AUTO-ENTMCNC: 35.1 G/DL (ref 31.5–36.5)
MCV RBC AUTO: 92 FL (ref 78–100)
PLATELET # BLD AUTO: 242 10E3/UL (ref 150–450)
POTASSIUM SERPL-SCNC: 3.2 MMOL/L (ref 3.4–5.3)
POTASSIUM SERPL-SCNC: 3.4 MMOL/L (ref 3.4–5.3)
POTASSIUM SERPL-SCNC: 3.6 MMOL/L (ref 3.4–5.3)
RBC # BLD AUTO: 4.86 10E6/UL (ref 4.4–5.9)
SODIUM SERPL-SCNC: 138 MMOL/L (ref 136–145)
WBC # BLD AUTO: 6.2 10E3/UL (ref 4–11)

## 2023-02-27 PROCEDURE — 33207 INSERT HEART PM VENTRICULAR: CPT | Mod: KX

## 2023-02-27 PROCEDURE — 99152 MOD SED SAME PHYS/QHP 5/>YRS: CPT | Performed by: INTERNAL MEDICINE

## 2023-02-27 PROCEDURE — 258N000002 HC RX IP 258 OP 250: Performed by: PHYSICIAN ASSISTANT

## 2023-02-27 PROCEDURE — 85027 COMPLETE CBC AUTOMATED: CPT | Performed by: STUDENT IN AN ORGANIZED HEALTH CARE EDUCATION/TRAINING PROGRAM

## 2023-02-27 PROCEDURE — 36415 COLL VENOUS BLD VENIPUNCTURE: CPT | Performed by: STUDENT IN AN ORGANIZED HEALTH CARE EDUCATION/TRAINING PROGRAM

## 2023-02-27 PROCEDURE — 272N000001 HC OR GENERAL SUPPLY STERILE: Performed by: INTERNAL MEDICINE

## 2023-02-27 PROCEDURE — 0JH604Z INSERTION OF PACEMAKER, SINGLE CHAMBER INTO CHEST SUBCUTANEOUS TISSUE AND FASCIA, OPEN APPROACH: ICD-10-PCS | Performed by: INTERNAL MEDICINE

## 2023-02-27 PROCEDURE — 250N000011 HC RX IP 250 OP 636: Performed by: PHYSICIAN ASSISTANT

## 2023-02-27 PROCEDURE — C1898 LEAD, PMKR, OTHER THAN TRANS: HCPCS | Performed by: INTERNAL MEDICINE

## 2023-02-27 PROCEDURE — 85610 PROTHROMBIN TIME: CPT | Performed by: STUDENT IN AN ORGANIZED HEALTH CARE EDUCATION/TRAINING PROGRAM

## 2023-02-27 PROCEDURE — 36415 COLL VENOUS BLD VENIPUNCTURE: CPT | Performed by: NURSE PRACTITIONER

## 2023-02-27 PROCEDURE — C1786 PMKR, SINGLE, RATE-RESP: HCPCS | Performed by: INTERNAL MEDICINE

## 2023-02-27 PROCEDURE — 210N000002 HC R&B HEART CARE

## 2023-02-27 PROCEDURE — 99232 SBSQ HOSP IP/OBS MODERATE 35: CPT | Performed by: NURSE PRACTITIONER

## 2023-02-27 PROCEDURE — 99222 1ST HOSP IP/OBS MODERATE 55: CPT | Mod: 25 | Performed by: INTERNAL MEDICINE

## 2023-02-27 PROCEDURE — 250N000011 HC RX IP 250 OP 636

## 2023-02-27 PROCEDURE — C1894 INTRO/SHEATH, NON-LASER: HCPCS | Performed by: INTERNAL MEDICINE

## 2023-02-27 PROCEDURE — 84132 ASSAY OF SERUM POTASSIUM: CPT | Performed by: NURSE PRACTITIONER

## 2023-02-27 PROCEDURE — 80048 BASIC METABOLIC PNL TOTAL CA: CPT | Performed by: STUDENT IN AN ORGANIZED HEALTH CARE EDUCATION/TRAINING PROGRAM

## 2023-02-27 PROCEDURE — 02HK3JZ INSERTION OF PACEMAKER LEAD INTO RIGHT VENTRICLE, PERCUTANEOUS APPROACH: ICD-10-PCS | Performed by: INTERNAL MEDICINE

## 2023-02-27 PROCEDURE — 250N000013 HC RX MED GY IP 250 OP 250 PS 637: Performed by: STUDENT IN AN ORGANIZED HEALTH CARE EDUCATION/TRAINING PROGRAM

## 2023-02-27 PROCEDURE — 93010 ELECTROCARDIOGRAM REPORT: CPT | Mod: XU | Performed by: INTERNAL MEDICINE

## 2023-02-27 PROCEDURE — 250N000009 HC RX 250: Performed by: INTERNAL MEDICINE

## 2023-02-27 PROCEDURE — 93005 ELECTROCARDIOGRAM TRACING: CPT

## 2023-02-27 PROCEDURE — 99153 MOD SED SAME PHYS/QHP EA: CPT | Performed by: INTERNAL MEDICINE

## 2023-02-27 PROCEDURE — 250N000013 HC RX MED GY IP 250 OP 250 PS 637: Performed by: NURSE PRACTITIONER

## 2023-02-27 PROCEDURE — 250N000011 HC RX IP 250 OP 636: Performed by: INTERNAL MEDICINE

## 2023-02-27 DEVICE — LEAD INGEVITY+ AF IS1 7841 52CM: Type: IMPLANTABLE DEVICE | Status: FUNCTIONAL

## 2023-02-27 DEVICE — PACEMAKER ACCOLADE SR MRI: Type: IMPLANTABLE DEVICE | Status: FUNCTIONAL

## 2023-02-27 RX ORDER — LIDOCAINE 40 MG/G
CREAM TOPICAL
Status: DISCONTINUED | OUTPATIENT
Start: 2023-02-27 | End: 2023-02-27

## 2023-02-27 RX ORDER — POTASSIUM CHLORIDE 1500 MG/1
40 TABLET, EXTENDED RELEASE ORAL ONCE
Status: COMPLETED | OUTPATIENT
Start: 2023-02-27 | End: 2023-02-27

## 2023-02-27 RX ORDER — CLINDAMYCIN PHOSPHATE 900 MG/50ML
900 INJECTION, SOLUTION INTRAVENOUS
Status: COMPLETED | OUTPATIENT
Start: 2023-02-27 | End: 2023-02-27

## 2023-02-27 RX ORDER — LIDOCAINE 40 MG/G
CREAM TOPICAL
Status: DISCONTINUED | OUTPATIENT
Start: 2023-02-27 | End: 2023-02-27 | Stop reason: HOSPADM

## 2023-02-27 RX ORDER — OXYCODONE AND ACETAMINOPHEN 5; 325 MG/1; MG/1
1 TABLET ORAL EVERY 4 HOURS PRN
Status: DISCONTINUED | OUTPATIENT
Start: 2023-02-27 | End: 2023-02-27

## 2023-02-27 RX ORDER — BUPIVACAINE HYDROCHLORIDE 2.5 MG/ML
INJECTION, SOLUTION EPIDURAL; INFILTRATION; INTRACAUDAL
Status: DISCONTINUED | OUTPATIENT
Start: 2023-02-27 | End: 2023-02-27 | Stop reason: HOSPADM

## 2023-02-27 RX ORDER — POTASSIUM CHLORIDE 1500 MG/1
40 TABLET, EXTENDED RELEASE ORAL DAILY
Status: DISCONTINUED | OUTPATIENT
Start: 2023-02-28 | End: 2023-02-28 | Stop reason: HOSPADM

## 2023-02-27 RX ORDER — SODIUM CHLORIDE 450 MG/100ML
INJECTION, SOLUTION INTRAVENOUS CONTINUOUS
Status: DISCONTINUED | OUTPATIENT
Start: 2023-02-27 | End: 2023-02-27 | Stop reason: HOSPADM

## 2023-02-27 RX ORDER — FENTANYL CITRATE 50 UG/ML
INJECTION, SOLUTION INTRAMUSCULAR; INTRAVENOUS
Status: DISCONTINUED | OUTPATIENT
Start: 2023-02-27 | End: 2023-02-27 | Stop reason: HOSPADM

## 2023-02-27 RX ORDER — WARFARIN SODIUM 5 MG/1
5 TABLET ORAL
Status: COMPLETED | OUTPATIENT
Start: 2023-02-27 | End: 2023-02-27

## 2023-02-27 RX ADMIN — SODIUM CHLORIDE: 4.5 INJECTION, SOLUTION INTRAVENOUS at 10:00

## 2023-02-27 RX ADMIN — CHLORTHALIDONE 50 MG: 25 TABLET ORAL at 08:19

## 2023-02-27 RX ADMIN — WARFARIN SODIUM 5 MG: 5 TABLET ORAL at 17:32

## 2023-02-27 RX ADMIN — EZETIMIBE 5 MG: 10 TABLET ORAL at 08:19

## 2023-02-27 RX ADMIN — FENOFIBRATE 160 MG: 160 TABLET ORAL at 20:38

## 2023-02-27 RX ADMIN — POTASSIUM CHLORIDE 40 MEQ: 1500 TABLET, EXTENDED RELEASE ORAL at 15:24

## 2023-02-27 RX ADMIN — POTASSIUM CHLORIDE 40 MEQ: 1500 TABLET, EXTENDED RELEASE ORAL at 08:19

## 2023-02-27 RX ADMIN — AMLODIPINE BESYLATE 10 MG: 10 TABLET ORAL at 08:19

## 2023-02-27 RX ADMIN — ROSUVASTATIN CALCIUM 40 MG: 10 TABLET, FILM COATED ORAL at 20:38

## 2023-02-27 ASSESSMENT — ACTIVITIES OF DAILY LIVING (ADL)
ADLS_ACUITY_SCORE: 20
ADLS_ACUITY_SCORE: 35
ADLS_ACUITY_SCORE: 20

## 2023-02-27 NOTE — CONSULTS
[facility] Cardiology Consultation     Date of Admission:  2/26/2023  Date of Consult: 02/27/23  Requesting Physician: Dr. Millard  Primary care physician: Reid Hogue  Primary cardiologist: Dr. Guajardo   Staff Cardiologist:  Dr. Muñiz     Reason for consult: Symptomatic pausing    Assessment:   Jonny Goldberg is a 77 year old male who was admitted on 2/26/2023 with chronic atrial fibrillation and syncope, with symptomatic pauses up to 6 seconds noted on inpatient telemetry, on no rate limiting medications.  Single chamber pacemaker implantation is recommended.  Restart warfarin postprocedure, as long as no hematoma is present.  I have also started daily potassium supplementation for hypokalemia.     We discussed the risks, benefits and indications of PPM, including but not limited to use of conscious sedation including need for a , discomfort, peripheral vessel injury, pneumothorax, cardiac puncture and/or tamponade, device malfunction and/or recall, and infection requiring explantation of the device and long term antibiotics. We also briefly discussed follow up expectations and restrictions following the procedure. The patient voiced understanding and is willing to proceed.  A consent form will be signed by the procedural physician.    Thank you very much for this consultation.     Kourtney Wesley PA-C  [facility]  Pager: [phone]  Text Page  (7:30am - 4pm M-F)   ________________________________________________________________________  History of Present Illness   Jonny Goldberg is a 77 year old male with a history of moderate aortic stenosis (1/2023 echo, previously seen by TAVR team Dr. Cook), minimal nonobstructive CAD (9/2022 cath), PAD/carotid artery disease, HTN, and chronic atrial fibrillation with a RBBB and preserved LVEF who presents with an episode of syncope with prodrome of lightheadedness/flushing while walking around [facility], resulting  in a facial contusion.  Telemetry has shown multiple pauses greater than 3 seconds and up to 6 seconds during waking hours.  He has felt intermittently lightheaded while here.  He has had past issues with paroxysmal lightheadedness and had a prior episode of syncope last fall, felt likely related to symptomatic pausing as well, but at that time was on metoprolol which was discontinued.  Subsequent event monitor showed chronic atrial fibrillation with very low-grade asymptomatic pausing, and continued monitoring was recommended at that time.  He also had paroxysmal VT (15 beat run) noted during his hospitalization this fall, but none noted on subsequent event monitor and none noted here in the hospital.  He denies any issues with chest discomfort, palpitations or shortness of breath, stating [place].  He is chronically anticoagulated with warfarin for a [id number] of 4, which has been held since yesterday, with an INR of 2.1 today.  His labs are otherwise notable for mild hypokalemia.  ______________________________________________________________________________    Code Status    Full Code    Past Medical History   I have reviewed this patient's medical history and updated it with pertinent information if needed.   Past Medical History:   Diagnosis Date     Aortic valve stenosis     severe     Blood clot in the legs     left leg after surgery     Gastro-oesophageal reflux disease      Hodgkins lymphoma (H)     bone marrow biopsy     Hypertension      PAD (peripheral artery disease) (H)      persistent atrial fibrillation     AFIB     Unspecified cerebral artery occlusion with cerebral infarction 10/01/2011       Past Surgical History   I have reviewed this patient's surgical history and updated it with pertinent information if needed.  Past Surgical History:   Procedure Laterality Date     APPENDECTOMY       ARTHROPLASTY REVISION HIP  12/27/2011     Procedure:ARTHROPLASTY REVISION HIP; RIGHT TOTAL HIP REVISION WITH BONE GRAFT  ; Surgeon:ANGIE HARRINGTON; Location: OR     BIOPSY  hodgkins lymphoma    bone marrow biopsy, chemo and radiation     CARDIAC SURGERY      cardiac ablation     COLONOSCOPY       CV CORONARY ANGIOGRAM N/A 9/23/2022    Procedure: Coronary Angiogram;  Surgeon: Gennaro Cook MD;  Location:  HEART CARDIAC CATH LAB     CV PCI N/A 9/23/2022    Procedure: Percutaneous Coronary Intervention;  Surgeon: Gennaro Cook MD;  Location:  HEART CARDIAC CATH LAB     CV RIGHT HEART CATH MEASUREMENTS RECORDED N/A 9/23/2022    Procedure: Right Heart Catheterization;  Surgeon: Gennaro Cook MD;  Location:  HEART CARDIAC CATH LAB     ORTHOPEDIC SURGERY      hip and knee surgeries     ORTHOPEDIC SURGERY      carpal tunnel  right hand       Prior to Admission Medications   Prior to Admission Medications   Prescriptions Last Dose Informant Patient Reported? Taking?   ALPHA LIPOIC ACID PO 2/26/2023 at AM  Yes Yes   Sig: Take 600 mg by mouth daily   CINNAMON PO 2/25/2023 at PM  Yes Yes   Sig: Take 1 capsule by mouth every evening Pt unsure of strength   Cholecalciferol (VITAMIN D) 400 UNITS tablet 2/25/2023 at PM  Yes Yes   Sig: Take 1 tablet by mouth every evening   Ubiquinol 100 MG CAPS 2/26/2023 at AM  Yes Yes   Sig: Take 100 mg by mouth daily   amLODIPine (NORVASC) 10 MG tablet 2/26/2023 at AM  No Yes   Sig: Take 1 tablet (10 mg) by mouth daily Appointment required for further refills   ascorbic acid (VITAMIN C) 1000 MG TABS 2/26/2023 at AM  Yes Yes   Sig: Take 1,000 mg by mouth daily   chlorthalidone (HYGROTON) 50 MG tablet 2/26/2023 at AM  Yes Yes   Sig: Take 50 mg by mouth daily   ezetimibe (ZETIA) 10 MG tablet 2/25/2023 at PM  No Yes   Sig: Take 0.5 tablets (5 mg) by mouth Every Mon, Wed, Fri Morning   Patient taking differently: Take 5 mg by mouth Every Mon, Wed, Fri Morning PM   fenofibrate (TRIGLIDE/LOFIBRA) 160 MG tablet  2/25/2023 at PM  No Yes   Sig: Take 1 tablet (160 mg) by mouth daily   Patient taking differently: Take 160 mg by mouth every evening   multivitamin w/minerals (THERA-VIT-M) tablet 2/25/2023 at PM  Yes Yes   Sig: Take 1 tablet by mouth every evening   omega 3 1000 MG CAPS 2/25/2023 at PM  Yes Yes   Sig: Take 1 capsule by mouth every evening 180 EPA/ 20 DHH   rosuvastatin (CRESTOR) 40 MG tablet 2/25/2023 at PM  Yes Yes   Sig: Take 40 mg by mouth every evening   warfarin (COUMADIN) 5 MG tablet 2/25/2023 at PM  Yes Yes   Sig: daily 4 mg Mon, Wed, Fri and 3 mg all other days.      Facility-Administered Medications: None     Allergies   Allergies   Allergen Reactions     Penicillin G Hives     hives       Social History   I have reviewed this patient's social history and updated it with pertinent information if needed. Jonny Goldberg  reports that he quit smoking about 35 years ago. He has never used smokeless tobacco. He reports current alcohol use. He reports that he does not use drugs.    Family History   I have reviewed this patient's family history and updated it with pertinent information if needed.   Family History   Problem Relation Age of Onset     Hypertension Mother      Heart Failure Mother      Coronary Artery Disease Mother         CABG     Arrhythmia Mother      Cerebrovascular Disease Father      Heart Failure Father      Colon Cancer Father      Coronary Artery Disease Father         CABG     Coronary Artery Disease Brother         CABG     Arrhythmia Brother        Review of Systems   A comprehensive review of systems is negative, except for as noted in the HPI.    Physical Exam   Temp: 97.6  F (36.4  C) Temp src: Oral BP: 130/88 Pulse: 76   Resp: 16 SpO2: 98 % O2 Device: None (Room air)    Vital Signs with Ranges  Temp:  [97.6  F (36.4  C)-98  F (36.7  C)] 97.6  F (36.4  C)  Pulse:  [56-93] 76  Resp:  [11-19] 16  BP: (111-145)/() 130/88  SpO2:  [95 %-98 %] 98 %  208 lbs 4.05 oz    General - Alert  "and oriented to time place and person in no acute distress  Eyes - No scleral icterus  HEENT - Neck supple, moist mucous membranes  Cardiovascular - Irregularly irregular rhythm, grade 3/6 linda at the LSB.  Extremities - There is No edema  Respiratory - RRR, CTAB.  Skin - No pallor or cyanosis  Gastrointestinal - Non tender and non distended without rebound or guarding  Psych - Appropriate affect   Neurological - No gross motor neurological focal deficits    Data   Most Recent 3 CBC's:Recent Labs   Lab Test 02/27/23  0608 02/26/23  1243 09/28/22  2058   WBC 6.2 5.8 6.1   HGB 15.6 16.3 15.4   MCV 92 92 94    253 244     Most Recent 3 BMP's:Recent Labs   Lab Test 02/27/23  0608 02/26/23 1243 09/28/22 2058    141 140   POTASSIUM 3.2* 3.5 3.0*   CHLORIDE 99 102 105   CO2 28 30* 27   BUN 25.3* 33.0* 32*   CR 0.96 1.16 1.10   ANIONGAP 11 9 8   SARA 9.3 9.7 9.0   * 125* 121*     Most Recent 3 INR's:Recent Labs   Lab Test 02/27/23  0608 02/26/23  1243 09/28/22  2058   INR 2.14* 2.20* 1.71*     Most Recent TSH and T4:No lab results found.    Clinically Significant Risk Factors Present on Admission       # Hypokalemia: Lowest K = 3.2 mmol/L in last 2 days, will replace as needed         # Drug Induced Coagulation Defect: home medication list includes an anticoagulant medication    # Obesity: Estimated body mass index is 33.61 kg/m  as calculated from the following:    Height as of this encounter: 1.676 m (5' 6\").    Weight as of this encounter: 94.5 kg (208 lb 4.1 oz).    Cardiovascular: Non-Rheumatic Valve Disease: Aortic valve stenosis  Peripheral vascular disease (PVD)    Fluid & Electrolyte Disorders: Hypokalemia         "

## 2023-02-27 NOTE — PROGRESS NOTES
SPIRITUAL HEALTH SERVICES Progress Note  [facility]    Saw pt Jonny Goldberg per admissions request. Introduced myself and Spiritual Health Services. Pt declined a visit. Informed pt how to access Spiritual Health Services in the future.     No further spiritual needs. Please consult if needs arise.     SHIRLEY MortensenDiv  Chaplain Resident   [phone]

## 2023-02-27 NOTE — PHARMACY-ANTICOAGULATION SERVICE
Clinical Pharmacy - Warfarin Dosing Consult     Pharmacy has been consulted to manage this patient s warfarin therapy.  Indication: Mechanical Aortic Valve Replacement  Therapy Goal: INR 2-3  Warfarin Prior to Admission: Yes  Warfarin PTA Regimen: 4 mg Mon, Wed, Fri and 3 mg all other days.    INR   Date Value Ref Range Status   02/27/2023 2.14 (H) 0.85 - 1.15 Final   02/26/2023 2.20 (H) 0.85 - 1.15 Final       Recommend warfarin 5 mg today.  Pharmacy will monitor Jonny Goldberg daily and order warfarin doses to achieve specified goal.      Please contact pharmacy as soon as possible if the warfarin needs to be held for a procedure or if the warfarin goals change.

## 2023-02-27 NOTE — PROGRESS NOTES
Pt placed on IMC status / transferred to critical care unit for frequent observation rt frequent cardiac pauses, two of which are asystole.

## 2023-02-27 NOTE — PROGRESS NOTES
Patient is A/O x 4, Vss, a-febrile, denies chest pain, Nausea, light headedness, or syncopal episode, patient admitted after a syncopal episode, scraped bridge of the nose with broken glasses, denies any headache, neuros are intact, tele A-fib CVR, bilateral carotid US done in ED, results pending, Cards following, up independently to the bathroom.

## 2023-02-27 NOTE — PROVIDER NOTIFICATION
MD Notification    Notified Person: MD    Notified Person Name: Paula    Notification Date/Time: 2/27/23 0706    Notification Interaction: amcon text page    Purpose of Notification: K 3.2, no protocol ordered, please order accordingly    Orders Received:    Comments:

## 2023-02-27 NOTE — PROVIDER NOTIFICATION
"MD Notification    Notified Person: MD     Notified Person Name: Micheal Mariano MD   Notification Date/Time: 0010 2/27/23    Notification Interaction: \"FYI: Admit 1200 today. Fell r/t syncopal episode r/t arrhythmia. Currently having numerous pause episodes 5 of which are equal to or greater than 3 seconds, and 2 asystole episodes.\"    Purpose of Notification: Update with medical occurrences    Orders Received: Placed on IMC status r/t asystole episodes    Comments:  "

## 2023-02-28 ENCOUNTER — APPOINTMENT (OUTPATIENT)
Dept: PHYSICAL THERAPY | Facility: CLINIC | Age: 78
DRG: 243 | End: 2023-02-28
Attending: STUDENT IN AN ORGANIZED HEALTH CARE EDUCATION/TRAINING PROGRAM
Payer: COMMERCIAL

## 2023-02-28 ENCOUNTER — APPOINTMENT (OUTPATIENT)
Dept: GENERAL RADIOLOGY | Facility: CLINIC | Age: 78
DRG: 243 | End: 2023-02-28
Attending: INTERNAL MEDICINE
Payer: COMMERCIAL

## 2023-02-28 VITALS
DIASTOLIC BLOOD PRESSURE: 83 MMHG | HEART RATE: 71 BPM | OXYGEN SATURATION: 99 % | BODY MASS INDEX: 33.47 KG/M2 | SYSTOLIC BLOOD PRESSURE: 126 MMHG | RESPIRATION RATE: 14 BRPM | WEIGHT: 208.25 LBS | HEIGHT: 66 IN | TEMPERATURE: 97.8 F

## 2023-02-28 DIAGNOSIS — Z95.0 CARDIAC PACEMAKER IN SITU: Primary | ICD-10-CM

## 2023-02-28 LAB
INR PPP: 2.02 (ref 0.85–1.15)
POTASSIUM SERPL-SCNC: 3.2 MMOL/L (ref 3.4–5.3)

## 2023-02-28 PROCEDURE — 250N000013 HC RX MED GY IP 250 OP 250 PS 637: Performed by: STUDENT IN AN ORGANIZED HEALTH CARE EDUCATION/TRAINING PROGRAM

## 2023-02-28 PROCEDURE — 85610 PROTHROMBIN TIME: CPT | Performed by: NURSE PRACTITIONER

## 2023-02-28 PROCEDURE — 99024 POSTOP FOLLOW-UP VISIT: CPT | Mod: FS | Performed by: PHYSICIAN ASSISTANT

## 2023-02-28 PROCEDURE — 999N000065 XR CHEST 2 VIEWS

## 2023-02-28 PROCEDURE — 84132 ASSAY OF SERUM POTASSIUM: CPT | Performed by: NURSE PRACTITIONER

## 2023-02-28 PROCEDURE — 250N000013 HC RX MED GY IP 250 OP 250 PS 637: Performed by: NURSE PRACTITIONER

## 2023-02-28 PROCEDURE — 36415 COLL VENOUS BLD VENIPUNCTURE: CPT | Performed by: NURSE PRACTITIONER

## 2023-02-28 PROCEDURE — 97161 PT EVAL LOW COMPLEX 20 MIN: CPT | Mod: GP

## 2023-02-28 PROCEDURE — 99239 HOSP IP/OBS DSCHRG MGMT >30: CPT | Performed by: NURSE PRACTITIONER

## 2023-02-28 PROCEDURE — 250N000013 HC RX MED GY IP 250 OP 250 PS 637: Performed by: PHYSICIAN ASSISTANT

## 2023-02-28 RX ORDER — POTASSIUM CHLORIDE 1500 MG/1
40 TABLET, EXTENDED RELEASE ORAL ONCE
Status: COMPLETED | OUTPATIENT
Start: 2023-02-28 | End: 2023-02-28

## 2023-02-28 RX ORDER — POTASSIUM CHLORIDE 1500 MG/1
40 TABLET, EXTENDED RELEASE ORAL DAILY
Qty: 30 TABLET | Refills: 0 | Status: SHIPPED | OUTPATIENT
Start: 2023-03-01

## 2023-02-28 RX ORDER — WARFARIN SODIUM 5 MG/1
5 TABLET ORAL
Status: DISCONTINUED | OUTPATIENT
Start: 2023-02-28 | End: 2023-02-28 | Stop reason: HOSPADM

## 2023-02-28 RX ADMIN — POTASSIUM CHLORIDE 40 MEQ: 1500 TABLET, EXTENDED RELEASE ORAL at 08:20

## 2023-02-28 RX ADMIN — POTASSIUM CHLORIDE 40 MEQ: 1500 TABLET, EXTENDED RELEASE ORAL at 08:29

## 2023-02-28 RX ADMIN — CHLORTHALIDONE 50 MG: 25 TABLET ORAL at 08:20

## 2023-02-28 RX ADMIN — AMLODIPINE BESYLATE 10 MG: 10 TABLET ORAL at 08:20

## 2023-02-28 ASSESSMENT — ACTIVITIES OF DAILY LIVING (ADL)
ADLS_ACUITY_SCORE: 20

## 2023-02-28 NOTE — PROGRESS NOTES
Nurse called to see if Device has been to see pt. See notes below. Station called back to inform them that yes, device saw patient over an hour ago.Unknown why my notes and updated AVS are not seen by floor.  SH/RN

## 2023-02-28 NOTE — PROGRESS NOTES
[facility] Electrophysiology Progress Note  Date of Service: 02/28/2023  Primary Cardiologist: Dr. Bennett PALACIOS Ashlyn is a 77 year old male with a history of moderate aortic stenosis (1/2023 echo, previously seen by TAVR team Dr. Cook), minimal nonobstructive CAD (9/2022 cath), PAD/carotid artery disease, HTN, chronic atrial fibrillation with a RBBB and preserved LVEF, who was admitted on 2/26/2023 with syncope, symptomatic pauses up to 6 seconds noted on inpatient telemetry, on no rate limiting medications. Underwent single-chamber BSci PPM implantation (generator model L310, serial # [id number]; lead model 7841, serial # [id number]) set to VVI @ 60 bpm on 2/27/23. CXR this morning shows no pneumothorax. Device interrogation shows stable impedence and threshold, 36% . K+ remains low at 3.2 despite supplementation.    Plan:   1. Continue warfarin (INR's followed through PCP / [facility]).  2. Increase KCl to 60 meq daily. BMP with PCP in 1-2 weeks.  3. EP will sign off. One week device check will be arranged. EP MORENA follow up in 3 months.     Plan was formulated under direction of Dr. Madrid.   Kourtney Wesley PA-C  [facility]  Pager: [phone]  Text Page  (7:30am - 4pm M-F)   __________________________________________________________________________  Physical Exam   Temp: 97.8  F (36.6  C) Temp src: Oral BP: 126/83 Pulse: 71   Resp: 14 SpO2: 99 % O2 Device: None (Room air) Oxygen Delivery: 2 LPM  Vitals:    02/26/23 1730 02/27/23 0624   Weight: 95.6 kg (210 lb 11.2 oz) 94.5 kg (208 lb 4.1 oz)       GENERAL:  The patient is in no apparent distress.   NECK: CVP appears normal, no masses or thyromegaly.  PULMONARY:  There is a normal respiratory effort. Clear lungs to auscultation bilaterally.   CARDIOVASCULAR:  Irregularly irregular rhythm, grade 3/6 linda at the LSB. Dressing covering L chest pacemaker incision C/D/I, with mild edema surrounding site, no tenderness,  erythema, oozing, warmth, hematoma, or ecchymosis appreciated.   GI:  Non tender abdomen with normoactive bowel sounds and no hepatosplenomegaly. There are no masses palpable.   EXTREMITIES:  No clubbing, cyanosis or edema.  VASCULAR: 2+ Pulses bilaterally in upper and lower extremities.    Medications     - MEDICATION INSTRUCTIONS -         amLODIPine  10 mg Oral Daily     chlorthalidone  50 mg Oral Daily     ezetimibe  5 mg Oral Q Mon Wed Fri AM     fenofibrate  160 mg Oral QPM     potassium chloride  40 mEq Oral Daily     rosuvastatin  40 mg Oral QPM     sodium chloride (PF)  3 mL Intracatheter Q8H     sodium chloride (PF)  3 mL Intracatheter Q8H     Warfarin Therapy Reminder  1 each Oral See Admin Instructions       Data   Most Recent 3 CBC's:Recent Labs   Lab Test 02/27/23 0608 02/26/23 1243 09/28/22 2058   WBC 6.2 5.8 6.1   HGB 15.6 16.3 15.4   MCV 92 92 94    253 244     Most Recent 3 BMP's:Recent Labs   Lab Test 02/28/23 0618 02/27/23  1951 02/27/23  1304 02/27/23  0608 02/26/23  1243 02/26/23 1243 09/28/22 2058   NA  --   --   --  138  --  141 140   POTASSIUM 3.2* 3.6 3.4 3.2*   < > 3.5 3.0*   CHLORIDE  --   --   --  99  --  102 105   CO2  --   --   --  28  --  30* 27   BUN  --   --   --  25.3*  --  33.0* 32*   CR  --   --   --  0.96  --  1.16 1.10   ANIONGAP  --   --   --  11  --  9 8   SARA  --   --   --  9.3  --  9.7 9.0   GLC  --   --   --  100*  --  125* 121*    < > = values in this interval not displayed.     Most Recent 3 INR's:Recent Labs   Lab Test 02/28/23 0618 02/27/23 0608 02/26/23  1243   INR 2.02* 2.14* 2.20*

## 2023-02-28 NOTE — PROGRESS NOTES
02/28/23 0700   Appointment Info   Signing Clinician's Name / Credentials (PT) Mini Cantu, PT   Living Environment   People in Home spouse   Current Living Arrangements house   Home Accessibility stairs to enter home;stairs within home   Number of Stairs, Main Entrance 2   Stair Railings, Main Entrance railings safe and in good condition;railing on right side (ascending)   Number of Stairs, Within Home, Primary ten   Stair Railings, Within Home, Primary railings safe and in good condition;railing on right side (ascending)   Transportation Anticipated car, drives self   Self-Care   Usual Activity Tolerance excellent   Current Activity Tolerance good   Regular Exercise Yes   Activity/Exercise Type walking   Exercise Amount/Frequency daily   Equipment Currently Used at Home none   Fall history within last six months yes   Number of times patient has fallen within last six months 1  (Syncopal episode)   General Information   Onset of Illness/Injury or Date of Surgery 02/26/23   Referring Physician Dr. Millard   Patient/Family Therapy Goals Statement (PT) To go home   Pertinent History of Current Problem (include personal factors and/or comorbidities that impact the POC) Pt is a 77 year old male admitted after a syncopal episode, now s/p PPM   Existing Precautions/Restrictions pacemaker   Cognition   Affect/Mental Status (Cognition) WFL   Orientation Status (Cognition) oriented x 4   Follows Commands (Cognition) WFL   Pain Assessment   Patient Currently in Pain No   Range of Motion (ROM)   Range of Motion ROM is WNL   Strength (Manual Muscle Testing)   Strength (Manual Muscle Testing) strength is WNL   Bed Mobility   Bed Mobility no deficits identified   Transfers   Transfers no deficits identified   Gait/Stairs (Locomotion)   Comment, (Gait/Stairs) Pt ambulates >500' without an AD independently. Pt demonstrates ability to start/stop, change speeds and change directions without difficulty. Pt up/down 15 steps with  handrail modified independently.   Balance   Balance Comments Good   Clinical Impression   Criteria for Skilled Therapeutic Intervention Evaluation only   Clinical Presentation (PT Evaluation Complexity) Stable/Uncomplicated   Clinical Presentation Rationale VSS, pain controlled   Clinical Decision Making (Complexity) low complexity   Risk & Benefits of therapy have been explained evaluation/treatment results reviewed;care plan/treatment goals reviewed;risks/benefits reviewed;current/potential barriers reviewed;participants voiced agreement with care plan;participants included;patient   PT Total Evaluation Time   PT Eval, Low Complexity Minutes ([id number]) 25   PT Discharge Planning   PT Plan No skilled acute care PT needs   PT Discharge Recommendation (DC Rec) home   PT Rationale for DC Rec Pt is independent with mobility, safe to discharge home   PT Brief overview of current status Independent   Total Session Time   Total Session Time (sum of timed and untimed services) 25

## 2023-02-28 NOTE — PROGRESS NOTES
Post Device Implant Instructions    Dressing:  Clean, dry, and intact  Chest XR reviewed:  Yes  Pneumo present?:  No  Lead Measurements per Device Rep:  WNL    Education Provided:  - Avoid raising left arm above the level of the shoulder for 3 weeks.  - Do not shower until outer occlusive dressing has been removed.  - Remove outer dressing in 3 - 4 days.  - Leave steri-strips in place, these will be removed at the 1 week check.   - Call Device Clinic with increased swelling, drainage, or fever > 101 degrees.   - Follow-up with the Device Clinic as scheduled.     Pt verbalized understanding of instructions and AVS updated.

## 2023-02-28 NOTE — DISCHARGE INSTRUCTIONS
Discharge Instructions for Pacemaker Implantation  You have had a procedure to insert a pacemaker. Once inside your body, this small electronic device helps keep your heart from beating too slowly. A pacemaker can t fix existing heart problems. But it can help you feel better and have more energy. As you recover, follow all of the instructions you are given, including those below.  Activity  Follow the instructions you are given about limiting your activity.  Do not raise your arm on the incision side above shoulder level for 3 weeks. This gives the device lead wires time to attach securely inside your heart.  Ask your doctor when you can expect to return to work.  You can still exercise. It s good for your body and your heart. Talk with your doctor about an exercise plan.  Other Precautions  Follow your doctor's directions carefully for wound care. You may remove the outer dressing in 3 - 4 days. Leave the steri-strips in place; these will be removed at your 1 week follow-up. Never put any creams, lotions, or products like peroxide on an incision unless your doctor tells you to.   You may shower once the outer dressing has been removed.   Before you receive any treatment, tell all health care providers (including your dentist) that you have a pacemaker.  You will be given an ID card that contains information about your pacemaker. Always carry this card with you. You can show this card if your pacemaker sets off a metal detector. You should also show it to avoid screening with a hand-held security wand.  Keep your cell phone away from your pacemaker. Don t carry the phone in your shirt pocket, even when it s turned off.  Avoid strong magnets. Examples are those used in MRIs or in hand-held security wands.  Avoid strong electrical fields. Examples are those made by radio transmitting towers,  ham  radios, and heavy-duty electrical equipment.  Avoid leaning over the open valencia of a running car. A running engine creates  an electrical field. Most household and yard appliances will not cause any problems. If you use any large power tools, such as an industrial , talk with your doctor.   Follow-Up  Follow up in the device clinic as scheduled. You have an appointment scheduled for 3/6/23. Your appointment is at 1:00pm in the [place] location.   Make regular follow-up appointments with your device clinic. They will check the pacemaker to make sure it s working properly.  When to Call Your Device Clinic [phone]  Call your doctor immediately if you have any of the following:  Dizziness  Chest pain  Lack of energy  Fainting spells  Twitching chest muscles  Rapid pulse or pounding heartbeat  Shortness of breath  Pain around your pacemaker  Fever above 100.4 F (38 C) or other signs of infection (redness, swelling, drainage, or warmth at the incision site)  Hiccups that won t stop     8350-5868 [employer]. [address] [place] [address]. All rights reserved. This information is not intended as a substitute for professional medical care. Always follow your healthcare professional's instructions.    [facility] in [place]: [phone]  Device Clinic (Monday to Friday, 8am-4pm): [phone]  *The device clinic is closed on weekends and holidays.  Any calls received during this time will be answered on the next business  day. For any urgent questions after hours, please call the main clinic number and you will be put in contact with the cardiologist on call.

## 2023-02-28 NOTE — PLAN OF CARE
Discharge to home. Will follow up with PCP and Cardiology as scheduled. Discharge education/medications complete. Transport arranged with spouse.                      
[id number]: A&Ox4. VSS on RA. Tele afib SVR/CVR. Longest pause 6 sec at 0546, pt asyptomatic. Denies CP, shortness of breath, dizziness or lightheadedness. Up SBA. Abrasion to bridge of nose, front top teeth missing. NPO @ 0000. Plan for pacemaker 2-27. IMC status.     
Care plan note:      Recent Vitals:  Temp: 97.4  F (36.3  C) Temp src: Oral BP: 114/89 Pulse: 85   Resp: 14 SpO2: 100 % O2 Device: None (Room air)      Orientation/Neuro: Alert and Oriented x 4  Pain: The patient is not having any pain.   Tele: Atrial fibrillation - controlled   IV medications: None   Mobility: up Independently   Skin: Incision: Left chest, pacemaker site.   GI: WDL  : WDL     Diet: Tolerating diet:   Well  Orders Placed This Encounter      Advance Diet as Tolerated: Regular Diet Adult      Safety/Concerns:  None  Aggression Color: Green    Plan: Possible discharge tomorrow.   Continue to monitor.      Naina Duque RN                           
Pleasant gentlemen. Pt is aox4, RA, ind/SBA, and full code. Tele is Afib CVR w/ BBB. Pt denies chest pain and SOB. Pt had PPM placed in left chest. VVI at 60 bpm. Left chest CDI. K protocol.   Plan: chest x-ray, interrogation and potential discharge.   
2

## 2023-03-01 ENCOUNTER — PATIENT OUTREACH (OUTPATIENT)
Dept: CARE COORDINATION | Facility: CLINIC | Age: 78
End: 2023-03-01
Payer: COMMERCIAL

## 2023-03-01 ENCOUNTER — TELEPHONE (OUTPATIENT)
Dept: CARDIOLOGY | Facility: CLINIC | Age: 78
End: 2023-03-01
Payer: COMMERCIAL

## 2023-03-01 NOTE — PROGRESS NOTES
Clinic Care Coordination Contact    Situation: Patient chart reviewed by care coordinator.    Background:Patient has history of prior symptomatic pauses that reportedly resolved after stopping metoprolol.  For the last month prior to admission he has had episodes that are almost exactly the same.  They last several seconds while at rest and then resolved    Assessment:syncope with loss of consciousness, history of symptomatic sinus pauses, severe aortic stenosis    Plan/Recommendations: follow up with pcp within 1-2 weeks, BMP next week, One week pacemaker check will be arranged, EP waylon follow up in 3 months.    PCP notified. [facility] will not do an outreach at this time.      JIM Pennington  , Care Coordination  [facility]  [phone]  [email or web address]

## 2023-03-06 ENCOUNTER — ANCILLARY PROCEDURE (OUTPATIENT)
Dept: CARDIOLOGY | Facility: CLINIC | Age: 78
End: 2023-03-06
Attending: INTERNAL MEDICINE
Payer: COMMERCIAL

## 2023-03-06 DIAGNOSIS — I48.20 CHRONIC ATRIAL FIBRILLATION (H): ICD-10-CM

## 2023-03-06 DIAGNOSIS — I45.5 SINUS PAUSE: ICD-10-CM

## 2023-03-06 DIAGNOSIS — R55 SYNCOPE, UNSPECIFIED SYNCOPE TYPE: Primary | ICD-10-CM

## 2023-03-06 DIAGNOSIS — Z95.0 CARDIAC PACEMAKER IN SITU: ICD-10-CM

## 2023-03-06 DIAGNOSIS — I47.29 PAROXYSMAL VENTRICULAR TACHYCARDIA (H): ICD-10-CM

## 2023-03-06 LAB
ATRIAL RATE - MUSE: 82 BPM
DIASTOLIC BLOOD PRESSURE - MUSE: NORMAL MMHG
INTERPRETATION ECG - MUSE: NORMAL
P AXIS - MUSE: NORMAL DEGREES
PR INTERVAL - MUSE: NORMAL MS
QRS DURATION - MUSE: 200 MS
QT - MUSE: 504 MS
QTC - MUSE: 555 MS
R AXIS - MUSE: -76 DEGREES
SYSTOLIC BLOOD PRESSURE - MUSE: NORMAL MMHG
T AXIS - MUSE: 86 DEGREES
VENTRICULAR RATE- MUSE: 73 BPM

## 2023-03-06 PROCEDURE — 93279 PRGRMG DEV EVAL PM/LDLS PM: CPT | Performed by: INTERNAL MEDICINE

## 2023-03-14 DIAGNOSIS — E78.2 MIXED HYPERLIPIDEMIA: ICD-10-CM

## 2023-03-14 RX ORDER — FENOFIBRATE 160 MG/1
160 TABLET ORAL DAILY
Qty: 90 TABLET | Refills: 0 | Status: SHIPPED | OUTPATIENT
Start: 2023-03-14 | End: 2023-06-12

## 2023-03-15 LAB
MDC_IDC_LEAD_IMPLANT_DT: NORMAL
MDC_IDC_LEAD_LOCATION: NORMAL
MDC_IDC_LEAD_LOCATION_DETAIL_1: NORMAL
MDC_IDC_LEAD_MFG: NORMAL
MDC_IDC_LEAD_MODEL: NORMAL
MDC_IDC_LEAD_POLARITY_TYPE: NORMAL
MDC_IDC_LEAD_SERIAL: NORMAL
MDC_IDC_MSMT_BATTERY_DTM: NORMAL
MDC_IDC_MSMT_BATTERY_REMAINING_LONGEVITY: 126 MO
MDC_IDC_MSMT_BATTERY_REMAINING_PERCENTAGE: 100 %
MDC_IDC_MSMT_BATTERY_STATUS: NORMAL
MDC_IDC_MSMT_LEADCHNL_RV_IMPEDANCE_VALUE: 695 OHM
MDC_IDC_MSMT_LEADCHNL_RV_PACING_THRESHOLD_AMPLITUDE: 0.6 V
MDC_IDC_MSMT_LEADCHNL_RV_PACING_THRESHOLD_PULSEWIDTH: 0.4 MS
MDC_IDC_PG_IMPLANT_DTM: NORMAL
MDC_IDC_PG_MFG: NORMAL
MDC_IDC_PG_MODEL: NORMAL
MDC_IDC_PG_SERIAL: NORMAL
MDC_IDC_PG_TYPE: NORMAL
MDC_IDC_SESS_CLINIC_NAME: NORMAL
MDC_IDC_SESS_DTM: NORMAL
MDC_IDC_SESS_TYPE: NORMAL
MDC_IDC_SET_BRADY_LOWRATE: 60 {BEATS}/MIN
MDC_IDC_SET_BRADY_MAX_SENSOR_RATE: 115 {BEATS}/MIN
MDC_IDC_SET_BRADY_MODE: NORMAL
MDC_IDC_SET_LEADCHNL_RV_PACING_AMPLITUDE: 1.1 V
MDC_IDC_SET_LEADCHNL_RV_PACING_CAPTURE_MODE: NORMAL
MDC_IDC_SET_LEADCHNL_RV_PACING_POLARITY: NORMAL
MDC_IDC_SET_LEADCHNL_RV_PACING_PULSEWIDTH: 0.4 MS
MDC_IDC_SET_LEADCHNL_RV_SENSING_ADAPTATION_MODE: NORMAL
MDC_IDC_SET_LEADCHNL_RV_SENSING_POLARITY: NORMAL
MDC_IDC_SET_LEADCHNL_RV_SENSING_SENSITIVITY: 2.5 MV
MDC_IDC_SET_ZONE_DETECTION_INTERVAL: 375 MS
MDC_IDC_SET_ZONE_TYPE: NORMAL
MDC_IDC_SET_ZONE_VENDOR_TYPE: NORMAL
MDC_IDC_STAT_BRADY_DTM_END: NORMAL
MDC_IDC_STAT_BRADY_DTM_START: NORMAL
MDC_IDC_STAT_BRADY_RV_PERCENT_PACED: 76 %
MDC_IDC_STAT_EPISODE_RECENT_COUNT: 0
MDC_IDC_STAT_EPISODE_RECENT_COUNT_DTM_END: NORMAL
MDC_IDC_STAT_EPISODE_RECENT_COUNT_DTM_START: NORMAL
MDC_IDC_STAT_EPISODE_TYPE: NORMAL
MDC_IDC_STAT_EPISODE_VENDOR_TYPE: NORMAL
MDC_IDC_STAT_EPISODE_VENDOR_TYPE: NORMAL

## 2023-03-16 DIAGNOSIS — I73.9 PAD (PERIPHERAL ARTERY DISEASE) (H): ICD-10-CM

## 2023-03-16 RX ORDER — EZETIMIBE 10 MG/1
5 TABLET ORAL
Qty: 20 TABLET | Refills: 3 | Status: SHIPPED | OUTPATIENT
Start: 2023-03-17 | End: 2024-03-18

## 2023-03-28 ENCOUNTER — TELEPHONE (OUTPATIENT)
Dept: UROLOGY | Facility: CLINIC | Age: 78
End: 2023-03-28

## 2023-03-28 NOTE — TELEPHONE ENCOUNTER
[facility] Call Center    Phone Message    May a detailed message be left on voicemail: yes     Reason for Call: Pt called to schedule appt. Pt would like to be seen for retention. Writer unable to schedule per protocol. Please give pt a call back to schedule. Thank you.    Action Taken: Other: URO    Travel Screening: Not Applicable

## 2023-04-17 ENCOUNTER — ANCILLARY PROCEDURE (OUTPATIENT)
Dept: CARDIOLOGY | Facility: CLINIC | Age: 78
End: 2023-04-17
Attending: INTERNAL MEDICINE
Payer: COMMERCIAL

## 2023-04-17 DIAGNOSIS — Z95.0 CARDIAC PACEMAKER IN SITU: ICD-10-CM

## 2023-04-17 DIAGNOSIS — I45.5 SINUS PAUSE: ICD-10-CM

## 2023-04-17 DIAGNOSIS — I47.29 PAROXYSMAL VENTRICULAR TACHYCARDIA (H): ICD-10-CM

## 2023-04-17 DIAGNOSIS — R55 SYNCOPE, UNSPECIFIED SYNCOPE TYPE: Primary | ICD-10-CM

## 2023-04-17 PROCEDURE — 93279 PRGRMG DEV EVAL PM/LDLS PM: CPT | Performed by: INTERNAL MEDICINE

## 2023-04-26 LAB
MDC_IDC_LEAD_IMPLANT_DT: NORMAL
MDC_IDC_LEAD_LOCATION: NORMAL
MDC_IDC_LEAD_LOCATION_DETAIL_1: NORMAL
MDC_IDC_LEAD_MFG: NORMAL
MDC_IDC_LEAD_MODEL: NORMAL
MDC_IDC_LEAD_POLARITY_TYPE: NORMAL
MDC_IDC_LEAD_SERIAL: NORMAL
MDC_IDC_MSMT_BATTERY_DTM: NORMAL
MDC_IDC_MSMT_BATTERY_REMAINING_LONGEVITY: 120 MO
MDC_IDC_MSMT_BATTERY_REMAINING_PERCENTAGE: 100 %
MDC_IDC_MSMT_BATTERY_STATUS: NORMAL
MDC_IDC_MSMT_LEADCHNL_RV_IMPEDANCE_VALUE: 696 OHM
MDC_IDC_MSMT_LEADCHNL_RV_PACING_THRESHOLD_AMPLITUDE: 0.8 V
MDC_IDC_MSMT_LEADCHNL_RV_PACING_THRESHOLD_PULSEWIDTH: 0.4 MS
MDC_IDC_PG_IMPLANT_DTM: NORMAL
MDC_IDC_PG_MFG: NORMAL
MDC_IDC_PG_MODEL: NORMAL
MDC_IDC_PG_SERIAL: NORMAL
MDC_IDC_PG_TYPE: NORMAL
MDC_IDC_SESS_CLINIC_NAME: NORMAL
MDC_IDC_SESS_DTM: NORMAL
MDC_IDC_SESS_TYPE: NORMAL
MDC_IDC_SET_BRADY_LOWRATE: 60 {BEATS}/MIN
MDC_IDC_SET_BRADY_MAX_SENSOR_RATE: 115 {BEATS}/MIN
MDC_IDC_SET_BRADY_MODE: NORMAL
MDC_IDC_SET_LEADCHNL_RV_PACING_AMPLITUDE: 1.3 V
MDC_IDC_SET_LEADCHNL_RV_PACING_CAPTURE_MODE: NORMAL
MDC_IDC_SET_LEADCHNL_RV_PACING_POLARITY: NORMAL
MDC_IDC_SET_LEADCHNL_RV_PACING_PULSEWIDTH: 0.4 MS
MDC_IDC_SET_LEADCHNL_RV_SENSING_ADAPTATION_MODE: NORMAL
MDC_IDC_SET_LEADCHNL_RV_SENSING_POLARITY: NORMAL
MDC_IDC_SET_LEADCHNL_RV_SENSING_SENSITIVITY: 2.5 MV
MDC_IDC_SET_ZONE_DETECTION_INTERVAL: 375 MS
MDC_IDC_SET_ZONE_TYPE: NORMAL
MDC_IDC_SET_ZONE_VENDOR_TYPE: NORMAL
MDC_IDC_STAT_BRADY_DTM_END: NORMAL
MDC_IDC_STAT_BRADY_DTM_START: NORMAL
MDC_IDC_STAT_BRADY_RV_PERCENT_PACED: 79 %
MDC_IDC_STAT_EPISODE_RECENT_COUNT: 0
MDC_IDC_STAT_EPISODE_RECENT_COUNT_DTM_END: NORMAL
MDC_IDC_STAT_EPISODE_RECENT_COUNT_DTM_START: NORMAL
MDC_IDC_STAT_EPISODE_TYPE: NORMAL
MDC_IDC_STAT_EPISODE_VENDOR_TYPE: NORMAL
MDC_IDC_STAT_EPISODE_VENDOR_TYPE: NORMAL

## 2023-05-04 DIAGNOSIS — I10 PRIMARY HYPERTENSION: ICD-10-CM

## 2023-05-04 RX ORDER — AMLODIPINE BESYLATE 10 MG/1
10 TABLET ORAL DAILY
Qty: 90 TABLET | Refills: 0 | Status: SHIPPED | OUTPATIENT
Start: 2023-05-04 | End: 2023-08-02

## 2023-05-09 ENCOUNTER — TRANSFERRED RECORDS (OUTPATIENT)
Dept: HEALTH INFORMATION MANAGEMENT | Facility: CLINIC | Age: 78
End: 2023-05-09

## 2023-06-12 ENCOUNTER — TELEPHONE (OUTPATIENT)
Dept: CARDIOLOGY | Facility: CLINIC | Age: 78
End: 2023-06-12
Payer: COMMERCIAL

## 2023-06-12 DIAGNOSIS — E78.2 MIXED HYPERLIPIDEMIA: ICD-10-CM

## 2023-06-12 RX ORDER — FENOFIBRATE 160 MG/1
160 TABLET ORAL DAILY
Qty: 90 TABLET | Refills: 0 | Status: SHIPPED | OUTPATIENT
Start: 2023-06-12 | End: 2023-09-11

## 2023-06-26 ENCOUNTER — OFFICE VISIT (OUTPATIENT)
Dept: CARDIOLOGY | Facility: CLINIC | Age: 78
End: 2023-06-26
Attending: INTERNAL MEDICINE
Payer: COMMERCIAL

## 2023-06-26 VITALS
HEIGHT: 66 IN | OXYGEN SATURATION: 96 % | WEIGHT: 209 LBS | BODY MASS INDEX: 33.59 KG/M2 | HEART RATE: 60 BPM | DIASTOLIC BLOOD PRESSURE: 78 MMHG | SYSTOLIC BLOOD PRESSURE: 135 MMHG

## 2023-06-26 DIAGNOSIS — Z95.0 CARDIAC PACEMAKER IN SITU: ICD-10-CM

## 2023-06-26 DIAGNOSIS — I35.0 AORTIC VALVE STENOSIS, ETIOLOGY OF CARDIAC VALVE DISEASE UNSPECIFIED: Primary | ICD-10-CM

## 2023-06-26 DIAGNOSIS — R55 SYNCOPE, UNSPECIFIED SYNCOPE TYPE: ICD-10-CM

## 2023-06-26 DIAGNOSIS — I45.5 SINUS PAUSE: ICD-10-CM

## 2023-06-26 DIAGNOSIS — I47.29 PAROXYSMAL VENTRICULAR TACHYCARDIA (H): ICD-10-CM

## 2023-06-26 DIAGNOSIS — I48.20 CHRONIC ATRIAL FIBRILLATION (H): ICD-10-CM

## 2023-06-26 PROCEDURE — 99214 OFFICE O/P EST MOD 30 MIN: CPT | Performed by: NURSE PRACTITIONER

## 2023-06-26 NOTE — LETTER
6/26/2023    Reid Hogue MD  [address]  [place] [address]    RE: Jonny Goldberg       Dear Colleague,     I had the pleasure of seeing Jonyn Goldberg in the [facility].  Cardiology Clinic Progress Note  Jonny Goldberg MRN# [id number]   YOB: 1945 Age: 77 year old   Primary Cardiologist: Dr. Cook (structural), Dr. Avalos (EP), Dr. Guajardo (general)  Reason for visit: 3 month follow up            Assessment and Plan:       1.  Syncope secondary to symptomatic sinus pauses between 3 to 6 seconds, s/p permanent pacemaker (2/2023)  -Last device check 4/2023 showed chronic atrial fibrillation, no ventricular arrhythmias, 79% ventricular pacing, good heart rate variability, battery status of 10 years  -No further episodes of syncope  -Advised he can slowly increase his exercise and weight he is lifting as he is feeling quite well and not having any cardiac symptoms with exertion    2.  Moderate-severe aortic valve stenosis  -Echocardiogram from January 2023 with aortic stenosis with mean gradient of 33 mmHg  -Following with TAVR clinic  -Right and left heart cath completed noting mild coronary artery disease with a mild elevated right heart filling pressures and mildly elevated left heart filling pressures  -Repeat echocardiogram in August planned     3.  Persistent atrial fibrillation, [id number] 4  -Rate controlled without beta-blocker  -Continue Coumadin for thromboembolism prophylaxis    4. Hyperlipidemia  -Will obtain recent lipid panel from [facility] for review   -Continue rosuvastatin, zetia, and fenofibrate    5.  Carotid artery disease  -2/2023 carotid ultrasound showed stable carotid disease with 50 to 69% stenosis in the right ICA, less than 50% stenosis in the left RCA  -Continue statin    6.  Mild nonobstructive coronary artery disease  -Patient is not on aspirin as he is on Coumadin which is reasonable given mild disease  -Continue  statin      Changes today: Echocardiogram as planned in August     Follow up plan: Follow up in 3 months with Dr. Patel/Joey         History of Presenting Illness:    Jonny Goldberg is a very pleasant 77 year old male with a history of persistent atrial fibrillation s/p ablation, severe aortic stenosis, hypertension, PAD, HLP, and syncope secondary to sinus pauses s/p permanent pacemaker placement.     Patient was evaluated in September 2022 with concerns for dizziness and aortic stenosis.  The echo performed prior to the visit showed normal LV size and function with an EF of 55 to 60% and no wall motion abnormalities.  Aortic stenosis was thought to be severe with valve area of 0.91 cm  mean gradient of 27 mmHg, DI is 0.23 and a stroke-volume index 33 mm/m .  At the visit patient had concerns for dizziness and lightheadedness a follow-up Zio patch monitor, right heart catheterization and left heart catheterization were recommended.    Right heart catheterization showed moderately elevated right heart filling pressures with a mean RA of 13 mmHg and mildly elevated left heart filling pressure with a mean wedge of 18 mmHg, mild PAH.  Coronary angiography showed mild coronary artery disease.    Zio patch monitor done in follow-up showed 288 pauses lasting 3 to 6 seconds with associated dizziness.  He also had VT lasting 15 beats with a rate in the 180s.  It was recommended to hold metoprolol and he presented to the emergency room at [facility] where he was evaluated by Dr. CROUCH.  His heart rates remained 60s to 70s on telemetry.  Pacemaker was discussed, however ultimately he was placed on an event monitor on discharge.  Subsequent strips revealed approximate 2-second pauses during nighttime hours and 1 to 2.8-second pause around 3 PM in the    Patient was seen by Melanie Vivas NP in October of 2022.  At that point he was to remain off his beta-blocker and continue wearing his event monitor, however his  symptomatic sinus pauses had improved.    Event monitor showed chronic atrial fibrillation with very low-grade asymptomatic pausing and continued monitoring was recommended.    An echocardiogram was done in January of 2023 which showed borderline concentric left ventricular hypertrophy, LVEF 55%, borderline inferior hypokinesis, right ventricle normal in structure, function, and size, moderate left atrial dilation, moderate valvular aortic stenosis with mean gradient of 33 mmHg, mild aortic root dilation.  This was reviewed by the TAVR team and patient was to follow-up in 6 months with a repeat echocardiogram    He was admitted in February 2023 with syncope.  He fell striking his face while walking around [facility].  During his admission up to 6-second symptomatic sinus pauses were noted on telemetry.  Patient underwent placement of a single-chamber pacemaker    Patient is here today for a 3 month follow up. Patient is here with his wife Sisi.     Patient reports feeling good. He walks usually, however he has been fishing and more sedentary lately. He plans to resume his walks and slowly increase the distance.  [place]  [place] and unloading his truck and supplies without any difficulties or concerning cardiac symptoms..  He will feel lightheaded at times when standing and also when he is moving, resolves quickly without syncope or pre-syncope.     Patient denies chest pain or chest tightness. Denies dizziness, lightheadedness or other presyncopal symptoms. No syncope.  Denies tachycardia or palpitations. Denies shortness of breath, orthopnea, or PND.     Blood pressure 135/78 and HR 60 in clinic today.    Denies significant bleeding issues on Coumadin.        Recent Hospitalizations   February 2023 syncope, sinus pauses and permanent pacemaker placement           Social History      Social History     Socioeconomic History    Marital status:      Spouse name:  "Not on file    Number of children: Not on file    Years of education: Not on file    Highest education level: Not on file   Occupational History    Not on file   Tobacco Use    Smoking status: Former     Types: Cigarettes     Quit date: 1987     Years since quittin.2    Smokeless tobacco: Never   Vaping Use    Vaping Use: Never used   Substance and Sexual Activity    Alcohol use: Yes     Alcohol/week: 0.0 standard drinks of alcohol     Comment: occasionally    Drug use: No    Sexual activity: Not on file   Other Topics Concern    Parent/sibling w/ CABG, MI or angioplasty before 65F 55M? Not Asked     Service Not Asked    Blood Transfusions Not Asked    Caffeine Concern No     Comment: 2-3 a day    Occupational Exposure Not Asked    Hobby Hazards Not Asked    Sleep Concern Yes     Comment: depends    Stress Concern Not Asked    Weight Concern No     Comment: weight loss    Special Diet No    Back Care Not Asked    Exercise No     Comment: limited due to knee pain    Bike Helmet Not Asked    Seat Belt Yes    Self-Exams Not Asked   Social History Narrative    Not on file     Social Determinants of Health     Financial Resource Strain: Not on file   Food Insecurity: Not on file   Transportation Needs: Not on file   Physical Activity: Not on file   Stress: Not on file   Social Connections: Not on file   Intimate Partner Violence: Not on file   Housing Stability: Not on file            Review of Systems:   Skin:        Eyes:       ENT:       Respiratory:  Negative    Cardiovascular:  Negative;chest pain;palpitations;dizziness;lightheadedness;edema;fatigue    Gastroenterology:      Genitourinary:       Musculoskeletal:       Neurologic:       Psychiatric:       Heme/Lymph/Imm:       Endocrine:  Negative           Physical Exam:   Vitals: /78   Pulse 60   Ht 1.676 m (5' 6\")   Wt 94.8 kg (209 lb)   SpO2 96%   BMI 33.73 kg/m     Wt Readings from Last 4 Encounters:   23 94.8 kg (209 lb) "   02/27/23 94.5 kg (208 lb 4.1 oz)   10/12/22 95.8 kg (211 lb 3.2 oz)   09/28/22 95.3 kg (210 lb)     GEN: well nourished, in no acute distress.  HEENT:  Pupils equal, round. Sclerae nonicteric.   NECK: Supple, no masses appreciated  C/V:  Regular rate and rhythm, no rub or gallop.  III/VI systolic murmur, RUSB  RESP: Respirations are unlabored. Clear to auscultation bilaterally without wheezing, rales, or rhonchi.  GI: Abdomen soft, nontender.  EXTREM: Trace left LE edema.  NEURO: Alert and oriented, cooperative.  SKIN: Warm and dry.  Left chest healed pacemaker site, nontender       Data:       LIPID RESULTS:  Lab Results   Component Value Date    CHOL 128 05/05/2021    HDL 38 05/05/2021    LDL 21 05/05/2021    TRIG 77 11/03/2021    TRIG 117 05/05/2021     LIVER ENZYME RESULTS:  Lab Results   Component Value Date    AST 42 05/05/2021    ALT 28 05/05/2021     CBC RESULTS:  Lab Results   Component Value Date    WBC 6.2 02/27/2023    WBC 5.4 08/04/2020    RBC 4.86 02/27/2023    RBC 4.86 08/04/2020    HGB 15.6 02/27/2023    HGB 15.3 08/04/2020    HCT 44.5 02/27/2023    HCT 47.0 08/04/2020    MCV 92 02/27/2023    MCV 96.7 08/04/2020    MCH 32.1 02/27/2023    MCH 31.6 08/04/2020    MCHC 35.1 02/27/2023    MCHC 32.7 08/04/2020    RDW 13.2 02/27/2023    RDW 13.4 08/04/2020     02/27/2023     08/04/2020     BMP RESULTS:  Lab Results   Component Value Date     02/27/2023     05/05/2021    POTASSIUM 3.2 (L) 02/28/2023    POTASSIUM 3.0 (L) 09/28/2022    POTASSIUM 3.7 05/05/2021    CHLORIDE 99 02/27/2023    CHLORIDE 105 09/28/2022    CHLORIDE 104 09/12/2022    CHLORIDE 100 05/05/2021    CO2 28 02/27/2023    CO2 27 09/28/2022    CO2 28 05/05/2021    ANIONGAP 11 02/27/2023    ANIONGAP 8 09/28/2022    ANIONGAP 1.9 08/04/2020     (H) 02/27/2023     (H) 09/28/2022    GLC 98 05/05/2021    BUN 25.3 (H) 02/27/2023    BUN 32 (H) 09/28/2022    BUN 27 05/05/2021    CR 0.96 02/27/2023    CR 1  05/05/2021    GFRESTIMATED 82 02/27/2023    GFRESTIMATED 62 05/05/2021    GFRESTBLACK 72 05/05/2021    SARA 9.3 02/27/2023    SARA 1.16 05/05/2021      A1C RESULTS:  Lab Results   Component Value Date    A1C 5.9 (A) 05/06/2021     INR RESULTS:  Lab Results   Component Value Date    INR 2.02 (H) 02/28/2023    INR 2.14 (H) 02/27/2023    INR 2.4 09/12/2022    INR 1.15 (H) 12/30/2011    INR 1.05 12/29/2011            Medications     Current Outpatient Medications   Medication Sig Dispense Refill    ALPHA LIPOIC ACID PO Take 600 mg by mouth daily      amLODIPine (NORVASC) 10 MG tablet Take 1 tablet (10 mg) by mouth daily 90 tablet 0    ascorbic acid (VITAMIN C) 1000 MG TABS Take 1,000 mg by mouth daily      chlorthalidone (HYGROTON) 50 MG tablet Take 50 mg by mouth daily      Cholecalciferol (VITAMIN D) 400 UNITS tablet Take 1 tablet by mouth every evening      CINNAMON PO Take 1 capsule by mouth every evening Pt unsure of strength      ezetimibe (ZETIA) 10 MG tablet Take 0.5 tablets (5 mg) by mouth Every Mon, Wed, Fri Morning 20 tablet 3    fenofibrate (TRIGLIDE/LOFIBRA) 160 MG tablet Take 1 tablet (160 mg) by mouth daily Appointment required for further refills 90 tablet 0    multivitamin w/minerals (THERA-VIT-M) tablet Take 1 tablet by mouth every evening      omega 3 1000 MG CAPS Take 1 capsule by mouth every evening 180 EPA/ 20 DHH      potassium chloride ER (KLOR-CON M) 20 MEQ CR tablet Take 2 tablets (40 mEq) by mouth daily 30 tablet 0    rosuvastatin (CRESTOR) 40 MG tablet Take 40 mg by mouth every evening      Ubiquinol 100 MG CAPS Take 100 mg by mouth daily      warfarin (COUMADIN) 5 MG tablet daily 4 mg Mon, Wed, Fri and 3 mg all other days.            Past Medical History     Past Medical History:   Diagnosis Date    Aortic valve stenosis     severe    Blood clot in the legs     left leg after surgery    Gastro-oesophageal reflux disease     Hodgkins lymphoma (H)     bone marrow biopsy    Hypertension     PAD  (peripheral artery disease) (H)     persistent atrial fibrillation     AFIB    Unspecified cerebral artery occlusion with cerebral infarction 10/01/2011     Past Surgical History:   Procedure Laterality Date    APPENDECTOMY      ARTHROPLASTY REVISION HIP  12/27/2011    Procedure:ARTHROPLASTY REVISION HIP; RIGHT TOTAL HIP REVISION WITH BONE GRAFT  ; Surgeon:ANGIE HARRINGTON; Location: OR    BIOPSY  hodgkins lymphoma    bone marrow biopsy, chemo and radiation    CARDIAC SURGERY      cardiac ablation    COLONOSCOPY      CV CORONARY ANGIOGRAM N/A 9/23/2022    Procedure: Coronary Angiogram;  Surgeon: Gennaro Cook MD;  Location:  HEART CARDIAC CATH LAB    CV PCI N/A 9/23/2022    Procedure: Percutaneous Coronary Intervention;  Surgeon: Gennaro Cook MD;  Location:  HEART CARDIAC CATH LAB    CV RIGHT HEART CATH MEASUREMENTS RECORDED N/A 9/23/2022    Procedure: Right Heart Catheterization;  Surgeon: Gennaro Cook MD;  Location:  HEART CARDIAC CATH LAB    EP PACEMAKER DEVICE & LEAD IMPLANT- RIGHT VENTRICULAR N/A 2/27/2023    Procedure: Pacemaker Device & Lead Implant- Right ventricular;  Surgeon: Brian Madrid MD;  Location:  HEART CARDIAC CATH LAB    ORTHOPEDIC SURGERY      hip and knee surgeries    ORTHOPEDIC SURGERY      carpal tunnel  right hand     Family History   Problem Relation Age of Onset    Hypertension Mother     Heart Failure Mother     Coronary Artery Disease Mother         CABG    Arrhythmia Mother     Cerebrovascular Disease Father     Heart Failure Father     Colon Cancer Father     Coronary Artery Disease Father         CABG    Coronary Artery Disease Brother         CABG    Arrhythmia Brother             Allergies   Penicillin g        Yanira Grey NP  [facility]  Pager: [phone]        Thank you for allowing me to participate in the care of your patient.      Sincerely,     Yanira Grey NP     [facility]  [facility]  cc:   Brian Madrid MD  [address]  [place] [id number]

## 2023-06-26 NOTE — PROGRESS NOTES
Cardiology Clinic Progress Note  Jonny Goldberg MRN# [id number]   YOB: 1945 Age: 77 year old   Primary Cardiologist: Dr. Cook (structural), Dr. Avalos (EP), Dr. Guajardo (general)  Reason for visit: 3 month follow up            Assessment and Plan:       1.  Syncope secondary to symptomatic sinus pauses between 3 to 6 seconds, s/p permanent pacemaker (2/2023)  -Last device check 4/2023 showed chronic atrial fibrillation, no ventricular arrhythmias, 79% ventricular pacing, good heart rate variability, battery status of 10 years  -No further episodes of syncope  -Advised he can slowly increase his exercise and weight he is lifting as he is feeling quite well and not having any cardiac symptoms with exertion    2.  Moderate-severe aortic valve stenosis  -Echocardiogram from January 2023 with aortic stenosis with mean gradient of 33 mmHg  -Following with TAVR clinic  -Right and left heart cath completed noting mild coronary artery disease with a mild elevated right heart filling pressures and mildly elevated left heart filling pressures  -Repeat echocardiogram in August planned     3.  Persistent atrial fibrillation, [id number] 4  -Rate controlled without beta-blocker  -Continue Coumadin for thromboembolism prophylaxis    4. Hyperlipidemia  -Will obtain recent lipid panel from [facility] physicians for review   -Continue rosuvastatin, zetia, and fenofibrate    5.  Carotid artery disease  -2/2023 carotid ultrasound showed stable carotid disease with 50 to 69% stenosis in the right ICA, less than 50% stenosis in the left RCA  -Continue statin    6.  Mild nonobstructive coronary artery disease  -Patient is not on aspirin as he is on Coumadin which is reasonable given mild disease  -Continue statin      Changes today: Echocardiogram as planned in August     Follow up plan: Follow up in 3 months with Dr. Patel/Joey         History of Presenting Illness:    Jonny Goldberg is a very pleasant 77 year old male  with a history of persistent atrial fibrillation s/p ablation, severe aortic stenosis, hypertension, PAD, HLP, and syncope secondary to sinus pauses s/p permanent pacemaker placement.     Patient was evaluated in September 2022 with concerns for dizziness and aortic stenosis.  The echo performed prior to the visit showed normal LV size and function with an EF of 55 to 60% and no wall motion abnormalities.  Aortic stenosis was thought to be severe with valve area of 0.91 cm  mean gradient of 27 mmHg, DI is 0.23 and a stroke-volume index 33 mm/m .  At the visit patient had concerns for dizziness and lightheadedness a follow-up Zio patch monitor, right heart catheterization and left heart catheterization were recommended.    Right heart catheterization showed moderately elevated right heart filling pressures with a mean RA of 13 mmHg and mildly elevated left heart filling pressure with a mean wedge of 18 mmHg, mild PAH.  Coronary angiography showed mild coronary artery disease.    Zio patch monitor done in follow-up showed 288 pauses lasting 3 to 6 seconds with associated dizziness.  He also had VT lasting 15 beats with a rate in the 180s.  It was recommended to hold metoprolol and he presented to the emergency room at [facility] where he was evaluated by Dr. CROUCH.  His heart rates remained 60s to 70s on telemetry.  Pacemaker was discussed, however ultimately he was placed on an event monitor on discharge.  Subsequent strips revealed approximate 2-second pauses during nighttime hours and 1 to 2.8-second pause around 3 PM in the    Patient was seen by Melanie Vivas NP in October of 2022.  At that point he was to remain off his beta-blocker and continue wearing his event monitor, however his symptomatic sinus pauses had improved.    Event monitor showed chronic atrial fibrillation with very low-grade asymptomatic pausing and continued monitoring was recommended.    An echocardiogram was done in January of  2023 which showed borderline concentric left ventricular hypertrophy, LVEF 55%, borderline inferior hypokinesis, right ventricle normal in structure, function, and size, moderate left atrial dilation, moderate valvular aortic stenosis with mean gradient of 33 mmHg, mild aortic root dilation.  This was reviewed by the TAVR team and patient was to follow-up in 6 months with a repeat echocardiogram    He was admitted in February 2023 with syncope.  He fell striking his face while walking around [facility].  During his admission up to 6-second symptomatic sinus pauses were noted on telemetry.  Patient underwent placement of a single-chamber pacemaker    Patient is here today for a 3 month follow up. Patient is here with his wife Sisi.     Patient reports feeling good. He walks usually, however he has been fishing and more sedentary lately. He plans to resume his walks and slowly increase the distance.  [place]  [place] and unloading his truck and supplies without any difficulties or concerning cardiac symptoms..  He will feel lightheaded at times when standing and also when he is moving, resolves quickly without syncope or pre-syncope.     Patient denies chest pain or chest tightness. Denies dizziness, lightheadedness or other presyncopal symptoms. No syncope.  Denies tachycardia or palpitations. Denies shortness of breath, orthopnea, or PND.     Blood pressure 135/78 and HR 60 in clinic today.    Denies significant bleeding issues on Coumadin.        Recent Hospitalizations   February 2023 syncope, sinus pauses and permanent pacemaker placement           Social History      Social History     Socioeconomic History     Marital status:      Spouse name: Not on file     Number of children: Not on file     Years of education: Not on file     Highest education level: Not on file   Occupational History     Not on file   Tobacco Use     Smoking status: Former     Types:  "Cigarettes     Quit date: 1987     Years since quittin.2     Smokeless tobacco: Never   Vaping Use     Vaping Use: Never used   Substance and Sexual Activity     Alcohol use: Yes     Alcohol/week: 0.0 standard drinks of alcohol     Comment: occasionally     Drug use: No     Sexual activity: Not on file   Other Topics Concern     Parent/sibling w/ CABG, MI or angioplasty before 65F 55M? Not Asked      Service Not Asked     Blood Transfusions Not Asked     Caffeine Concern No     Comment: 2-3 a day     Occupational Exposure Not Asked     Hobby Hazards Not Asked     Sleep Concern Yes     Comment: depends     Stress Concern Not Asked     Weight Concern No     Comment: weight loss     Special Diet No     Back Care Not Asked     Exercise No     Comment: limited due to knee pain     Bike Helmet Not Asked     Seat Belt Yes     Self-Exams Not Asked   Social History Narrative     Not on file     Social Determinants of Health     Financial Resource Strain: Not on file   Food Insecurity: Not on file   Transportation Needs: Not on file   Physical Activity: Not on file   Stress: Not on file   Social Connections: Not on file   Intimate Partner Violence: Not on file   Housing Stability: Not on file            Review of Systems:   Skin:        Eyes:       ENT:       Respiratory:  Negative    Cardiovascular:  Negative;chest pain;palpitations;dizziness;lightheadedness;edema;fatigue    Gastroenterology:      Genitourinary:       Musculoskeletal:       Neurologic:       Psychiatric:       Heme/Lymph/Imm:       Endocrine:  Negative           Physical Exam:   Vitals: /78   Pulse 60   Ht 1.676 m (5' 6\")   Wt 94.8 kg (209 lb)   SpO2 96%   BMI 33.73 kg/m     Wt Readings from Last 4 Encounters:   23 94.8 kg (209 lb)   23 94.5 kg (208 lb 4.1 oz)   10/12/22 95.8 kg (211 lb 3.2 oz)   22 95.3 kg (210 lb)     GEN: well nourished, in no acute distress.  HEENT:  Pupils equal, round. Sclerae nonicteric. "   NECK: Supple, no masses appreciated  C/V:  Regular rate and rhythm, no rub or gallop.  III/VI systolic murmur, RUSB  RESP: Respirations are unlabored. Clear to auscultation bilaterally without wheezing, rales, or rhonchi.  GI: Abdomen soft, nontender.  EXTREM: Trace left LE edema.  NEURO: Alert and oriented, cooperative.  SKIN: Warm and dry.  Left chest healed pacemaker site, nontender       Data:       LIPID RESULTS:  Lab Results   Component Value Date    CHOL 128 05/05/2021    HDL 38 05/05/2021    LDL 21 05/05/2021    TRIG 77 11/03/2021    TRIG 117 05/05/2021     LIVER ENZYME RESULTS:  Lab Results   Component Value Date    AST 42 05/05/2021    ALT 28 05/05/2021     CBC RESULTS:  Lab Results   Component Value Date    WBC 6.2 02/27/2023    WBC 5.4 08/04/2020    RBC 4.86 02/27/2023    RBC 4.86 08/04/2020    HGB 15.6 02/27/2023    HGB 15.3 08/04/2020    HCT 44.5 02/27/2023    HCT 47.0 08/04/2020    MCV 92 02/27/2023    MCV 96.7 08/04/2020    MCH 32.1 02/27/2023    MCH 31.6 08/04/2020    MCHC 35.1 02/27/2023    MCHC 32.7 08/04/2020    RDW 13.2 02/27/2023    RDW 13.4 08/04/2020     02/27/2023     08/04/2020     BMP RESULTS:  Lab Results   Component Value Date     02/27/2023     05/05/2021    POTASSIUM 3.2 (L) 02/28/2023    POTASSIUM 3.0 (L) 09/28/2022    POTASSIUM 3.7 05/05/2021    CHLORIDE 99 02/27/2023    CHLORIDE 105 09/28/2022    CHLORIDE 104 09/12/2022    CHLORIDE 100 05/05/2021    CO2 28 02/27/2023    CO2 27 09/28/2022    CO2 28 05/05/2021    ANIONGAP 11 02/27/2023    ANIONGAP 8 09/28/2022    ANIONGAP 1.9 08/04/2020     (H) 02/27/2023     (H) 09/28/2022    GLC 98 05/05/2021    BUN 25.3 (H) 02/27/2023    BUN 32 (H) 09/28/2022    BUN 27 05/05/2021    CR 0.96 02/27/2023    CR 1 05/05/2021    GFRESTIMATED 82 02/27/2023    GFRESTIMATED 62 05/05/2021    GFRESTBLACK 72 05/05/2021    SARA 9.3 02/27/2023    SARA 1.16 05/05/2021      A1C RESULTS:  Lab Results   Component Value Date     A1C 5.9 (A) 05/06/2021     INR RESULTS:  Lab Results   Component Value Date    INR 2.02 (H) 02/28/2023    INR 2.14 (H) 02/27/2023    INR 2.4 09/12/2022    INR 1.15 (H) 12/30/2011    INR 1.05 12/29/2011            Medications     Current Outpatient Medications   Medication Sig Dispense Refill     ALPHA LIPOIC ACID PO Take 600 mg by mouth daily       amLODIPine (NORVASC) 10 MG tablet Take 1 tablet (10 mg) by mouth daily 90 tablet 0     ascorbic acid (VITAMIN C) 1000 MG TABS Take 1,000 mg by mouth daily       chlorthalidone (HYGROTON) 50 MG tablet Take 50 mg by mouth daily       Cholecalciferol (VITAMIN D) 400 UNITS tablet Take 1 tablet by mouth every evening       CINNAMON PO Take 1 capsule by mouth every evening Pt unsure of strength       ezetimibe (ZETIA) 10 MG tablet Take 0.5 tablets (5 mg) by mouth Every Mon, Wed, Fri Morning 20 tablet 3     fenofibrate (TRIGLIDE/LOFIBRA) 160 MG tablet Take 1 tablet (160 mg) by mouth daily Appointment required for further refills 90 tablet 0     multivitamin w/minerals (THERA-VIT-M) tablet Take 1 tablet by mouth every evening       omega 3 1000 MG CAPS Take 1 capsule by mouth every evening 180 EPA/ 20 DHH       potassium chloride ER (KLOR-CON M) 20 MEQ CR tablet Take 2 tablets (40 mEq) by mouth daily 30 tablet 0     rosuvastatin (CRESTOR) 40 MG tablet Take 40 mg by mouth every evening       Ubiquinol 100 MG CAPS Take 100 mg by mouth daily       warfarin (COUMADIN) 5 MG tablet daily 4 mg Mon, Wed, Fri and 3 mg all other days.            Past Medical History     Past Medical History:   Diagnosis Date     Aortic valve stenosis     severe     Blood clot in the legs     left leg after surgery     Gastro-oesophageal reflux disease      Hodgkins lymphoma (H)     bone marrow biopsy     Hypertension      PAD (peripheral artery disease) (H)      persistent atrial fibrillation     AFIB     Unspecified cerebral artery occlusion with cerebral infarction 10/01/2011     Past Surgical History:    Procedure Laterality Date     APPENDECTOMY       ARTHROPLASTY REVISION HIP  12/27/2011    Procedure:ARTHROPLASTY REVISION HIP; RIGHT TOTAL HIP REVISION WITH BONE GRAFT  ; Surgeon:ANGIE HARRINGTON; Location: OR     BIOPSY  hodgkins lymphoma    bone marrow biopsy, chemo and radiation     CARDIAC SURGERY      cardiac ablation     COLONOSCOPY       CV CORONARY ANGIOGRAM N/A 9/23/2022    Procedure: Coronary Angiogram;  Surgeon: Gennaro Cook MD;  Location:  HEART CARDIAC CATH LAB     CV PCI N/A 9/23/2022    Procedure: Percutaneous Coronary Intervention;  Surgeon: Gennaro Cook MD;  Location:  HEART CARDIAC CATH LAB     CV RIGHT HEART CATH MEASUREMENTS RECORDED N/A 9/23/2022    Procedure: Right Heart Catheterization;  Surgeon: Gennaro Cook MD;  Location:  HEART CARDIAC CATH LAB     EP PACEMAKER DEVICE & LEAD IMPLANT- RIGHT VENTRICULAR N/A 2/27/2023    Procedure: Pacemaker Device & Lead Implant- Right ventricular;  Surgeon: Brian Madrid MD;  Location:  HEART CARDIAC CATH LAB     ORTHOPEDIC SURGERY      hip and knee surgeries     ORTHOPEDIC SURGERY      carpal tunnel  right hand     Family History   Problem Relation Age of Onset     Hypertension Mother      Heart Failure Mother      Coronary Artery Disease Mother         CABG     Arrhythmia Mother      Cerebrovascular Disease Father      Heart Failure Father      Colon Cancer Father      Coronary Artery Disease Father         CABG     Coronary Artery Disease Brother         CABG     Arrhythmia Brother             Allergies   Penicillin g        Yanira Grey NP  [facility]  Pager: [phone]

## 2023-06-26 NOTE — PATIENT INSTRUCTIONS
Today's Recommendations    Your last pacemaker check showed normal device function   Let us know if you have more lightheadedness or leg swelling   Continue all medications without changes.  I will get a copy of your labs from [facility]  Keep your appointment for your echocardiogram   Please follow up with Dr. Patel in 3 month.    Please send a [place] message or call [phone] to the RN team with questions or concerns.     Scheduling number [phone]  DREW Guevara, CNP

## 2023-07-11 ENCOUNTER — OFFICE VISIT (OUTPATIENT)
Dept: UROLOGY | Facility: CLINIC | Age: 78
End: 2023-07-11
Payer: COMMERCIAL

## 2023-07-11 ENCOUNTER — TELEPHONE (OUTPATIENT)
Dept: UROLOGY | Facility: CLINIC | Age: 78
End: 2023-07-11

## 2023-07-11 VITALS
HEIGHT: 66 IN | BODY MASS INDEX: 32.78 KG/M2 | WEIGHT: 204 LBS | SYSTOLIC BLOOD PRESSURE: 138 MMHG | HEART RATE: 60 BPM | OXYGEN SATURATION: 97 % | DIASTOLIC BLOOD PRESSURE: 75 MMHG

## 2023-07-11 DIAGNOSIS — R97.20 ELEVATED PSA: ICD-10-CM

## 2023-07-11 DIAGNOSIS — Z12.5 SCREENING FOR PROSTATE CANCER: ICD-10-CM

## 2023-07-11 DIAGNOSIS — N40.1 BENIGN LOCALIZED PROSTATIC HYPERPLASIA WITH LOWER URINARY TRACT SYMPTOMS (LUTS): ICD-10-CM

## 2023-07-11 DIAGNOSIS — R33.9 URINARY RETENTION: Primary | ICD-10-CM

## 2023-07-11 DIAGNOSIS — Z12.5 ENCOUNTER FOR SCREENING FOR MALIGNANT NEOPLASM OF PROSTATE: ICD-10-CM

## 2023-07-11 LAB
PSA SERPL-MCNC: 5.5 UG/L (ref 0–4)
RESIDUAL VOLUME (RV) (EXTERNAL): NORMAL

## 2023-07-11 PROCEDURE — 51798 US URINE CAPACITY MEASURE: CPT | Performed by: PHYSICIAN ASSISTANT

## 2023-07-11 PROCEDURE — 99204 OFFICE O/P NEW MOD 45 MIN: CPT | Mod: 25 | Performed by: PHYSICIAN ASSISTANT

## 2023-07-11 PROCEDURE — 36415 COLL VENOUS BLD VENIPUNCTURE: CPT | Performed by: PHYSICIAN ASSISTANT

## 2023-07-11 PROCEDURE — G0103 PSA SCREENING: HCPCS | Performed by: PHYSICIAN ASSISTANT

## 2023-07-11 RX ORDER — ALFUZOSIN HYDROCHLORIDE 10 MG/1
10 TABLET, EXTENDED RELEASE ORAL DAILY
Qty: 90 TABLET | Refills: 4 | Status: SHIPPED | OUTPATIENT
Start: 2023-07-11

## 2023-07-11 ASSESSMENT — PAIN SCALES - GENERAL: PAINLEVEL: NO PAIN (0)

## 2023-07-11 NOTE — TELEPHONE ENCOUNTER
[facility] Call Center    Phone Message    May a detailed message be left on voicemail: yes     Reason for Call: Other: .  Pt calling stating he is able to get MRI done as of now, ordered by Benny. PT is wanting to speak with a care team member with questions he has before scheduling. Please call pt   Action Taken: Message routed to:  Other: Uro    Travel Screening: Not Applicable

## 2023-07-11 NOTE — TELEPHONE ENCOUNTER
Returned phone call and spoke with patient. He states he can have MRI with pacemaker. He would like the imaging number to schedule the appointment. Gave him number to schedule MRI. He did mention that there may be some paperwork involved regarding pre-authorization with Pacemaker device.     Birdie Eason LPN

## 2023-07-11 NOTE — PROGRESS NOTES
CC: LUTS    HPI:  Jonny Goldberg is a pleasant 77 year old male who presents in consultation from Dr. Hogue for evaluation of the above.  Noting nocturia x 3-4, slowing of stream but can be strong at times, urgency, urge incontinence for several years.      5/9/23 PSA 4.7 (3.2 in 2022) and UA neg.     Past Medical History:   Diagnosis Date     Aortic valve stenosis     severe     Blood clot in the legs     left leg after surgery     Gastro-oesophageal reflux disease      Hodgkins lymphoma (H)     bone marrow biopsy     Hypertension      PAD (peripheral artery disease) (H)      persistent atrial fibrillation     AFIB     Unspecified cerebral artery occlusion with cerebral infarction 10/01/2011       Past Surgical History:   Procedure Laterality Date     APPENDECTOMY       ARTHROPLASTY REVISION HIP  12/27/2011    Procedure:ARTHROPLASTY REVISION HIP; RIGHT TOTAL HIP REVISION WITH BONE GRAFT  ; Surgeon:ANGIE HARRINGTON; Location: OR     BIOPSY  hodgkins lymphoma    bone marrow biopsy, chemo and radiation     CARDIAC SURGERY      cardiac ablation     COLONOSCOPY       CV CORONARY ANGIOGRAM N/A 9/23/2022    Procedure: Coronary Angiogram;  Surgeon: Gennaro Cook MD;  Location: [facility] CARDIAC CATH LAB     CV PCI N/A 9/23/2022    Procedure: Percutaneous Coronary Intervention;  Surgeon: Gennaro Cook MD;  Location:  HEART CARDIAC CATH LAB     CV RIGHT HEART CATH MEASUREMENTS RECORDED N/A 9/23/2022    Procedure: Right Heart Catheterization;  Surgeon: Gennaro Cook MD;  Location:  HEART CARDIAC CATH LAB     EP PACEMAKER DEVICE & LEAD IMPLANT- RIGHT VENTRICULAR N/A 2/27/2023    Procedure: Pacemaker Device & Lead Implant- Right ventricular;  Surgeon: Brian Madrid MD;  Location:  HEART CARDIAC CATH LAB     ORTHOPEDIC SURGERY      hip and knee surgeries     ORTHOPEDIC SURGERY      carpal tunnel  right hand       Social History     Socioeconomic History     Marital status:      Spouse name:  Not on file     Number of children: Not on file     Years of education: Not on file     Highest education level: Not on file   Occupational History     Not on file   Tobacco Use     Smoking status: Former     Types: Cigarettes     Quit date: 1987     Years since quittin.2     Smokeless tobacco: Never   Vaping Use     Vaping Use: Never used   Substance and Sexual Activity     Alcohol use: Yes     Alcohol/week: 0.0 standard drinks of alcohol     Comment: occasionally     Drug use: No     Sexual activity: Not on file   Other Topics Concern     Parent/sibling w/ CABG, MI or angioplasty before 65F 55M? Not Asked      Service Not Asked     Blood Transfusions Not Asked     Caffeine Concern No     Comment: 2-3 a day     Occupational Exposure Not Asked     Hobby Hazards Not Asked     Sleep Concern Yes     Comment: depends     Stress Concern Not Asked     Weight Concern No     Comment: weight loss     Special Diet No     Back Care Not Asked     Exercise No     Comment: limited due to knee pain     Bike Helmet Not Asked     Seat Belt Yes     Self-Exams Not Asked   Social History Narrative     Not on file     Social Determinants of Health     Financial Resource Strain: Not on file   Food Insecurity: Not on file   Transportation Needs: Not on file   Physical Activity: Not on file   Stress: Not on file   Social Connections: Not on file   Intimate Partner Violence: Not on file   Housing Stability: Not on file       Family History   Problem Relation Age of Onset     Hypertension Mother      Heart Failure Mother      Coronary Artery Disease Mother         CABG     Arrhythmia Mother      Cerebrovascular Disease Father      Heart Failure Father      Colon Cancer Father      Coronary Artery Disease Father         CABG     Coronary Artery Disease Brother         CABG     Arrhythmia Brother        Allergies   Allergen Reactions     Penicillin G Hives     hives       Current Outpatient Medications   Medication     ALPHA  LIPOIC ACID PO     amLODIPine (NORVASC) 10 MG tablet     ascorbic acid (VITAMIN C) 1000 MG TABS     chlorthalidone (HYGROTON) 50 MG tablet     Cholecalciferol (VITAMIN D) 400 UNITS tablet     CINNAMON PO     ezetimibe (ZETIA) 10 MG tablet     fenofibrate (TRIGLIDE/LOFIBRA) 160 MG tablet     multivitamin w/minerals (THERA-VIT-M) tablet     omega 3 1000 MG CAPS     potassium chloride ER (KLOR-CON M) 20 MEQ CR tablet     rosuvastatin (CRESTOR) 40 MG tablet     Ubiquinol 100 MG CAPS     warfarin (COUMADIN) 5 MG tablet     No current facility-administered medications for this visit.         PEx:   There were no vitals taken for this visit.    PSYCH: NAD  EYES: EOMI  MOUTH: MMM  NEURO: AAO x3    Urine: neg       A/P: Jonny Goldberg is a 77 year old male with elevated PSA, LUTS  -PSA recheck. If continues to be elevated, we discussed need for prostate biopsy.   -Trial of alfuzosin 10mg, SE reviewed.     Isabel Zapata PA-C  [facility]        30 minutes spent on the date of the encounter doing chart review, review of outside records, review of test results, interpretation of tests, patient visit and documentation     ADDENDUM: PSA 5.5, discussed with pt's spouse, Sisi, need for MRI if pacemaker is compatible vs TRUS. Will investigate pacemaker with MRI team.

## 2023-07-11 NOTE — NURSING NOTE
Chief Complaint   Patient presents with     Urinary Retention   bladder scan is 20 ml done with nurse and bladder scan.  3-4 times a night to urinate   Flow is okay in daytime   But at night not a good flow   Sometimes the urgency to urinate but not a lot   Kermit Pineda, clinic assistant

## 2023-07-11 NOTE — PATIENT INSTRUCTIONS
PSA today.    You have been prescribed alfuzosin. This is best taken 30 min after your evening meal.     It is prescribed for men with difficulty urinating due to benign prostatic enlargement.  Hopefully, you will see improvement in your urinary symptoms in the next 7-10 days.    Common side effects include:  Weakness or fatigue  Blurred vision  Sinus congestion or runny nose  Dizziness or lightheadedness  Decrease or lack of ejaculation

## 2023-07-12 ENCOUNTER — TELEPHONE (OUTPATIENT)
Dept: CARDIOLOGY | Facility: CLINIC | Age: 78
End: 2023-07-12
Payer: COMMERCIAL

## 2023-07-12 NOTE — TELEPHONE ENCOUNTER
VM received: Pt wondering if he can have an MRI  Called pt back. Explained we will receive a clearance form from the MRI facility and will know after this form is completed. Phone number provided if MRI facility needs to contact us.  Jr NASCIMENTO

## 2023-07-19 ENCOUNTER — ANCILLARY PROCEDURE (OUTPATIENT)
Dept: CARDIOLOGY | Facility: CLINIC | Age: 78
End: 2023-07-19
Attending: INTERNAL MEDICINE
Payer: COMMERCIAL

## 2023-07-19 PROCEDURE — 93296 REM INTERROG EVL PM/IDS: CPT | Performed by: INTERNAL MEDICINE

## 2023-07-19 PROCEDURE — 93294 REM INTERROG EVL PM/LDLS PM: CPT | Performed by: INTERNAL MEDICINE

## 2023-08-01 ENCOUNTER — HOSPITAL ENCOUNTER (OUTPATIENT)
Dept: CARDIOLOGY | Facility: CLINIC | Age: 78
Discharge: HOME | End: 2023-08-01
Attending: NURSE PRACTITIONER | Admitting: NURSE PRACTITIONER
Payer: COMMERCIAL

## 2023-08-01 DIAGNOSIS — I35.0 SEVERE AORTIC STENOSIS: ICD-10-CM

## 2023-08-01 LAB — LVEF ECHO: NORMAL

## 2023-08-01 PROCEDURE — 999N000208 ECHOCARDIOGRAM COMPLETE

## 2023-08-01 PROCEDURE — 255N000002 HC RX 255 OP 636: Performed by: NURSE PRACTITIONER

## 2023-08-01 PROCEDURE — 93306 TTE W/DOPPLER COMPLETE: CPT | Mod: 26 | Performed by: INTERNAL MEDICINE

## 2023-08-01 RX ADMIN — HUMAN ALBUMIN MICROSPHERES AND PERFLUTREN 9 ML: 10; .22 INJECTION, SOLUTION INTRAVENOUS at 10:47

## 2023-08-02 DIAGNOSIS — I10 PRIMARY HYPERTENSION: ICD-10-CM

## 2023-08-02 RX ORDER — AMLODIPINE BESYLATE 10 MG/1
10 TABLET ORAL DAILY
Qty: 90 TABLET | Refills: 0 | Status: SHIPPED | OUTPATIENT
Start: 2023-08-02 | End: 2023-11-07

## 2023-08-03 ENCOUNTER — ANCILLARY PROCEDURE (OUTPATIENT)
Dept: CARDIOLOGY | Facility: CLINIC | Age: 78
End: 2023-08-03
Attending: INTERNAL MEDICINE
Payer: COMMERCIAL

## 2023-08-03 ENCOUNTER — HOSPITAL ENCOUNTER (OUTPATIENT)
Dept: MRI IMAGING | Facility: CLINIC | Age: 78
Discharge: HOME | End: 2023-08-03
Attending: PHYSICIAN ASSISTANT
Payer: COMMERCIAL

## 2023-08-03 DIAGNOSIS — R97.20 ELEVATED PSA: ICD-10-CM

## 2023-08-03 DIAGNOSIS — Z45.02 ENCOUNTER FOR ADJUSTMENT AND MANAGEMENT OF AUTOMATIC IMPLANTABLE CARDIAC DEFIBRILLATOR: ICD-10-CM

## 2023-08-03 DIAGNOSIS — Z95.0 CARDIAC PACEMAKER IN SITU: ICD-10-CM

## 2023-08-03 DIAGNOSIS — Z95.0 CARDIAC PACEMAKER IN SITU: Primary | ICD-10-CM

## 2023-08-03 PROCEDURE — 72197 MRI PELVIS W/O & W/DYE: CPT

## 2023-08-03 PROCEDURE — 255N000002 HC RX 255 OP 636: Mod: JZ | Performed by: STUDENT IN AN ORGANIZED HEALTH CARE EDUCATION/TRAINING PROGRAM

## 2023-08-03 PROCEDURE — 93286 PERI-PX EVAL PM/LDLS PM IP: CPT

## 2023-08-03 PROCEDURE — 93286 PERI-PX EVAL PM/LDLS PM IP: CPT | Mod: 26 | Performed by: INTERNAL MEDICINE

## 2023-08-03 PROCEDURE — 72197 MRI PELVIS W/O & W/DYE: CPT | Mod: 26 | Performed by: STUDENT IN AN ORGANIZED HEALTH CARE EDUCATION/TRAINING PROGRAM

## 2023-08-03 PROCEDURE — A9585 GADOBUTROL INJECTION: HCPCS | Mod: JZ | Performed by: STUDENT IN AN ORGANIZED HEALTH CARE EDUCATION/TRAINING PROGRAM

## 2023-08-03 RX ORDER — GADOBUTROL 604.72 MG/ML
10 INJECTION INTRAVENOUS ONCE
Status: COMPLETED | OUTPATIENT
Start: 2023-08-03 | End: 2023-08-03

## 2023-08-03 RX ADMIN — GADOBUTROL 10 ML: 604.72 INJECTION INTRAVENOUS at 15:03

## 2023-08-05 LAB
MDC_IDC_LEAD_IMPLANT_DT: NORMAL
MDC_IDC_LEAD_LOCATION: NORMAL
MDC_IDC_LEAD_LOCATION_DETAIL_1: NORMAL
MDC_IDC_LEAD_MFG: NORMAL
MDC_IDC_LEAD_MODEL: NORMAL
MDC_IDC_LEAD_POLARITY_TYPE: NORMAL
MDC_IDC_LEAD_SERIAL: NORMAL
MDC_IDC_MSMT_BATTERY_REMAINING_LONGEVITY: 114 MO
MDC_IDC_MSMT_BATTERY_STATUS: NORMAL
MDC_IDC_MSMT_LEADCHNL_RV_IMPEDANCE_VALUE: 688 OHM
MDC_IDC_MSMT_LEADCHNL_RV_PACING_THRESHOLD_AMPLITUDE: 0.6 V
MDC_IDC_MSMT_LEADCHNL_RV_PACING_THRESHOLD_PULSEWIDTH: 0.4 MS
MDC_IDC_PG_IMPLANT_DTM: NORMAL
MDC_IDC_PG_MFG: NORMAL
MDC_IDC_PG_MODEL: NORMAL
MDC_IDC_PG_SERIAL: NORMAL
MDC_IDC_PG_TYPE: NORMAL
MDC_IDC_SESS_CLINIC_NAME: NORMAL
MDC_IDC_SESS_DTM: NORMAL
MDC_IDC_SESS_TYPE: NORMAL
MDC_IDC_SET_BRADY_LOWRATE: 60 {BEATS}/MIN
MDC_IDC_SET_BRADY_MAX_SENSOR_RATE: 115 {BEATS}/MIN
MDC_IDC_SET_BRADY_MODE: NORMAL
MDC_IDC_SET_LEADCHNL_RA_SENSING_POLARITY: NORMAL
MDC_IDC_SET_LEADCHNL_RV_PACING_AMPLITUDE: 1.3 V
MDC_IDC_SET_LEADCHNL_RV_PACING_CAPTURE_MODE: NORMAL
MDC_IDC_SET_LEADCHNL_RV_PACING_POLARITY: NORMAL
MDC_IDC_SET_LEADCHNL_RV_PACING_PULSEWIDTH: 0.4 MS
MDC_IDC_SET_LEADCHNL_RV_SENSING_ADAPTATION_MODE: NORMAL
MDC_IDC_SET_LEADCHNL_RV_SENSING_POLARITY: NORMAL
MDC_IDC_SET_LEADCHNL_RV_SENSING_SENSITIVITY: 2.5 MV
MDC_IDC_SET_ZONE_TYPE: NORMAL
MDC_IDC_SET_ZONE_VENDOR_TYPE: NORMAL
MDC_IDC_STAT_BRADY_RV_PERCENT_PACED: 93 %
MDC_IDC_STAT_EPISODE_RECENT_COUNT: 0
MDC_IDC_STAT_EPISODE_RECENT_COUNT_DTM_END: NORMAL
MDC_IDC_STAT_EPISODE_RECENT_COUNT_DTM_START: NORMAL
MDC_IDC_STAT_EPISODE_TOTAL_COUNT: 0
MDC_IDC_STAT_EPISODE_TOTAL_COUNT_DTM_END: NORMAL
MDC_IDC_STAT_EPISODE_TYPE: NORMAL
MDC_IDC_STAT_EPISODE_TYPE: NORMAL
MDC_IDC_STAT_EPISODE_VENDOR_TYPE: NORMAL
MDC_IDC_STAT_EPISODE_VENDOR_TYPE: NORMAL

## 2023-08-08 ENCOUNTER — OFFICE VISIT (OUTPATIENT)
Dept: UROLOGY | Facility: CLINIC | Age: 78
End: 2023-08-08
Payer: COMMERCIAL

## 2023-08-08 VITALS
HEART RATE: 60 BPM | DIASTOLIC BLOOD PRESSURE: 73 MMHG | SYSTOLIC BLOOD PRESSURE: 139 MMHG | HEIGHT: 66 IN | OXYGEN SATURATION: 96 % | BODY MASS INDEX: 32.78 KG/M2 | WEIGHT: 204 LBS

## 2023-08-08 DIAGNOSIS — R97.20 ELEVATED PROSTATE SPECIFIC ANTIGEN (PSA): Primary | ICD-10-CM

## 2023-08-08 PROCEDURE — 99214 OFFICE O/P EST MOD 30 MIN: CPT | Performed by: UROLOGY

## 2023-08-08 RX ORDER — CIPROFLOXACIN 500 MG/1
500 TABLET, FILM COATED ORAL 2 TIMES DAILY
Qty: 6 TABLET | Refills: 0 | Status: SHIPPED | OUTPATIENT
Start: 2023-08-08 | End: 2023-08-11

## 2023-08-08 ASSESSMENT — PAIN SCALES - GENERAL: PAINLEVEL: NO PAIN (0)

## 2023-08-08 NOTE — LETTER
8/8/2023       RE: Jonny Goldberg  [address]  [facility] [address]     Dear Colleague,    Thank you for referring your patient, Jonny Goldberg, to the [facility] at [facility]. Please see a copy of my visit note below.    Office Visit Note  [facility]  [phone]    UROLOGIC DIAGNOSES:   Elevated PSA  Enlarged prostate    CURRENT INTERVENTIONS:   MRI prostate    HISTORY:   This is a 78-year-old gentleman who presented with nocturia and slowing of his urinary stream.  During his work-up a PSA was checked and it was elevated at 5.5. (It had been 4.7 previously as well).  He had an MRI of the prostate performed that showed a 67 g prostate that had a PI-RADS 4 lesion on the left side of the prostate.  He takes Uroxatrol.  He says this has reduced his nocturia somewhat, now it is at 2-3 times nightly.    He takes Coumadin.  He says that he had a DVT after a knee arthroscopic knee surgery 20 to 30 years ago and he has had no problems since that time.  He says he has held the Coumadin previously for minor procedures.  He has a pacemaker as well.  He also has a diagnosis of severe aortic stenosis.  He is scheduled to see his cardiologist again at the end of this month.      PAST MEDICAL HISTORY:   Past Medical History:   Diagnosis Date    Aortic valve stenosis     severe    Blood clot in the legs     left leg after surgery    Gastro-oesophageal reflux disease     Hodgkins lymphoma (H)     bone marrow biopsy    Hypertension     PAD (peripheral artery disease) (H)     persistent atrial fibrillation     AFIB    Unspecified cerebral artery occlusion with cerebral infarction 10/01/2011       PAST SURGICAL HISTORY:   Past Surgical History:   Procedure Laterality Date    APPENDECTOMY      ARTHROPLASTY REVISION HIP  12/27/2011    Procedure:ARTHROPLASTY REVISION HIP; RIGHT TOTAL HIP REVISION WITH BONE GRAFT  ; Surgeon:ANGIE HARRINGTON  NATALIIA; Location: OR    BIOPSY  hodgkins lymphoma    bone marrow biopsy, chemo and radiation    CARDIAC SURGERY      cardiac ablation    COLONOSCOPY      CV CORONARY ANGIOGRAM N/A 2022    Procedure: Coronary Angiogram;  Surgeon: Gennaro Cook MD;  Location:  HEART CARDIAC CATH LAB    CV PCI N/A 2022    Procedure: Percutaneous Coronary Intervention;  Surgeon: Gennaro Cook MD;  Location:  HEART CARDIAC CATH LAB    CV RIGHT HEART CATH MEASUREMENTS RECORDED N/A 2022    Procedure: Right Heart Catheterization;  Surgeon: Gennaro Cook MD;  Location:  HEART CARDIAC CATH LAB    EP PACEMAKER DEVICE & LEAD IMPLANT- RIGHT VENTRICULAR N/A 2023    Procedure: Pacemaker Device & Lead Implant- Right ventricular;  Surgeon: Brian Madrid MD;  Location:  HEART CARDIAC CATH LAB    ORTHOPEDIC SURGERY      hip and knee surgeries    ORTHOPEDIC SURGERY      carpal tunnel  right hand       FAMILY HISTORY:   Family History   Problem Relation Age of Onset    Hypertension Mother     Heart Failure Mother     Coronary Artery Disease Mother         CABG    Arrhythmia Mother     Cerebrovascular Disease Father     Heart Failure Father     Colon Cancer Father     Coronary Artery Disease Father         CABG    Coronary Artery Disease Brother         CABG    Arrhythmia Brother        SOCIAL HISTORY:   Social History     Socioeconomic History    Marital status:      Spouse name: None    Number of children: None    Years of education: None    Highest education level: None   Tobacco Use    Smoking status: Former     Types: Cigarettes     Quit date: 1987     Years since quittin.3    Smokeless tobacco: Never   Vaping Use    Vaping Use: Never used   Substance and Sexual Activity    Alcohol use: Yes     Alcohol/week: 0.0 standard drinks of alcohol     Comment: occasionally    Drug use: No   Other Topics Concern    Caffeine Concern No     Comment: 2-3 a day    Sleep Concern Yes     Comment:  "depends    Weight Concern No     Comment: weight loss    Special Diet No    Exercise No     Comment: limited due to knee pain    Seat Belt Yes       Review Of Systems:  Skin: No rash, pruritis, or skin pigmentation  Eyes: No changes in vision  Ears/Nose/Throat: No changes in hearing, no nosebleeds  Respiratory: No shortness of breath, dyspnea on exertion, cough, or hemoptysis  Cardiovascular: No chest pain or palpitations  Gastrointestinal: No diarrhea or constipation. No abdominal pain. No hematochezia  Genitourinary: see HPI  Musculoskeletal: No pain or swelling of joints, normal range of motion  Neurologic: No weakness or tremors  Psychiatric: No recent changes in memory or mood  Hematologic/Lymphatic/Immunologic: No easy bruising or enlarged lymph nodes  Endocrine: No weight gain or loss      PHYSICAL EXAM:    /73   Pulse 60   Ht 1.676 m (5' 6\")   Wt 92.5 kg (204 lb)   SpO2 96%   BMI 32.93 kg/m      Constitutional: Well developed. Conversant and in no acute distress  Eyes: Anicteric sclera, conjunctiva clear, normal extraocular movements  ENT: Normocephalic and atraumatic,   Skin: Warm and dry. No rashes or lesions  Cardiac: No peripheral edema  Back/Flank: Not done  CNS/PNS: Normal musculature and movements, moves all extremities normally  Respiratory: Normal non-labored breathing  Abdomen: Soft nontender and nondistended  Peripheral Vascular: No peripheral edema  Mental Status/Psych: Alert and Oriented x 3. Normal mood and affect    Penis: Not done  Scrotal Skin: Not done  Testicles: Not done  Epididymis: Not done  Digital Rectal Exam:     Cystoscopy: Not done    Imaging: None    Urinalysis: UA RESULTS:  No results for input(s): COLOR, APPEARANCE, URINEGLC, URINEBILI, URINEKETONE, SG, UBLD, URINEPH, PROTEIN, UROBILINOGEN, NITRITE, LEUKEST, RBCU, WBCU in the last 23954 hours.    PSA: 5.5    Post Void Residual:     Other labs: None today      IMPRESSION:  Enlarged prostate  Elevated PSA    PLAN:  We " discussed his MRI results.  The MRI shows that it is likely that he has prostate cancer.  However, he could have a low-grade prostate cancer that may not require treatment or could have a more high-grade cancer.  The only way to definitively determine if he has prostate cancer and how aggressive the cancer is would be to do a prostate biopsy.  We discussed prostate biopsy in detail today along with its risks, including bleeding and infection.  All of his questions were answered.  He wishes to proceed.    We will tentatively get him scheduled for prostate biopsy sometime after his visit with his cardiologist.  I would like to follow-up on that visit with his cardiologist before proceeding with biopsy.  If there are cardiac concerns come from that visit we might reconsider proceeding with prostate biopsy at this time.  Ciprofloxacin was prescribed for prophylaxis.  He will need to hold his Coumadin for 5 days prior to biopsy and we discussed this.      Yuan Meredith M.D.

## 2023-08-08 NOTE — PROGRESS NOTES
Office Visit Note  [facility]  [phone]    UROLOGIC DIAGNOSES:   Elevated PSA  Enlarged prostate    CURRENT INTERVENTIONS:   MRI prostate    HISTORY:   This is a 78-year-old gentleman who presented with nocturia and slowing of his urinary stream.  During his work-up a PSA was checked and it was elevated at 5.5. (It had been 4.7 previously as well).  He had an MRI of the prostate performed that showed a 67 g prostate that had a PI-RADS 4 lesion on the left side of the prostate.  He takes Uroxatrol.  He says this has reduced his nocturia somewhat, now it is at 2-3 times nightly.    He takes Coumadin.  He says that he had a DVT after a knee arthroscopic knee surgery 20 to 30 years ago and he has had no problems since that time.  He says he has held the Coumadin previously for minor procedures.  He has a pacemaker as well.  He also has a diagnosis of severe aortic stenosis.  He is scheduled to see his cardiologist again at the end of this month.      PAST MEDICAL HISTORY:   Past Medical History:   Diagnosis Date    Aortic valve stenosis     severe    Blood clot in the legs     left leg after surgery    Gastro-oesophageal reflux disease     Hodgkins lymphoma (H)     bone marrow biopsy    Hypertension     PAD (peripheral artery disease) (H)     persistent atrial fibrillation     AFIB    Unspecified cerebral artery occlusion with cerebral infarction 10/01/2011       PAST SURGICAL HISTORY:   Past Surgical History:   Procedure Laterality Date    APPENDECTOMY      ARTHROPLASTY REVISION HIP  12/27/2011    Procedure:ARTHROPLASTY REVISION HIP; RIGHT TOTAL HIP REVISION WITH BONE GRAFT  ; Surgeon:ANGIE HARRINGTON; Location: OR    BIOPSY  hodgkins lymphoma    bone marrow biopsy, chemo and radiation    CARDIAC SURGERY      cardiac ablation    COLONOSCOPY      CV CORONARY ANGIOGRAM N/A 9/23/2022    Procedure: Coronary Angiogram;  Surgeon: Gennaro Cook MD;  Location:  HEART CARDIAC CATH LAB    CV PCI N/A  2022    Procedure: Percutaneous Coronary Intervention;  Surgeon: Gennaro Cook MD;  Location:  HEART CARDIAC CATH LAB    CV RIGHT HEART CATH MEASUREMENTS RECORDED N/A 2022    Procedure: Right Heart Catheterization;  Surgeon: Gennaro Cook MD;  Location:  HEART CARDIAC CATH LAB    EP PACEMAKER DEVICE & LEAD IMPLANT- RIGHT VENTRICULAR N/A 2023    Procedure: Pacemaker Device & Lead Implant- Right ventricular;  Surgeon: Brian Madrid MD;  Location:  HEART CARDIAC CATH LAB    ORTHOPEDIC SURGERY      hip and knee surgeries    ORTHOPEDIC SURGERY      carpal tunnel  right hand       FAMILY HISTORY:   Family History   Problem Relation Age of Onset    Hypertension Mother     Heart Failure Mother     Coronary Artery Disease Mother         CABG    Arrhythmia Mother     Cerebrovascular Disease Father     Heart Failure Father     Colon Cancer Father     Coronary Artery Disease Father         CABG    Coronary Artery Disease Brother         CABG    Arrhythmia Brother        SOCIAL HISTORY:   Social History     Socioeconomic History    Marital status:      Spouse name: None    Number of children: None    Years of education: None    Highest education level: None   Tobacco Use    Smoking status: Former     Types: Cigarettes     Quit date: 1987     Years since quittin.3    Smokeless tobacco: Never   Vaping Use    Vaping Use: Never used   Substance and Sexual Activity    Alcohol use: Yes     Alcohol/week: 0.0 standard drinks of alcohol     Comment: occasionally    Drug use: No   Other Topics Concern    Caffeine Concern No     Comment: 2-3 a day    Sleep Concern Yes     Comment: depends    Weight Concern No     Comment: weight loss    Special Diet No    Exercise No     Comment: limited due to knee pain    Seat Belt Yes       Review Of Systems:  Skin: No rash, pruritis, or skin pigmentation  Eyes: No changes in vision  Ears/Nose/Throat: No changes in hearing, no nosebleeds  Respiratory:  "No shortness of breath, dyspnea on exertion, cough, or hemoptysis  Cardiovascular: No chest pain or palpitations  Gastrointestinal: No diarrhea or constipation. No abdominal pain. No hematochezia  Genitourinary: see HPI  Musculoskeletal: No pain or swelling of joints, normal range of motion  Neurologic: No weakness or tremors  Psychiatric: No recent changes in memory or mood  Hematologic/Lymphatic/Immunologic: No easy bruising or enlarged lymph nodes  Endocrine: No weight gain or loss      PHYSICAL EXAM:    /73   Pulse 60   Ht 1.676 m (5' 6\")   Wt 92.5 kg (204 lb)   SpO2 96%   BMI 32.93 kg/m      Constitutional: Well developed. Conversant and in no acute distress  Eyes: Anicteric sclera, conjunctiva clear, normal extraocular movements  ENT: Normocephalic and atraumatic,   Skin: Warm and dry. No rashes or lesions  Cardiac: No peripheral edema  Back/Flank: Not done  CNS/PNS: Normal musculature and movements, moves all extremities normally  Respiratory: Normal non-labored breathing  Abdomen: Soft nontender and nondistended  Peripheral Vascular: No peripheral edema  Mental Status/Psych: Alert and Oriented x 3. Normal mood and affect    Penis: Not done  Scrotal Skin: Not done  Testicles: Not done  Epididymis: Not done  Digital Rectal Exam:     Cystoscopy: Not done    Imaging: None    Urinalysis: UA RESULTS:  No results for input(s): COLOR, APPEARANCE, URINEGLC, URINEBILI, URINEKETONE, SG, UBLD, URINEPH, PROTEIN, UROBILINOGEN, NITRITE, LEUKEST, RBCU, WBCU in the last 60782 hours.    PSA: 5.5    Post Void Residual:     Other labs: None today      IMPRESSION:  Enlarged prostate  Elevated PSA    PLAN:  We discussed his MRI results.  The MRI shows that it is likely that he has prostate cancer.  However, he could have a low-grade prostate cancer that may not require treatment or could have a more high-grade cancer.  The only way to definitively determine if he has prostate cancer and how aggressive the cancer is " would be to do a prostate biopsy.  We discussed prostate biopsy in detail today along with its risks, including bleeding and infection.  All of his questions were answered.  He wishes to proceed.    We will tentatively get him scheduled for prostate biopsy sometime after his visit with his cardiologist.  I would like to follow-up on that visit with his cardiologist before proceeding with biopsy.  If there are cardiac concerns come from that visit we might reconsider proceeding with prostate biopsy at this time.  Ciprofloxacin was prescribed for prophylaxis.  He will need to hold his Coumadin for 5 days prior to biopsy and we discussed this.      Yuan Meredith M.D.

## 2023-08-08 NOTE — NURSING NOTE
Chief Complaint   Patient presents with    Follow Up     Mri done 08- ,also pas draw on 07-    Talk about the results of psa and mri today   Everything is going well   Kermit Pineda, clinic assistant

## 2023-08-12 LAB
MDC_IDC_LEAD_IMPLANT_DT: NORMAL
MDC_IDC_LEAD_LOCATION: NORMAL
MDC_IDC_LEAD_LOCATION_DETAIL_1: NORMAL
MDC_IDC_LEAD_MFG: NORMAL
MDC_IDC_LEAD_MODEL: NORMAL
MDC_IDC_LEAD_POLARITY_TYPE: NORMAL
MDC_IDC_LEAD_SERIAL: NORMAL
MDC_IDC_MSMT_BATTERY_DTM: NORMAL
MDC_IDC_MSMT_BATTERY_REMAINING_LONGEVITY: 114 MO
MDC_IDC_MSMT_BATTERY_REMAINING_PERCENTAGE: 100 %
MDC_IDC_MSMT_BATTERY_STATUS: NORMAL
MDC_IDC_MSMT_LEADCHNL_RV_IMPEDANCE_VALUE: 669 OHM
MDC_IDC_MSMT_LEADCHNL_RV_PACING_THRESHOLD_AMPLITUDE: 0.7 V
MDC_IDC_MSMT_LEADCHNL_RV_PACING_THRESHOLD_PULSEWIDTH: 0.4 MS
MDC_IDC_PG_IMPLANT_DTM: NORMAL
MDC_IDC_PG_MFG: NORMAL
MDC_IDC_PG_MODEL: NORMAL
MDC_IDC_PG_SERIAL: NORMAL
MDC_IDC_PG_TYPE: NORMAL
MDC_IDC_SESS_CLINIC_NAME: NORMAL
MDC_IDC_SESS_DTM: NORMAL
MDC_IDC_SESS_TYPE: NORMAL
MDC_IDC_SET_BRADY_LOWRATE: 60 {BEATS}/MIN
MDC_IDC_SET_BRADY_MAX_SENSOR_RATE: 115 {BEATS}/MIN
MDC_IDC_SET_BRADY_MODE: NORMAL
MDC_IDC_SET_LEADCHNL_RV_PACING_AMPLITUDE: 3.5 V
MDC_IDC_SET_LEADCHNL_RV_PACING_CAPTURE_MODE: NORMAL
MDC_IDC_SET_LEADCHNL_RV_PACING_POLARITY: NORMAL
MDC_IDC_SET_LEADCHNL_RV_PACING_PULSEWIDTH: 0.4 MS
MDC_IDC_SET_LEADCHNL_RV_SENSING_ADAPTATION_MODE: NORMAL
MDC_IDC_SET_LEADCHNL_RV_SENSING_POLARITY: NORMAL
MDC_IDC_SET_LEADCHNL_RV_SENSING_SENSITIVITY: 2.5 MV
MDC_IDC_SET_ZONE_DETECTION_INTERVAL: 375 MS
MDC_IDC_SET_ZONE_TYPE: NORMAL
MDC_IDC_SET_ZONE_VENDOR_TYPE: NORMAL
MDC_IDC_STAT_BRADY_DTM_END: NORMAL
MDC_IDC_STAT_BRADY_DTM_START: NORMAL
MDC_IDC_STAT_BRADY_RV_PERCENT_PACED: 93 %
MDC_IDC_STAT_EPISODE_RECENT_COUNT: 0
MDC_IDC_STAT_EPISODE_RECENT_COUNT: 0
MDC_IDC_STAT_EPISODE_RECENT_COUNT: 1
MDC_IDC_STAT_EPISODE_RECENT_COUNT_DTM_END: NORMAL
MDC_IDC_STAT_EPISODE_RECENT_COUNT_DTM_START: NORMAL
MDC_IDC_STAT_EPISODE_TYPE: NORMAL
MDC_IDC_STAT_EPISODE_VENDOR_TYPE: NORMAL
MDC_IDC_STAT_EPISODE_VENDOR_TYPE: NORMAL

## 2023-08-31 ENCOUNTER — OFFICE VISIT (OUTPATIENT)
Dept: CARDIOLOGY | Facility: CLINIC | Age: 78
End: 2023-08-31
Attending: NURSE PRACTITIONER
Payer: COMMERCIAL

## 2023-08-31 VITALS
HEIGHT: 66 IN | SYSTOLIC BLOOD PRESSURE: 142 MMHG | DIASTOLIC BLOOD PRESSURE: 72 MMHG | WEIGHT: 211.8 LBS | HEART RATE: 60 BPM | BODY MASS INDEX: 34.04 KG/M2 | OXYGEN SATURATION: 96 %

## 2023-08-31 DIAGNOSIS — Z95.0 CARDIAC PACEMAKER IN SITU: ICD-10-CM

## 2023-08-31 DIAGNOSIS — I48.20 CHRONIC ATRIAL FIBRILLATION (H): ICD-10-CM

## 2023-08-31 DIAGNOSIS — R55 SYNCOPE, UNSPECIFIED SYNCOPE TYPE: ICD-10-CM

## 2023-08-31 DIAGNOSIS — I35.0 AORTIC VALVE STENOSIS, ETIOLOGY OF CARDIAC VALVE DISEASE UNSPECIFIED: ICD-10-CM

## 2023-08-31 PROCEDURE — 99214 OFFICE O/P EST MOD 30 MIN: CPT | Performed by: INTERNAL MEDICINE

## 2023-08-31 NOTE — LETTER
8/31/2023    Reid Hogue MD  [address]  [facility] [address]    RE: Jonny Goldberg       Dear Colleague,     I had the pleasure of seeing Jonny Goldberg in the [facility].  REASON FOR CONSULTATION: Aortic stenosis    HISTORY OF PRESENT ILLNESS:  I had the pleasure of seeing Jonny Goldberg at the [facility] in [place] this morning     He is a pleasant 78-year-old gentleman with known aortic stenosis, hypertension, carotid artery disease, chronic atrial fibrillation and peripheral arterial disease, who is here for followup.  He was previously seen by Dr. Patel early 2022 for evaluation of aortic stenosis, which was thought to be moderate in severity at that time.    I subsequently saw him in September 2022 for evaluation of lightheaded spells.  He had an echocardiogram at that time which suggested severe aortic stenosis.  However, we felt the symptoms were unlikely related to the aortic stenosis.  He was subsequently found to have high-grade AV block on event monitor and subsequently had permanent pacemaker implantation.  No recurrent presyncopal spells since pacemaker implantation.    He is very active and does not endorse any overwhelming cardiopulmonary symptoms although his wife believes that he is slowing down.  He admits to curtailing his activities.    He had repeat echocardiogram earlier this month which I reviewed.  The aortic valve is severely stenotic with a mean gradient of 31 mmHg, valve area 0.9 cm  and dimensionless index of 0.22.  His ejection fraction is also declined and now is 40 to 45%.      ASSESSMENT AND PLAN:  A very pleasant 78-year male with severe aortic stenosis.  I believe his aortic stenosis has progressed and is now in the severe range.  His LV function was also declined which is likely secondary to his aortic stenosis.  Although is not endorsing significant symptoms, I believe he should proceed with aortic valve replacement  sooner than later.  He has several upcoming procedures including dental procedures and prostate biopsy for possible prostate cancer which needs to be taking care of prior to his TAVR. We will touch base with him in approximately couple weeks to reassess symptoms.  He has had CT TAVR as well as coronary angiogram within the past year.  We will present him in all multidisciplinary TAVR conference in the upcoming weeks.    It was a pleasure seeing Mr. Goldberg in the clinic this afternoon.  I appreciate the opportunity to participate in his care.    Gennaro Cook MD      Today's clinic visit entailed:  30 minutes spent by me on the date of the encounter doing chart review, history and exam, documentation and further activities per the note  Provider  Link to [facility] Help Grid       Encounter Diagnoses   Name Primary?    Cardiac pacemaker in situ     Syncope, unspecified syncope type     Chronic atrial fibrillation (H)     Aortic valve stenosis, etiology of cardiac valve disease unspecified        CURRENT MEDICATIONS:  Current Outpatient Medications   Medication Sig Dispense Refill    alfuzosin ER (UROXATRAL) 10 MG 24 hr tablet Take 1 tablet (10 mg) by mouth daily 90 tablet 4    ALPHA LIPOIC ACID PO Take 600 mg by mouth daily      amLODIPine (NORVASC) 10 MG tablet Take 1 tablet (10 mg) by mouth daily 90 tablet 0    ascorbic acid (VITAMIN C) 1000 MG TABS Take 1,000 mg by mouth daily      chlorthalidone (HYGROTON) 50 MG tablet Take 50 mg by mouth daily      Cholecalciferol (VITAMIN D) 400 UNITS tablet Take 1 tablet by mouth every evening      CINNAMON PO Take 1 capsule by mouth every evening Pt unsure of strength      ezetimibe (ZETIA) 10 MG tablet Take 0.5 tablets (5 mg) by mouth Every Mon, Wed, Fri Morning 20 tablet 3    fenofibrate (TRIGLIDE/LOFIBRA) 160 MG tablet Take 1 tablet (160 mg) by mouth daily Appointment required for further refills 90 tablet 0    multivitamin w/minerals (THERA-VIT-M) tablet Take 1 tablet by mouth  every evening      omega 3 1000 MG CAPS Take 1 capsule by mouth every evening 180 EPA/ 20 DHH      potassium chloride ER (KLOR-CON M) 20 MEQ CR tablet Take 2 tablets (40 mEq) by mouth daily 30 tablet 0    rosuvastatin (CRESTOR) 40 MG tablet Take 40 mg by mouth every evening      Ubiquinol 100 MG CAPS Take 100 mg by mouth daily      warfarin ANTICOAGULANT (COUMADIN) 4 MG tablet 4 mg daily 4 mg daily         ALLERGIES     Allergies   Allergen Reactions    Penicillin G Hives     hives       PAST MEDICAL HISTORY:  Past Medical History:   Diagnosis Date    Aortic valve stenosis     severe    Blood clot in the legs     left leg after surgery    Gastro-oesophageal reflux disease     Hodgkins lymphoma (H)     bone marrow biopsy    Hypertension     PAD (peripheral artery disease) (H)     persistent atrial fibrillation     AFIB    Unspecified cerebral artery occlusion with cerebral infarction 10/01/2011       PAST SURGICAL HISTORY:  Past Surgical History:   Procedure Laterality Date    APPENDECTOMY      ARTHROPLASTY REVISION HIP  12/27/2011    Procedure:ARTHROPLASTY REVISION HIP; RIGHT TOTAL HIP REVISION WITH BONE GRAFT  ; Surgeon:ANGIE HARRINGTON; Location: OR    BIOPSY  hodgkins lymphoma    bone marrow biopsy, chemo and radiation    CARDIAC SURGERY      cardiac ablation    COLONOSCOPY      CV CORONARY ANGIOGRAM N/A 9/23/2022    Procedure: Coronary Angiogram;  Surgeon: Gennaro Cook MD;  Location:  HEART CARDIAC CATH LAB    CV PCI N/A 9/23/2022    Procedure: Percutaneous Coronary Intervention;  Surgeon: Gennaro Cook MD;  Location:  HEART CARDIAC CATH LAB    CV RIGHT HEART CATH MEASUREMENTS RECORDED N/A 9/23/2022    Procedure: Right Heart Catheterization;  Surgeon: Gennaro Cook MD;  Location:  HEART CARDIAC CATH LAB    EP PACEMAKER DEVICE & LEAD IMPLANT- RIGHT VENTRICULAR N/A 2/27/2023    Procedure: Pacemaker Device & Lead Implant- Right ventricular;  Surgeon: Brian Madrid MD;  Location:   "HEART CARDIAC CATH LAB    ORTHOPEDIC SURGERY      hip and knee surgeries    ORTHOPEDIC SURGERY      carpal tunnel  right hand       FAMILY HISTORY:  Family History   Problem Relation Age of Onset    Hypertension Mother     Heart Failure Mother     Coronary Artery Disease Mother         CABG    Arrhythmia Mother     Cerebrovascular Disease Father     Heart Failure Father     Colon Cancer Father     Coronary Artery Disease Father         CABG    Coronary Artery Disease Brother         CABG    Arrhythmia Brother        SOCIAL HISTORY:  Social History     Socioeconomic History    Marital status:      Spouse name: None    Number of children: None    Years of education: None    Highest education level: None   Tobacco Use    Smoking status: Former     Types: Cigarettes     Quit date: 1987     Years since quittin.4    Smokeless tobacco: Never   Vaping Use    Vaping Use: Never used   Substance and Sexual Activity    Alcohol use: Yes     Alcohol/week: 0.0 standard drinks of alcohol     Comment: occasionally    Drug use: No   Other Topics Concern    Caffeine Concern No     Comment: 2-3 a day    Sleep Concern Yes     Comment: depends    Weight Concern No     Comment: weight loss    Special Diet No    Exercise No     Comment: limited due to knee pain    Seat Belt Yes       Review of Systems:  Skin:          Eyes:         ENT:         Respiratory:          Cardiovascular:  Negative for;palpitations;edema;chest pain;fatigue;dizziness;lightheadedness;syncope or near-syncope chest pain;Positive for;exercise intolerance pt states having chest pain on the left side of chest under left arm/armpit about a week prior. states that pain was in chest, not on surface.  Gastroenterology:        Genitourinary:         Musculoskeletal:         Neurologic:         Psychiatric:         Heme/Lymph/Imm:         Endocrine:           Physical Exam:  Vitals: BP (!) 142/72   Pulse 60   Ht 1.676 m (5' 6\")   Wt 96.1 kg (211 lb 12.8 oz) "   SpO2 96%   BMI 34.19 kg/m      Constitutional:           Skin:             Head:           Eyes:           Lymph:      ENT:           Neck:           Respiratory:            Cardiac:                                                           GI:           Extremities and Muscular Skeletal:                 Neurological:           Psych:           CC  Yanira Grey, NP  [address]  [place] [address]      Thank you for allowing me to participate in the care of your patient.      Sincerely,     Gennaro Cook MD, MD     [facility]

## 2023-08-31 NOTE — PROGRESS NOTES
REASON FOR CONSULTATION: Aortic stenosis    HISTORY OF PRESENT ILLNESS:  I had the pleasure of seeing Jonny Goldberg at the [facility] in [place] this morning     He is a pleasant 78-year-old gentleman with known aortic stenosis, hypertension, carotid artery disease, chronic atrial fibrillation and peripheral arterial disease, who is here for followup.  He was previously seen by Dr. Patel early 2022 for evaluation of aortic stenosis, which was thought to be moderate in severity at that time.    I subsequently saw him in September 2022 for evaluation of lightheaded spells.  He had an echocardiogram at that time which suggested severe aortic stenosis.  However, we felt the symptoms were unlikely related to the aortic stenosis.  He was subsequently found to have high-grade AV block on event monitor and subsequently had permanent pacemaker implantation.  No recurrent presyncopal spells since pacemaker implantation.    He is very active and does not endorse any overwhelming cardiopulmonary symptoms although his wife believes that he is slowing down.  He admits to curtailing his activities.    He had repeat echocardiogram earlier this month which I reviewed.  The aortic valve is severely stenotic with a mean gradient of 31 mmHg, valve area 0.9 cm  and dimensionless index of 0.22.  His ejection fraction is also declined and now is 40 to 45%.      ASSESSMENT AND PLAN:  A very pleasant 78-year male with severe aortic stenosis.  I believe his aortic stenosis has progressed and is now in the severe range.  His LV function was also declined which is likely secondary to his aortic stenosis.  Although is not endorsing significant symptoms, I believe he should proceed with aortic valve replacement sooner than later.  He has several upcoming procedures including dental procedures and prostate biopsy for possible prostate cancer which needs to be taking care of prior to his TAVR. We will touch base with him  in approximately couple weeks to reassess symptoms.  He has had CT TAVR as well as coronary angiogram within the past year.  We will present him in all multidisciplinary TAVR conference in the upcoming weeks.    It was a pleasure seeing Mr. Goldberg in the clinic this afternoon.  I appreciate the opportunity to participate in his care.    Gennaro Cook MD      Today's clinic visit entailed:  30 minutes spent by me on the date of the encounter doing chart review, history and exam, documentation and further activities per the note  Provider  Link to [facility] Help Grid       Encounter Diagnoses   Name Primary?    Cardiac pacemaker in situ     Syncope, unspecified syncope type     Chronic atrial fibrillation (H)     Aortic valve stenosis, etiology of cardiac valve disease unspecified        CURRENT MEDICATIONS:  Current Outpatient Medications   Medication Sig Dispense Refill    alfuzosin ER (UROXATRAL) 10 MG 24 hr tablet Take 1 tablet (10 mg) by mouth daily 90 tablet 4    ALPHA LIPOIC ACID PO Take 600 mg by mouth daily      amLODIPine (NORVASC) 10 MG tablet Take 1 tablet (10 mg) by mouth daily 90 tablet 0    ascorbic acid (VITAMIN C) 1000 MG TABS Take 1,000 mg by mouth daily      chlorthalidone (HYGROTON) 50 MG tablet Take 50 mg by mouth daily      Cholecalciferol (VITAMIN D) 400 UNITS tablet Take 1 tablet by mouth every evening      CINNAMON PO Take 1 capsule by mouth every evening Pt unsure of strength      ezetimibe (ZETIA) 10 MG tablet Take 0.5 tablets (5 mg) by mouth Every Mon, Wed, Fri Morning 20 tablet 3    fenofibrate (TRIGLIDE/LOFIBRA) 160 MG tablet Take 1 tablet (160 mg) by mouth daily Appointment required for further refills 90 tablet 0    multivitamin w/minerals (THERA-VIT-M) tablet Take 1 tablet by mouth every evening      omega 3 1000 MG CAPS Take 1 capsule by mouth every evening 180 EPA/ 20 DHH      potassium chloride ER (KLOR-CON M) 20 MEQ CR tablet Take 2 tablets (40 mEq) by mouth daily 30 tablet 0     rosuvastatin (CRESTOR) 40 MG tablet Take 40 mg by mouth every evening      Ubiquinol 100 MG CAPS Take 100 mg by mouth daily      warfarin ANTICOAGULANT (COUMADIN) 4 MG tablet 4 mg daily 4 mg daily         ALLERGIES     Allergies   Allergen Reactions    Penicillin G Hives     hives       PAST MEDICAL HISTORY:  Past Medical History:   Diagnosis Date    Aortic valve stenosis     severe    Blood clot in the legs     left leg after surgery    Gastro-oesophageal reflux disease     Hodgkins lymphoma (H)     bone marrow biopsy    Hypertension     PAD (peripheral artery disease) (H)     persistent atrial fibrillation     AFIB    Unspecified cerebral artery occlusion with cerebral infarction 10/01/2011       PAST SURGICAL HISTORY:  Past Surgical History:   Procedure Laterality Date    APPENDECTOMY      ARTHROPLASTY REVISION HIP  12/27/2011    Procedure:ARTHROPLASTY REVISION HIP; RIGHT TOTAL HIP REVISION WITH BONE GRAFT  ; Surgeon:ANGIE HARRINGTON; Location: OR    BIOPSY  hodgkins lymphoma    bone marrow biopsy, chemo and radiation    CARDIAC SURGERY      cardiac ablation    COLONOSCOPY      CV CORONARY ANGIOGRAM N/A 9/23/2022    Procedure: Coronary Angiogram;  Surgeon: Gennaro Cook MD;  Location:  HEART CARDIAC CATH LAB    CV PCI N/A 9/23/2022    Procedure: Percutaneous Coronary Intervention;  Surgeon: Gennaro Cook MD;  Location:  HEART CARDIAC CATH LAB    CV RIGHT HEART CATH MEASUREMENTS RECORDED N/A 9/23/2022    Procedure: Right Heart Catheterization;  Surgeon: Gennaro Cook MD;  Location:  HEART CARDIAC CATH LAB    EP PACEMAKER DEVICE & LEAD IMPLANT- RIGHT VENTRICULAR N/A 2/27/2023    Procedure: Pacemaker Device & Lead Implant- Right ventricular;  Surgeon: Brian Madrid MD;  Location:  HEART CARDIAC CATH LAB    ORTHOPEDIC SURGERY      hip and knee surgeries    ORTHOPEDIC SURGERY      carpal tunnel  right hand       FAMILY HISTORY:  Family History   Problem Relation Age of Onset     "Hypertension Mother     Heart Failure Mother     Coronary Artery Disease Mother         CABG    Arrhythmia Mother     Cerebrovascular Disease Father     Heart Failure Father     Colon Cancer Father     Coronary Artery Disease Father         CABG    Coronary Artery Disease Brother         CABG    Arrhythmia Brother        SOCIAL HISTORY:  Social History     Socioeconomic History    Marital status:      Spouse name: None    Number of children: None    Years of education: None    Highest education level: None   Tobacco Use    Smoking status: Former     Types: Cigarettes     Quit date: 1987     Years since quittin.4    Smokeless tobacco: Never   Vaping Use    Vaping Use: Never used   Substance and Sexual Activity    Alcohol use: Yes     Alcohol/week: 0.0 standard drinks of alcohol     Comment: occasionally    Drug use: No   Other Topics Concern    Caffeine Concern No     Comment: 2-3 a day    Sleep Concern Yes     Comment: depends    Weight Concern No     Comment: weight loss    Special Diet No    Exercise No     Comment: limited due to knee pain    Seat Belt Yes       Review of Systems:  Skin:          Eyes:         ENT:         Respiratory:          Cardiovascular:  Negative for;palpitations;edema;chest pain;fatigue;dizziness;lightheadedness;syncope or near-syncope chest pain;Positive for;exercise intolerance pt states having chest pain on the left side of chest under left arm/armpit about a week prior. states that pain was in chest, not on surface.  Gastroenterology:        Genitourinary:         Musculoskeletal:         Neurologic:         Psychiatric:         Heme/Lymph/Imm:         Endocrine:           Physical Exam:  Vitals: BP (!) 142/72   Pulse 60   Ht 1.676 m (5' 6\")   Wt 96.1 kg (211 lb 12.8 oz)   SpO2 96%   BMI 34.19 kg/m      Constitutional:           Skin:             Head:           Eyes:           Lymph:      ENT:           Neck:           Respiratory:            Cardiac:             "                                               GI:           Extremities and Muscular Skeletal:                 Neurological:           Psych:           CC  Yanira Grey, NP  [address]  [place] [address]

## 2023-09-11 DIAGNOSIS — E78.2 MIXED HYPERLIPIDEMIA: ICD-10-CM

## 2023-09-11 RX ORDER — FENOFIBRATE 160 MG/1
160 TABLET ORAL DAILY
Qty: 90 TABLET | Refills: 3 | Status: SHIPPED | OUTPATIENT
Start: 2023-09-11 | End: 2024-09-04

## 2023-09-14 ENCOUNTER — OFFICE VISIT (OUTPATIENT)
Dept: UROLOGY | Facility: CLINIC | Age: 78
End: 2023-09-14
Payer: COMMERCIAL

## 2023-09-14 VITALS — DIASTOLIC BLOOD PRESSURE: 73 MMHG | HEART RATE: 59 BPM | OXYGEN SATURATION: 97 % | SYSTOLIC BLOOD PRESSURE: 146 MMHG

## 2023-09-14 DIAGNOSIS — Z12.5 SCREENING FOR PROSTATE CANCER: ICD-10-CM

## 2023-09-14 DIAGNOSIS — Z79.2 PROPHYLACTIC ANTIBIOTIC: Primary | ICD-10-CM

## 2023-09-14 DIAGNOSIS — R97.20 ELEVATED PROSTATE SPECIFIC ANTIGEN (PSA): ICD-10-CM

## 2023-09-14 PROCEDURE — G0416 PROSTATE BIOPSY, ANY MTHD: HCPCS | Performed by: PATHOLOGY

## 2023-09-14 PROCEDURE — 55700 PR BIOPSY OF PROSTATE,NEEDLE/PUNCH: CPT | Performed by: UROLOGY

## 2023-09-14 PROCEDURE — 96372 THER/PROPH/DIAG INJ SC/IM: CPT | Mod: 59 | Performed by: UROLOGY

## 2023-09-14 PROCEDURE — 76942 ECHO GUIDE FOR BIOPSY: CPT | Performed by: UROLOGY

## 2023-09-14 RX ORDER — GENTAMICIN 40 MG/ML
80 INJECTION, SOLUTION INTRAMUSCULAR; INTRAVENOUS ONCE
Status: COMPLETED | OUTPATIENT
Start: 2023-09-14 | End: 2023-09-14

## 2023-09-14 RX ORDER — LIDOCAINE HYDROCHLORIDE 10 MG/ML
20 INJECTION, SOLUTION INFILTRATION; PERINEURAL ONCE
Status: COMPLETED | OUTPATIENT
Start: 2023-09-14 | End: 2023-09-14

## 2023-09-14 RX ADMIN — GENTAMICIN 80 MG: 40 INJECTION, SOLUTION INTRAMUSCULAR; INTRAVENOUS at 11:10

## 2023-09-14 RX ADMIN — LIDOCAINE HYDROCHLORIDE 20 ML: 10 INJECTION, SOLUTION INFILTRATION; PERINEURAL at 11:40

## 2023-09-14 NOTE — PATIENT INSTRUCTIONS
[facility]  Transrectal Ultrasound  Post Operative Information    The physician who performed your Transrectal Ultrasound is Dr. Meredith (telephone number [phone] 8-4:30pm after hours please call [phone].  Please contact this doctor if you have any problems or questions.  If unable to reach your doctor, please return to the Emergency Department.    Take one antibiotic the evening of the procedure and then as directed on your prescription.  Drink at least 6-8 glasses of fluids for the first 48 hours.  Avoid heavy lifting and strenuous activity for 48 hours.  Avoid sexual intercourse for the first 24 hours.  No aspirin or ibuprofen products (Motrin, Advil, Nuprin, ect.) for one week.  You may take acetaminophen (Tylenol) for pain. You may take 2-500mg extra strength tylenol every 6 hours, not to exceed the daily recommendation of 4,000mg.   You may notice a small amount of blood on the tissue after a bowel movement.  You may pass blood with clots in your urine following the procedure.  The amount will decrease with time but may be visible for up to two weeks. If you have trouble urinating due to a possible blood clot, please call our office.  You make have blood in your semen for 4 weeks after the procedure.  You may experience mild perineal (groin area) discomfort after the procedure. If you were not prescribed a sedative, you may take a tub soak to alleviate groin discomfort.   Please call you doctor if you have any of the follow symptoms:  Fever over 101.1  Increase in the amount of blood passed, dark-red blood cherry color urine/Trouble Urinating  Severe discomfort or pain not well managed with methods above

## 2023-09-14 NOTE — PROGRESS NOTES
Here for an MRI-ultrasound-fusion guided biopsy of the prostate    We previously obtained an MRI of the prostate and identified all PIRADS 4-5 lesions for targeting    Target Lesion #1 Left sided lesion    Prostate volume on MRI 67mL    PSA   Date Value Ref Range Status   05/05/2021 3.4 0 - 4.0 ng/mL Final     Prostate Specific Antigen Screen   Date Value Ref Range Status   07/11/2023 5.50 (H) 0.00 - 4.00 ug/L Final       An enema was completed and 500 mg of Cipro was given prior to the biopsy.  After obtaining informed consent patient was placed in lateral decubitus position.  The ultrasound probe was placed in the rectum.  The prostate was numbed using ultrasound guidance with 1% lidocaine 5 mls along each nerve bundle.  US images were obtained and then fused with the MRI images.  The fused images were then used to guide the biopsy of the targeted lesions with at least 2 cores taken of each lesion.  We then performed random biopsies.  12 cores were taken with 6 on each side, base, mid and apex.  The patient tolerated the procedure well.      We will follow up with the results in 7-10 days and contact patient with these results.

## 2023-09-14 NOTE — NURSING NOTE
Chief Complaint   Patient presents with    Elevated PSA     Patient is here for Transrectal Ultrasound/MRI Guided Prostate Biopsies      Procedure was explained to patient prior to performing said procedure.  The patient signed the [place] consent form and all questions were answered prior to the procedure.  Any pre-procedural antibiotics were given according to the performing physician's recommendation.  Patient reviewed information on the labels attached to samples and confirmed the accuracy of all of the labels.     Performing Physician:  Dr. Meredith  Antibiotic taken?  yes  Aspirin or other blood thinning medications discontinued 7-10 days:  yes  Time of enema:  yes  Patient given Gentamicin intramuscular injection 30 minutes prior to procedure  Yes    The following medication was given:     MEDICATION: Gentamicin   ROUTE: IM  SITE: RUQ - Gluteus  DOSE:80mg/2ml  LOT #: [id number]  : [employer]  EXPIRATION DATE: 10/24  NDC#: 58647-643-53    Was there drug waste? No  Multi-dose vial: Yes     Using a 1 1/2 inch 21 guage needle medication drawn up as ordered with sterile syringe. Using sterile technique, a new 1 1/2 needle 21 gauge placed on syringe and patient cleansed with alcohol pad. Site was mapped out using palm of hand on the greater trochanter and forefinger on iliac crest. Using V technique between middle finger and index finger. Skin was tracted and Injection was given using a 90 degree angle dart method and after aspiration of needle and no blood, medication was slowly given via IM injection.  Patient tolerated injection well, and bandage placed on site following insertion removal.      The following medication was given by MD:     MEDICATION:  Lidocaine 1% Soln  ROUTE: Local Infiltration   SITE: Prostate  DOSE: 200mg/20ml  LOT #: [id number]  : [employer]  EXPIRATION DATE: 01/01/2025  NDC#: 7738-0259-91  Was there drug waste? No  Multi-dose vial: Yes     Twelve samples were taken from  the right and left, medial and lateral base, mid and apex of the prostate respectively.  Three additional samples were taken from Left Sided Lesion.  Vitals were repeated prior to patient leaving and instructions for post TRUS care were explained to the patient.      Birdie Eason LPN

## 2023-09-14 NOTE — LETTER
9/14/2023       RE: Jonny Goldberg  [address]  [facility] [address]     Dear Colleague,    Thank you for referring your patient, Jonny Goldberg, to the [facility] at [facility]. Please see a copy of my visit note below.    Here for an MRI-ultrasound-fusion guided biopsy of the prostate    We previously obtained an MRI of the prostate and identified all PIRADS 4-5 lesions for targeting    Target Lesion #1 Left sided lesion    Prostate volume on MRI 67mL    PSA   Date Value Ref Range Status   05/05/2021 3.4 0 - 4.0 ng/mL Final     Prostate Specific Antigen Screen   Date Value Ref Range Status   07/11/2023 5.50 (H) 0.00 - 4.00 ug/L Final       An enema was completed and 500 mg of Cipro was given prior to the biopsy.  After obtaining informed consent patient was placed in lateral decubitus position.  The ultrasound probe was placed in the rectum.  The prostate was numbed using ultrasound guidance with 1% lidocaine 5 mls along each nerve bundle.  US images were obtained and then fused with the MRI images.  The fused images were then used to guide the biopsy of the targeted lesions with at least 2 cores taken of each lesion.  We then performed random biopsies.  12 cores were taken with 6 on each side, base, mid and apex.  The patient tolerated the procedure well.      We will follow up with the results in 7-10 days and contact patient with these results.       Again, thank you for allowing me to participate in the care of your patient.      Sincerely,    Yuan Meredith MD

## 2023-09-20 LAB
PATH REPORT.COMMENTS IMP SPEC: NORMAL
PATH REPORT.COMMENTS IMP SPEC: NORMAL
PATH REPORT.FINAL DX SPEC: NORMAL
PATH REPORT.GROSS SPEC: NORMAL
PATH REPORT.MICROSCOPIC SPEC OTHER STN: NORMAL
PATH REPORT.RELEVANT HX SPEC: NORMAL
PHOTO IMAGE: NORMAL

## 2023-09-22 ENCOUNTER — VIRTUAL VISIT (OUTPATIENT)
Dept: UROLOGY | Facility: CLINIC | Age: 78
End: 2023-09-22
Payer: COMMERCIAL

## 2023-09-22 ENCOUNTER — TELEPHONE (OUTPATIENT)
Dept: CARDIOLOGY | Facility: CLINIC | Age: 78
End: 2023-09-22

## 2023-09-22 DIAGNOSIS — R97.20 ELEVATED PROSTATE SPECIFIC ANTIGEN (PSA): Primary | ICD-10-CM

## 2023-09-22 PROCEDURE — 99213 OFFICE O/P EST LOW 20 MIN: CPT | Mod: VID | Performed by: UROLOGY

## 2023-09-22 RX ORDER — CLINDAMYCIN HCL 300 MG
2 CAPSULE ORAL
COMMUNITY
Start: 2023-09-20

## 2023-09-22 ASSESSMENT — PAIN SCALES - GENERAL: PAINLEVEL: NO PAIN (0)

## 2023-09-22 NOTE — PROGRESS NOTES
Office Visit Note  [facility]  [phone]    UROLOGIC DIAGNOSES:   Elevated PSA  Enlarged prostate    CURRENT INTERVENTIONS:   Negative prostate biopsy    HISTORY:   Jonny underwent prostate biopsy in the office last week and he tolerated the procedure well.  He has had no symptoms.  His biopsies were all negative.  The targeted biopsies taken from the lesion seen on the left side of the prostate showed inflammation only.  He does have an enlarged prostate but has only minor urinary symptoms or complaints.      PAST MEDICAL HISTORY:   Past Medical History:   Diagnosis Date    Aortic valve stenosis     severe    Blood clot in the legs     left leg after surgery    Gastro-oesophageal reflux disease     Hodgkins lymphoma (H)     bone marrow biopsy    Hypertension     PAD (peripheral artery disease) (H)     persistent atrial fibrillation     AFIB    Unspecified cerebral artery occlusion with cerebral infarction 10/01/2011       PAST SURGICAL HISTORY:   Past Surgical History:   Procedure Laterality Date    APPENDECTOMY      ARTHROPLASTY REVISION HIP  12/27/2011    Procedure:ARTHROPLASTY REVISION HIP; RIGHT TOTAL HIP REVISION WITH BONE GRAFT  ; Surgeon:ANGIE HARRINGTON; Location: OR    BIOPSY  hodgkins lymphoma    bone marrow biopsy, chemo and radiation    CARDIAC SURGERY      cardiac ablation    COLONOSCOPY      CV CORONARY ANGIOGRAM N/A 9/23/2022    Procedure: Coronary Angiogram;  Surgeon: Gennaro Cook MD;  Location:  HEART CARDIAC CATH LAB    CV PCI N/A 9/23/2022    Procedure: Percutaneous Coronary Intervention;  Surgeon: Gennaro Cook MD;  Location:  HEART CARDIAC CATH LAB    CV RIGHT HEART CATH MEASUREMENTS RECORDED N/A 9/23/2022    Procedure: Right Heart Catheterization;  Surgeon: Gennaro Cook MD;  Location:  HEART CARDIAC CATH LAB    EP PACEMAKER DEVICE & LEAD IMPLANT- RIGHT VENTRICULAR N/A 2/27/2023    Procedure: Pacemaker Device & Lead Implant- Right ventricular;   Surgeon: Brian Madrid MD;  Location:  HEART CARDIAC CATH LAB    ORTHOPEDIC SURGERY      hip and knee surgeries    ORTHOPEDIC SURGERY      carpal tunnel  right hand       FAMILY HISTORY:   Family History   Problem Relation Age of Onset    Hypertension Mother     Heart Failure Mother     Coronary Artery Disease Mother         CABG    Arrhythmia Mother     Cerebrovascular Disease Father     Heart Failure Father     Colon Cancer Father     Coronary Artery Disease Father         CABG    Coronary Artery Disease Brother         CABG    Arrhythmia Brother        SOCIAL HISTORY:   Social History     Tobacco Use    Smoking status: Former     Types: Cigarettes     Quit date: 1987     Years since quittin.4    Smokeless tobacco: Never   Substance Use Topics    Alcohol use: Yes     Alcohol/week: 0.0 standard drinks of alcohol     Comment: occasionally       REVIEW OF SYSTEMS:  Skin: No rash, pruritis, or skin pigmentation  Eyes: No changes in vision  Ears/Nose/Throat: No changes in hearing, no nosebleeds  Respiratory: No shortness of breath, dyspnea on exertion, cough, or hemoptysis  Cardiovascular: No chest pain or palpitations  Gastrointestinal: No diarrhea or constipation. No abdominal pain. No hematochezia  Genitourinary: see HPI  Musculoskeletal: No pain or swelling of joints, normal range of motion  Neurologic: No weakness or tremors  Psychiatric: No recent changes in memory or mood  Hematologic/Lymphatic/Immunologic: No easy bruising or enlarged lymph nodes  Endocrine: No weight gain or loss      PHYSICAL EXAM:    General: Alert and oriented to time, place, and self. In NAD   HEENT: Head AT/NC, EOMI, CN Grossly intact   Lungs: no respiratory distress, or pursed lip breathing   Heart: No obvious jugular venous distension present   Musculoskeltal: Normal movements. Normal appearing musculature  Skin: no suspicious lesions or rashes   Neuro: Alert, oriented, speech and mentation normal; moving all 4  extremities equally.   Psych: affect and mood normal    Imaging: None    Urinalysis: UA RESULTS:  No results for input(s): COLOR, APPEARANCE, URINEGLC, URINEBILI, URINEKETONE, SG, UBLD, URINEPH, PROTEIN, UROBILINOGEN, NITRITE, LEUKEST, RBCU, WBCU in the last 65685 hours.    PSA: 5.5    Post Void Residual:     Other labs: None today      IMPRESSION:  Negative prostate biopsy    PLAN:  His prostate biopsy is negative.  He was provided reassurance.  We discussed general screening recommendations for prostate cancer screening.  It is not recommended to screen for prostate cancer after the age of 75.  Therefore I recommended that he stop prostate cancer screening that he not check his PSA again in the future.  He is welcome to follow-up with me as needed in the future.      Yuan Meredith M.D.              Virtual Visit Details    Type of service:  Video Visit   Video Start Time: 11:01 AM  Video End Time:11:12 AM    Originating Location (pt. Location): Home    Distant Location (provider location):  On-site  Platform used for Video Visit: King

## 2023-09-22 NOTE — TELEPHONE ENCOUNTER
Called patient today to follow up after his office visit with Dr. Cook on 8/31/23. Patient states that he has continued to have worsening symptoms since then. He has been feeling fatigued and generally unwell. He has been trying to walk and remain active but has been having to stop and rest frequently. Informed patient that we will plan to present his case at our valve conference next week. Discussed that I would call him following that discussion. Patient states understanding and agreement with plan.

## 2023-09-22 NOTE — NURSING NOTE
Is the patient currently in the state of [place]? YES    Visit mode:VIDEO    If the visit is dropped, the patient can be reconnected by: VIDEO VISIT: Text to cell phone:   Telephone Information:   Mobile [phone]       Will anyone else be joining the visit? NO  (If patient encounters technical issues they should call [phone]    How would you like to obtain your AVS? Mail    Are changes needed to the allergy or medication list? No    Reason for visit: RECHECK (ctru)    Ninoska ADDISON

## 2023-09-22 NOTE — LETTER
9/22/2023       RE: Jonny Goldberg  [address]  [facility] [address]     Dear Colleague,    Thank you for referring your patient, Jonny Goldberg, to the [facility] UROLOGY CLINIC [place] at [facility]. Please see a copy of my visit note below.    Office Visit Note  [facility] Urology Clinic  [phone]    UROLOGIC DIAGNOSES:   Elevated PSA  Enlarged prostate    CURRENT INTERVENTIONS:   Negative prostate biopsy    HISTORY:   Jonny underwent prostate biopsy in the office last week and he tolerated the procedure well.  He has had no symptoms.  His biopsies were all negative.  The targeted biopsies taken from the lesion seen on the left side of the prostate showed inflammation only.  He does have an enlarged prostate but has only minor urinary symptoms or complaints.      PAST MEDICAL HISTORY:   Past Medical History:   Diagnosis Date    Aortic valve stenosis     severe    Blood clot in the legs     left leg after surgery    Gastro-oesophageal reflux disease     Hodgkins lymphoma (H)     bone marrow biopsy    Hypertension     PAD (peripheral artery disease) (H)     persistent atrial fibrillation     AFIB    Unspecified cerebral artery occlusion with cerebral infarction 10/01/2011       PAST SURGICAL HISTORY:   Past Surgical History:   Procedure Laterality Date    APPENDECTOMY      ARTHROPLASTY REVISION HIP  12/27/2011    Procedure:ARTHROPLASTY REVISION HIP; RIGHT TOTAL HIP REVISION WITH BONE GRAFT  ; Surgeon:ANGIE HARRINGTON; Location: OR    BIOPSY  hodgkins lymphoma    bone marrow biopsy, chemo and radiation    CARDIAC SURGERY      cardiac ablation    COLONOSCOPY      CV CORONARY ANGIOGRAM N/A 9/23/2022    Procedure: Coronary Angiogram;  Surgeon: Gennaro Cook MD;  Location:  HEART CARDIAC CATH LAB    CV PCI N/A 9/23/2022    Procedure: Percutaneous Coronary Intervention;  Surgeon: Gennaro Cook MD;  Location:  HEART CARDIAC CATH LAB     CV RIGHT HEART CATH MEASUREMENTS RECORDED N/A 2022    Procedure: Right Heart Catheterization;  Surgeon: Gennaro Cook MD;  Location:  HEART CARDIAC CATH LAB    EP PACEMAKER DEVICE & LEAD IMPLANT- RIGHT VENTRICULAR N/A 2023    Procedure: Pacemaker Device & Lead Implant- Right ventricular;  Surgeon: Brian Madrid MD;  Location:  HEART CARDIAC CATH LAB    ORTHOPEDIC SURGERY      hip and knee surgeries    ORTHOPEDIC SURGERY      carpal tunnel  right hand       FAMILY HISTORY:   Family History   Problem Relation Age of Onset    Hypertension Mother     Heart Failure Mother     Coronary Artery Disease Mother         CABG    Arrhythmia Mother     Cerebrovascular Disease Father     Heart Failure Father     Colon Cancer Father     Coronary Artery Disease Father         CABG    Coronary Artery Disease Brother         CABG    Arrhythmia Brother        SOCIAL HISTORY:   Social History     Tobacco Use    Smoking status: Former     Types: Cigarettes     Quit date: 1987     Years since quittin.4    Smokeless tobacco: Never   Substance Use Topics    Alcohol use: Yes     Alcohol/week: 0.0 standard drinks of alcohol     Comment: occasionally       REVIEW OF SYSTEMS:  Skin: No rash, pruritis, or skin pigmentation  Eyes: No changes in vision  Ears/Nose/Throat: No changes in hearing, no nosebleeds  Respiratory: No shortness of breath, dyspnea on exertion, cough, or hemoptysis  Cardiovascular: No chest pain or palpitations  Gastrointestinal: No diarrhea or constipation. No abdominal pain. No hematochezia  Genitourinary: see HPI  Musculoskeletal: No pain or swelling of joints, normal range of motion  Neurologic: No weakness or tremors  Psychiatric: No recent changes in memory or mood  Hematologic/Lymphatic/Immunologic: No easy bruising or enlarged lymph nodes  Endocrine: No weight gain or loss      PHYSICAL EXAM:    General: Alert and oriented to time, place, and self. In NAD   HEENT: Head AT/NC, EOMI, CN  Grossly intact   Lungs: no respiratory distress, or pursed lip breathing   Heart: No obvious jugular venous distension present   Musculoskeltal: Normal movements. Normal appearing musculature  Skin: no suspicious lesions or rashes   Neuro: Alert, oriented, speech and mentation normal; moving all 4 extremities equally.   Psych: affect and mood normal    Imaging: None    Urinalysis: UA RESULTS:  No results for input(s): COLOR, APPEARANCE, URINEGLC, URINEBILI, URINEKETONE, SG, UBLD, URINEPH, PROTEIN, UROBILINOGEN, NITRITE, LEUKEST, RBCU, WBCU in the last 99578 hours.    PSA: 5.5    Post Void Residual:     Other labs: None today      IMPRESSION:  Negative prostate biopsy    PLAN:  His prostate biopsy is negative.  He was provided reassurance.  We discussed general screening recommendations for prostate cancer screening.  It is not recommended to screen for prostate cancer after the age of 75.  Therefore I recommended that he stop prostate cancer screening that he not check his PSA again in the future.  He is welcome to follow-up with me as needed in the future.      Yuan Meredith M.D.              Virtual Visit Details    Type of service:  Video Visit   Video Start Time: 11:01 AM  Video End Time:11:12 AM    Originating Location (pt. Location): Home    Distant Location (provider location):  On-site  Platform used for Video Visit: Knig

## 2023-09-28 ENCOUNTER — TELEPHONE (OUTPATIENT)
Dept: CARDIOLOGY | Facility: CLINIC | Age: 78
End: 2023-09-28
Payer: COMMERCIAL

## 2023-09-28 DIAGNOSIS — I35.0 SEVERE AORTIC STENOSIS: Primary | ICD-10-CM

## 2023-09-28 NOTE — TELEPHONE ENCOUNTER
Patient was presented this morning at the multidisciplinary valve conference. Plan is to proceed with TAVR procedure.    Valve: De Paz  Approach: LJESSE, perclose  Sedation: Conscious     Above information was called out to the patient. Will plan to proceed with TAVR on 10/10/23. He will need H&P, labs, and telephone bailout conversation prior. Will have [place] schedule.    Ingrid Latif RN  Structural Heart Coordinator  [facility]

## 2023-10-01 LAB
ABO/RH(D): NORMAL
ANTIBODY SCREEN: NEGATIVE
SPECIMEN EXPIRATION DATE: NORMAL

## 2023-10-02 ENCOUNTER — LAB (OUTPATIENT)
Dept: LAB | Facility: CLINIC | Age: 78
End: 2023-10-02
Payer: COMMERCIAL

## 2023-10-02 ENCOUNTER — OFFICE VISIT (OUTPATIENT)
Dept: CARDIOLOGY | Facility: CLINIC | Age: 78
End: 2023-10-02
Payer: COMMERCIAL

## 2023-10-02 VITALS
HEART RATE: 63 BPM | DIASTOLIC BLOOD PRESSURE: 66 MMHG | SYSTOLIC BLOOD PRESSURE: 138 MMHG | OXYGEN SATURATION: 95 % | BODY MASS INDEX: 33.33 KG/M2 | HEIGHT: 66 IN | WEIGHT: 207.4 LBS

## 2023-10-02 DIAGNOSIS — I35.0 SEVERE AORTIC STENOSIS: Primary | ICD-10-CM

## 2023-10-02 DIAGNOSIS — I35.0 SEVERE AORTIC STENOSIS: ICD-10-CM

## 2023-10-02 LAB
ALBUMIN SERPL BCG-MCNC: 4.2 G/DL (ref 3.5–5.2)
ALP SERPL-CCNC: 46 U/L (ref 40–129)
ALT SERPL W P-5'-P-CCNC: 26 U/L (ref 0–70)
ANION GAP SERPL CALCULATED.3IONS-SCNC: 13 MMOL/L (ref 7–15)
AST SERPL W P-5'-P-CCNC: 50 U/L (ref 0–45)
BILIRUB SERPL-MCNC: 1 MG/DL
BLOOD BANK CHART COMMENT: NORMAL
BUN SERPL-MCNC: 24.5 MG/DL (ref 8–23)
CALCIUM SERPL-MCNC: 9.7 MG/DL (ref 8.8–10.2)
CHLORIDE SERPL-SCNC: 102 MMOL/L (ref 98–107)
CREAT SERPL-MCNC: 1.14 MG/DL (ref 0.67–1.17)
DEPRECATED HCO3 PLAS-SCNC: 26 MMOL/L (ref 22–29)
EGFRCR SERPLBLD CKD-EPI 2021: 67 ML/MIN/1.73M2
ERYTHROCYTE [DISTWIDTH] IN BLOOD BY AUTOMATED COUNT: 12.8 % (ref 10–15)
GLUCOSE SERPL-MCNC: 109 MG/DL (ref 70–99)
HCT VFR BLD AUTO: 45.2 % (ref 40–53)
HGB BLD-MCNC: 15.6 G/DL (ref 13.3–17.7)
INR PPP: 2.11 (ref 0.85–1.15)
MCH RBC QN AUTO: 32.6 PG (ref 26.5–33)
MCHC RBC AUTO-ENTMCNC: 34.5 G/DL (ref 31.5–36.5)
MCV RBC AUTO: 95 FL (ref 78–100)
NT-PROBNP SERPL-MCNC: 1063 PG/ML (ref 0–1800)
PLATELET # BLD AUTO: 245 10E3/UL (ref 150–450)
POTASSIUM SERPL-SCNC: 3.6 MMOL/L (ref 3.4–5.3)
PROT SERPL-MCNC: 7.5 G/DL (ref 6.4–8.3)
RBC # BLD AUTO: 4.78 10E6/UL (ref 4.4–5.9)
SODIUM SERPL-SCNC: 141 MMOL/L (ref 135–145)
SPECIMEN EXPIRATION DATE: NORMAL
WBC # BLD AUTO: 6.5 10E3/UL (ref 4–11)

## 2023-10-02 PROCEDURE — 85610 PROTHROMBIN TIME: CPT

## 2023-10-02 PROCEDURE — 99215 OFFICE O/P EST HI 40 MIN: CPT | Performed by: NURSE PRACTITIONER

## 2023-10-02 PROCEDURE — 83880 ASSAY OF NATRIURETIC PEPTIDE: CPT

## 2023-10-02 PROCEDURE — 80053 COMPREHEN METABOLIC PANEL: CPT

## 2023-10-02 PROCEDURE — 86900 BLOOD TYPING SEROLOGIC ABO: CPT

## 2023-10-02 PROCEDURE — 85027 COMPLETE CBC AUTOMATED: CPT

## 2023-10-02 PROCEDURE — 93000 ELECTROCARDIOGRAM COMPLETE: CPT | Performed by: NURSE PRACTITIONER

## 2023-10-02 PROCEDURE — 36415 COLL VENOUS BLD VENIPUNCTURE: CPT

## 2023-10-02 NOTE — PATIENT INSTRUCTIONS
TAVR PRE-PROCEDURE INSTRUCTIONS:    - Your TAVR is scheduled Tuesday 10/10/2023, 2nd case. Please check-in at 8:00am at the Registration Desk in the [facility] at [facility].    - Use the Hibiclens soap I have provided you the night before and morning of the procedure. Wash from chin to toes, please avoid your face and eyes.    - Nothing to eat or drink the morning of your TAVR.     Medication instructions:  - You may take your morning medications with sips of water.   - Hold chlorthalidone morning of procedure.   - Please hold all vitamins and supplements the morning of your procedure.  - You will need to hold your warfarin prior to TAVR, take your last dose on: 10/4/2023.   - You will need to take 325mg of aspirin the morning of your TAVR.    -On the day of the procedure, you may have two visitors in the pre-operative area. Two visitors may see you when you get to your room in the Heart Center (2nd floor of [facility]).    - You will stay overnight on Tuesday night. We will monitor you overnight for signs of bleeding, stroke, as well as need for pacemaker. On Wednesday morning, you will have an echo of your new valve and work with cardiac rehab.     It was nice to see you today! Please call with any other questions, concerns, or any changes in your health: [phone]    Ingrid Latif RN  Structural Heart Coordinator  [facility]

## 2023-10-02 NOTE — PROGRESS NOTES
Completed frailty testing and KCCQ.   5 meter walk: 4.7 seconds             4.9 seconds             4.6 seconds  Frailty score: 0/5    KCCQ Results:   1a. 5  1b. 3  1c. 1  2. 3  3. 1  4. 1  5. 2  6. 2  7. 1  8a. 2  8b. 2  8c. 3    Preliminary STS Risk Score: 2.85%  NYHA Class: II    Ingrid Latif RN  Structural Heart Coordinator  [facility]

## 2023-10-02 NOTE — LETTER
10/2/2023    Reid Hogue MD  [address]  [facility] [address]    RE: Jonny Goldberg       Dear Colleague,     I had the pleasure of seeing Jonny Goldberg in the [facility].      STRUCTURAL HEART CLINIC  H&P    Referring Provider: Dr. Cook    History of Present Illness  Jonny Goldberg is a pleasant 78-year-old gentleman with known aortic stenosis, hypertension, carotid artery disease, chronic atrial fibrillation, and high-grade AV block s/p PPM, who presents for pre-operative H&P in preparation for transcatheter aortic valve replacement on 10/10/2023 at [facility].     The patient has been followed closely since 2022 for his aortic stenosis.  He has remained relatively asymptomatic.  His most recent echocardiogram demonstrated severely stenotic aortic valve with a mean gradient 31, valve area 0.9 cm , dimensionless index of 0.22.  His ejection fraction is also declined and is now 40 to 45%.  Given his newly reduced LV function, it was recommend he proceed with aortic valve replacement sooner than later.    His TAVR guided CT showed favorable anatomy for transfemoral approach with an aortic valve calcium score of 1832.  He underwent coronary angiogram in September 2022 that showed mild coronary disease.    Today Jonny appears doing well from a cardiovascular standpoint.  He has noticed worsening dyspnea on exertion over the last year.  He denies chest pain, syncope or near syncope, or palpitations.  He has no PND or orthopnea.  He has no infectious symptoms.    Assessment and Plan     Jonny Goldberg presents for pre-operative H&P in preparation for a transcatheter aortic valve replacement on 10/10/2023 at [facility].       1. Severe aortic valve stenosis: We discussed the procedure in detail, including pre and post-procedure care, restrictions and follow-up.  He understands risks including 5-10% risk of heart block leading to permanent pacemaker placement, 3%  risk of bleeding, infection, vascular complication, 1-3% risk of stroke and very low risk (<1%) of serious complications such as cardiac perforation, aortic root rupture, dissection or death.     All questions answered  Type and screen orders complete  Supplies for scrubbing provided  No known contrast dye allergy  No trouble with anesthesia in the past  Labs today WNL, no s/s of infection    2. Chronic diastolic heart failure, NYHA class II, Stage B: Secondary to valvular heart disease. No acute volume overload on exam, though patient does endorse significant exertional dyspnea.     3. Permanent atrial fibrillation: Not on rate control medication. He takes Coumadin for stroke prophylaxis. Hold 5 days pre procedure.     4. Hypertension: Currently on amlodipine 10 mg daily and chlorthalidone 50 mg daily. Will hold chlorthalidone pre-procedure.      Medication Recommendations:  Antiplatelet: Take  mg perioperatively   Coumadin: Hold 5 days pre-procedure  Supplements: Hold morning of procedure    Patient is optimized and is acceptable candidate for the proposed procedure.  No further diagnostic evaluation is needed. Pre-procedure instructions provided in written format.     Bere Jackson, RUI, APRN, CNP  Structural Heart Nurse Practitioner  [facility]   Pager: [phone]    Current Medications:  Current Outpatient Medications   Medication Sig Dispense Refill    alfuzosin ER (UROXATRAL) 10 MG 24 hr tablet Take 1 tablet (10 mg) by mouth daily 90 tablet 4    ALPHA LIPOIC ACID PO Take 600 mg by mouth daily      amLODIPine (NORVASC) 10 MG tablet Take 1 tablet (10 mg) by mouth daily 90 tablet 0    ascorbic acid (VITAMIN C) 1000 MG TABS Take 1,000 mg by mouth daily      chlorthalidone (HYGROTON) 50 MG tablet Take 50 mg by mouth daily      Cholecalciferol (VITAMIN D) 400 UNITS tablet Take 1 tablet by mouth every evening      CINNAMON PO Take 1 capsule by mouth every evening Pt unsure of strength       clindamycin (CLEOCIN) 300 MG capsule       ezetimibe (ZETIA) 10 MG tablet Take 0.5 tablets (5 mg) by mouth Every Mon, Wed, Fri Morning 20 tablet 3    fenofibrate (TRIGLIDE/LOFIBRA) 160 MG tablet Take 1 tablet (160 mg) by mouth daily 90 tablet 3    multivitamin w/minerals (THERA-VIT-M) tablet Take 1 tablet by mouth every evening      omega 3 1000 MG CAPS Take 1 capsule by mouth every evening 180 EPA/ 20 DHH      potassium chloride ER (KLOR-CON M) 20 MEQ CR tablet Take 2 tablets (40 mEq) by mouth daily 30 tablet 0    rosuvastatin (CRESTOR) 40 MG tablet Take 40 mg by mouth every evening      Ubiquinol 100 MG CAPS Take 100 mg by mouth daily      warfarin ANTICOAGULANT (COUMADIN) 4 MG tablet 4 mg daily 4 mg daily         Allergies:     Allergies   Allergen Reactions    Penicillin G Hives     hives       Past Medical History:  Past Medical History:   Diagnosis Date    Aortic valve stenosis     severe    Blood clot in the legs     left leg after surgery    Gastro-oesophageal reflux disease     Hodgkins lymphoma (H)     bone marrow biopsy    Hypertension     PAD (peripheral artery disease) (H24)     persistent atrial fibrillation     AFIB    Unspecified cerebral artery occlusion with cerebral infarction 10/01/2011       Past Surgical History:  Past Surgical History:   Procedure Laterality Date    APPENDECTOMY      ARTHROPLASTY REVISION HIP  12/27/2011    Procedure:ARTHROPLASTY REVISION HIP; RIGHT TOTAL HIP REVISION WITH BONE GRAFT  ; Surgeon:ANGIE HARRINGTON; Location: OR    BIOPSY  hodgkins lymphoma    bone marrow biopsy, chemo and radiation    CARDIAC SURGERY      cardiac ablation    COLONOSCOPY      CV CORONARY ANGIOGRAM N/A 9/23/2022    Procedure: Coronary Angiogram;  Surgeon: Gennaro Cook MD;  Location:  HEART CARDIAC CATH LAB    CV PCI N/A 9/23/2022    Procedure: Percutaneous Coronary Intervention;  Surgeon: Gennaro Cook MD;  Location:  HEART CARDIAC CATH LAB    CV RIGHT HEART CATH MEASUREMENTS  RECORDED N/A 2022    Procedure: Right Heart Catheterization;  Surgeon: Gennaro Cook MD;  Location:  HEART CARDIAC CATH LAB    EP PACEMAKER DEVICE & LEAD IMPLANT- RIGHT VENTRICULAR N/A 2023    Procedure: Pacemaker Device & Lead Implant- Right ventricular;  Surgeon: Brian Madrid MD;  Location:  HEART CARDIAC CATH LAB    ORTHOPEDIC SURGERY      hip and knee surgeries    ORTHOPEDIC SURGERY      carpal tunnel  right hand       Family History:  Family History   Problem Relation Age of Onset    Hypertension Mother     Heart Failure Mother     Coronary Artery Disease Mother         CABG    Arrhythmia Mother     Cerebrovascular Disease Father     Heart Failure Father     Colon Cancer Father     Coronary Artery Disease Father         CABG    Coronary Artery Disease Brother         CABG    Arrhythmia Brother        Social History:  Social History     Socioeconomic History    Marital status:      Spouse name: None    Number of children: None    Years of education: None    Highest education level: None   Tobacco Use    Smoking status: Former     Types: Cigarettes     Quit date: 1987     Years since quittin.5    Smokeless tobacco: Never   Vaping Use    Vaping Use: Never used   Substance and Sexual Activity    Alcohol use: Yes     Alcohol/week: 0.0 standard drinks of alcohol     Comment: occasionally    Drug use: No   Other Topics Concern    Caffeine Concern No     Comment: 2-3 a day    Sleep Concern Yes     Comment: depends    Weight Concern No     Comment: weight loss    Special Diet No    Exercise No     Comment: limited due to knee pain    Seat Belt Yes       Review of Systems:  Constitutional: No fever, chills, or sweats. No weight gain/loss   ENT: No visual disturbance, ear ache, epistaxis, sore throat  Allergies/Immunologic: Negative.   Respiratory: No cough, hemoptysia  Cardiovascular: As per HPI  GI: No nausea, vomiting, hematemesis, melena, or hematochezia  : No urinary  "frequency, dysuria, or hematuria  Integument: Negative  Psychiatric: Negative  Neuro: Negative  Endocrinology: Negative   Musculoskeletal: Negative      Physical Exam:  Vitals: /66   Pulse 63   Ht 1.676 m (5' 6\")   Wt 94.1 kg (207 lb 6.4 oz)   SpO2 95%   BMI 33.48 kg/m     General: NAD  HEENT:  Dentition intact.    Neck: No jugular venous distension.   Heart: RRR with  JUNIOR at RUSB  Lungs: CTA.    Abdomen: Soft, nontender, nondistended.   Extremities: No clubbing, cyanosis, or edema.  The pulses are +4/4 at the radial, brachial, femoral, popliteal, DP, and PT sites bilaterally.  No bruits are noted.  Neurologic: Alert and oriented to person/place/time, normal speech, gait and affect  Skin: No petechiae, purpura or rash.      Diagnostic Studies:  ECG: paced rhythm   Personally reviewed and interpreted by me.    Coronary Angiogram: 9/2022  Conclusion    1. Mild coronary artery disease   2. Moderately elevated right heart filling pressures (mean RA 13 mmHg)  3. Mildly elevated left heart filling pressure (mean wedge 18 mmHg)  4. Mild PH      Plan     Follow bedrest per protocol   Continued medical management and lifestyle modifications for cardiovascular risk factor optimizations.  Proceed with CT TAVR       Echocardiogram: 8/2023  Interpretation Summary     1. Left ventricular systolic function is mildly reduced. The visual ejection  fraction is 40-45%.  2. The right ventricle is normal in structure, function and size.  3. Severe aortic stenosis; mean gradient 31 mmHg, peak velocity 3.6 m/sec,  calculated valve area 0.9 cm2, DI 0.22, SVI 38 ml/m2. Visually the valve is  heavily calcified with restricted leaflet motion consistent with severe aortic  stenosis.      Laboratory Studies:  Personally reviewed and interpreted by me.    LIPID RESULTS:  Lab Results   Component Value Date    CHOL 128 05/05/2021    HDL 38 05/05/2021    LDL 21 05/05/2021    TRIG 130 05/09/2023    TRIG 117 05/05/2021       LIVER ENZYME " RESULTS:  Lab Results   Component Value Date    AST 42 05/05/2021    ALT 28 05/05/2021       CBC RESULTS:  Lab Results   Component Value Date    WBC 6.2 02/27/2023    WBC 5.4 08/04/2020    RBC 4.86 02/27/2023    RBC 4.86 08/04/2020    HGB 15.6 02/27/2023    HGB 15.3 08/04/2020    HCT 44.5 02/27/2023    HCT 47.0 08/04/2020    MCV 92 02/27/2023    MCV 96.7 08/04/2020    MCH 32.1 02/27/2023    MCH 31.6 08/04/2020    MCHC 35.1 02/27/2023    MCHC 32.7 08/04/2020    RDW 13.2 02/27/2023    RDW 13.4 08/04/2020     02/27/2023     08/04/2020       BMP RESULTS:  Lab Results   Component Value Date     02/27/2023     05/05/2021    POTASSIUM 3.2 (L) 02/28/2023    POTASSIUM 3.0 (L) 09/28/2022    POTASSIUM 3.7 05/05/2021    CHLORIDE 102 05/09/2023    CHLORIDE 100 05/05/2021    CO2 28 02/27/2023    CO2 27 09/28/2022    CO2 28 05/05/2021    ANIONGAP 11 02/27/2023    ANIONGAP 8 09/28/2022    ANIONGAP 1.9 08/04/2020     (H) 02/27/2023     (H) 09/28/2022    GLC 98 05/05/2021    BUN 25.3 (H) 02/27/2023    BUN 32 (H) 09/28/2022    BUN 27 05/05/2021    CR 0.96 02/27/2023    CR 1 05/05/2021    GFRESTIMATED 82 02/27/2023    GFRESTIMATED 62 05/05/2021    GFRESTBLACK 72 05/05/2021    SARA 9.3 02/27/2023    SARA 1.16 05/05/2021        A1C RESULTS:  Lab Results   Component Value Date    A1C 5.9 (A) 05/06/2021       INR RESULTS:  Lab Results   Component Value Date    INR 2.3 05/09/2023    INR 2.02 (H) 02/28/2023    INR 2.14 (H) 02/27/2023    INR 2.4 09/12/2022    INR 1.15 (H) 12/30/2011    INR 1.05 12/29/2011           Completed frailty testing and KCCQ.   5 meter walk: 4.7 seconds             4.9 seconds             4.6 seconds  Frailty score: 0/5    KCCQ Results:   1a. 5  1b. 3  1c. 1  2. 3  3. 1  4. 1  5. 2  6. 2  7. 1  8a. 2  8b. 2  8c. 3    Preliminary STS Risk Score: 2.85%  NYHA Class: II    Ingrid Latif RN  Structural Heart Coordinator  [facility]      Thank you for  allowing me to participate in the care of your patient.      Sincerely,     Bere Jackson [facility]  cc:   No referring provider defined for this encounter.

## 2023-10-02 NOTE — PROGRESS NOTES
[facility]  H&P    Referring Provider: Dr. Cook    History of Present Illness  Jonny Goldberg is a pleasant 78-year-old gentleman with known aortic stenosis, hypertension, carotid artery disease, chronic atrial fibrillation, and high-grade AV block s/p PPM, who presents for pre-operative H&P in preparation for transcatheter aortic valve replacement on 10/10/2023 at [facility].     The patient has been followed closely since 2022 for his aortic stenosis.  He has remained relatively asymptomatic.  His most recent echocardiogram demonstrated severely stenotic aortic valve with a mean gradient 31, valve area 0.9 cm , dimensionless index of 0.22.  His ejection fraction is also declined and is now 40 to 45%.  Given his newly reduced LV function, it was recommend he proceed with aortic valve replacement sooner than later.    His TAVR guided CT showed favorable anatomy for transfemoral approach with an aortic valve calcium score of 1832.  He underwent coronary angiogram in September 2022 that showed mild coronary disease.    Today Jonny appears doing well from a cardiovascular standpoint.  He has noticed worsening dyspnea on exertion over the last year.  He denies chest pain, syncope or near syncope, or palpitations.  He has no PND or orthopnea.  He has no infectious symptoms.    Assessment and Plan     Jonny Goldberg presents for pre-operative H&P in preparation for a transcatheter aortic valve replacement on 10/10/2023 at [facility].       1. Severe aortic valve stenosis: We discussed the procedure in detail, including pre and post-procedure care, restrictions and follow-up.  He understands risks including 5-10% risk of heart block leading to permanent pacemaker placement, 3% risk of bleeding, infection, vascular complication, 1-3% risk of stroke and very low risk (<1%) of serious complications such as cardiac perforation, aortic root rupture, dissection or death.     All  questions answered  Type and screen orders complete  Supplies for scrubbing provided  No known contrast dye allergy  No trouble with anesthesia in the past  Labs today WNL, no s/s of infection    2. Chronic diastolic heart failure, NYHA class II, Stage B: Secondary to valvular heart disease. No acute volume overload on exam, though patient does endorse significant exertional dyspnea.     3. Permanent atrial fibrillation: Not on rate control medication. He takes Coumadin for stroke prophylaxis. Hold 5 days pre procedure.     4. Hypertension: Currently on amlodipine 10 mg daily and chlorthalidone 50 mg daily. Will hold chlorthalidone pre-procedure.      Medication Recommendations:  Antiplatelet: Take  mg perioperatively   Coumadin: Hold 5 days pre-procedure  Supplements: Hold morning of procedure    Patient is optimized and is acceptable candidate for the proposed procedure.  No further diagnostic evaluation is needed. Pre-procedure instructions provided in written format.     Bere Jackson, DNP, APRN, CNP  Structural Heart Nurse Practitioner  [facility]   Pager: [phone]    Current Medications:  Current Outpatient Medications   Medication Sig Dispense Refill    alfuzosin ER (UROXATRAL) 10 MG 24 hr tablet Take 1 tablet (10 mg) by mouth daily 90 tablet 4    ALPHA LIPOIC ACID PO Take 600 mg by mouth daily      amLODIPine (NORVASC) 10 MG tablet Take 1 tablet (10 mg) by mouth daily 90 tablet 0    ascorbic acid (VITAMIN C) 1000 MG TABS Take 1,000 mg by mouth daily      chlorthalidone (HYGROTON) 50 MG tablet Take 50 mg by mouth daily      Cholecalciferol (VITAMIN D) 400 UNITS tablet Take 1 tablet by mouth every evening      CINNAMON PO Take 1 capsule by mouth every evening Pt unsure of strength      clindamycin (CLEOCIN) 300 MG capsule       ezetimibe (ZETIA) 10 MG tablet Take 0.5 tablets (5 mg) by mouth Every Mon, Wed, Fri Morning 20 tablet 3    fenofibrate (TRIGLIDE/LOFIBRA) 160 MG tablet Take 1  tablet (160 mg) by mouth daily 90 tablet 3    multivitamin w/minerals (THERA-VIT-M) tablet Take 1 tablet by mouth every evening      omega 3 1000 MG CAPS Take 1 capsule by mouth every evening 180 EPA/ 20 DHH      potassium chloride ER (KLOR-CON M) 20 MEQ CR tablet Take 2 tablets (40 mEq) by mouth daily 30 tablet 0    rosuvastatin (CRESTOR) 40 MG tablet Take 40 mg by mouth every evening      Ubiquinol 100 MG CAPS Take 100 mg by mouth daily      warfarin ANTICOAGULANT (COUMADIN) 4 MG tablet 4 mg daily 4 mg daily         Allergies:     Allergies   Allergen Reactions    Penicillin G Hives     hives       Past Medical History:  Past Medical History:   Diagnosis Date    Aortic valve stenosis     severe    Blood clot in the legs     left leg after surgery    Gastro-oesophageal reflux disease     Hodgkins lymphoma (H)     bone marrow biopsy    Hypertension     PAD (peripheral artery disease) (H24)     persistent atrial fibrillation     AFIB    Unspecified cerebral artery occlusion with cerebral infarction 10/01/2011       Past Surgical History:  Past Surgical History:   Procedure Laterality Date    APPENDECTOMY      ARTHROPLASTY REVISION HIP  12/27/2011    Procedure:ARTHROPLASTY REVISION HIP; RIGHT TOTAL HIP REVISION WITH BONE GRAFT  ; Surgeon:ANGIE HARRINGTON; Location: OR    BIOPSY  hodgkins lymphoma    bone marrow biopsy, chemo and radiation    CARDIAC SURGERY      cardiac ablation    COLONOSCOPY      CV CORONARY ANGIOGRAM N/A 9/23/2022    Procedure: Coronary Angiogram;  Surgeon: Gennaro Cook MD;  Location:  HEART CARDIAC CATH LAB    CV PCI N/A 9/23/2022    Procedure: Percutaneous Coronary Intervention;  Surgeon: Gennaro Cook MD;  Location:  HEART CARDIAC CATH LAB    CV RIGHT HEART CATH MEASUREMENTS RECORDED N/A 9/23/2022    Procedure: Right Heart Catheterization;  Surgeon: Gennaro Cook MD;  Location:  HEART CARDIAC CATH LAB    EP PACEMAKER DEVICE & LEAD IMPLANT- RIGHT VENTRICULAR N/A 2/27/2023     Procedure: Pacemaker Device & Lead Implant- Right ventricular;  Surgeon: Brian Madrid MD;  Location:  HEART CARDIAC CATH LAB    ORTHOPEDIC SURGERY      hip and knee surgeries    ORTHOPEDIC SURGERY      carpal tunnel  right hand       Family History:  Family History   Problem Relation Age of Onset    Hypertension Mother     Heart Failure Mother     Coronary Artery Disease Mother         CABG    Arrhythmia Mother     Cerebrovascular Disease Father     Heart Failure Father     Colon Cancer Father     Coronary Artery Disease Father         CABG    Coronary Artery Disease Brother         CABG    Arrhythmia Brother        Social History:  Social History     Socioeconomic History    Marital status:      Spouse name: None    Number of children: None    Years of education: None    Highest education level: None   Tobacco Use    Smoking status: Former     Types: Cigarettes     Quit date: 1987     Years since quittin.5    Smokeless tobacco: Never   Vaping Use    Vaping Use: Never used   Substance and Sexual Activity    Alcohol use: Yes     Alcohol/week: 0.0 standard drinks of alcohol     Comment: occasionally    Drug use: No   Other Topics Concern    Caffeine Concern No     Comment: 2-3 a day    Sleep Concern Yes     Comment: depends    Weight Concern No     Comment: weight loss    Special Diet No    Exercise No     Comment: limited due to knee pain    Seat Belt Yes       Review of Systems:  Constitutional: No fever, chills, or sweats. No weight gain/loss   ENT: No visual disturbance, ear ache, epistaxis, sore throat  Allergies/Immunologic: Negative.   Respiratory: No cough, hemoptysia  Cardiovascular: As per HPI  GI: No nausea, vomiting, hematemesis, melena, or hematochezia  : No urinary frequency, dysuria, or hematuria  Integument: Negative  Psychiatric: Negative  Neuro: Negative  Endocrinology: Negative   Musculoskeletal: Negative      Physical Exam:  Vitals: /66   Pulse 63   Ht 1.676  "m (5' 6\")   Wt 94.1 kg (207 lb 6.4 oz)   SpO2 95%   BMI 33.48 kg/m     General: NAD  HEENT:  Dentition intact.    Neck: No jugular venous distension.   Heart: RRR with  JUNIOR at RUSB  Lungs: CTA.    Abdomen: Soft, nontender, nondistended.   Extremities: No clubbing, cyanosis, or edema.  The pulses are +4/4 at the radial, brachial, femoral, popliteal, DP, and PT sites bilaterally.  No bruits are noted.  Neurologic: Alert and oriented to person/place/time, normal speech, gait and affect  Skin: No petechiae, purpura or rash.      Diagnostic Studies:  ECG: paced rhythm   Personally reviewed and interpreted by me.    Coronary Angiogram: 9/2022  Conclusion    1. Mild coronary artery disease   2. Moderately elevated right heart filling pressures (mean RA 13 mmHg)  3. Mildly elevated left heart filling pressure (mean wedge 18 mmHg)  4. Mild PH      Plan     Follow bedrest per protocol   Continued medical management and lifestyle modifications for cardiovascular risk factor optimizations.  Proceed with CT TAVR       Echocardiogram: 8/2023  Interpretation Summary     1. Left ventricular systolic function is mildly reduced. The visual ejection  fraction is 40-45%.  2. The right ventricle is normal in structure, function and size.  3. Severe aortic stenosis; mean gradient 31 mmHg, peak velocity 3.6 m/sec,  calculated valve area 0.9 cm2, DI 0.22, SVI 38 ml/m2. Visually the valve is  heavily calcified with restricted leaflet motion consistent with severe aortic  stenosis.      Laboratory Studies:  Personally reviewed and interpreted by me.    LIPID RESULTS:  Lab Results   Component Value Date    CHOL 128 05/05/2021    HDL 38 05/05/2021    LDL 21 05/05/2021    TRIG 130 05/09/2023    TRIG 117 05/05/2021       LIVER ENZYME RESULTS:  Lab Results   Component Value Date    AST 42 05/05/2021    ALT 28 05/05/2021       CBC RESULTS:  Lab Results   Component Value Date    WBC 6.2 02/27/2023    WBC 5.4 08/04/2020    RBC 4.86 02/27/2023    " RBC 4.86 08/04/2020    HGB 15.6 02/27/2023    HGB 15.3 08/04/2020    HCT 44.5 02/27/2023    HCT 47.0 08/04/2020    MCV 92 02/27/2023    MCV 96.7 08/04/2020    MCH 32.1 02/27/2023    MCH 31.6 08/04/2020    MCHC 35.1 02/27/2023    MCHC 32.7 08/04/2020    RDW 13.2 02/27/2023    RDW 13.4 08/04/2020     02/27/2023     08/04/2020       BMP RESULTS:  Lab Results   Component Value Date     02/27/2023     05/05/2021    POTASSIUM 3.2 (L) 02/28/2023    POTASSIUM 3.0 (L) 09/28/2022    POTASSIUM 3.7 05/05/2021    CHLORIDE 102 05/09/2023    CHLORIDE 100 05/05/2021    CO2 28 02/27/2023    CO2 27 09/28/2022    CO2 28 05/05/2021    ANIONGAP 11 02/27/2023    ANIONGAP 8 09/28/2022    ANIONGAP 1.9 08/04/2020     (H) 02/27/2023     (H) 09/28/2022    GLC 98 05/05/2021    BUN 25.3 (H) 02/27/2023    BUN 32 (H) 09/28/2022    BUN 27 05/05/2021    CR 0.96 02/27/2023    CR 1 05/05/2021    GFRESTIMATED 82 02/27/2023    GFRESTIMATED 62 05/05/2021    GFRESTBLACK 72 05/05/2021    SARA 9.3 02/27/2023    SARA 1.16 05/05/2021        A1C RESULTS:  Lab Results   Component Value Date    A1C 5.9 (A) 05/06/2021       INR RESULTS:  Lab Results   Component Value Date    INR 2.3 05/09/2023    INR 2.02 (H) 02/28/2023    INR 2.14 (H) 02/27/2023    INR 2.4 09/12/2022    INR 1.15 (H) 12/30/2011    INR 1.05 12/29/2011

## 2023-10-03 ENCOUNTER — TELEPHONE (OUTPATIENT)
Dept: CARDIOLOGY | Facility: CLINIC | Age: 78
End: 2023-10-03
Payer: COMMERCIAL

## 2023-10-03 NOTE — TELEPHONE ENCOUNTER
Spoke with patient's peridontist Dr. Maxime Zhu with [facility] he states that he has no concerns about patient proceeding with TAVR next week. Informed Dr. Zhu that we could delay patient's TAVR if he feels this is necessary from a dental standpoint to ensure there is no concern for infection. Dr. Zhu did not feel that this is necessary.   Spoke with patient and reviewed that Dr. Zhu gave clearance to proceed with TAVR next week.

## 2023-10-05 DIAGNOSIS — I35.0 SEVERE AORTIC STENOSIS: Primary | ICD-10-CM

## 2023-10-05 RX ORDER — ASPIRIN 325 MG
325 TABLET ORAL ONCE
Status: CANCELLED | OUTPATIENT
Start: 2023-10-05 | End: 2023-10-05

## 2023-10-05 RX ORDER — CLINDAMYCIN PHOSPHATE 900 MG/50ML
900 INJECTION, SOLUTION INTRAVENOUS
Status: CANCELLED | OUTPATIENT
Start: 2023-10-05

## 2023-10-05 RX ORDER — LIDOCAINE 40 MG/G
CREAM TOPICAL
Status: CANCELLED | OUTPATIENT
Start: 2023-10-05

## 2023-10-05 RX ORDER — SODIUM CHLORIDE 9 MG/ML
INJECTION, SOLUTION INTRAVENOUS CONTINUOUS
Status: CANCELLED | OUTPATIENT
Start: 2023-10-05

## 2023-10-09 RX ORDER — ASPIRIN 81 MG/1
4 TABLET ORAL ONCE
Status: ON HOLD | COMMUNITY
End: 2023-12-13

## 2023-10-09 NOTE — PROGRESS NOTES
PTA medications updated by Medication Scribe prior to surgery via phone call with patient (last doses completed by Nurse)     Medication history sources: Patient, Surescripts, and H&P  In the past week, patient estimated taking medication this percent of the time: Greater than 90%      Significant changes made to the medication list:  None      Additional medication history information:   None    Medication reconciliation completed by provider prior to medication history? No    Time spent in this activity: 30 minutes    The information provided in this note is only as accurate as the sources available at the time of update(s)    Prior to Admission medications    Medication Sig Last Dose Taking? Auth Provider Long Term End Date   alfuzosin ER (UROXATRAL) 10 MG 24 hr tablet Take 1 tablet (10 mg) by mouth daily 10/9/2023 at am Yes Isabel Zapata PA-C     ALPHA LIPOIC ACID PO Take 600 mg by mouth daily 10/9/2023 at am Yes Reported, Patient     amLODIPine (NORVASC) 10 MG tablet Take 1 tablet (10 mg) by mouth daily 10/9/2023 at am Yes Yanira Grey NP Yes    ascorbic acid (VITAMIN C) 1000 MG TABS Take 1,000 mg by mouth daily 10/9/2023 at am Yes Reported, Patient     aspirin 81 MG EC tablet Take 4 tablets by mouth once (4 x 81 mg = 324 mg)  at am Yes Reported, Patient     chlorthalidone (HYGROTON) 50 MG tablet Take 50 mg by mouth daily 10/9/2023 at am Yes Reported, Patient Yes    Cholecalciferol (VITAMIN D) 400 UNITS tablet Take 1 tablet by mouth every evening 10/9/2023 at pm Yes Reported, Patient     CINNAMON PO Take 1 capsule by mouth every evening Pt unsure of strength 10/9/2023 at pm Yes Reported, Patient     clindamycin (CLEOCIN) 300 MG capsule Take 2 capsules by mouth once as needed (1 hour prior to dental appointments 2 x 300 mg = 300 mg) Unknown at prn Yes Reported, Patient     ezetimibe (ZETIA) 10 MG tablet Take 0.5 tablets (5 mg) by mouth Every Mon, Wed, Fri Morning 10/9/2023 at am Yes Melanie Deshpande APRN  CNP Yes    fenofibrate (TRIGLIDE/LOFIBRA) 160 MG tablet Take 1 tablet (160 mg) by mouth daily 10/9/2023 at am Yes Yanira Grey, NP Yes    multivitamin w/minerals (THERA-VIT-M) tablet Take 1 tablet by mouth every evening 10/9/2023 at pm Yes Reported, Patient     omega 3 1000 MG CAPS Take 1 capsule by mouth every evening 180 EPA/ 20 DHH 10/9/2023 at pm Yes Reported, Patient     potassium chloride ER (KLOR-CON M) 20 MEQ CR tablet Take 2 tablets (40 mEq) by mouth daily 10/9/2023 at am Yes Russell Potts, CNP     rosuvastatin (CRESTOR) 40 MG tablet Take 40 mg by mouth every evening 10/9/2023 at pm Yes Reported, Patient Yes    Ubiquinol 100 MG CAPS Take 100 mg by mouth daily 10/9/2023 at am Yes Reported, Patient     warfarin ANTICOAGULANT (COUMADIN) 4 MG tablet Take 1 tablet by mouth daily 10/4/2023 at pm Yes Reported, Patient         Medication history completed by:    Blayne Arias CPhT  Medication [facility]

## 2023-10-10 ENCOUNTER — HOSPITAL ENCOUNTER (INPATIENT)
Facility: CLINIC | Age: 78
LOS: 1 days | Discharge: HOME | DRG: 253 | End: 2023-10-11
Attending: INTERNAL MEDICINE | Admitting: INTERNAL MEDICINE
Payer: COMMERCIAL

## 2023-10-10 ENCOUNTER — ANESTHESIA (OUTPATIENT)
Dept: CARDIOLOGY | Facility: CLINIC | Age: 78
DRG: 253 | End: 2023-10-10
Payer: COMMERCIAL

## 2023-10-10 ENCOUNTER — HOSPITAL ENCOUNTER (OUTPATIENT)
Dept: CARDIOLOGY | Facility: CLINIC | Age: 78
Discharge: HOME | DRG: 253 | End: 2023-10-10
Attending: INTERNAL MEDICINE | Admitting: INTERNAL MEDICINE
Payer: COMMERCIAL

## 2023-10-10 ENCOUNTER — MEDICAL CORRESPONDENCE (OUTPATIENT)
Dept: HEALTH INFORMATION MANAGEMENT | Facility: CLINIC | Age: 78
End: 2023-10-10

## 2023-10-10 ENCOUNTER — ANESTHESIA EVENT (OUTPATIENT)
Dept: CARDIOLOGY | Facility: CLINIC | Age: 78
DRG: 253 | End: 2023-10-10
Payer: COMMERCIAL

## 2023-10-10 DIAGNOSIS — I35.0 SEVERE AORTIC STENOSIS: ICD-10-CM

## 2023-10-10 LAB
ACT BLD: 185 SECONDS (ref 74–150)
ANION GAP SERPL CALCULATED.3IONS-SCNC: 14 MMOL/L (ref 7–15)
BLD PROD TYP BPU: NORMAL
BLD PROD TYP BPU: NORMAL
BLOOD COMPONENT TYPE: NORMAL
BLOOD COMPONENT TYPE: NORMAL
BUN SERPL-MCNC: 25.1 MG/DL (ref 8–23)
CALCIUM SERPL-MCNC: 9.3 MG/DL (ref 8.8–10.2)
CHLORIDE SERPL-SCNC: 102 MMOL/L (ref 98–107)
CODING SYSTEM: NORMAL
CODING SYSTEM: NORMAL
CREAT SERPL-MCNC: 1.07 MG/DL (ref 0.67–1.17)
CROSSMATCH: NORMAL
CROSSMATCH: NORMAL
DEPRECATED HCO3 PLAS-SCNC: 24 MMOL/L (ref 22–29)
EGFRCR SERPLBLD CKD-EPI 2021: 72 ML/MIN/1.73M2
ERYTHROCYTE [DISTWIDTH] IN BLOOD BY AUTOMATED COUNT: 13 % (ref 10–15)
GLUCOSE SERPL-MCNC: 97 MG/DL (ref 70–99)
HCT VFR BLD AUTO: 44.6 % (ref 40–53)
HGB BLD-MCNC: 15.3 G/DL (ref 13.3–17.7)
INR PPP: 1.18 (ref 0.85–1.15)
ISSUE DATE AND TIME: NORMAL
ISSUE DATE AND TIME: NORMAL
MCH RBC QN AUTO: 32.2 PG (ref 26.5–33)
MCHC RBC AUTO-ENTMCNC: 34.3 G/DL (ref 31.5–36.5)
MCV RBC AUTO: 94 FL (ref 78–100)
PLATELET # BLD AUTO: 244 10E3/UL (ref 150–450)
POTASSIUM SERPL-SCNC: 3.2 MMOL/L (ref 3.4–5.3)
RBC # BLD AUTO: 4.75 10E6/UL (ref 4.4–5.9)
SODIUM SERPL-SCNC: 140 MMOL/L (ref 135–145)
UNIT ABO/RH: NORMAL
UNIT ABO/RH: NORMAL
UNIT NUMBER: NORMAL
UNIT NUMBER: NORMAL
UNIT STATUS: NORMAL
UNIT STATUS: NORMAL
UNIT TYPE ISBT: 6200
UNIT TYPE ISBT: 6200
WBC # BLD AUTO: 6.9 10E3/UL (ref 4–11)

## 2023-10-10 PROCEDURE — 250N000025 HC SEVOFLURANE, PER MIN: Performed by: INTERNAL MEDICINE

## 2023-10-10 PROCEDURE — 370N000017 HC ANESTHESIA TECHNICAL FEE, PER MIN: Performed by: INTERNAL MEDICINE

## 2023-10-10 PROCEDURE — 82310 ASSAY OF CALCIUM: CPT | Performed by: INTERNAL MEDICINE

## 2023-10-10 PROCEDURE — 86923 COMPATIBILITY TEST ELECTRIC: CPT | Performed by: INTERNAL MEDICINE

## 2023-10-10 PROCEDURE — P9045 ALBUMIN (HUMAN), 5%, 250 ML: HCPCS | Mod: JZ | Performed by: REGISTERED NURSE

## 2023-10-10 PROCEDURE — 93325 DOPPLER ECHO COLOR FLOW MAPG: CPT | Mod: 52

## 2023-10-10 PROCEDURE — 93321 DOPPLER ECHO F-UP/LMTD STD: CPT | Mod: 26 | Performed by: INTERNAL MEDICINE

## 2023-10-10 PROCEDURE — 250N000013 HC RX MED GY IP 250 OP 250 PS 637: Performed by: NURSE PRACTITIONER

## 2023-10-10 PROCEDURE — 33361 REPLACE AORTIC VALVE PERQ: CPT | Performed by: STUDENT IN AN ORGANIZED HEALTH CARE EDUCATION/TRAINING PROGRAM

## 2023-10-10 PROCEDURE — 85014 HEMATOCRIT: CPT | Performed by: INTERNAL MEDICINE

## 2023-10-10 PROCEDURE — C1760 CLOSURE DEV, VASC: HCPCS | Performed by: INTERNAL MEDICINE

## 2023-10-10 PROCEDURE — 93308 TTE F-UP OR LMTD: CPT | Mod: 26 | Performed by: INTERNAL MEDICINE

## 2023-10-10 PROCEDURE — P9016 RBC LEUKOCYTES REDUCED: HCPCS | Performed by: INTERNAL MEDICINE

## 2023-10-10 PROCEDURE — 04UL0KZ SUPPLEMENT LEFT FEMORAL ARTERY WITH NONAUTOLOGOUS TISSUE SUBSTITUTE, OPEN APPROACH: ICD-10-PCS | Performed by: INTERNAL MEDICINE

## 2023-10-10 PROCEDURE — C1769 GUIDE WIRE: HCPCS | Performed by: STUDENT IN AN ORGANIZED HEALTH CARE EDUCATION/TRAINING PROGRAM

## 2023-10-10 PROCEDURE — 93321 DOPPLER ECHO F-UP/LMTD STD: CPT | Mod: 52

## 2023-10-10 PROCEDURE — C1769 GUIDE WIRE: HCPCS | Performed by: INTERNAL MEDICINE

## 2023-10-10 PROCEDURE — 250N000011 HC RX IP 250 OP 636: Mod: JZ | Performed by: NURSE PRACTITIONER

## 2023-10-10 PROCEDURE — 33361 REPLACE AORTIC VALVE PERQ: CPT | Mod: 62 | Performed by: STUDENT IN AN ORGANIZED HEALTH CARE EDUCATION/TRAINING PROGRAM

## 2023-10-10 PROCEDURE — 258N000003 HC RX IP 258 OP 636: Performed by: REGISTERED NURSE

## 2023-10-10 PROCEDURE — 85610 PROTHROMBIN TIME: CPT | Performed by: INTERNAL MEDICINE

## 2023-10-10 PROCEDURE — 250N000013 HC RX MED GY IP 250 OP 250 PS 637: Performed by: STUDENT IN AN ORGANIZED HEALTH CARE EDUCATION/TRAINING PROGRAM

## 2023-10-10 PROCEDURE — 210N000002 HC R&B HEART CARE

## 2023-10-10 PROCEDURE — 250N000009 HC RX 250: Performed by: INTERNAL MEDICINE

## 2023-10-10 PROCEDURE — 93010 ELECTROCARDIOGRAM REPORT: CPT | Performed by: INTERNAL MEDICINE

## 2023-10-10 PROCEDURE — 99152 MOD SED SAME PHYS/QHP 5/>YRS: CPT | Performed by: STUDENT IN AN ORGANIZED HEALTH CARE EDUCATION/TRAINING PROGRAM

## 2023-10-10 PROCEDURE — 250N000011 HC RX IP 250 OP 636: Performed by: INTERNAL MEDICINE

## 2023-10-10 PROCEDURE — 250N000009 HC RX 250: Performed by: REGISTERED NURSE

## 2023-10-10 PROCEDURE — 370N000017 HC ANESTHESIA TECHNICAL FEE, PER MIN: Performed by: STUDENT IN AN ORGANIZED HEALTH CARE EDUCATION/TRAINING PROGRAM

## 2023-10-10 PROCEDURE — 36415 COLL VENOUS BLD VENIPUNCTURE: CPT | Performed by: INTERNAL MEDICINE

## 2023-10-10 PROCEDURE — C1763 CONN TISS, NON-HUMAN: HCPCS | Performed by: INTERNAL MEDICINE

## 2023-10-10 PROCEDURE — 99153 MOD SED SAME PHYS/QHP EA: CPT | Performed by: STUDENT IN AN ORGANIZED HEALTH CARE EDUCATION/TRAINING PROGRAM

## 2023-10-10 PROCEDURE — 250N000011 HC RX IP 250 OP 636: Performed by: REGISTERED NURSE

## 2023-10-10 PROCEDURE — 85347 COAGULATION TIME ACTIVATED: CPT

## 2023-10-10 PROCEDURE — 93005 ELECTROCARDIOGRAM TRACING: CPT

## 2023-10-10 PROCEDURE — 33361 REPLACE AORTIC VALVE PERQ: CPT | Mod: 62 | Performed by: INTERNAL MEDICINE

## 2023-10-10 PROCEDURE — 250N000011 HC RX IP 250 OP 636: Performed by: ANESTHESIOLOGY

## 2023-10-10 PROCEDURE — C1763 CONN TISS, NON-HUMAN: HCPCS | Performed by: STUDENT IN AN ORGANIZED HEALTH CARE EDUCATION/TRAINING PROGRAM

## 2023-10-10 PROCEDURE — 272N000001 HC OR GENERAL SUPPLY STERILE: Performed by: INTERNAL MEDICINE

## 2023-10-10 PROCEDURE — C1894 INTRO/SHEATH, NON-LASER: HCPCS | Performed by: STUDENT IN AN ORGANIZED HEALTH CARE EDUCATION/TRAINING PROGRAM

## 2023-10-10 PROCEDURE — 250N000025 HC SEVOFLURANE, PER MIN: Performed by: STUDENT IN AN ORGANIZED HEALTH CARE EDUCATION/TRAINING PROGRAM

## 2023-10-10 PROCEDURE — 250N000011 HC RX IP 250 OP 636: Mod: JZ | Performed by: INTERNAL MEDICINE

## 2023-10-10 PROCEDURE — C1760 CLOSURE DEV, VASC: HCPCS | Performed by: STUDENT IN AN ORGANIZED HEALTH CARE EDUCATION/TRAINING PROGRAM

## 2023-10-10 PROCEDURE — 99222 1ST HOSP IP/OBS MODERATE 55: CPT | Mod: 25 | Performed by: NURSE PRACTITIONER

## 2023-10-10 PROCEDURE — 93325 DOPPLER ECHO COLOR FLOW MAPG: CPT | Mod: 26 | Performed by: INTERNAL MEDICINE

## 2023-10-10 PROCEDURE — C1894 INTRO/SHEATH, NON-LASER: HCPCS | Performed by: INTERNAL MEDICINE

## 2023-10-10 PROCEDURE — 272N000001 HC OR GENERAL SUPPLY STERILE: Performed by: STUDENT IN AN ORGANIZED HEALTH CARE EDUCATION/TRAINING PROGRAM

## 2023-10-10 DEVICE — IMPLANTABLE DEVICE: Type: IMPLANTABLE DEVICE | Site: ILIAC/FEMORALS | Status: FUNCTIONAL

## 2023-10-10 RX ORDER — FENTANYL CITRATE 50 UG/ML
25 INJECTION, SOLUTION INTRAMUSCULAR; INTRAVENOUS EVERY 5 MIN PRN
Status: DISCONTINUED | OUTPATIENT
Start: 2023-10-10 | End: 2023-10-10 | Stop reason: HOSPADM

## 2023-10-10 RX ORDER — IOPAMIDOL 755 MG/ML
INJECTION, SOLUTION INTRAVASCULAR
Status: DISCONTINUED | OUTPATIENT
Start: 2023-10-10 | End: 2023-10-10 | Stop reason: HOSPADM

## 2023-10-10 RX ORDER — CALCIUM GLUCONATE 94 MG/ML
INJECTION, SOLUTION INTRAVENOUS PRN
Status: DISCONTINUED | OUTPATIENT
Start: 2023-10-10 | End: 2023-10-10

## 2023-10-10 RX ORDER — CLINDAMYCIN PHOSPHATE 900 MG/50ML
900 INJECTION, SOLUTION INTRAVENOUS
Status: COMPLETED | OUTPATIENT
Start: 2023-10-10 | End: 2023-10-10

## 2023-10-10 RX ORDER — ONDANSETRON 4 MG/1
4 TABLET, ORALLY DISINTEGRATING ORAL EVERY 30 MIN PRN
Status: DISCONTINUED | OUTPATIENT
Start: 2023-10-10 | End: 2023-10-10 | Stop reason: HOSPADM

## 2023-10-10 RX ORDER — ASPIRIN 325 MG
325 TABLET ORAL ONCE
Status: COMPLETED | OUTPATIENT
Start: 2023-10-10 | End: 2023-10-10

## 2023-10-10 RX ORDER — PROPOFOL 10 MG/ML
INJECTION, EMULSION INTRAVENOUS PRN
Status: DISCONTINUED | OUTPATIENT
Start: 2023-10-10 | End: 2023-10-10

## 2023-10-10 RX ORDER — FENOFIBRATE 160 MG/1
160 TABLET ORAL DAILY
Status: DISCONTINUED | OUTPATIENT
Start: 2023-10-10 | End: 2023-10-11 | Stop reason: HOSPADM

## 2023-10-10 RX ORDER — NALOXONE HYDROCHLORIDE 0.4 MG/ML
0.2 INJECTION, SOLUTION INTRAMUSCULAR; INTRAVENOUS; SUBCUTANEOUS
Status: DISCONTINUED | OUTPATIENT
Start: 2023-10-10 | End: 2023-10-11 | Stop reason: HOSPADM

## 2023-10-10 RX ORDER — ALFUZOSIN HYDROCHLORIDE 10 MG/1
10 TABLET, EXTENDED RELEASE ORAL DAILY
Status: DISCONTINUED | OUTPATIENT
Start: 2023-10-10 | End: 2023-10-10

## 2023-10-10 RX ORDER — HYDROMORPHONE HYDROCHLORIDE 1 MG/ML
0.4 INJECTION, SOLUTION INTRAMUSCULAR; INTRAVENOUS; SUBCUTANEOUS EVERY 5 MIN PRN
Status: DISCONTINUED | OUTPATIENT
Start: 2023-10-10 | End: 2023-10-10 | Stop reason: HOSPADM

## 2023-10-10 RX ORDER — NALOXONE HYDROCHLORIDE 0.4 MG/ML
0.4 INJECTION, SOLUTION INTRAMUSCULAR; INTRAVENOUS; SUBCUTANEOUS
Status: DISCONTINUED | OUTPATIENT
Start: 2023-10-10 | End: 2023-10-11 | Stop reason: HOSPADM

## 2023-10-10 RX ORDER — NITROGLYCERIN 0.4 MG/1
0.4 TABLET SUBLINGUAL EVERY 5 MIN PRN
Status: DISCONTINUED | OUTPATIENT
Start: 2023-10-10 | End: 2023-10-11 | Stop reason: HOSPADM

## 2023-10-10 RX ORDER — DEXAMETHASONE SODIUM PHOSPHATE 4 MG/ML
INJECTION, SOLUTION INTRA-ARTICULAR; INTRALESIONAL; INTRAMUSCULAR; INTRAVENOUS; SOFT TISSUE PRN
Status: DISCONTINUED | OUTPATIENT
Start: 2023-10-10 | End: 2023-10-10

## 2023-10-10 RX ORDER — SODIUM CHLORIDE 9 MG/ML
INJECTION, SOLUTION INTRAVENOUS CONTINUOUS
Status: DISCONTINUED | OUTPATIENT
Start: 2023-10-10 | End: 2023-10-10 | Stop reason: HOSPADM

## 2023-10-10 RX ORDER — CHLORTHALIDONE 25 MG/1
50 TABLET ORAL DAILY
Status: DISCONTINUED | OUTPATIENT
Start: 2023-10-11 | End: 2023-10-11 | Stop reason: HOSPADM

## 2023-10-10 RX ORDER — FENTANYL CITRATE 50 UG/ML
50 INJECTION, SOLUTION INTRAMUSCULAR; INTRAVENOUS EVERY 5 MIN PRN
Status: DISCONTINUED | OUTPATIENT
Start: 2023-10-10 | End: 2023-10-10 | Stop reason: HOSPADM

## 2023-10-10 RX ORDER — ACETAMINOPHEN 325 MG/1
650 TABLET ORAL EVERY 4 HOURS PRN
Status: DISCONTINUED | OUTPATIENT
Start: 2023-10-10 | End: 2023-10-11 | Stop reason: HOSPADM

## 2023-10-10 RX ORDER — HYDRALAZINE HYDROCHLORIDE 20 MG/ML
10 INJECTION INTRAMUSCULAR; INTRAVENOUS
Status: DISCONTINUED | OUTPATIENT
Start: 2023-10-10 | End: 2023-10-11 | Stop reason: HOSPADM

## 2023-10-10 RX ORDER — SODIUM CHLORIDE, SODIUM LACTATE, POTASSIUM CHLORIDE, CALCIUM CHLORIDE 600; 310; 30; 20 MG/100ML; MG/100ML; MG/100ML; MG/100ML
INJECTION, SOLUTION INTRAVENOUS CONTINUOUS PRN
Status: DISCONTINUED | OUTPATIENT
Start: 2023-10-10 | End: 2023-10-10

## 2023-10-10 RX ORDER — ALFUZOSIN HYDROCHLORIDE 10 MG/1
10 TABLET, EXTENDED RELEASE ORAL DAILY
Status: DISCONTINUED | OUTPATIENT
Start: 2023-10-11 | End: 2023-10-11 | Stop reason: HOSPADM

## 2023-10-10 RX ORDER — SODIUM CHLORIDE 9 MG/ML
INJECTION, SOLUTION INTRAVENOUS CONTINUOUS PRN
Status: DISCONTINUED | OUTPATIENT
Start: 2023-10-10 | End: 2023-10-10

## 2023-10-10 RX ORDER — ROSUVASTATIN CALCIUM 20 MG/1
40 TABLET, COATED ORAL EVERY EVENING
Status: DISCONTINUED | OUTPATIENT
Start: 2023-10-10 | End: 2023-10-11 | Stop reason: HOSPADM

## 2023-10-10 RX ORDER — FENTANYL CITRATE 50 UG/ML
INJECTION, SOLUTION INTRAMUSCULAR; INTRAVENOUS
Status: DISCONTINUED | OUTPATIENT
Start: 2023-10-10 | End: 2023-10-10 | Stop reason: HOSPADM

## 2023-10-10 RX ORDER — AMLODIPINE BESYLATE 10 MG/1
10 TABLET ORAL DAILY
Status: DISCONTINUED | OUTPATIENT
Start: 2023-10-11 | End: 2023-10-11 | Stop reason: HOSPADM

## 2023-10-10 RX ORDER — FENTANYL CITRATE 50 UG/ML
INJECTION, SOLUTION INTRAMUSCULAR; INTRAVENOUS PRN
Status: DISCONTINUED | OUTPATIENT
Start: 2023-10-10 | End: 2023-10-10

## 2023-10-10 RX ORDER — HYDROMORPHONE HCL IN WATER/PF 6 MG/30 ML
0.2 PATIENT CONTROLLED ANALGESIA SYRINGE INTRAVENOUS EVERY 6 HOURS PRN
Status: DISCONTINUED | OUTPATIENT
Start: 2023-10-10 | End: 2023-10-11 | Stop reason: HOSPADM

## 2023-10-10 RX ORDER — HEPARIN SODIUM 1000 [USP'U]/ML
INJECTION, SOLUTION INTRAVENOUS; SUBCUTANEOUS PRN
Status: DISCONTINUED | OUTPATIENT
Start: 2023-10-10 | End: 2023-10-10

## 2023-10-10 RX ORDER — HYDROMORPHONE HYDROCHLORIDE 1 MG/ML
0.2 INJECTION, SOLUTION INTRAMUSCULAR; INTRAVENOUS; SUBCUTANEOUS EVERY 5 MIN PRN
Status: DISCONTINUED | OUTPATIENT
Start: 2023-10-10 | End: 2023-10-10 | Stop reason: HOSPADM

## 2023-10-10 RX ORDER — POTASSIUM CHLORIDE 7.45 MG/ML
10 INJECTION INTRAVENOUS
Status: COMPLETED | OUTPATIENT
Start: 2023-10-10 | End: 2023-10-10

## 2023-10-10 RX ORDER — LIDOCAINE 40 MG/G
CREAM TOPICAL
Status: DISCONTINUED | OUTPATIENT
Start: 2023-10-10 | End: 2023-10-10 | Stop reason: HOSPADM

## 2023-10-10 RX ORDER — POTASSIUM CHLORIDE 1500 MG/1
40 TABLET, EXTENDED RELEASE ORAL DAILY
Status: DISCONTINUED | OUTPATIENT
Start: 2023-10-10 | End: 2023-10-11 | Stop reason: HOSPADM

## 2023-10-10 RX ORDER — POTASSIUM CHLORIDE 1500 MG/1
40 TABLET, EXTENDED RELEASE ORAL ONCE
Status: COMPLETED | OUTPATIENT
Start: 2023-10-10 | End: 2023-10-10

## 2023-10-10 RX ORDER — ONDANSETRON 2 MG/ML
4 INJECTION INTRAMUSCULAR; INTRAVENOUS EVERY 30 MIN PRN
Status: DISCONTINUED | OUTPATIENT
Start: 2023-10-10 | End: 2023-10-10 | Stop reason: HOSPADM

## 2023-10-10 RX ORDER — SODIUM CHLORIDE, SODIUM LACTATE, POTASSIUM CHLORIDE, CALCIUM CHLORIDE 600; 310; 30; 20 MG/100ML; MG/100ML; MG/100ML; MG/100ML
INJECTION, SOLUTION INTRAVENOUS CONTINUOUS
Status: DISCONTINUED | OUTPATIENT
Start: 2023-10-10 | End: 2023-10-10 | Stop reason: HOSPADM

## 2023-10-10 RX ADMIN — PHENYLEPHRINE HYDROCHLORIDE 100 MCG: 10 INJECTION INTRAVENOUS at 12:31

## 2023-10-10 RX ADMIN — NOREPINEPHRINE BITARTRATE 0.02 MCG/KG/MIN: 1 INJECTION, SOLUTION, CONCENTRATE INTRAVENOUS at 12:46

## 2023-10-10 RX ADMIN — SODIUM CHLORIDE: 9 INJECTION, SOLUTION INTRAVENOUS at 11:25

## 2023-10-10 RX ADMIN — FENOFIBRATE 160 MG: 160 TABLET ORAL at 21:05

## 2023-10-10 RX ADMIN — PHENYLEPHRINE HYDROCHLORIDE 100 MCG: 10 INJECTION INTRAVENOUS at 11:52

## 2023-10-10 RX ADMIN — HYDROMORPHONE HYDROCHLORIDE 0.2 MG: 0.2 INJECTION, SOLUTION INTRAMUSCULAR; INTRAVENOUS; SUBCUTANEOUS at 17:11

## 2023-10-10 RX ADMIN — SUGAMMADEX 200 MG: 100 INJECTION, SOLUTION INTRAVENOUS at 13:46

## 2023-10-10 RX ADMIN — ROCURONIUM BROMIDE 10 MG: 50 INJECTION, SOLUTION INTRAVENOUS at 12:16

## 2023-10-10 RX ADMIN — CLINDAMYCIN PHOSPHATE 900 MG: 900 INJECTION, SOLUTION INTRAVENOUS at 10:14

## 2023-10-10 RX ADMIN — FENTANYL CITRATE 25 MCG: 50 INJECTION INTRAMUSCULAR; INTRAVENOUS at 13:25

## 2023-10-10 RX ADMIN — FENTANYL CITRATE 50 MCG: 50 INJECTION, SOLUTION INTRAMUSCULAR; INTRAVENOUS at 15:14

## 2023-10-10 RX ADMIN — CALCIUM GLUCONATE 250 MG: 98 INJECTION, SOLUTION INTRAVENOUS at 12:42

## 2023-10-10 RX ADMIN — FENTANYL CITRATE 50 MCG: 50 INJECTION INTRAMUSCULAR; INTRAVENOUS at 11:32

## 2023-10-10 RX ADMIN — NOREPINEPHRINE BITARTRATE 6.4 MCG: 1 INJECTION, SOLUTION, CONCENTRATE INTRAVENOUS at 12:38

## 2023-10-10 RX ADMIN — HEPARIN SODIUM 8000 UNITS: 1000 INJECTION INTRAVENOUS; SUBCUTANEOUS at 12:44

## 2023-10-10 RX ADMIN — PHENYLEPHRINE HYDROCHLORIDE 100 MCG: 10 INJECTION INTRAVENOUS at 12:34

## 2023-10-10 RX ADMIN — ROCURONIUM BROMIDE 20 MG: 50 INJECTION, SOLUTION INTRAVENOUS at 11:35

## 2023-10-10 RX ADMIN — PHENYLEPHRINE HYDROCHLORIDE 100 MCG: 10 INJECTION INTRAVENOUS at 12:16

## 2023-10-10 RX ADMIN — PHENYLEPHRINE HYDROCHLORIDE 100 MCG: 10 INJECTION INTRAVENOUS at 11:58

## 2023-10-10 RX ADMIN — SUCCINYLCHOLINE CHLORIDE 100 MG: 20 INJECTION, SOLUTION INTRAMUSCULAR; INTRAVENOUS; PARENTERAL at 11:35

## 2023-10-10 RX ADMIN — ROCURONIUM BROMIDE 10 MG: 50 INJECTION, SOLUTION INTRAVENOUS at 12:01

## 2023-10-10 RX ADMIN — POTASSIUM CHLORIDE 40 MEQ: 1500 TABLET, EXTENDED RELEASE ORAL at 10:10

## 2023-10-10 RX ADMIN — POTASSIUM CHLORIDE 40 MEQ: 1500 TABLET, EXTENDED RELEASE ORAL at 18:46

## 2023-10-10 RX ADMIN — ALBUMIN (HUMAN): 12.5 SOLUTION INTRAVENOUS at 12:39

## 2023-10-10 RX ADMIN — ROSUVASTATIN CALCIUM 40 MG: 20 TABLET, FILM COATED ORAL at 21:04

## 2023-10-10 RX ADMIN — PROPOFOL 120 MG: 10 INJECTION, EMULSION INTRAVENOUS at 11:33

## 2023-10-10 RX ADMIN — FENTANYL CITRATE 25 MCG: 50 INJECTION INTRAMUSCULAR; INTRAVENOUS at 13:15

## 2023-10-10 RX ADMIN — CALCIUM GLUCONATE 250 MG: 98 INJECTION, SOLUTION INTRAVENOUS at 12:40

## 2023-10-10 RX ADMIN — SODIUM CHLORIDE, POTASSIUM CHLORIDE, SODIUM LACTATE AND CALCIUM CHLORIDE: 600; 310; 30; 20 INJECTION, SOLUTION INTRAVENOUS at 11:32

## 2023-10-10 RX ADMIN — DEXAMETHASONE SODIUM PHOSPHATE 8 MG: 4 INJECTION, SOLUTION INTRA-ARTICULAR; INTRALESIONAL; INTRAMUSCULAR; INTRAVENOUS; SOFT TISSUE at 11:38

## 2023-10-10 RX ADMIN — NOREPINEPHRINE BITARTRATE 6.4 MCG: 1 INJECTION, SOLUTION, CONCENTRATE INTRAVENOUS at 12:35

## 2023-10-10 RX ADMIN — PHENYLEPHRINE HYDROCHLORIDE 100 MCG: 10 INJECTION INTRAVENOUS at 11:48

## 2023-10-10 RX ADMIN — ROCURONIUM BROMIDE 10 MG: 50 INJECTION, SOLUTION INTRAVENOUS at 13:01

## 2023-10-10 RX ADMIN — ROCURONIUM BROMIDE 10 MG: 50 INJECTION, SOLUTION INTRAVENOUS at 12:31

## 2023-10-10 RX ADMIN — PHENYLEPHRINE HYDROCHLORIDE 100 MCG: 10 INJECTION INTRAVENOUS at 12:29

## 2023-10-10 ASSESSMENT — ENCOUNTER SYMPTOMS: DYSRHYTHMIAS: 1

## 2023-10-10 ASSESSMENT — LIFESTYLE VARIABLES: TOBACCO_USE: 1

## 2023-10-10 ASSESSMENT — ACTIVITIES OF DAILY LIVING (ADL)
ADLS_ACUITY_SCORE: 18
ADLS_ACUITY_SCORE: 35
ADLS_ACUITY_SCORE: 18

## 2023-10-10 NOTE — ANESTHESIA PROCEDURE NOTES
Airway       Patient location during procedure: OR       Procedure Start/Stop Times: 10/10/2023 11:37 AM  Staff -        Anesthesiologist:  Itzel Martin MD       CRNA: Oren Hong APRN CRNA       Performed By: anesthesiologist and CRNAIndications and Patient Condition       Indications for airway management: stephen-procedural       Induction type:intravenous       Mask difficulty assessment: 1 - vent by mask    Final Airway Details       Final airway type: endotracheal airway       Successful airway: ETT - single  Endotracheal Airway Details        ETT size (mm): 8.0       Cuffed: yes       Cuff volume (mL): 8       Successful intubation technique: video laryngoscopy       VL Blade Size: Silva 4       Grade View of Cords: 1       Adjucts: stylet       Position: Right       Measured from: lips       Secured at (cm): 22       Bite block used: None    Post intubation assessment        Placement verified by: capnometry, equal breath sounds and chest rise        Number of attempts at approach: 1       Number of other approaches attempted: 0       Secured with: plastic tape       Ease of procedure: easy       Dentition: Intact and Unchanged    Medication(s) Administered   Medication Administration Time: 10/10/2023 11:37 AM

## 2023-10-10 NOTE — ANESTHESIA CARE TRANSFER NOTE
Patient: Jonny Goldberg    Procedure: Procedure(s):  Left Groin Cutdown with Repair of Left Femoral Artery; Aborted Transcatheter Aortic Valve Replacement       Diagnosis: valvular disease  Diagnosis Additional Information: No value filed.    Anesthesia Type:   No value filed.     Note:    Oropharynx: oropharynx clear of all foreign objects and spontaneously breathing  Level of Consciousness: drowsy  Oxygen Supplementation: face mask  Level of Supplemental Oxygen (L/min / FiO2): 8  Independent Airway: airway patency satisfactory and stable  Dentition: dentition unchanged      Patient transferred to: PACU    Handoff Report: Identifed the Patient, Identified the Reponsible Provider, Reviewed the pertinent medical history, Discussed the surgical course, Reviewed Intra-OP anesthesia mangement and issues during anesthesia, Set expectations for post-procedure period and Allowed opportunity for questions and acknowledgement of understanding      Vitals:  Vitals Value Taken Time   /87 10/10/23 1404   Temp 98    Pulse 60 10/10/23 1408   Resp 12 10/10/23 1408   SpO2 97 % 10/10/23 1408   Vitals shown include unvalidated device data.    Electronically Signed By: DREW Machado CRNA  October 10, 2023  2:09 PM

## 2023-10-10 NOTE — ANESTHESIA PREPROCEDURE EVALUATION
Anesthesia Pre-Procedure Evaluation    Patient: Jonny Goldberg   MRN: [id number] : 1945        Procedure : Procedure(s):  Left Groin Cutdown with Repair of Left Femoral Artery; Aborted Transcatheter Aortic Valve Replacement          Past Medical History:   Diagnosis Date    Aortic valve stenosis     severe    Blood clot in the legs     left leg after surgery    Gastro-oesophageal reflux disease     Hodgkins lymphoma (H)     bone marrow biopsy    Hypertension     PAD (peripheral artery disease) (H24)     persistent atrial fibrillation     AFIB    Unspecified cerebral artery occlusion with cerebral infarction 10/01/2011      Past Surgical History:   Procedure Laterality Date    APPENDECTOMY      ARTHROPLASTY REVISION HIP  2011    Procedure:ARTHROPLASTY REVISION HIP; RIGHT TOTAL HIP REVISION WITH BONE GRAFT  ; Surgeon:ANGIE HARRINGTON; Location: OR    BIOPSY  hodgkins lymphoma    bone marrow biopsy, chemo and radiation    CARDIAC SURGERY      cardiac ablation    COLONOSCOPY      CV CORONARY ANGIOGRAM N/A 2022    Procedure: Coronary Angiogram;  Surgeon: G[facility] MD;  Location:  HEART CARDIAC CATH LAB    CV PCI N/A 2022    Procedure: Percutaneous Coronary Intervention;  Surgeon: G[facility] MD;  Location:  HEART CARDIAC CATH LAB    CV RIGHT HEART CATH MEASUREMENTS RECORDED N/A 2022    Procedure: Right Heart Catheterization;  Surgeon: Gennaro [facility] MD;  Location:  HEART CARDIAC CATH LAB    EP PACEMAKER DEVICE & LEAD IMPLANT- RIGHT VENTRICULAR N/A 2023    Procedure: Pacemaker Device & Lead Implant- Right ventricular;  Surgeon: Brian Madrid MD;  Location:  HEART CARDIAC CATH LAB    ORTHOPEDIC SURGERY      hip and knee surgeries    ORTHOPEDIC SURGERY      carpal tunnel  right hand      Allergies   Allergen Reactions    Penicillin G Hives     hives      Social History     Tobacco Use    Smoking status: Former     Types: Cigarettes     Quit date:  1987     Years since quittin.5    Smokeless tobacco: Never   Substance Use Topics    Alcohol use: Yes     Alcohol/week: 0.0 standard drinks of alcohol     Comment: occasionally      Wt Readings from Last 1 Encounters:   10/10/23 95.7 kg (211 lb)        Anesthesia Evaluation            ROS/MED HX  ENT/Pulmonary:     (+)                tobacco use, Past use,                      Neurologic:       Cardiovascular:     (+)  hypertension- Peripheral Vascular Disease-- Carotid Stenosis.   -  - -      CHF  Last EF: 40-45      pacemaker, Reason placed: High degree AVB.         dysrhythmias, a-fib,  valvular problems/murmurs type: AS     Previous cardiac testing   Echo: Date: Results:  Interpretation Summary     1. Left ventricular systolic function is mildly reduced. The visual ejection  fraction is 40-45%.  2. The right ventricle is normal in structure, function and size.  3. Severe aortic stenosis; mean gradient 31 mmHg, peak velocity 3.6 m/sec,  calculated valve area 0.9 cm2, DI 0.22, SVI 38 ml/m2. Visually the valve is  heavily calcified with restricted leaflet motion consistent with severe aortic  stenosis.    Stress Test:  Date: Results:    ECG Reviewed:  Date: Results:    Cath:  Date: Results:      METS/Exercise Tolerance:     Hematologic:       Musculoskeletal:       GI/Hepatic:     (+) GERD,                   Renal/Genitourinary:       Endo:     (+)               Obesity (BMI 34),       Psychiatric/Substance Use:       Infectious Disease:       Malignancy:       Other:            Physical Exam    Airway         TM distance: > 3 FB   Neck ROM: full   Mouth opening: > 3 cm    Respiratory Devices and Support         Dental       (+) Minor Abnormalities - some fillings, tiny chips      Cardiovascular          Rhythm and rate: regular and normal     Pulmonary           breath sounds clear to auscultation           OUTSIDE LABS:  CBC:   Lab Results   Component Value Date    WBC 6.9 10/10/2023    WBC 6.5  10/02/2023    HGB 15.3 10/10/2023    HGB 15.6 10/02/2023    HCT 44.6 10/10/2023    HCT 45.2 10/02/2023     10/10/2023     10/02/2023     BMP:   Lab Results   Component Value Date     10/10/2023     10/02/2023    POTASSIUM 3.2 (L) 10/10/2023    POTASSIUM 3.6 10/02/2023    CHLORIDE 102 10/10/2023    CHLORIDE 102 10/02/2023    CO2 24 10/10/2023    CO2 26 10/02/2023    BUN 25.1 (H) 10/10/2023    BUN 24.5 (H) 10/02/2023    CR 1.07 10/10/2023    CR 1.14 10/02/2023    GLC 97 10/10/2023     (H) 10/02/2023     COAGS:   Lab Results   Component Value Date    PTT 34 09/23/2022    INR 1.18 (H) 10/10/2023     POC: No results found for: BGM, HCG, HCGS  HEPATIC:   Lab Results   Component Value Date    ALBUMIN 4.2 10/02/2023    PROTTOTAL 7.5 10/02/2023    ALT 26 10/02/2023    AST 50 (H) 10/02/2023    ALKPHOS 46 10/02/2023    BILITOTAL 1.0 10/02/2023     OTHER:   Lab Results   Component Value Date    PH 7.38 09/23/2022    LACT 0.6 09/23/2022    A1C 5.9 (A) 05/06/2021    SARA 9.3 10/10/2023    MAG 2.0 09/28/2022       Anesthesia Plan    ASA Status:  4, emergent    NPO Status:  NPO Appropriate    Anesthesia Type: General.     - Airway: ETT   Induction: Intravenous, Propofol.   Maintenance: Inhalation.        Consents    Anesthesia Plan(s) and associated risks, benefits, and realistic alternatives discussed. Questions answered and patient/representative(s) expressed understanding.     - Discussed: Risks, Benefits and Alternatives for the PROCEDURE were discussed     - Discussed with:  Patient            Postoperative Care    Pain management: Multi-modal analgesia, IV analgesics.   PONV prophylaxis: Ondansetron (or other 5HT-3), Background Propofol Infusion     Comments:    Other Comments: Called urgently to OR as moderate sedation TAVR case now requiring GETA for femoral cutdown.             Itzel Martin MD

## 2023-10-10 NOTE — H&P
"  [facility]      Cardiology H&P  Jonny Goldberg MRN: [id number]  1945  Date of Admission:10/10/2023  Primary care provider: Reid Hogue      Assessment and Plan:     Jonny Goldberg is a pleasant 78 year old admitted on 10/10/2023 for elective TAVR that was aborted due to inability to remove closure (Perclose) device requiring surgical cutdown and femoral artery repair.     # Severe aortic stenosis   # s/p LCFA cutdown and surgical repair with pericardial patch   Patient underwent attempted TAVR today that was aborted due to vascular complications noted below requiring LCFA surgical cutdown and repair with pericardial bovine patch. He is recovering well. Groin site soft without hematoma.   - Bedrest per protocol   - Neuro checks, per protocol  - Echocardiogram tomorrow  - Monitor groin sites  - EKG in morning   - Continuous telemetry monitoring      # Chronic diastolic heart failure, NYHA class II  NTpBNP 1063. Euvolemic on exam  - Diurese as needed/able  - Daily weights  - Strict intake/output    # Permanent atrial fibrillation   - Resume coumadin tomorrow, pharmacy to dose, goal INR 2-3    # Hypertension   - Continue PTA amlodipine 10 mg daily   - Continue PTA chlorthalidone 50 mg daily     FEN: Cardiac diet  Disposition: Home in 1-2 days with cardiac rehab pending stable post-op period  Code Status: FULL CODE    DREW Guthrie, CNP  [facility] Structural/Interventional Cardiology Team  Pager: [phone]    Clinically Significant Risk Factors Present on Admission       # Hypokalemia: Lowest K = 3.2 mmol/L in last 2 days, will replace as needed         # Drug Induced Coagulation Defect: home medication list includes an anticoagulant medication  # Drug Induced Platelet Defect: home medication list includes an antiplatelet medication   # Obesity: Estimated body mass index is 34.06 kg/m  as calculated from the following:    Height as of this encounter: 1.676 m (5' 6\").    Weight as of this " encounter: 95.7 kg (211 lb).           Chief Complaint:   Planned TAVR         History of Present Illness:   Jonny Goldberg is a pleasant 78-year-old gentleman with known aortic stenosis, hypertension, carotid artery disease, chronic atrial fibrillation, and high-grade AV block s/p PPM who presented today for planned TAVR.    Unfortunately, the procedure was aborted due to inability to remove closure device requiring surgical cutdown and repair of femoral artery with bovine pericardial patch.     At time of interview, patient reports no pain. LCFA access site intact, no hematoma. He is hemodynamically stable.            Review of Systems:    10 point review of systems negative except for stated above in HPI.          Past Medical History:   Medical History reviewed.   Past Medical History:   Diagnosis Date    Aortic valve stenosis     severe    Blood clot in the legs     left leg after surgery    Gastro-oesophageal reflux disease     Hodgkins lymphoma (H)     bone marrow biopsy    Hypertension     PAD (peripheral artery disease) (H24)     persistent atrial fibrillation     AFIB    Unspecified cerebral artery occlusion with cerebral infarction 10/01/2011             Past Surgical History:   Surgical History reviewed.   Past Surgical History:   Procedure Laterality Date    APPENDECTOMY      ARTHROPLASTY REVISION HIP  12/27/2011    Procedure:ARTHROPLASTY REVISION HIP; RIGHT TOTAL HIP REVISION WITH BONE GRAFT  ; Surgeon:ANGIE HARRINGTON; Location: OR    BIOPSY  hodgkins lymphoma    bone marrow biopsy, chemo and radiation    CARDIAC SURGERY      cardiac ablation    COLONOSCOPY      CV CORONARY ANGIOGRAM N/A 9/23/2022    Procedure: Coronary Angiogram;  Surgeon: Gennaro Cook MD;  Location:  HEART CARDIAC CATH LAB    CV PCI N/A 9/23/2022    Procedure: Percutaneous Coronary Intervention;  Surgeon: Gennaro Cook MD;  Location:  HEART CARDIAC CATH LAB    CV RIGHT HEART CATH MEASUREMENTS RECORDED N/A 9/23/2022     Procedure: Right Heart Catheterization;  Surgeon: Gennaro Cook MD;  Location:  HEART CARDIAC CATH LAB    EP PACEMAKER DEVICE & LEAD IMPLANT- RIGHT VENTRICULAR N/A 2023    Procedure: Pacemaker Device & Lead Implant- Right ventricular;  Surgeon: Brian Madrid MD;  Location:  HEART CARDIAC CATH LAB    ORTHOPEDIC SURGERY      hip and knee surgeries    ORTHOPEDIC SURGERY      carpal tunnel  right hand             Social History:   Social History reviewed.  Social History     Tobacco Use    Smoking status: Former     Types: Cigarettes     Quit date: 1987     Years since quittin.5    Smokeless tobacco: Never   Substance Use Topics    Alcohol use: Yes     Alcohol/week: 0.0 standard drinks of alcohol     Comment: occasionally             Family History:   Family History reviewed.   Family History   Problem Relation Age of Onset    Hypertension Mother     Heart Failure Mother     Coronary Artery Disease Mother         CABG    Arrhythmia Mother     Cerebrovascular Disease Father     Heart Failure Father     Colon Cancer Father     Coronary Artery Disease Father         CABG    Coronary Artery Disease Brother         CABG    Arrhythmia Brother              Allergies:     Allergies   Allergen Reactions    Penicillin G Hives     hives             Medications:   Medications Reviewed.   Current Facility-Administered Medications   Medication    acetaminophen (TYLENOL) tablet 650 mg    [START ON 10/11/2023] alfuzosin ER (UROXATRAL) 24 hr tablet 10 mg    [START ON 10/11/2023] amLODIPine (NORVASC) tablet 10 mg    [START ON 10/11/2023] chlorthalidone (HYGROTON) tablet 50 mg    [START ON 10/11/2023] ezetimibe (ZETIA) half-tab 5 mg    fenofibrate (TRIGLIDE/LOFIBRA) tablet 160 mg    fentaNYL (PF) (SUBLIMAZE) injection 25 mcg    fentaNYL (PF) (SUBLIMAZE) injection 50 mcg    fentaNYL (PF) (SUBLIMAZE) injection    HOLD:  Metformin and metformin containing medications if patient received IV contrast with acute  "kidney injury or severe chronic kidney disease (stage IV or stage V; i.e., eGFR less than 30)    hydrALAZINE (APRESOLINE) injection 10 mg    HYDROmorphone (PF) (DILAUDID) injection 0.2 mg    HYDROmorphone (PF) (DILAUDID) injection 0.4 mg    iopamidol (ISOVUE-370) solution    lactated ringers infusion    lidocaine (LMX4) cream    lidocaine 1 % 0.1-1 mL    lidocaine 1 %    midazolam (VERSED) injection    nitroGLYcerin (NITROSTAT) sublingual tablet 0.4 mg    ondansetron (ZOFRAN ODT) ODT tab 4 mg    Or    ondansetron (ZOFRAN) injection 4 mg    Patient is NOT allergic to contrast dye    potassium chloride ER (KLOR-CON M) CR tablet 40 mEq    rosuvastatin (CRESTOR) tablet 40 mg    sodium chloride (PF) 0.9% PF flush 3 mL    sodium chloride (PF) 0.9% PF flush 3 mL    sodium chloride (PF) 0.9% PF flush 3 mL    sodium chloride 0.9 % infusion             Physical Exam:   Vitals were reviewed.  Blood pressure 112/73, pulse 60, temperature 97.5  F (36.4  C), temperature source Temporal, resp. rate 15, height 1.676 m (5' 6\"), weight 95.7 kg (211 lb), SpO2 94 %.    General: AAOx3, NAD  Skin: Not jaundiced, no rash, no ecchymoses; incision site CDI, soft, non-tender, no signs of hematoma. No bruit  HEENT: MMM, PERRLA, EOM intact  CV: RRR, normal S1S2, grade II JUNIOR   Resp: Clear to auscultation bilaterally, no wheezes, rhonchi  Abd: Soft, non-tender, BS+, no masses appreciated  Extremities: warm and well perfused, palpable pulses, no edema  Neuro: No lateralizing symptoms or focal neurologic deficits          Labs:   Routine Labs:  No results found for: TROPI, TROPONIN, TROPR, TROPN  CMP  Recent Labs   Lab 10/10/23  0844      POTASSIUM 3.2*   CHLORIDE 102   CO2 24   ANIONGAP 14   GLC 97   BUN 25.1*   CR 1.07   GFRESTIMATED 72   SARA 9.3     CBC  Recent Labs   Lab 10/10/23  0844   WBC 6.9   RBC 4.75   HGB 15.3   HCT 44.6   MCV 94   MCH 32.2   MCHC 34.3   RDW 13.0        INR  Recent Labs   Lab 10/10/23  0844   INR 1.18* "           Diagnostics:    TAVR (aborted): 10/10/23  Conclusion    TAVR procedure aborted as during deployment of second Perclose on LFA device could not be retrieved and emergent cut down was performed with bovine patch repair of the artery and removal of the device.   Hemostasis of RFA with 8 Fr Angioseal         Plan     Follow bedrest per protocol   Continued medical management and lifestyle modifications for cardiovascular risk factor optimizations.   Arterial sheath removed from femoral artery with closure device.   Femoral angiogram identifies arterial sheath placement suitable for closure device.   Admit to inpatient        Care as per cardiology team   Discussion about future TAVR from alternate access if needed   Follow up vascular surgery recs regarding patch repair of LFA       Medical Decision Making       70 MINUTES SPENT BY ME on the date of service doing chart review, history, exam, documentation & further activities per the note.       Review of patients symptoms, labs, imaging, and with severe symptomatic aortic stenosis appropriate for AVR.  Risks and benefits explained.     Thom Patel MD

## 2023-10-10 NOTE — BRIEF OP NOTE
[facility]    Brief Operative Note    Pre-operative diagnosis: valvular disease  Post-operative diagnosis Same as pre-operative diagnosis    Procedure: Transfemoral aortic valve replacement - aborted    Surgeon: Surgeon(s) and Role:     * German Patel MD - Primary     * Delvis Samano MD - Assisting     * Maciej Townsend MD - Fellow - Assisting     * Rolando Nagy MD     * Merlin Batista MD     * Narciso Lovell MD - Fellow    Anesthesia: Moderate Sedation   Estimated Blood Loss: 300 ml    Drains: None  Specimens: * No specimens in log *  Findings:   Left femoral aneurysm with calcified vessel. Proglide device stuck requiring cutdown, repair of femoral artery with bovine pericardial patch .  Complications: As mentioned above .  Implants: None          Narciso Lovell MD  Cardiothoracic Surgery Fellow  Pager: [phone]

## 2023-10-10 NOTE — OR NURSING
October 10, 2023  1100    OR staff was called in to do a left femoral cutdown. Part of the PROGLIDE was stuck in the left femoral artery and cannot be easily removed.     Dr. Merlin Batista and Dr. Narciso Lovell did a cutdown incision on the left femoral area at 1137 October 10, 2023.    Cell saver was requested at 1155.    Vascular surgery was called in at 1226.    PROCEDURE: Left Groin Cutdown with Repair of Left Femoral Artery; Aborted Transcatheter Aortic Valve Replacement     ESTIMATED BLOOD LOSS: 360mL

## 2023-10-10 NOTE — ANESTHESIA POSTPROCEDURE EVALUATION
Patient: Jonny Goldberg    Procedure: Procedure(s):  Left Groin Cutdown with Repair of Left Femoral Artery; Aborted Transcatheter Aortic Valve Replacement       Anesthesia Type:  General    Note:  Disposition: Admission   Postop Pain Control: Uneventful            Sign Out: Well controlled pain   PONV: No   Neuro/Psych: Uneventful            Sign Out: Acceptable/Baseline neuro status   Airway/Respiratory: Uneventful            Sign Out: Acceptable/Baseline resp. status   CV/Hemodynamics: Uneventful            Sign Out: Acceptable CV status; No obvious hypovolemia; No obvious fluid overload   Other NRE: NONE   DID A NON-ROUTINE EVENT OCCUR? No           Last vitals:  Vitals Value Taken Time   /70 10/10/23 1530   Temp 36.4  C (97.5  F) 10/10/23 1430   Pulse 60 10/10/23 1534   Resp 9 10/10/23 1534   SpO2 93 % 10/10/23 1535   Vitals shown include unvalidated device data.    Electronically Signed By: Itzel Martin MD  October 10, 2023  4:43 PM

## 2023-10-11 ENCOUNTER — APPOINTMENT (OUTPATIENT)
Dept: PHYSICAL THERAPY | Facility: CLINIC | Age: 78
DRG: 253 | End: 2023-10-11
Attending: STUDENT IN AN ORGANIZED HEALTH CARE EDUCATION/TRAINING PROGRAM
Payer: COMMERCIAL

## 2023-10-11 VITALS
DIASTOLIC BLOOD PRESSURE: 63 MMHG | WEIGHT: 208 LBS | RESPIRATION RATE: 12 BRPM | OXYGEN SATURATION: 98 % | HEART RATE: 63 BPM | HEIGHT: 66 IN | SYSTOLIC BLOOD PRESSURE: 106 MMHG | TEMPERATURE: 97.5 F | BODY MASS INDEX: 33.43 KG/M2

## 2023-10-11 LAB
ANION GAP SERPL CALCULATED.3IONS-SCNC: 10 MMOL/L (ref 7–15)
BUN SERPL-MCNC: 21.9 MG/DL (ref 8–23)
CALCIUM SERPL-MCNC: 8.8 MG/DL (ref 8.8–10.2)
CHLORIDE SERPL-SCNC: 103 MMOL/L (ref 98–107)
CREAT SERPL-MCNC: 0.97 MG/DL (ref 0.67–1.17)
DEPRECATED HCO3 PLAS-SCNC: 26 MMOL/L (ref 22–29)
EGFRCR SERPLBLD CKD-EPI 2021: 81 ML/MIN/1.73M2
ERYTHROCYTE [DISTWIDTH] IN BLOOD BY AUTOMATED COUNT: 13 % (ref 10–15)
GLUCOSE SERPL-MCNC: 145 MG/DL (ref 70–99)
HCT VFR BLD AUTO: 36.4 % (ref 40–53)
HGB BLD-MCNC: 12.6 G/DL (ref 13.3–17.7)
MAGNESIUM SERPL-MCNC: 1.6 MG/DL (ref 1.7–2.3)
MCH RBC QN AUTO: 32.7 PG (ref 26.5–33)
MCHC RBC AUTO-ENTMCNC: 34.6 G/DL (ref 31.5–36.5)
MCV RBC AUTO: 95 FL (ref 78–100)
PHOSPHATE SERPL-MCNC: 2.7 MG/DL (ref 2.5–4.5)
PLATELET # BLD AUTO: 219 10E3/UL (ref 150–450)
POTASSIUM SERPL-SCNC: 4 MMOL/L (ref 3.4–5.3)
RBC # BLD AUTO: 3.85 10E6/UL (ref 4.4–5.9)
SODIUM SERPL-SCNC: 139 MMOL/L (ref 135–145)
WBC # BLD AUTO: 15.3 10E3/UL (ref 4–11)

## 2023-10-11 PROCEDURE — 93005 ELECTROCARDIOGRAM TRACING: CPT

## 2023-10-11 PROCEDURE — 97110 THERAPEUTIC EXERCISES: CPT | Mod: GP

## 2023-10-11 PROCEDURE — 99239 HOSP IP/OBS DSCHRG MGMT >30: CPT | Mod: FS | Performed by: INTERNAL MEDICINE

## 2023-10-11 PROCEDURE — 80048 BASIC METABOLIC PNL TOTAL CA: CPT | Performed by: STUDENT IN AN ORGANIZED HEALTH CARE EDUCATION/TRAINING PROGRAM

## 2023-10-11 PROCEDURE — 85027 COMPLETE CBC AUTOMATED: CPT | Performed by: STUDENT IN AN ORGANIZED HEALTH CARE EDUCATION/TRAINING PROGRAM

## 2023-10-11 PROCEDURE — 36415 COLL VENOUS BLD VENIPUNCTURE: CPT | Performed by: STUDENT IN AN ORGANIZED HEALTH CARE EDUCATION/TRAINING PROGRAM

## 2023-10-11 PROCEDURE — 83735 ASSAY OF MAGNESIUM: CPT | Performed by: STUDENT IN AN ORGANIZED HEALTH CARE EDUCATION/TRAINING PROGRAM

## 2023-10-11 PROCEDURE — 250N000013 HC RX MED GY IP 250 OP 250 PS 637: Performed by: STUDENT IN AN ORGANIZED HEALTH CARE EDUCATION/TRAINING PROGRAM

## 2023-10-11 PROCEDURE — 84100 ASSAY OF PHOSPHORUS: CPT | Performed by: STUDENT IN AN ORGANIZED HEALTH CARE EDUCATION/TRAINING PROGRAM

## 2023-10-11 PROCEDURE — 93010 ELECTROCARDIOGRAM REPORT: CPT | Performed by: INTERNAL MEDICINE

## 2023-10-11 PROCEDURE — 97161 PT EVAL LOW COMPLEX 20 MIN: CPT | Mod: GP

## 2023-10-11 PROCEDURE — 97530 THERAPEUTIC ACTIVITIES: CPT | Mod: GP

## 2023-10-11 RX ADMIN — POTASSIUM CHLORIDE 40 MEQ: 1500 TABLET, EXTENDED RELEASE ORAL at 08:39

## 2023-10-11 RX ADMIN — ALFUZOSIN HYDROCHLORIDE 10 MG: 10 TABLET, EXTENDED RELEASE ORAL at 08:39

## 2023-10-11 RX ADMIN — ACETAMINOPHEN 650 MG: 325 TABLET, FILM COATED ORAL at 08:44

## 2023-10-11 RX ADMIN — CHLORTHALIDONE 50 MG: 25 TABLET ORAL at 08:38

## 2023-10-11 RX ADMIN — ACETAMINOPHEN 650 MG: 325 TABLET, FILM COATED ORAL at 00:25

## 2023-10-11 RX ADMIN — EZETIMIBE 5 MG: 10 TABLET ORAL at 08:39

## 2023-10-11 RX ADMIN — AMLODIPINE BESYLATE 10 MG: 10 TABLET ORAL at 08:39

## 2023-10-11 RX ADMIN — FENOFIBRATE 160 MG: 160 TABLET ORAL at 08:38

## 2023-10-11 ASSESSMENT — ACTIVITIES OF DAILY LIVING (ADL)
ADLS_ACUITY_SCORE: 18

## 2023-10-11 NOTE — DISCHARGE SUMMARY
[address] [address]  p: [phone]    Discharge Summary: Cardiology Service    Jonny Goldberg MRN# [id number]   YOB: 1945 Age: 78 year old       Admission Date: 10/10/2023  Discharge Date: 10/11/23      Brief HPI:  Jonny Goldberg is a pleasant 78 year old with severe aortic stenosis admitted on 10/10/2023 for elective TAVR that was aborted due to inability to remove closure (Perclose) device requiring surgical cutdown and femoral artery repair. His hospital course has otherwise been uncomplicated.     Hospital Course by Diagnosis:  # Severe aortic stenosis   # s/p LCFA cutdown and surgical repair with pericardial patch   Patient underwent attempted TAVR that was aborted due to vascular complications requiring LCFA surgical cutdown and repair with pericardial bovine patch. He is recovering well. Groin site soft without hematoma.   - Will plan for outpatient TAVR in 4-6 weeks via [facility].   - Follow-up with structural clinic on 1-2 weeks.       # Chronic diastolic heart failure, NYHA class II  NTpBNP 1063. Euvolemic on exam  - No acute issues.      # Permanent atrial fibrillation   - Resume coumadin, goal INR 2-3     # Hypertension   - Continue PTA amlodipine 10 mg daily   - Continue PTA chlorthalidone 50 mg daily     Pertinent Procedures:  1. Attempted TAVR, aborted  2. LCFA surgical cutdown and patch angioplasty.       Medication Changes:  None     Discharge medications:   Current Discharge Medication List        CONTINUE these medications which have NOT CHANGED    Details   alfuzosin ER (UROXATRAL) 10 MG 24 hr tablet Take 1 tablet (10 mg) by mouth daily  Qty: 90 tablet, Refills: 4    Associated Diagnoses: Benign localized prostatic hyperplasia with lower urinary tract symptoms (LUTS)      ALPHA LIPOIC ACID PO Take 600 mg by mouth daily      amLODIPine (NORVASC) 10 MG tablet Take 1 tablet (10 mg) by mouth daily  Qty: 90 tablet,  Refills: 0    Associated Diagnoses: Primary hypertension      ascorbic acid (VITAMIN C) 1000 MG TABS Take 1,000 mg by mouth daily      aspirin 81 MG EC tablet Take 4 tablets by mouth once (4 x 81 mg = 324 mg)      chlorthalidone (HYGROTON) 50 MG tablet Take 50 mg by mouth daily      Cholecalciferol (VITAMIN D) 400 UNITS tablet Take 1 tablet by mouth every evening      CINNAMON PO Take 1 capsule by mouth every evening Pt unsure of strength      clindamycin (CLEOCIN) 300 MG capsule Take 2 capsules by mouth once as needed (1 hour prior to dental appointments 2 x 300 mg = 300 mg)      ezetimibe (ZETIA) 10 MG tablet Take 0.5 tablets (5 mg) by mouth Every Mon, Wed, Fri Morning  Qty: 20 tablet, Refills: 3    Associated Diagnoses: PAD (peripheral artery disease) (H24)      fenofibrate (TRIGLIDE/LOFIBRA) 160 MG tablet Take 1 tablet (160 mg) by mouth daily  Qty: 90 tablet, Refills: 3    Associated Diagnoses: Mixed hyperlipidemia      multivitamin w/minerals (THERA-VIT-M) tablet Take 1 tablet by mouth every evening      omega 3 1000 MG CAPS Take 1 capsule by mouth every evening 180 EPA/ 20 DHH      potassium chloride ER (KLOR-CON M) 20 MEQ CR tablet Take 2 tablets (40 mEq) by mouth daily  Qty: 30 tablet, Refills: 0    Associated Diagnoses: Hypokalemia      rosuvastatin (CRESTOR) 40 MG tablet Take 40 mg by mouth every evening      Ubiquinol 100 MG CAPS Take 100 mg by mouth daily      warfarin ANTICOAGULANT (COUMADIN) 4 MG tablet Take 1 tablet by mouth daily               Condition on discharge  Temp:  [97.5  F (36.4  C)-97.6  F (36.4  C)] 97.5  F (36.4  C)  Pulse:  [60-70] 70  Resp:  [10-25] 12  BP: (103-139)/(68-89) 114/74  SpO2:  [91 %-99 %] 97 %  General: Alert, interactive, NAD  Eyes: sclera anicteric, EOMI  Neck: JVP 0, carotid 2+ bilaterally  Cardiovascular: regular rate and rhythm, normal S1 and S2, no murmurs, gallops, or rubs  Resp: clear to auscultation bilaterally, no rales, wheezes, or rhonchi  GI: Soft, nontender,  nondistended. +BS.  No HSM or masses, no rebound or guarding.  Extremities: no edema, no cyanosis or clubbing, dorsalis pedis and posterior tibialis pulses 2+ bilaterally  Skin: Warm and dry, no jaundice or rash  Neuro: CN 2-12 intact, moves all extremities equally  Psych: Alert & oriented x 3    Patient Care Team:  Reid Hogue MD as PCP - General (Family Medicine)  Reid Hayes MD ([facility])  Isabel Zapata PA-C as Assigned OBGYN Provider  Yuan Meredith MD as Assigned Surgical Provider  Gennaro Cook MD as Assigned Heart and Vascular Provider    Bere Jackson DNP, APRN, CNP  [facility]  [phone]    Time Spent on this Encounter   I, Bere Jackson CNP, personally saw the patient today and spent greater than 30 minutes discharging this patient.

## 2023-10-11 NOTE — PROGRESS NOTES
10/11/23 1000   Appointment Info   Signing Clinician's Name / Credentials (PT) Mini Cantu PT   Living Environment   People in Home spouse   Current Living Arrangements house   Home Accessibility stairs within home   Number of Stairs, Within Home, Primary greater than 10 stairs   Stair Railings, Within Home, Primary railings safe and in good condition;railing on right side (ascending)   Transportation Anticipated car, drives self;family or friend will provide   Self-Care   Usual Activity Tolerance good   Current Activity Tolerance good   Regular Exercise No   Equipment Currently Used at Home none   Fall history within last six months no   General Information   Onset of Illness/Injury or Date of Surgery 10/10/23   Referring Physician Dr. Patel   Patient/Family Therapy Goals Statement (PT) To go home   Pertinent History of Current Problem (include personal factors and/or comorbidities that impact the POC) Pt is a 78 year old male admitted for TAVR, however, TAVR not completed.   Cognition   Affect/Mental Status (Cognition) WFL   Orientation Status (Cognition) oriented x 4   Pain Assessment   Patient Currently in Pain No   Range of Motion (ROM)   Range of Motion ROM is WFL   Strength (Manual Muscle Testing)   Strength (Manual Muscle Testing) strength is WFL   Bed Mobility   Bed Mobility no deficits identified   Transfers   Transfers no deficits identified   Gait/Stairs (Locomotion)   [place] (Gait) independent   Balance   Balance Comments Good   Clinical Impression   Criteria for Skilled Therapeutic Intervention Yes, treatment indicated   PT Diagnosis (PT) Impaired endurance   Influenced by the following impairments Decreased endurance   Functional limitations due to impairments Difficulty with long distance ambulation   Clinical Presentation (PT Evaluation Complexity) stable   Clinical Presentation Rationale VSS, pain controlled   Clinical Decision Making (Complexity) low complexity   Planned Therapy  Interventions (PT) patient/family education;progressive activity/exercise;risk factor education;home program guidelines   Risk & Benefits of therapy have been explained evaluation/treatment results reviewed;risks/benefits reviewed;current/potential barriers reviewed;care plan/treatment goals reviewed;participants voiced agreement with care plan;participants included;patient;spouse/significant other   PT Total Evaluation Time   PT Eval, Low Complexity Minutes (33573) 10   PT Discharge Planning   PT Plan Goals met, discharge from therapy   PT Discharge Recommendation (DC Rec) home   PT Rationale for DC Rec Pt independent with mobility, safe to return home.   PT Brief overview of current status Independent   Total Session Time   Total Session Time (sum of timed and untimed services) 10

## 2023-10-11 NOTE — PLAN OF CARE
Neuro: A/O x4  CV/Rhythm: V paced  Resp/02:  LS clear, equal on RA  GI/Diet: regular diet  : voiding in urinal and bathroom, some intermittent hesitation, per pt baseline (recent prostate biopsy)  Skin/Incisions/Sites: Rt groin site is c/d/I, L groin site ecchymotic, puffy but soft, no bruit noted.  Off bedrest at 2000.  Pulses/CMS: +2 radial, pedal, PT/DP verified with doppler and marked  Edema: +1-2 in ankles  Activity/Falls Risk: Up with SBA to bathroom  Lines/Drains/IVs: SL  Labs/BGM: K 3.2, replaced today with recheck tomorrow, INR 1.18  Test/Procedures:   VS/Pain: VSS, pt stated earlier groin pain relived post dilaudid and ice pack  DC Plan: likely home tomorrow if groin site stable  Other: wife updated at bedside.

## 2023-10-11 NOTE — PLAN OF CARE
Goal Outcome Evaluation:      Plan of Care Reviewed With: patient    Overall Patient Progress: improvingOverall Patient Progress: improving         VSS, tele V-paced, denies pain.  Right and left groin sites are soft and non-tender, ecchymotic and suture intact, CMS intact to baseline.  Plan is to discharge home and come back in a month for TAVR.  Discharge instructions gone over with patient and wife.  IV and tele removed, belongings sent with patient.  Transferred off unit via staff and wife will provide transportation home.

## 2023-10-11 NOTE — PLAN OF CARE
A&Ox4. Tele: v-paced. No CP/SOB reported. RA. VSS. SBA to bathroom and able to void w hesitation and urgency. R groin site CDI and CMS intact (baseline numbness bilateral LE). L groin is bruised, soft, and puffy. Pulses +. Recheck for K+ this morning. Tylenol given for groin pain.   Plan: possible discharge home

## 2023-10-11 NOTE — DISCHARGE INSTRUCTIONS

## 2023-10-12 ENCOUNTER — PATIENT OUTREACH (OUTPATIENT)
Dept: CARE COORDINATION | Facility: CLINIC | Age: 78
End: 2023-10-12
Payer: COMMERCIAL

## 2023-10-12 LAB
ATRIAL RATE - MUSE: 28 BPM
DIASTOLIC BLOOD PRESSURE - MUSE: NORMAL MMHG
INTERPRETATION ECG - MUSE: NORMAL
P AXIS - MUSE: NORMAL DEGREES
PR INTERVAL - MUSE: NORMAL MS
QRS DURATION - MUSE: 202 MS
QT - MUSE: 552 MS
QTC - MUSE: 552 MS
R AXIS - MUSE: -78 DEGREES
SYSTOLIC BLOOD PRESSURE - MUSE: NORMAL MMHG
T AXIS - MUSE: 84 DEGREES
VENTRICULAR RATE- MUSE: 60 BPM

## 2023-10-12 ASSESSMENT — ACTIVITIES OF DAILY LIVING (ADL): DEPENDENT_IADLS:: INDEPENDENT

## 2023-10-12 NOTE — PROGRESS NOTES
Clinic Care Coordination Contact  [facility]/Voicemail    Referral Source: IP Report. Pt was hospitalized at [facility] from 10/10-10/11 for a planned TAVR procedure. The procedure needed to be aborted due to inability to remove closure (Perclose) device requiring surgical cutdown and femoral artery repair.  Clinical Data: Care Coordinator Outreach  Outreach attempted x 1.  Left message on patient's voicemail with call back information and requested return call.  Plan: Care Coordinator will try to reach patient again in 3-5 business days.    Kelly Valle [facility]  Social Work Care Coordinator  Phone: [phone]

## 2023-10-13 ENCOUNTER — TELEPHONE (OUTPATIENT)
Dept: CARDIOLOGY | Facility: CLINIC | Age: 78
End: 2023-10-13

## 2023-10-13 LAB
ATRIAL RATE - MUSE: 24 BPM
DIASTOLIC BLOOD PRESSURE - MUSE: NORMAL MMHG
INTERPRETATION ECG - MUSE: NORMAL
P AXIS - MUSE: NORMAL DEGREES
PR INTERVAL - MUSE: NORMAL MS
QRS DURATION - MUSE: 162 MS
QT - MUSE: 536 MS
QTC - MUSE: 536 MS
R AXIS - MUSE: -79 DEGREES
SYSTOLIC BLOOD PRESSURE - MUSE: NORMAL MMHG
T AXIS - MUSE: 98 DEGREES
VENTRICULAR RATE- MUSE: 60 BPM

## 2023-10-13 NOTE — TELEPHONE ENCOUNTER
"Received call on Team 5 voicemail from patient's spouse that patient is in \"intense\" pain.  Wife is requesting a return call.  Will route to structural team as this was an attempted TAVR patient recently discharged.  Katie Fishman RN on 10/13/2023 at 10:23 AM    "

## 2023-10-13 NOTE — TELEPHONE ENCOUNTER
Spoke with patient and his wife on the phone. Jonny notes that he has been experiencing significant pain at the site of his LCFA surgical cutdown and repair. He states that he was in intense pain this morning which improved when he laid down.   He states that this is consistent with the pain he experienced in the hospital. He also notes that his incision is stable with no changes or concerns.     He has been taking Tylenol. He also notes that he has also been taking some of an old dilaudid prescription that his wife had in order to sleep.   We reviewed that patient should not be using a prescription that is not his. Patient is requesting something stronger than tylenol to take as needed for pain.

## 2023-10-13 NOTE — PROGRESS NOTES
Clinic Care Coordination Contact  Clinic Care Coordination Contact  OUTREACH    Referral Information:  Referral Source: IP Report. Pt was hospitalized at [facility] from 10/10-10/11 for a planned TAVR procedure. The procedure needed to be aborted due to inability to remove closure (Perclose) device requiring surgical cutdown and femoral artery repair.     Primary Diagnosis: Cardiovascular - other    Chief Complaint   Patient presents with    Clinic Care Coordination - Post Hospital     SW Outreach        [place] Utilization:   Clinic Utilization  Difficulty keeping appointments:: No  Compliance Concerns: No  No-Show Concerns: No  No PCP office visit in Past Year: No  Utilization      No Show Count (past year)  1             ED Visits  1             Hospital Admissions  2                    Current as of: 10/12/2023 11:50 PM                Clinical Concerns:  Current Medical Concerns:  Severe aortic stenosis, permanent a-fib, HTN and chronic diastolic heart failure   Current Behavioral Concerns: None    Education Provided to patient: SW role   Pain  Pain (GOAL):: Yes  Type: Acute (<3mo)  Progression: Constant  Health Maintenance Reviewed:      Medication Management:  Medication review status: Pt's spouse stated that they have a call out to the heart clinic  to see if they can get something to control the pain better, they report that they are currently only prescribed Tylenol.    Functional Status:  Dependent ADLs:: Independent  Dependent IADLs:: Independent  Bed or wheelchair confined:: No  Mobility Status: Independent    Living Situation:  Current living arrangement:: I live in a private home with spouse  Type of residence:: Private home - stairs    Lifestyle & Psychosocial Needs:  Pt is a 78 yr old  male who lives in [place]\"" with his spouse Sisi. Pt is retired. No insurance or financial concerns noted at this time.    Social Determinants of Health     Food Insecurity: Not on file   Depression: Not at risk  "(9/22/2023)    PHQ-2     PHQ-2 Score: 0   Housing Stability: Not on file   Tobacco Use: Medium Risk (10/10/2023)    Patient History     Smoking Tobacco Use: Former     Smokeless Tobacco Use: Never     Passive Exposure: Not on file   Financial Resource Strain: Not on file   Alcohol Use: Not on file   Transportation Needs: Not on file   Physical Activity: Not on file   Interpersonal Safety: Not on file   Stress: Not on file   Social Connections: Not on file     Inadequate nutrition (GOAL):: No  Tube Feeding: No  Transportation means:: Regular car     Informal Support system:: Spouse        Resources and Interventions:  Current Resources: None      Equipment Currently Used at Home: none  Employment Status: retired       Received call back from pt's spouse Sisi. Sisi explained that pt is in a significant amount of pain and she has a call out to the Heart Clinic to see if they can get something better to manage the pain. She reports that the pain is mostly in his leg due to the artery being severed during the procedure but he is sore all over from laying in a hospital bed for a couple of days. DOROTEO suggested also scheduling follow-up with PCP, Sisi stated that the pt \"isn't going anywhere\" due to the pain. DOROTEO MEJIA also suggested calling and speaking with Catrina Best PCP triage if she does not get a call back from the heart clinic soon. Sisi agreed and will do that. Sisi was appreciative for the call and denied having add'l questions/concerns for DOROTEO MEJIA at this time.       Patient/Caregiver understanding: Yes       Future Appointments                In 1 week 1,  Cardiac Rehab [facility] Cardiac and Pulmonary Rehabilitation [facility] LUIS M    In 1 week PEREZ TECH1 [facility] PSA CLIN    In 2 weeks Bere Jackson CNP M [facility] Heart Clinic [facility] PSA CLIN            Plan: DOROTEO MEJIA will not plan further outreach at this time. Will remain available as needed and " encouraged pt/spouse to reach back out if needed.    Kelly Valle [facility]  Social Work Care Coordinator  Phone: [phone]

## 2023-10-16 ENCOUNTER — APPOINTMENT (OUTPATIENT)
Dept: ULTRASOUND IMAGING | Facility: CLINIC | Age: 78
End: 2023-10-16
Attending: EMERGENCY MEDICINE
Payer: COMMERCIAL

## 2023-10-16 ENCOUNTER — HOSPITAL ENCOUNTER (EMERGENCY)
Facility: CLINIC | Age: 78
Discharge: HOME | End: 2023-10-16
Attending: EMERGENCY MEDICINE | Admitting: EMERGENCY MEDICINE
Payer: COMMERCIAL

## 2023-10-16 VITALS
DIASTOLIC BLOOD PRESSURE: 73 MMHG | TEMPERATURE: 97.1 F | SYSTOLIC BLOOD PRESSURE: 131 MMHG | BODY MASS INDEX: 33.43 KG/M2 | WEIGHT: 208 LBS | HEART RATE: 64 BPM | OXYGEN SATURATION: 93 % | HEIGHT: 66 IN | RESPIRATION RATE: 18 BRPM

## 2023-10-16 DIAGNOSIS — L03.314 CELLULITIS OF GROIN: ICD-10-CM

## 2023-10-16 DIAGNOSIS — S30.1XXA HEMATOMA OF GROIN, INITIAL ENCOUNTER: ICD-10-CM

## 2023-10-16 LAB
ALBUMIN SERPL BCG-MCNC: 3.6 G/DL (ref 3.5–5.2)
ALP SERPL-CCNC: 43 U/L (ref 40–129)
ALT SERPL W P-5'-P-CCNC: 14 U/L (ref 0–70)
ANION GAP SERPL CALCULATED.3IONS-SCNC: 12 MMOL/L (ref 7–15)
APTT PPP: 30 SECONDS (ref 22–38)
AST SERPL W P-5'-P-CCNC: 19 U/L (ref 0–45)
BASO+EOS+MONOS # BLD AUTO: ABNORMAL 10*3/UL
BASO+EOS+MONOS NFR BLD AUTO: ABNORMAL %
BASOPHILS # BLD AUTO: 0.1 10E3/UL (ref 0–0.2)
BASOPHILS NFR BLD AUTO: 1 %
BILIRUB SERPL-MCNC: 0.6 MG/DL
BUN SERPL-MCNC: 21.3 MG/DL (ref 8–23)
CALCIUM SERPL-MCNC: 9.3 MG/DL (ref 8.8–10.2)
CHLORIDE SERPL-SCNC: 101 MMOL/L (ref 98–107)
CREAT SERPL-MCNC: 1.03 MG/DL (ref 0.67–1.17)
DEPRECATED HCO3 PLAS-SCNC: 24 MMOL/L (ref 22–29)
EGFRCR SERPLBLD CKD-EPI 2021: 75 ML/MIN/1.73M2
EOSINOPHIL # BLD AUTO: 0.2 10E3/UL (ref 0–0.7)
EOSINOPHIL NFR BLD AUTO: 2 %
ERYTHROCYTE [DISTWIDTH] IN BLOOD BY AUTOMATED COUNT: 13.2 % (ref 10–15)
GLUCOSE SERPL-MCNC: 143 MG/DL (ref 70–99)
HCT VFR BLD AUTO: 35.5 % (ref 40–53)
HGB BLD-MCNC: 12.1 G/DL (ref 13.3–17.7)
IMM GRANULOCYTES # BLD: 0 10E3/UL
IMM GRANULOCYTES NFR BLD: 0 %
INR PPP: 1.38 (ref 0.85–1.15)
LYMPHOCYTES # BLD AUTO: 0.7 10E3/UL (ref 0.8–5.3)
LYMPHOCYTES NFR BLD AUTO: 7 %
MCH RBC QN AUTO: 32.6 PG (ref 26.5–33)
MCHC RBC AUTO-ENTMCNC: 34.1 G/DL (ref 31.5–36.5)
MCV RBC AUTO: 96 FL (ref 78–100)
MONOCYTES # BLD AUTO: 1.2 10E3/UL (ref 0–1.3)
MONOCYTES NFR BLD AUTO: 11 %
NEUTROPHILS # BLD AUTO: 8.2 10E3/UL (ref 1.6–8.3)
NEUTROPHILS NFR BLD AUTO: 79 %
NRBC # BLD AUTO: 0 10E3/UL
NRBC BLD AUTO-RTO: 0 /100
PLATELET # BLD AUTO: 246 10E3/UL (ref 150–450)
POTASSIUM SERPL-SCNC: 3.2 MMOL/L (ref 3.4–5.3)
PROT SERPL-MCNC: 6.7 G/DL (ref 6.4–8.3)
RBC # BLD AUTO: 3.71 10E6/UL (ref 4.4–5.9)
SODIUM SERPL-SCNC: 137 MMOL/L (ref 135–145)
WBC # BLD AUTO: 10.4 10E3/UL (ref 4–11)

## 2023-10-16 PROCEDURE — 85610 PROTHROMBIN TIME: CPT | Performed by: EMERGENCY MEDICINE

## 2023-10-16 PROCEDURE — 36415 COLL VENOUS BLD VENIPUNCTURE: CPT | Performed by: EMERGENCY MEDICINE

## 2023-10-16 PROCEDURE — 250N000013 HC RX MED GY IP 250 OP 250 PS 637: Performed by: EMERGENCY MEDICINE

## 2023-10-16 PROCEDURE — 99284 EMERGENCY DEPT VISIT MOD MDM: CPT | Mod: 25

## 2023-10-16 PROCEDURE — 85025 COMPLETE CBC W/AUTO DIFF WBC: CPT | Performed by: EMERGENCY MEDICINE

## 2023-10-16 PROCEDURE — 93926 LOWER EXTREMITY STUDY: CPT | Mod: XS

## 2023-10-16 PROCEDURE — 80053 COMPREHEN METABOLIC PANEL: CPT | Performed by: EMERGENCY MEDICINE

## 2023-10-16 PROCEDURE — 85730 THROMBOPLASTIN TIME PARTIAL: CPT | Performed by: EMERGENCY MEDICINE

## 2023-10-16 PROCEDURE — 93926 LOWER EXTREMITY STUDY: CPT | Mod: LT

## 2023-10-16 RX ORDER — CEPHALEXIN 500 MG/1
500 CAPSULE ORAL ONCE
Status: COMPLETED | OUTPATIENT
Start: 2023-10-16 | End: 2023-10-16

## 2023-10-16 RX ORDER — OXYCODONE AND ACETAMINOPHEN 5; 325 MG/1; MG/1
1 TABLET ORAL ONCE
Status: COMPLETED | OUTPATIENT
Start: 2023-10-16 | End: 2023-10-16

## 2023-10-16 RX ORDER — OXYCODONE AND ACETAMINOPHEN 5; 325 MG/1; MG/1
1 TABLET ORAL EVERY 8 HOURS PRN
Qty: 9 TABLET | Refills: 0 | Status: SHIPPED | OUTPATIENT
Start: 2023-10-16 | End: 2023-10-19

## 2023-10-16 RX ORDER — CEPHALEXIN 500 MG/1
500 CAPSULE ORAL 4 TIMES DAILY
Qty: 28 CAPSULE | Refills: 0 | Status: SHIPPED | OUTPATIENT
Start: 2023-10-16 | End: 2023-10-23

## 2023-10-16 RX ADMIN — CEPHALEXIN 500 MG: 500 CAPSULE ORAL at 13:28

## 2023-10-16 ASSESSMENT — ACTIVITIES OF DAILY LIVING (ADL)
ADLS_ACUITY_SCORE: 33
ADLS_ACUITY_SCORE: 35

## 2023-10-16 NOTE — TELEPHONE ENCOUNTER
"Per Rolando Nagy MD   \"Cut down was done by CV surgery. I just repaired the artery. Please defer management to them.\"  Thanks,  MAYITO Garcia, RN  [facility]  Office:  [phone] Fax: [phone]    "

## 2023-10-16 NOTE — ED TRIAGE NOTES
Had a heart procedure that they were unable to perform on 10/10.  Reports had placed catheter  through L FA.  Increased pain at that sight, redness and new numbness to LLE that started last night.  Pulse palpable to LLE.  Warm to touch.      Triage Assessment (Adult)       Row Name 10/16/23 0950          Triage Assessment    Airway WDL WDL        Respiratory WDL    Respiratory WDL WDL        Skin Circulation/Temperature WDL    Skin Circulation/Temperature WDL X        Cardiac WDL    Cardiac WDL WDL        Peripheral/Neurovascular WDL    Peripheral Neurovascular WDL X        Cognitive/Neuro/Behavioral WDL    Cognitive/Neuro/Behavioral WDL WDL

## 2023-10-16 NOTE — TELEPHONE ENCOUNTER
Received voicemail from patient this morning. He states that his left leg (side of TF cutdown) still has significant pain. He notes that the incision area is red and warm. He states that his left foot has developed some numbness on the bottom of his foot which started yesterday.   Notified vascular team of patient's symptoms. Advised that patient present to the ED for further evaluation. Patient states understanding and agreement with plan.

## 2023-10-16 NOTE — TELEPHONE ENCOUNTER
Discussed and reviewed with Lorraine Snyder NP.   Called pt back, verified recommendation to present to ER right away.   Pt verbalized understanding.     MAYITO Rangel, RN  [facility]  Office:  [phone] Fax: [phone]

## 2023-10-16 NOTE — ED PROVIDER NOTES
"  History     Chief Complaint:  Post-op Problem     HPI   Jonny Goldberg is a 78 year old male, s/p left groin cutdown with repair of left femoral artery (aborted TAVR) six days ago with history of aortic valve stenosis, blood clots, HTN, hyperlipidemia, CVA, Hodgkin's lymphoma, and persistent atrial fibrillation anticoagulated on Coumadin who presents to the ED with a post-operative problem. Patient states that he had increased pain and redness to the LLE that began last night. States he noted new redness over the last day. States he has been keeping wound covered, and clean.  He reports that the bottom of his left foot feels\"like cardboard.\" He also noticed a decreased sensation in his left foot, though is still able to feel his foot. Patient's wife adds that his incision site is bigger and more red than it previously was. Jonny denies chest pain, abdominal pain, fevers, chills, nausea, and vomiting. He has been eating and drinking normally. No recent falls. Patient spoke with cardiology via telephone today and was advised to be evaluated in the ED. he has been ambulatory without any problem.  Reports taking Tylenol at home for pain.    Independent Historian:   Spouse/Partner - They report additional history as noted above    Review of External Notes:   I reviewed the recent left groin cutdown procedure note (see findings below).    Left Groin Cutdown Findings (10/10/23):  Left femoral aneurysm with calcified vessel. Proglide device stuck requiring cutdown, repair of femoral artery with bovine pericardial patch .     Medications:    Uroxatral   Norvasc  Aspirin 81 mg  Hygroton   Cleocin   Zetia   Triglide   Klor-Con  Crestor   Ubiquinol   Coumadin     Past Medical History:    Aortic valve stenosis   Blood clot in the legs  GERD  Hodgkin's lymphoma   HTN  PAD  Persistent atrial fibrillation   Cerebral artery occlusion with cerebral infarction   Mixed hyperlipidemia   Atherosclerotic cerebrovascular disease " "  Paroxysmal VT  Sinus pause     Past Surgical History:    Appendectomy  Arthroplasty revision hip, right  Cardiac ablation  Colonoscopy   Heart catheterization  PCI  EP pacemaker device and lead implant, right ventricular  Groin cutdown with repair of left femoral artery, aborted TAVR, left  Carpal tunnel release, right   Knee surgeries     Physical Exam   Patient Vitals for the past 24 hrs:   BP Temp Temp src Pulse Resp SpO2 Height Weight   10/16/23 1329 131/73 -- -- -- -- 93 % -- --   10/16/23 1157 -- -- -- -- -- 97 % -- --   10/16/23 1152 -- -- -- -- -- 96 % -- --   10/16/23 1151 -- -- -- -- -- 96 % -- --   10/16/23 1144 -- -- -- -- -- 95 % -- --   10/16/23 1114 -- -- -- -- -- 97 % -- --   10/16/23 0951 120/58 -- -- -- -- -- -- --   10/16/23 0948 -- 97.1  F (36.2  C) Temporal 64 18 96 % 1.676 m (5' 6\") 94.3 kg (208 lb)        Physical Exam  Vitals reviewed.   Constitutional:       General: He is not in acute distress.     Appearance: He is not ill-appearing.   HENT:      Head: Normocephalic and atraumatic.   Eyes:      Extraocular Movements: Extraocular movements intact.   Cardiovascular:      Rate and Rhythm: Normal rate and regular rhythm.   Pulmonary:      Effort: Pulmonary effort is normal. No respiratory distress.      Breath sounds: Normal breath sounds. No wheezing.   Abdominal:      Palpations: Abdomen is soft.      Tenderness: There is no abdominal tenderness. There is no guarding.   Musculoskeletal:      Cervical back: Normal range of motion.      Comments: 5 out of 5 strength to the bilateral lower extremities with dorsi flexion and plantarflexion.  Sensation grossly intact to the bilateral lower extremities.  To the left groin there is sutures in place there is overlying erythema and tenderness see media.  No palpable areas of fluctuance.  No active drainage from the incision it does appear clean dry and intact.  2+ DP and PT pulse bilaterally   Skin:     General: Skin is warm and dry. "   Neurological:      Mental Status: He is alert and oriented to person, place, and time.      GCS: GCS eye subscore is 4. GCS verbal subscore is 5. GCS motor subscore is 6.   Psychiatric:         Behavior: Behavior normal.           Emergency Department Course     Imaging:  Us Post Vascular Access Low Ext Duplex   Final Result   IMPRESSION:   1. Patent vessels in the left groin.   2. Hypoechoic collection underlying incision is 3.9 x 4.6 x 3.9 cm. No   internal blood flow. Uncertain if this is hematoma or postoperative   seroma.      JAILENE RESENDEZ MD            SYSTEM ID:  [id number]      US Lower Extremity Arterial Duplex Left   Final Result   IMPRESSION: Patent arteries throughout the left lower extremity   without suggestion of arterial insufficiency. No visible aneurysm,   though common femoral artery is ectatic at 1.5 cm.      JAILENE RESENDEZ MD            SYSTEM ID:  [id number]             Laboratory:  Labs Ordered and Resulted from Time of ED Arrival to Time of ED Departure   INR - Abnormal       Result Value    INR 1.38 (*)    COMPREHENSIVE METABOLIC PANEL - Abnormal    Sodium 137      Potassium 3.2 (*)     Carbon Dioxide (CO2) 24      Anion Gap 12      Urea Nitrogen 21.3      Creatinine 1.03      GFR Estimate 75      Calcium 9.3      Chloride 101      Glucose 143 (*)     Alkaline Phosphatase 43      AST 19      ALT 14      Protein Total 6.7      Albumin 3.6      Bilirubin Total 0.6     CBC WITH PLATELETS AND DIFFERENTIAL - Abnormal    WBC Count 10.4      RBC Count 3.71 (*)     Hemoglobin 12.1 (*)     Hematocrit 35.5 (*)     MCV 96      MCH 32.6      MCHC 34.1      RDW 13.2      Platelet Count 246      % Neutrophils 79      % Lymphocytes 7      % Monocytes 11      Mids % (Monos, Eos, Basos)        % Eosinophils 2      % Basophils 1      % Immature Granulocytes 0      NRBCs per 100 WBC 0      Absolute Neutrophils 8.2      Absolute Lymphocytes 0.7 (*)     Absolute Monocytes 1.2      Mids Abs (Monos, Eos, Basos)         Absolute Eosinophils 0.2      Absolute Basophils 0.1      Absolute Immature Granulocytes 0.0      Absolute NRBCs 0.0     PARTIAL THROMBOPLASTIN TIME - Normal    aPTT 30            Emergency Department Course & Assessments:       Interventions:  Medications   cephALEXin (KEFLEX) capsule 500 mg (500 mg Oral $Given 10/16/23 1328)   oxyCODONE-acetaminophen (PERCOCET) 5-325 MG per tablet 1 tablet (1 tablet Oral Not Given 10/16/23 1329)        Assessments:  1022 I obtained history and examined the patient as noted above.  1252 Pt re-evaluated. He is doing well and denies pain medication at this time.   1222 I rechecked the patient and explained findings. He is doing well and is comfortable with discharge to home.     Independent Interpretation (X-rays, CTs, rhythm strip):  None    Social Determinants of Health affecting care:   None    Disposition:  The patient was discharged to home.     Impression & Plan    CMS Diagnoses: None    Medical Decision Makin-year-old male presenting today with left groin pain.  Recently a TAVR was attempted through access of the left groin cutdown was done.  Unable to complete the procedure.  He denies any chest pain or shortness of breath.  He states that he has had pain localized around this incision and increased redness over the last day.  He has been ambulatory.  Distal pulses are intact.  Sensation grossly intact to the lower extremities.  Patient well-appearing in no acute distress afebrile.  Lab work obtained as well as arterial blocks.  Findings as noted above.  Patient updated on results.  Given the overlying erythema on the wounds will treat as cellulitis.  Discussed plan for Keflex.  Also sent patient home on pain medications.  Wife requesting that patient be sent home on Dilaudid, states that she has Dilaudid at home that she has been giving the patient.  I discussed with them giving oxycodone or hydrocodone.  They were agreeable.  Instructed them to make an  appointment to follow-up with their cardiologist, cardiothoracic surgeon.  They verbalized understanding and agreement.    Diagnosis:    ICD-10-CM    1. Hematoma of groin, initial encounter  S30.1XXA       2. Cellulitis of groin  L03.314            Discharge Medications:  Discharge Medication List as of 10/16/2023  1:31 PM        START taking these medications    Details   cephALEXin (KEFLEX) 500 MG capsule Take 1 capsule (500 mg) by mouth 4 times daily for 7 days, Disp-28 capsule, R-0, E-Prescribe      oxyCODONE-acetaminophen (PERCOCET) 5-325 MG tablet Take 1 tablet by mouth every 8 hours as needed for pain, Disp-9 tablet, R-0, Local Print                Scribe Disclosure:  I, Madelaine Orantes, am serving as a scribe at 10:22 AM on 10/16/2023 to document services personally performed by Kiley Cheney DO based on my observations and the provider's statements to me.   10/16/2023   Kiley Cheney DO Doan, Tiffani, DO  10/16/23 7617

## 2023-10-19 ENCOUNTER — OFFICE VISIT (OUTPATIENT)
Dept: CARDIOLOGY | Facility: CLINIC | Age: 78
End: 2023-10-19
Payer: COMMERCIAL

## 2023-10-19 VITALS
BODY MASS INDEX: 34.31 KG/M2 | HEART RATE: 60 BPM | WEIGHT: 213.5 LBS | SYSTOLIC BLOOD PRESSURE: 132 MMHG | OXYGEN SATURATION: 96 % | HEIGHT: 66 IN | DIASTOLIC BLOOD PRESSURE: 68 MMHG

## 2023-10-19 DIAGNOSIS — I35.0 SEVERE AORTIC STENOSIS: Primary | ICD-10-CM

## 2023-10-19 PROCEDURE — 99207 PR NO BILLABLE SERVICE THIS VISIT: CPT | Performed by: STUDENT IN AN ORGANIZED HEALTH CARE EDUCATION/TRAINING PROGRAM

## 2023-10-20 ENCOUNTER — TELEPHONE (OUTPATIENT)
Dept: CARDIOLOGY | Facility: CLINIC | Age: 78
End: 2023-10-20

## 2023-10-20 NOTE — TELEPHONE ENCOUNTER
Spoke with patient today after being present for his office visit with Dr. Batista yesterday. Jonny would prefer to avoid open heart surgery. He would like to discuss TAVR with IVÁN elkins. Reviewed with Dr. Cook today. He would like to have patient see Dr. Nagy in consult to discuss vascular access for TAVR. Patient is aware that vascular team will be calling him to schedule an office visit. Notified vascular team of need for office visit.

## 2023-10-23 ENCOUNTER — TELEPHONE (OUTPATIENT)
Dept: OTHER | Facility: CLINIC | Age: 78
End: 2023-10-23
Payer: COMMERCIAL

## 2023-10-23 NOTE — TELEPHONE ENCOUNTER
Future Appointments   Date Time Provider Department Center   10/25/2023 12:00 [facility]   10/25/2023  2:45 PM [facility] CARDIOTHORACIC SURGERY, ISIAH SEQUEIRA [facility]   10/26/2023  8:00 AM [facility]   11/2/2023 10:50 AM Bere Jackson, CNP [facility] PSA CLIN   11/6/2023 11:00 AM Rolando Nagy MD formerly [facility]

## 2023-10-23 NOTE — TELEPHONE ENCOUNTER
"\"He would like to discuss TAVR with R. TF cutdown. Reviewed with Dr. Cook today. He would like to have patient see Dr. Nagy in consult to discuss vascular access for TAVR.\"     Routing to  to coordinate in clinic OV 2 -3 weeks from 10/23/23 with Dr. Nagy.     Appt note: Discuss vascular access (Right tibial femoral cutdown for TAVR). Dr. Nagy will be assisting Dr. Cook with TAVR.     MAYITO Rangel, RN  [facility]  Office:  [phone] Fax: [phone]        "

## 2023-10-24 NOTE — OP NOTE
OPERATIVE DATE: 10/10/2023    PRE-OPERATIVE DIAGNOSIS:  1) Severe aortic stenosis  2) Chronic diastolic heart failure  3) Chronic atrial fibrillation  4) Hypertension    POST-OPERATIVE DIAGNOSIS:  1) Severe aortic stenosis  2) Chronic diastolic heart failure  3) Chronic atrial fibrillation  4) Hypertension    PROCEDURE:  1) Emergent left common femoral artery cutdown and repair with bovine pericardial patch    SURGEON: PHILLIP Batista MD    CO-SURGEON: Rolando Nagy MD    CARDIOLOGIST: Thom Patel MD    ANESTHESIA: Monitored anesthesia care with local analgesia converted to general endotracheal anesthesia    ESTIMATED BLOOD LOSS: 50 mL    INDICATIONS:  Mr. Jonny Goldberg is a 78 year old year old male with severe symptomatic aortic stenosis. We were asked to evaluate for transcatheter aortic valve replacement. Risks and benefits of the operation were explained to the patient and their family including, but not limited to, bleeding, infection, stroke and even death. They understood these risks and agreed to proceed electively.    OPERATIVE REPORT:  The patient was transferred to the operating room and positioned supine on the OR table. Monitored anesthesia care was begun. The patients chest, abdomen and bilateral lower extremities were clipped, prepped and draped in sterile fashion. A pre-procedure time-out was performed confirming the correct patient, correct site and correct procedure.    Intravenous heparin was administered. Arterial access of the right and left femoral arteries was performed. The side used for delivery was prepared for percutaneous closure at completion. When attempting to secure the Perclose Proglide device there appeared to be a device malfunction resulting in an inability to remove the device from the femoral artery. We suspected at this point that the device footplate was likely stuck within the artery.    I proceeded to perform a cutdown on the left femoral artery and was able to expose  the vessel, which was both heavily calcified and aneurysmal. Due to the severity of the calcification and a clearly abnormal vessel, I called Dr. Nagy from vascular surgery to assist me with the procedure.    Please refer to his op note for details regarding his portion of the procedure. In brief, we were able to locate a soft spot for clamping further proximally and distally and the arteriotomy was repaired primarily using a bovine pericardial patch for reinforcement. There was good distal flow following completion of the repair.    The wound was subsequently closed in multiple layers and the skin closed with interrupted vertical mattress nylon sutures.    The patient was then transferred to the recovery area in stable condition.    All needle, sponge and instrument counts were correct at the end of the case.    PHILLIP Batista MD  Cardiothoracic Surgery  Pager [phone]

## 2023-10-25 ENCOUNTER — ANCILLARY PROCEDURE (OUTPATIENT)
Dept: CARDIOLOGY | Facility: CLINIC | Age: 78
End: 2023-10-25
Attending: INTERNAL MEDICINE
Payer: COMMERCIAL

## 2023-10-25 ENCOUNTER — OFFICE VISIT (OUTPATIENT)
Dept: CARDIOLOGY | Facility: CLINIC | Age: 78
End: 2023-10-25
Payer: COMMERCIAL

## 2023-10-25 DIAGNOSIS — Z95.0 CARDIAC PACEMAKER IN SITU: ICD-10-CM

## 2023-10-25 DIAGNOSIS — Z95.0 CARDIAC PACEMAKER IN SITU: Primary | ICD-10-CM

## 2023-10-25 DIAGNOSIS — L98.499: Primary | ICD-10-CM

## 2023-10-25 LAB
MDC_IDC_EPISODE_DTM: NORMAL
MDC_IDC_EPISODE_DTM: NORMAL
MDC_IDC_EPISODE_ID: NORMAL
MDC_IDC_EPISODE_ID: NORMAL
MDC_IDC_EPISODE_TYPE: NORMAL
MDC_IDC_EPISODE_TYPE: NORMAL
MDC_IDC_LEAD_CONNECTION_STATUS: NORMAL
MDC_IDC_LEAD_IMPLANT_DT: NORMAL
MDC_IDC_LEAD_LOCATION: NORMAL
MDC_IDC_LEAD_LOCATION_DETAIL_1: NORMAL
MDC_IDC_LEAD_MFG: NORMAL
MDC_IDC_LEAD_MODEL: NORMAL
MDC_IDC_LEAD_POLARITY_TYPE: NORMAL
MDC_IDC_LEAD_SERIAL: NORMAL
MDC_IDC_MSMT_BATTERY_DTM: NORMAL
MDC_IDC_MSMT_BATTERY_REMAINING_LONGEVITY: 108 MO
MDC_IDC_MSMT_BATTERY_REMAINING_PERCENTAGE: 100 %
MDC_IDC_MSMT_BATTERY_STATUS: NORMAL
MDC_IDC_MSMT_LEADCHNL_RV_IMPEDANCE_VALUE: 599 OHM
MDC_IDC_MSMT_LEADCHNL_RV_PACING_THRESHOLD_AMPLITUDE: 0.7 V
MDC_IDC_MSMT_LEADCHNL_RV_PACING_THRESHOLD_PULSEWIDTH: 0.4 MS
MDC_IDC_PG_IMPLANT_DTM: NORMAL
MDC_IDC_PG_MFG: NORMAL
MDC_IDC_PG_MODEL: NORMAL
MDC_IDC_PG_SERIAL: NORMAL
MDC_IDC_PG_TYPE: NORMAL
MDC_IDC_SESS_CLINIC_NAME: NORMAL
MDC_IDC_SESS_DTM: NORMAL
MDC_IDC_SESS_TYPE: NORMAL
MDC_IDC_SET_BRADY_LOWRATE: 60 {BEATS}/MIN
MDC_IDC_SET_BRADY_MAX_SENSOR_RATE: 115 {BEATS}/MIN
MDC_IDC_SET_BRADY_MODE: NORMAL
MDC_IDC_SET_LEADCHNL_RV_PACING_AMPLITUDE: 1.2 V
MDC_IDC_SET_LEADCHNL_RV_PACING_CAPTURE_MODE: NORMAL
MDC_IDC_SET_LEADCHNL_RV_PACING_POLARITY: NORMAL
MDC_IDC_SET_LEADCHNL_RV_PACING_PULSEWIDTH: 0.4 MS
MDC_IDC_SET_LEADCHNL_RV_SENSING_ADAPTATION_MODE: NORMAL
MDC_IDC_SET_LEADCHNL_RV_SENSING_POLARITY: NORMAL
MDC_IDC_SET_LEADCHNL_RV_SENSING_SENSITIVITY: 2.5 MV
MDC_IDC_SET_ZONE_DETECTION_INTERVAL: 375 MS
MDC_IDC_SET_ZONE_STATUS: NORMAL
MDC_IDC_SET_ZONE_TYPE: NORMAL
MDC_IDC_SET_ZONE_VENDOR_TYPE: NORMAL
MDC_IDC_STAT_BRADY_DTM_END: NORMAL
MDC_IDC_STAT_BRADY_DTM_START: NORMAL
MDC_IDC_STAT_BRADY_RV_PERCENT_PACED: 95 %
MDC_IDC_STAT_EPISODE_RECENT_COUNT: 0
MDC_IDC_STAT_EPISODE_RECENT_COUNT: 0
MDC_IDC_STAT_EPISODE_RECENT_COUNT: 1
MDC_IDC_STAT_EPISODE_RECENT_COUNT_DTM_END: NORMAL
MDC_IDC_STAT_EPISODE_RECENT_COUNT_DTM_START: NORMAL
MDC_IDC_STAT_EPISODE_TYPE: NORMAL
MDC_IDC_STAT_EPISODE_VENDOR_TYPE: NORMAL
MDC_IDC_STAT_EPISODE_VENDOR_TYPE: NORMAL

## 2023-10-25 PROCEDURE — 93294 REM INTERROG EVL PM/LDLS PM: CPT | Performed by: INTERNAL MEDICINE

## 2023-10-25 PROCEDURE — 93296 REM INTERROG EVL PM/IDS: CPT | Performed by: INTERNAL MEDICINE

## 2023-10-25 PROCEDURE — 99024 POSTOP FOLLOW-UP VISIT: CPT | Performed by: PHYSICIAN ASSISTANT

## 2023-10-25 NOTE — PATIENT INSTRUCTIONS
Call Kelly or [facility] Nurses with any questions about groin incision-can send pics via email as well. [phone]    Cleanse L groin incision daily with antibacterial soap (dial or Microklenz spray) apply clean gauze and tape. If area becomes soiled/sweaty please re-cleanse.

## 2023-11-06 ENCOUNTER — OFFICE VISIT (OUTPATIENT)
Dept: OTHER | Facility: CLINIC | Age: 78
End: 2023-11-06
Attending: SURGERY
Payer: COMMERCIAL

## 2023-11-06 VITALS — DIASTOLIC BLOOD PRESSURE: 67 MMHG | SYSTOLIC BLOOD PRESSURE: 135 MMHG | HEART RATE: 60 BPM

## 2023-11-06 DIAGNOSIS — I73.9 PAD (PERIPHERAL ARTERY DISEASE) (H): Primary | ICD-10-CM

## 2023-11-06 PROCEDURE — 99203 OFFICE O/P NEW LOW 30 MIN: CPT | Performed by: SURGERY

## 2023-11-06 PROCEDURE — G0463 HOSPITAL OUTPT CLINIC VISIT: HCPCS | Performed by: SURGERY

## 2023-11-06 NOTE — PROGRESS NOTES
[facility]        Patient is here for a consult.    Pt is currently taking Statin and Warfarin.    /67 (BP Location: Right arm, Patient Position: Chair, Cuff Size: Adult Regular)   Pulse 60     The provider has been notified that the patient has no concerns.     Questions patient would like addressed today are: N/A.    Refills are needed: N/A    Has homecare services and agency name:  Kathleen Card MA

## 2023-11-06 NOTE — PROGRESS NOTES
I had the opportunity to meet Mr. Jonny Goldberg in person today.  He is a 78-year-old male who was undergoing an attempted percutaneous transaortic valve replacement on 10 October 2023.  There was device malfunction of the left common femoral artery access site while using the Pro-glide.  Patient required a cutdown because of bleeding.  This was done by Dr. Batista from cardiac surgery.  He had the artery clamped and bleeding controlled.  At this juncture I did a small endarterectomy and primarily repaired the artery with external reinforcement of the common femoral artery.  This is because the artery was aneurysmal and I think the most likely reason why there was a device failure of the Pro-glide device.    Patient still has not had his transaortic valve replacement.  I was able to go back and look at the imaging from his preparation for the transaortic valve replacement.    His right common femoral artery also measures 12 mm.    She recently had a left lower extremity arterial duplex which shows that he does not have any popliteal artery aneurysms.  We will get right lower extremity arterial duplex to rule out the presence of the popliteal artery aneurysm.    My proposal would be to do an elective right common femoral artery cutdown with device delivery for the transaortic valve with primary repair.  Explained all of this to the patient.  In fact Dr. Cook and I have discussed this before and I think this would be the safest approach moving forward.

## 2023-11-07 DIAGNOSIS — I10 PRIMARY HYPERTENSION: ICD-10-CM

## 2023-11-07 RX ORDER — AMLODIPINE BESYLATE 10 MG/1
10 TABLET ORAL DAILY
Qty: 90 TABLET | Refills: 0 | Status: SHIPPED | OUTPATIENT
Start: 2023-11-07 | End: 2024-02-05

## 2023-11-08 ENCOUNTER — HOSPITAL ENCOUNTER (OUTPATIENT)
Dept: ULTRASOUND IMAGING | Facility: CLINIC | Age: 78
Discharge: HOME | End: 2023-11-08
Attending: SURGERY | Admitting: SURGERY
Payer: COMMERCIAL

## 2023-11-08 ENCOUNTER — TELEPHONE (OUTPATIENT)
Dept: CARDIOLOGY | Facility: CLINIC | Age: 78
End: 2023-11-08
Payer: COMMERCIAL

## 2023-11-08 DIAGNOSIS — I35.0 SEVERE AORTIC STENOSIS: Primary | ICD-10-CM

## 2023-11-08 DIAGNOSIS — I73.9 PAD (PERIPHERAL ARTERY DISEASE) (H): ICD-10-CM

## 2023-11-08 PROCEDURE — 93926 LOWER EXTREMITY STUDY: CPT | Mod: RT

## 2023-11-08 PROCEDURE — 93926 LOWER EXTREMITY STUDY: CPT | Mod: 26 | Performed by: SURGERY

## 2023-11-08 NOTE — PATIENT INSTRUCTIONS
TAVR PRE-PROCEDURE INSTRUCTIONS:    - Your TAVR is scheduled Tuesday 12/12/2023, 3rd case. Please check-in at 11:00AM at the Registration Desk in the [facility] at [facility].    - Use the Hibiclens soap I have provided you the night before and morning of the procedure. Wash from chin to toes, please avoid your face and eyes.    - Nothing to eat or drink the morning of your TAVR.     Medication instructions:  - You may take your morning medications with sips of water.   - Please hold all vitamins and supplements the morning of your procedure.  - You will need to hold your warfarin prior to TAVR, take your last dose on: 12/06/2023.   - You will need to take 325mg of aspirin the morning of your TAVR.    -On the day of the procedure, you may have two visitors in the pre-operative area. Two visitors may see you when you get to your room in the Heart Center (2nd floor of [facility]).    - You will stay overnight on Tuesday night. We will monitor you overnight for signs of bleeding, stroke, as well as need for pacemaker. On Wednesday morning, you will have an echo of your new valve and work with cardiac rehab.     It was nice to see you today! Please call with any other questions, concerns, or any changes in your health: [phone].    Neli Abernathy RN  Structural Heart Coordinator  [facility]

## 2023-11-08 NOTE — TELEPHONE ENCOUNTER
Patient was discussed and reviewed by Dr. Cook, Dr. Patel, and Dr. Nagy. Everyone is agreeable regarding TAVR access plan of right common femoral artery cutdown. Patient is scheduled for follow-up office visit with Bere Jackson NP tomorrow 11/09/2023. At that time we will confirm and formally schedule TAVR procedure.

## 2023-11-09 ENCOUNTER — OFFICE VISIT (OUTPATIENT)
Dept: CARDIOLOGY | Facility: CLINIC | Age: 78
End: 2023-11-09
Payer: COMMERCIAL

## 2023-11-09 ENCOUNTER — TRANSFERRED RECORDS (OUTPATIENT)
Dept: HEALTH INFORMATION MANAGEMENT | Facility: CLINIC | Age: 78
End: 2023-11-09

## 2023-11-09 VITALS
WEIGHT: 210 LBS | OXYGEN SATURATION: 96 % | DIASTOLIC BLOOD PRESSURE: 68 MMHG | SYSTOLIC BLOOD PRESSURE: 116 MMHG | HEART RATE: 58 BPM | HEIGHT: 66 IN | BODY MASS INDEX: 33.75 KG/M2

## 2023-11-09 DIAGNOSIS — I35.0 SEVERE AORTIC STENOSIS BY PRIOR ECHOCARDIOGRAM: Primary | ICD-10-CM

## 2023-11-09 DIAGNOSIS — I48.20 CHRONIC ATRIAL FIBRILLATION (H): ICD-10-CM

## 2023-11-09 DIAGNOSIS — I50.32 CHRONIC DIASTOLIC HEART FAILURE (H): ICD-10-CM

## 2023-11-09 PROCEDURE — 93000 ELECTROCARDIOGRAM COMPLETE: CPT | Performed by: NURSE PRACTITIONER

## 2023-11-09 PROCEDURE — 99215 OFFICE O/P EST HI 40 MIN: CPT | Performed by: NURSE PRACTITIONER

## 2023-11-09 NOTE — PROGRESS NOTES
STRUCTURAL HEART CLINIC  H&P    Referring Provider: Dr. Cook    History of Present Illness  Jonny Goldberg is a pleasant 78-year-old gentleman with known aortic stenosis who presents today for H&P in preparation for a trans catheter aortic valve replacement with right femoral artery cutdown on 12/12/2023 at [facility] with Dr. Cook.    Other history is notable for hypertension, chronic Afib, and high-grade AV block s/p PPM.      He underwent attempted TAVR on 10/10/23.  There was device malfunction of the left common femoral artery access site while using the Pro-glide requiring a cutdown because of bleeding.  This was done by Dr. Batista from cardiac surgery.  The artery was clamped and bleeding controlled. Vascular surgery subsequently did a small endarterectomy and primarily repaired the artery with external reinforcement of the common femoral artery. 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    Today Jonny reports doing well.  He has ongoing dyspnea with exertion. He otherwise denies any chest pain, syncope or near syncope, or palpitations. No PND or orthopnea. He has no infectious symptoms. Blood pressure is well controlled.     His most recent echocardiogram demonstrated severely stenotic aortic valve with a mean gradient 31 mmHg, valve area 0.9 cm , dimensionless index of 0.22, and LVEF 40-45%. His aortic valve calcium score is 1400.     Assessment and Plan     Jonny Goldberg presents for pre-operative H&P in preparation for a transcatheter aortic valve replacement with RCFA cutdown on 10/10/2023 at [facility].       1. Severe aortic valve stenosis: We discussed the procedure in detail, including pre and post-procedure care, restrictions and follow-up.  He understands risks including 3% risk of bleeding, infection, vascular complication, 1-3% risk of stroke and very low risk (<1%) of serious complications such as cardiac  perforation, aortic root rupture, dissection or death.     All questions answered  Type and screen orders complete  Supplies for scrubbing provided  No known contrast dye allergy  No trouble with anesthesia in the past  Labs today WNL, no s/s of infection    2. Chronic diastolic heart failure, NYHA class II, Stage B: Secondary to valvular heart disease. No acute volume overload on exam, though patient does endorse significant exertional dyspnea.     3. Permanent atrial fibrillation: Not on rate control medication. He takes Coumadin for stroke prophylaxis. Hold 5 days pre procedure.     4. Hypertension: Currently on amlodipine 10 mg daily and chlorthalidone 50 mg daily. Will hold chlorthalidone pre-procedure.      Medication Recommendations:  Antiplatelet: Take  mg perioperatively   Coumadin: Hold 5 days pre-procedure  Supplements: Hold morning of procedure    Patient is optimized and is acceptable candidate for the proposed procedure.  No further diagnostic evaluation is needed. Pre-procedure instructions provided in written format.     Bere Jackson, RUI, APRN, CNP  Structural Heart Nurse Practitioner  [facility]   Pager: [phone]    Current Medications:  Current Outpatient Medications   Medication Sig Dispense Refill    alfuzosin ER (UROXATRAL) 10 MG 24 hr tablet Take 1 tablet (10 mg) by mouth daily 90 tablet 4    ALPHA LIPOIC ACID PO Take 600 mg by mouth daily      amLODIPine (NORVASC) 10 MG tablet Take 1 tablet (10 mg) by mouth daily 90 tablet 0    ascorbic acid (VITAMIN C) 1000 MG TABS Take 1,000 mg by mouth daily      aspirin 81 MG EC tablet Take 4 tablets by mouth once (4 x 81 mg = 324 mg) (Patient not taking: Reported on 11/6/2023)      chlorthalidone (HYGROTON) 50 MG tablet Take 50 mg by mouth daily      Cholecalciferol (VITAMIN D) 400 UNITS tablet Take 1 tablet by mouth every evening      CINNAMON PO Take 1 capsule by mouth every evening Pt unsure of strength      clindamycin  (CLEOCIN) 300 MG capsule Take 2 capsules by mouth once as needed (1 hour prior to dental appointments 2 x 300 mg = 300 mg)      ezetimibe (ZETIA) 10 MG tablet Take 0.5 tablets (5 mg) by mouth Every Mon, Wed, Fri Morning 20 tablet 3    fenofibrate (TRIGLIDE/LOFIBRA) 160 MG tablet Take 1 tablet (160 mg) by mouth daily 90 tablet 3    multivitamin w/minerals (THERA-VIT-M) tablet Take 1 tablet by mouth every evening      omega 3 1000 MG CAPS Take 1 capsule by mouth every evening 180 EPA/ 20 DHH      potassium chloride ER (KLOR-CON M) 20 MEQ CR tablet Take 2 tablets (40 mEq) by mouth daily 30 tablet 0    rosuvastatin (CRESTOR) 40 MG tablet Take 40 mg by mouth every evening      Ubiquinol 100 MG CAPS Take 100 mg by mouth daily      warfarin ANTICOAGULANT (COUMADIN) 4 MG tablet Take 1 tablet by mouth daily         Allergies:     Allergies   Allergen Reactions    Irinotecan     Penicillin G Hives     hives       Past Medical History:  Past Medical History:   Diagnosis Date    Aortic valve stenosis     severe    Blood clot in the legs     left leg after surgery    Gastro-oesophageal reflux disease     Hodgkins lymphoma (H)     bone marrow biopsy    Hypertension     PAD (peripheral artery disease) (H24)     persistent atrial fibrillation     AFIB    Unspecified cerebral artery occlusion with cerebral infarction 10/01/2011       Past Surgical History:  Past Surgical History:   Procedure Laterality Date    APPENDECTOMY      ARTHROPLASTY REVISION HIP  12/27/2011    Procedure:ARTHROPLASTY REVISION HIP; RIGHT TOTAL HIP REVISION WITH BONE GRAFT  ; Surgeon:ANGIE HARRINGTON; Location: OR    BIOPSY  hodgkins lymphoma    bone marrow biopsy, chemo and radiation    CARDIAC SURGERY      cardiac ablation    COLONOSCOPY      CV CORONARY ANGIOGRAM N/A 9/23/2022    Procedure: Coronary Angiogram;  Surgeon: Gennaro Cook MD;  Location:  HEART CARDIAC CATH LAB    CV PCI N/A 9/23/2022    Procedure: Percutaneous Coronary Intervention;   Surgeon: Gennaro Cook MD;  Location:  HEART CARDIAC CATH LAB    CV RIGHT HEART CATH MEASUREMENTS RECORDED N/A 2022    Procedure: Right Heart Catheterization;  Surgeon: Gennaro Cook MD;  Location:  HEART CARDIAC CATH LAB    EP PACEMAKER DEVICE & LEAD IMPLANT- RIGHT VENTRICULAR N/A 2023    Procedure: Pacemaker Device & Lead Implant- Right ventricular;  Surgeon: Brian Madrid MD;  Location:  HEART CARDIAC CATH LAB    ILIAC ARTERY ANGIOGRAM LEFT Left 10/10/2023    Procedure: Left Groin Cutdown with Repair of Left Femoral Artery; Aborted Transcatheter Aortic Valve Replacement;  Surgeon: German Patel MD;  Location:  HEART CARDIAC CATH LAB    ORTHOPEDIC SURGERY      hip and knee surgeries    ORTHOPEDIC SURGERY      carpal tunnel  right hand       Family History:  Family History   Problem Relation Age of Onset    Hypertension Mother     Heart Failure Mother     Coronary Artery Disease Mother         CABG    Arrhythmia Mother     Cerebrovascular Disease Father     Heart Failure Father     Colon Cancer Father     Coronary Artery Disease Father         CABG    Coronary Artery Disease Brother         CABG    Arrhythmia Brother        Social History:  Social History     Socioeconomic History    Marital status:    Tobacco Use    Smoking status: Former     Types: Cigarettes     Quit date: 1987     Years since quittin.6    Smokeless tobacco: Never   Vaping Use    Vaping Use: Never used   Substance and Sexual Activity    Alcohol use: Yes     Alcohol/week: 0.0 standard drinks of alcohol     Comment: occasionally    Drug use: No   Other Topics Concern    Caffeine Concern No     Comment: 2-3 a day    Sleep Concern Yes     Comment: depends    Weight Concern No     Comment: weight loss    Special Diet No    Exercise No     Comment: limited due to knee pain    Seat Belt Yes       Review of Systems:  Constitutional: No fever, chills, or sweats. No weight gain/loss   ENT: No  visual disturbance, ear ache, epistaxis, sore throat  Allergies/Immunologic: Negative.   Respiratory: No cough, hemoptysia  Cardiovascular: As per HPI  GI: No nausea, vomiting, hematemesis, melena, or hematochezia  : No urinary frequency, dysuria, or hematuria  Integument: Negative  Psychiatric: Negative  Neuro: Negative  Endocrinology: Negative   Musculoskeletal: Negative      Physical Exam:  Vitals: There were no vitals taken for this visit.   General: NAD  HEENT:  Dentition intact.    Neck: No jugular venous distension.   Heart: RRR with  JUNIOR at RUSB  Lungs: CTA.    Abdomen: Soft, nontender, nondistended.   Extremities: No clubbing, cyanosis, or edema.  The pulses are +4/4 at the radial, brachial, femoral, popliteal, DP, and PT sites bilaterally.  No bruits are noted.  Neurologic: Alert and oriented to person/place/time, normal speech, gait and affect  Skin: No petechiae, purpura or rash.      Diagnostic Studies:  ECG: paced rhythm   Personally reviewed and interpreted by me.    Coronary Angiogram: 9/2022  Conclusion    1. Mild coronary artery disease   2. Moderately elevated right heart filling pressures (mean RA 13 mmHg)  3. Mildly elevated left heart filling pressure (mean wedge 18 mmHg)  4. Mild PH      Plan     Follow bedrest per protocol   Continued medical management and lifestyle modifications for cardiovascular risk factor optimizations.  Proceed with CT TAVR       Echocardiogram: 8/2023  Interpretation Summary     1. Left ventricular systolic function is mildly reduced. The visual ejection  fraction is 40-45%.  2. The right ventricle is normal in structure, function and size.  3. Severe aortic stenosis; mean gradient 31 mmHg, peak velocity 3.6 m/sec,  calculated valve area 0.9 cm2, DI 0.22, SVI 38 ml/m2. Visually the valve is  heavily calcified with restricted leaflet motion consistent with severe aortic  stenosis.      Laboratory Studies:  Personally reviewed and interpreted by me.    LIPID  RESULTS:  Lab Results   Component Value Date    CHOL 128 05/05/2021    HDL 38 05/05/2021    LDL 21 05/05/2021    TRIG 130 05/09/2023    TRIG 117 05/05/2021       LIVER ENZYME RESULTS:  Lab Results   Component Value Date    AST 19 10/16/2023    AST 42 05/05/2021    ALT 14 10/16/2023    ALT 28 05/05/2021       CBC RESULTS:  Lab Results   Component Value Date    WBC 10.4 10/16/2023    WBC 5.4 08/04/2020    RBC 3.71 (L) 10/16/2023    RBC 4.86 08/04/2020    HGB 12.1 (L) 10/16/2023    HGB 15.3 08/04/2020    HCT 35.5 (L) 10/16/2023    HCT 47.0 08/04/2020    MCV 96 10/16/2023    MCV 96.7 08/04/2020    MCH 32.6 10/16/2023    MCH 31.6 08/04/2020    MCHC 34.1 10/16/2023    MCHC 32.7 08/04/2020    RDW 13.2 10/16/2023    RDW 13.4 08/04/2020     10/16/2023     08/04/2020       BMP RESULTS:  Lab Results   Component Value Date     10/16/2023     05/05/2021    POTASSIUM 3.2 (L) 10/16/2023    POTASSIUM 3.0 (L) 09/28/2022    POTASSIUM 3.7 05/05/2021    CHLORIDE 101 10/16/2023    CHLORIDE 102 05/09/2023    CHLORIDE 100 05/05/2021    CO2 24 10/16/2023    CO2 27 09/28/2022    CO2 28 05/05/2021    ANIONGAP 12 10/16/2023    ANIONGAP 8 09/28/2022    ANIONGAP 1.9 08/04/2020     (H) 10/16/2023     (H) 09/28/2022    GLC 98 05/05/2021    BUN 21.3 10/16/2023    BUN 32 (H) 09/28/2022    BUN 27 05/05/2021    CR 1.03 10/16/2023    CR 1 05/05/2021    GFRESTIMATED 75 10/16/2023    GFRESTIMATED 62 05/05/2021    GFRESTBLACK 72 05/05/2021    SARA 9.3 10/16/2023    SARA 1.16 05/05/2021        A1C RESULTS:  Lab Results   Component Value Date    A1C 5.9 (A) 05/06/2021       INR RESULTS:  Lab Results   Component Value Date    INR 1.38 (H) 10/16/2023    INR 1.18 (H) 10/10/2023    INR 2.3 05/09/2023    INR 2.4 09/12/2022    INR 1.15 (H) 12/30/2011    INR 1.05 12/29/2011

## 2023-11-09 NOTE — LETTER
11/9/2023    Reid Hogue MD  [address]  [facility] [address]    RE: Jonny Goldberg       Dear Colleague,     I had the pleasure of seeing Jonny Goldberg in the [facility].      STRUCTURAL HEART CLINIC  H&P    Referring Provider: Dr. Cook    History of Present Illness  Jonny Goldberg is a pleasant 78-year-old gentleman with known aortic stenosis who presents today for H&P in preparation for a trans catheter aortic valve replacement with right femoral artery cutdown on 12/12/2023 at [facility] with Dr. Cook.    Other history is notable for hypertension, chronic Afib, and high-grade AV block s/p PPM.      He underwent attempted TAVR on 10/10/23.  There was device malfunction of the left common femoral artery access site while using the Pro-glide requiring a cutdown because of bleeding.  This was done by Dr. Batista from cardiac surgery.  The artery was clamped and bleeding controlled. Vascular surgery subsequently did a small endarterectomy and primarily repaired the artery with external reinforcement of the common femoral artery. 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    Today Jonny reports doing well.  He has ongoing dyspnea with exertion. He otherwise denies any chest pain, syncope or near syncope, or palpitations. No PND or orthopnea. He has no infectious symptoms. Blood pressure is well controlled.     His most recent echocardiogram demonstrated severely stenotic aortic valve with a mean gradient 31 mmHg, valve area 0.9 cm , dimensionless index of 0.22, and LVEF 40-45%. His aortic valve calcium score is 1400.     Assessment and Plan     Jonny Goldberg presents for pre-operative H&P in preparation for a transcatheter aortic valve replacement with RCFA cutdown on 10/10/2023 at [facility].       1. Severe aortic valve stenosis: We discussed the procedure in detail, including pre and post-procedure  care, restrictions and follow-up.  He understands risks including 3% risk of bleeding, infection, vascular complication, 1-3% risk of stroke and very low risk (<1%) of serious complications such as cardiac perforation, aortic root rupture, dissection or death.     All questions answered  Type and screen orders complete  Supplies for scrubbing provided  No known contrast dye allergy  No trouble with anesthesia in the past  Labs today WNL, no s/s of infection    2. Chronic diastolic heart failure, NYHA class II, Stage B: Secondary to valvular heart disease. No acute volume overload on exam, though patient does endorse significant exertional dyspnea.     3. Permanent atrial fibrillation: Not on rate control medication. He takes Coumadin for stroke prophylaxis. Hold 5 days pre procedure.     4. Hypertension: Currently on amlodipine 10 mg daily and chlorthalidone 50 mg daily. Will hold chlorthalidone pre-procedure.      Medication Recommendations:  Antiplatelet: Take  mg perioperatively   Coumadin: Hold 5 days pre-procedure  Supplements: Hold morning of procedure    Patient is optimized and is acceptable candidate for the proposed procedure.  No further diagnostic evaluation is needed. Pre-procedure instructions provided in written format.     Bere Jackson, DNP, APRN, CNP  Structural Heart Nurse Practitioner  [facility]   Pager: [phone]    Current Medications:  Current Outpatient Medications   Medication Sig Dispense Refill    alfuzosin ER (UROXATRAL) 10 MG 24 hr tablet Take 1 tablet (10 mg) by mouth daily 90 tablet 4    ALPHA LIPOIC ACID PO Take 600 mg by mouth daily      amLODIPine (NORVASC) 10 MG tablet Take 1 tablet (10 mg) by mouth daily 90 tablet 0    ascorbic acid (VITAMIN C) 1000 MG TABS Take 1,000 mg by mouth daily      aspirin 81 MG EC tablet Take 4 tablets by mouth once (4 x 81 mg = 324 mg) (Patient not taking: Reported on 11/6/2023)      chlorthalidone (HYGROTON) 50 MG tablet Take 50  mg by mouth daily      Cholecalciferol (VITAMIN D) 400 UNITS tablet Take 1 tablet by mouth every evening      CINNAMON PO Take 1 capsule by mouth every evening Pt unsure of strength      clindamycin (CLEOCIN) 300 MG capsule Take 2 capsules by mouth once as needed (1 hour prior to dental appointments 2 x 300 mg = 300 mg)      ezetimibe (ZETIA) 10 MG tablet Take 0.5 tablets (5 mg) by mouth Every Mon, Wed, Fri Morning 20 tablet 3    fenofibrate (TRIGLIDE/LOFIBRA) 160 MG tablet Take 1 tablet (160 mg) by mouth daily 90 tablet 3    multivitamin w/minerals (THERA-VIT-M) tablet Take 1 tablet by mouth every evening      omega 3 1000 MG CAPS Take 1 capsule by mouth every evening 180 EPA/ 20 DHH      potassium chloride ER (KLOR-CON M) 20 MEQ CR tablet Take 2 tablets (40 mEq) by mouth daily 30 tablet 0    rosuvastatin (CRESTOR) 40 MG tablet Take 40 mg by mouth every evening      Ubiquinol 100 MG CAPS Take 100 mg by mouth daily      warfarin ANTICOAGULANT (COUMADIN) 4 MG tablet Take 1 tablet by mouth daily         Allergies:     Allergies   Allergen Reactions    Irinotecan     Penicillin G Hives     hives       Past Medical History:  Past Medical History:   Diagnosis Date    Aortic valve stenosis     severe    Blood clot in the legs     left leg after surgery    Gastro-oesophageal reflux disease     Hodgkins lymphoma (H)     bone marrow biopsy    Hypertension     PAD (peripheral artery disease) (H24)     persistent atrial fibrillation     AFIB    Unspecified cerebral artery occlusion with cerebral infarction 10/01/2011       Past Surgical History:  Past Surgical History:   Procedure Laterality Date    APPENDECTOMY      ARTHROPLASTY REVISION HIP  12/27/2011    Procedure:ARTHROPLASTY REVISION HIP; RIGHT TOTAL HIP REVISION WITH BONE GRAFT  ; Surgeon:ANGIE HARRINGTON; Location:[facility] OR    BIOPSY  hodgkins lymphoma    bone marrow biopsy, chemo and radiation    CARDIAC SURGERY      cardiac ablation    COLONOSCOPY      CV CORONARY  ANGIOGRAM N/A 2022    Procedure: Coronary Angiogram;  Surgeon: Gennaro Cook MD;  Location:  HEART CARDIAC CATH LAB    CV PCI N/A 2022    Procedure: Percutaneous Coronary Intervention;  Surgeon: Gennaro Cook MD;  Location:  HEART CARDIAC CATH LAB    CV RIGHT HEART CATH MEASUREMENTS RECORDED N/A 2022    Procedure: Right Heart Catheterization;  Surgeon: Gennaro Cook MD;  Location:  HEART CARDIAC CATH LAB    EP PACEMAKER DEVICE & LEAD IMPLANT- RIGHT VENTRICULAR N/A 2023    Procedure: Pacemaker Device & Lead Implant- Right ventricular;  Surgeon: Brian Madrid MD;  Location: [facility] CARDIAC CATH LAB    ILIAC ARTERY ANGIOGRAM LEFT Left 10/10/2023    Procedure: Left Groin Cutdown with Repair of Left Femoral Artery; Aborted Transcatheter Aortic Valve Replacement;  Surgeon: German Patel MD;  Location:  HEART CARDIAC CATH LAB    ORTHOPEDIC SURGERY      hip and knee surgeries    ORTHOPEDIC SURGERY      carpal tunnel  right hand       Family History:  Family History   Problem Relation Age of Onset    Hypertension Mother     Heart Failure Mother     Coronary Artery Disease Mother         CABG    Arrhythmia Mother     Cerebrovascular Disease Father     Heart Failure Father     Colon Cancer Father     Coronary Artery Disease Father         CABG    Coronary Artery Disease Brother         CABG    Arrhythmia Brother        Social History:  Social History     Socioeconomic History    Marital status:    Tobacco Use    Smoking status: Former     Types: Cigarettes     Quit date: 1987     Years since quittin.6    Smokeless tobacco: Never   Vaping Use    Vaping Use: Never used   Substance and Sexual Activity    Alcohol use: Yes     Alcohol/week: 0.0 standard drinks of alcohol     Comment: occasionally    Drug use: No   Other Topics Concern    Caffeine Concern No     Comment: 2-3 a day    Sleep Concern Yes     Comment: depends    Weight Concern No     Comment:  weight loss    Special Diet No    Exercise No     Comment: limited due to knee pain    Seat Belt Yes       Review of Systems:  Constitutional: No fever, chills, or sweats. No weight gain/loss   ENT: No visual disturbance, ear ache, epistaxis, sore throat  Allergies/Immunologic: Negative.   Respiratory: No cough, hemoptysia  Cardiovascular: As per HPI  GI: No nausea, vomiting, hematemesis, melena, or hematochezia  : No urinary frequency, dysuria, or hematuria  Integument: Negative  Psychiatric: Negative  Neuro: Negative  Endocrinology: Negative   Musculoskeletal: Negative      Physical Exam:  Vitals: There were no vitals taken for this visit.   General: NAD  HEENT:  Dentition intact.    Neck: No jugular venous distension.   Heart: RRR with  JUNIOR at RUSB  Lungs: CTA.    Abdomen: Soft, nontender, nondistended.   Extremities: No clubbing, cyanosis, or edema.  The pulses are +4/4 at the radial, brachial, femoral, popliteal, DP, and PT sites bilaterally.  No bruits are noted.  Neurologic: Alert and oriented to person/place/time, normal speech, gait and affect  Skin: No petechiae, purpura or rash.      Diagnostic Studies:  ECG: paced rhythm   Personally reviewed and interpreted by me.    Coronary Angiogram: 9/2022  Conclusion    1. Mild coronary artery disease   2. Moderately elevated right heart filling pressures (mean RA 13 mmHg)  3. Mildly elevated left heart filling pressure (mean wedge 18 mmHg)  4. Mild PH      Plan     Follow bedrest per protocol   Continued medical management and lifestyle modifications for cardiovascular risk factor optimizations.  Proceed with CT TAVR       Echocardiogram: 8/2023  Interpretation Summary     1. Left ventricular systolic function is mildly reduced. The visual ejection  fraction is 40-45%.  2. The right ventricle is normal in structure, function and size.  3. Severe aortic stenosis; mean gradient 31 mmHg, peak velocity 3.6 m/sec,  calculated valve area 0.9 cm2, DI 0.22, SVI 38  ml/m2. Visually the valve is  heavily calcified with restricted leaflet motion consistent with severe aortic  stenosis.      Laboratory Studies:  Personally reviewed and interpreted by me.    LIPID RESULTS:  Lab Results   Component Value Date    CHOL 128 05/05/2021    HDL 38 05/05/2021    LDL 21 05/05/2021    TRIG 130 05/09/2023    TRIG 117 05/05/2021       LIVER ENZYME RESULTS:  Lab Results   Component Value Date    AST 19 10/16/2023    AST 42 05/05/2021    ALT 14 10/16/2023    ALT 28 05/05/2021       CBC RESULTS:  Lab Results   Component Value Date    WBC 10.4 10/16/2023    WBC 5.4 08/04/2020    RBC 3.71 (L) 10/16/2023    RBC 4.86 08/04/2020    HGB 12.1 (L) 10/16/2023    HGB 15.3 08/04/2020    HCT 35.5 (L) 10/16/2023    HCT 47.0 08/04/2020    MCV 96 10/16/2023    MCV 96.7 08/04/2020    MCH 32.6 10/16/2023    MCH 31.6 08/04/2020    MCHC 34.1 10/16/2023    MCHC 32.7 08/04/2020    RDW 13.2 10/16/2023    RDW 13.4 08/04/2020     10/16/2023     08/04/2020       BMP RESULTS:  Lab Results   Component Value Date     10/16/2023     05/05/2021    POTASSIUM 3.2 (L) 10/16/2023    POTASSIUM 3.0 (L) 09/28/2022    POTASSIUM 3.7 05/05/2021    CHLORIDE 101 10/16/2023    CHLORIDE 102 05/09/2023    CHLORIDE 100 05/05/2021    CO2 24 10/16/2023    CO2 27 09/28/2022    CO2 28 05/05/2021    ANIONGAP 12 10/16/2023    ANIONGAP 8 09/28/2022    ANIONGAP 1.9 08/04/2020     (H) 10/16/2023     (H) 09/28/2022    GLC 98 05/05/2021    BUN 21.3 10/16/2023    BUN 32 (H) 09/28/2022    BUN 27 05/05/2021    CR 1.03 10/16/2023    CR 1 05/05/2021    GFRESTIMATED 75 10/16/2023    GFRESTIMATED 62 05/05/2021    GFRESTBLACK 72 05/05/2021    SARA 9.3 10/16/2023    SARA 1.16 05/05/2021        A1C RESULTS:  Lab Results   Component Value Date    A1C 5.9 (A) 05/06/2021       INR RESULTS:  Lab Results   Component Value Date    INR 1.38 (H) 10/16/2023    INR 1.18 (H) 10/10/2023    INR 2.3 05/09/2023    INR 2.4 09/12/2022    INR  1.15 (H) 12/30/2011    INR 1.05 12/29/2011         Thank you for allowing me to participate in the care of your patient.      Sincerely,     Bere Jackson [facility]  cc:   No referring provider defined for this encounter.

## 2023-11-10 ENCOUNTER — CARE COORDINATION (OUTPATIENT)
Dept: CARDIOLOGY | Facility: CLINIC | Age: 78
End: 2023-11-10
Payer: COMMERCIAL

## 2023-11-10 ENCOUNTER — TELEPHONE (OUTPATIENT)
Dept: OTHER | Facility: CLINIC | Age: 78
End: 2023-11-10
Payer: COMMERCIAL

## 2023-11-10 NOTE — TELEPHONE ENCOUNTER
Routing to Dr. Nagy, please call pt with results of 11/8/23 US LE arterial duplex right, and document in tele encounter.     MAYITO Rangel, RN  [facility]  Office:  [phone] Fax: [phone]

## 2023-11-10 NOTE — TELEPHONE ENCOUNTER
"Per Dr. Nagy: \"Please let him know it was normal\".     Writer called pt and explained this to pt and pts wife, they verbalized understanding and had no further questions.     MAYITO Rangel, RN  [facility]  Office:  [phone] Fax: [phone]    "

## 2023-11-10 NOTE — PROGRESS NOTES
Incoming call from patient, states he has an appt on 12/20/23 to fill in his dental implants. He is then scheduled for crowns in January. He was calling as an FYI as his TAVR has now been rescheduled to 12/12/23.     Advised Jonny that as a general rule we recommend avoiding dental work/procedures for 3 months post valve implant.     Jonny is not sure if this is possible as apparently the oral surgeon is waiting for a minimum amount of gum tissue re-growth prior to the dental procedure, which may or may not be achieved prior to his scheduled TAVR.     Jonny agrees to call the oral surgeon's office to see if it's possible to reschedule the oral surgery for prior to his TAVR. Either way he will let us know so that we can have an acceptable plan in place before his TAVR.     Joan Bennett, АЛЕКСАНДРN, RN, PHN, CHFN, HNB-BC   11/10/2023 at 2:17 PM

## 2023-11-16 NOTE — PROGRESS NOTES
Cardiac Surgery Consultation      Jonny Goldberg MRN# [id number]   YOB: 1945 Age: 78 year old   Date of Admission: (Not on file)     Reason for consult: {:8179281}     Primary Cardiologist:        Assessment and Plan:   Mr***. *** is a ***-year-old ***man with severe ***multivessel coronary artery disease and ***. I recommend coronary artery bypass grafting. We described the technical details, as well as the expected postoperative course and recovery to the patient. We also discussed the risks and benefits of the procedure. The risks include but are not limited to bleeding, infection, stroke, heart or graft failure with myocardial infarction, dysrhythmia, respiratory failure, kidney or liver injury, and death.     The calculated STS risk of mortality is ***, and the calculated STS risk of major morbidity or mortality is ***. After carefully considering the above, the patient understands these risks and wishes to undergo the operation.     Plan             History of Present Illness:   This patient is a 78 year old male who presents with ***                Past Medical History:   {:9780833}          Past Surgical History:   {:6152485}            Social History:   {:9932600}          Family History:   {:2991509}          Medications:   {:7608393}          Review of Systems:     {:4651572}            Physical Exam:   {Vitals              :9508032}    Gen: resting comfortably in NAD***  CV: RRR on tele  Pulm: normal work of breathing on room air  Abd: soft, non-tender, non-distended  Ext: warm and well perfused***         Data:   {Lab options               :1637288}  {   Your interpretation of cardiac studies                 :0217282}  {   Your interpretation of imaging studies                :3936871}    CARDIAC CATHETERIZATION (***):  ***    TRANSTHORACIC ECHOCARDIOGRAPHY (***):  ***      PHILLIP Batista MD  Cardiothoracic Surgery  Cell: [phone]; Pager [phone]       ARTHROPLASTY REVISION HIP  2011    Procedure:ARTHROPLASTY REVISION HIP; RIGHT TOTAL HIP REVISION WITH BONE GRAFT  ; Surgeon:ANGIE HARRINGTON; Location: OR    BIOPSY  hodgkins lymphoma    bone marrow biopsy, chemo and radiation    CARDIAC SURGERY      cardiac ablation    COLONOSCOPY      CV CORONARY ANGIOGRAM N/A 2022    Procedure: Coronary Angiogram;  Surgeon: Gennaro Cook MD;  Location:  HEART CARDIAC CATH LAB    CV PCI N/A 2022    Procedure: Percutaneous Coronary Intervention;  Surgeon: Gennaro Cook MD;  Location:  HEART CARDI[facility] CATH LAB    CV RIGHT HEART CATH MEASUREMENTS RECORDED N/A 2022    Procedure: Right Heart Catheterization;  Surgeon: Gennaro Cook MD;  Location:  HEART CARDIAC CATH LAB    CV TRANSCATHETER AORTIC VALVE REPLACEMENT-FEMORAL APPROACH N/A 2023    Procedure: Transcatheter Aortic Valve Replacement-Femoral Approach;  Surgeon: Gennaro Cook MD;  Location:  HEART CARDIAC CATH LAB    EP PACEMAKER DEVICE & LEAD IMPLANT- RIGHT VENTRICULAR N/A 2023    Procedure: Pacemaker Device & Lead Implant- Right ventricular;  Surgeon: Brian Madrid MD;  Location: [facility] CARDIAC CATH LAB    ILIAC ARTERY ANGIOGRAM LEFT Left 10/10/2023    Procedure: Left Groin Cutdown with Repair of Left Femoral Artery; Aborted Transcatheter Aortic Valve Replacement;  Surgeon: German Patel MD;  Location:  HEART CARDIAC CATH LAB    ORTHOPEDIC SURGERY      hip and knee surgeries    ORTHOPEDIC SURGERY      carpal tunnel  right hand               Social History:     Social History     Tobacco Use    Smoking status: Former     Current packs/day: 0.00     Types: Cigarettes     Quit date: 1987     Years since quittin.7    Smokeless tobacco: Never   Substance Use Topics    Alcohol use: Yes     Comment: 1 glass daily             Family History:     Family History   Problem Relation Age of Onset    Hypertension Mother     Heart Failure Mother      Coronary Artery Disease Mother         CABG    Arrhythmia Mother     Cerebrovascular Disease Father     Heart Failure Father     Colon Cancer Father     Coronary Artery Disease Father         CABG    Coronary Artery Disease Brother         CABG    Arrhythmia Brother              Medications:     Current Outpatient Medications   Medication Sig Dispense Refill    alfuzosin ER (UROXATRAL) 10 MG 24 hr tablet Take 1 tablet (10 mg) by mouth daily 90 tablet 4    ALPHA LIPOIC ACID PO Take 600 mg by mouth daily      ascorbic acid (VITAMIN C) 1000 MG TABS Take 1 tablet by mouth daily      chlorthalidone (HYGROTON) 50 MG tablet Take 50 mg by mouth daily      Cholecalciferol (VITAMIN D) 400 UNITS tablet Take 1 tablet by mouth every evening      CINNAMON PO Take 1 capsule by mouth every evening      clindamycin (CLEOCIN) 300 MG capsule Take 2 capsules by mouth once as needed (1 hour prior to dental appointments 2 x 300 mg = 300 mg)      multivitamin w/minerals (THERA-VIT-M) tablet Take 1 tablet by mouth every evening      omega 3 1000 MG CAPS Take 1 capsule by mouth every evening 180 EPA/ 20 DHH      potassium chloride ER (KLOR-CON M) 20 MEQ CR tablet Take 2 tablets (40 mEq) by mouth daily 30 tablet 0    rosuvastatin (CRESTOR) 40 MG tablet Take 40 mg by mouth every evening      warfarin ANTICOAGULANT (COUMADIN) 4 MG tablet Take 1 tablet by mouth daily      amLODIPine (NORVASC) 10 MG tablet Take 1 tablet (10 mg) by mouth daily 90 tablet 2    co-enzyme Q-10 100 MG CAPS capsule Take 1 capsule by mouth daily      doxycycline hyclate (VIBRAMYCIN) 100 MG capsule Take 100 mg by mouth 2 times daily.      ezetimibe (ZETIA) 10 MG tablet Take 0.5 tablets (5 mg) by mouth Every Mon, Wed, Fri Morning 20 tablet 3    fenofibrate (TRIGLIDE/LOFIBRA) 160 MG tablet Take 1 tablet (160 mg) by mouth daily. 90 tablet 2     No current facility-administered medications for this visit.             Review of Systems:     The 10 point Review of Systems is  negative other than noted in the HPI            Physical Exam:   Vitals were reviewed    Gen: resting comfortably in NAD  CV: atrial fibrillation; irregularly irregular  Pulm: normal work of breathing on room air  Abd: soft, non-tender, non-distended  Ext: warm and well perfused; left groin incision indurated, swollen, and erythematous.    PHILLIP Batista MD  Cardiothoracic Surgery  Cell: [phone]; Pager [phone]

## 2023-11-22 ENCOUNTER — TELEPHONE (OUTPATIENT)
Dept: CARDIOLOGY | Facility: CLINIC | Age: 78
End: 2023-11-22
Payer: COMMERCIAL

## 2023-11-22 NOTE — TELEPHONE ENCOUNTER
Patient called the heart clinic stating he can feel something sharp at the incision site on his left groin area following his attempted TAVR procedure on 10/10/2023.    On 10/10/2023 there was a device malfunction of the left common femoral artery access site while using pr-glide and patient required a cutdown which was performed by Dr. Batista with Dr. Nagy's assistance.       Patient described his left groin area as feeling like a needle was left in that area. He stated he noticed this one week ago as the incision area continues to get softer. He stated it feels sharp to the touch and is wondering what that is.     I informed patient that I am not sure what he is feeling and will send a message out to the rest of his care team for assistance and recommendation.

## 2023-11-24 ENCOUNTER — OFFICE VISIT (OUTPATIENT)
Dept: CARDIOLOGY | Facility: CLINIC | Age: 78
End: 2023-11-24
Payer: COMMERCIAL

## 2023-11-24 VITALS
OXYGEN SATURATION: 96 % | HEIGHT: 66 IN | HEART RATE: 64 BPM | BODY MASS INDEX: 34.39 KG/M2 | SYSTOLIC BLOOD PRESSURE: 136 MMHG | WEIGHT: 214 LBS | DIASTOLIC BLOOD PRESSURE: 72 MMHG

## 2023-11-24 DIAGNOSIS — I35.0 AORTIC STENOSIS, SEVERE: Primary | ICD-10-CM

## 2023-11-24 PROCEDURE — 99212 OFFICE O/P EST SF 10 MIN: CPT | Performed by: NURSE PRACTITIONER

## 2023-11-24 NOTE — PROGRESS NOTES
"CARDIOTHORACIC SURGERY       Patient with previous failed TAVR attempt with cutdown to left groin in order to remove percutaneous closure device. 4 nylon sutures previously removed in clinic. Pt presents today with c/o of a \"sharpness\" coming from incision site. Area examined and pt. spitting suture. Area cleansed and suture lifted and cut to enable retraction under incision. Area healing well w/o evidence of infection of dehiscence. Instructed patient to call with further issues.     DREW Conde, ACNPC-AG, CCRN  Nurse Practitioner  Cardiothoracic Surgery       "

## 2023-12-07 LAB
ABO/RH(D): NORMAL
ANTIBODY SCREEN: NEGATIVE
SPECIMEN EXPIRATION DATE: NORMAL

## 2023-12-08 ENCOUNTER — LAB (OUTPATIENT)
Dept: LAB | Facility: CLINIC | Age: 78
End: 2023-12-08
Payer: COMMERCIAL

## 2023-12-08 DIAGNOSIS — I35.0 SEVERE AORTIC STENOSIS: ICD-10-CM

## 2023-12-08 LAB
ANION GAP SERPL CALCULATED.3IONS-SCNC: 9 MMOL/L (ref 7–15)
BLOOD BANK CHART COMMENT: NORMAL
BUN SERPL-MCNC: 27.5 MG/DL (ref 8–23)
CALCIUM SERPL-MCNC: 9.3 MG/DL (ref 8.8–10.2)
CHLORIDE SERPL-SCNC: 103 MMOL/L (ref 98–107)
CREAT SERPL-MCNC: 1.08 MG/DL (ref 0.67–1.17)
DEPRECATED HCO3 PLAS-SCNC: 29 MMOL/L (ref 22–29)
EGFRCR SERPLBLD CKD-EPI 2021: 71 ML/MIN/1.73M2
ERYTHROCYTE [DISTWIDTH] IN BLOOD BY AUTOMATED COUNT: 13.2 % (ref 10–15)
GLUCOSE SERPL-MCNC: 99 MG/DL (ref 70–99)
HCT VFR BLD AUTO: 42.8 % (ref 40–53)
HGB BLD-MCNC: 14.6 G/DL (ref 13.3–17.7)
INR PPP: 2.57 (ref 0.85–1.15)
MCH RBC QN AUTO: 31.7 PG (ref 26.5–33)
MCHC RBC AUTO-ENTMCNC: 34.1 G/DL (ref 31.5–36.5)
MCV RBC AUTO: 93 FL (ref 78–100)
NT-PROBNP SERPL-MCNC: 694 PG/ML (ref 0–1800)
PLATELET # BLD AUTO: 240 10E3/UL (ref 150–450)
POTASSIUM SERPL-SCNC: 3.6 MMOL/L (ref 3.4–5.3)
RBC # BLD AUTO: 4.61 10E6/UL (ref 4.4–5.9)
SODIUM SERPL-SCNC: 141 MMOL/L (ref 135–145)
SPECIMEN EXPIRATION DATE: NORMAL
WBC # BLD AUTO: 5.8 10E3/UL (ref 4–11)

## 2023-12-08 PROCEDURE — 82310 ASSAY OF CALCIUM: CPT

## 2023-12-08 PROCEDURE — 85027 COMPLETE CBC AUTOMATED: CPT

## 2023-12-08 PROCEDURE — 86850 RBC ANTIBODY SCREEN: CPT

## 2023-12-08 PROCEDURE — 85610 PROTHROMBIN TIME: CPT

## 2023-12-08 PROCEDURE — 83880 ASSAY OF NATRIURETIC PEPTIDE: CPT

## 2023-12-08 PROCEDURE — 86901 BLOOD TYPING SEROLOGIC RH(D): CPT

## 2023-12-08 PROCEDURE — 36415 COLL VENOUS BLD VENIPUNCTURE: CPT

## 2023-12-11 ENCOUNTER — ANESTHESIA EVENT (OUTPATIENT)
Dept: CARDIOLOGY | Facility: CLINIC | Age: 78
DRG: 267 | End: 2023-12-11
Payer: COMMERCIAL

## 2023-12-11 DIAGNOSIS — I35.0 AORTIC STENOSIS, SEVERE: Primary | ICD-10-CM

## 2023-12-11 RX ORDER — CLINDAMYCIN PHOSPHATE 900 MG/50ML
900 INJECTION, SOLUTION INTRAVENOUS
Status: CANCELLED | OUTPATIENT
Start: 2023-12-11

## 2023-12-11 RX ORDER — LIDOCAINE 40 MG/G
CREAM TOPICAL
Status: CANCELLED | OUTPATIENT
Start: 2023-12-11

## 2023-12-11 RX ORDER — SODIUM CHLORIDE 9 MG/ML
INJECTION, SOLUTION INTRAVENOUS CONTINUOUS
Status: CANCELLED | OUTPATIENT
Start: 2023-12-11

## 2023-12-11 RX ORDER — ASPIRIN 325 MG
325 TABLET ORAL ONCE
Status: CANCELLED | OUTPATIENT
Start: 2023-12-11 | End: 2023-12-11

## 2023-12-12 ENCOUNTER — MEDICAL CORRESPONDENCE (OUTPATIENT)
Dept: HEALTH INFORMATION MANAGEMENT | Facility: CLINIC | Age: 78
End: 2023-12-12

## 2023-12-12 ENCOUNTER — HOSPITAL ENCOUNTER (OUTPATIENT)
Dept: CARDIOLOGY | Facility: CLINIC | Age: 78
Discharge: HOME | DRG: 267 | End: 2023-12-12
Attending: INTERNAL MEDICINE | Admitting: INTERNAL MEDICINE
Payer: COMMERCIAL

## 2023-12-12 ENCOUNTER — APPOINTMENT (OUTPATIENT)
Dept: SURGERY | Facility: PHYSICIAN GROUP | Age: 78
End: 2023-12-12
Payer: COMMERCIAL

## 2023-12-12 ENCOUNTER — HOSPITAL ENCOUNTER (INPATIENT)
Facility: CLINIC | Age: 78
LOS: 1 days | Discharge: HOME | DRG: 267 | End: 2023-12-13
Attending: INTERNAL MEDICINE | Admitting: INTERNAL MEDICINE
Payer: COMMERCIAL

## 2023-12-12 ENCOUNTER — ANESTHESIA (OUTPATIENT)
Dept: CARDIOLOGY | Facility: CLINIC | Age: 78
DRG: 267 | End: 2023-12-12
Payer: COMMERCIAL

## 2023-12-12 DIAGNOSIS — I35.0 AORTIC STENOSIS, SEVERE: ICD-10-CM

## 2023-12-12 DIAGNOSIS — Z95.2 S/P TAVR (TRANSCATHETER AORTIC VALVE REPLACEMENT): Primary | ICD-10-CM

## 2023-12-12 DIAGNOSIS — Z95.3 S/P TAVR (TRANSCATHETER AORTIC VALVE REPLACEMENT), BIOPROSTHETIC: Primary | ICD-10-CM

## 2023-12-12 DIAGNOSIS — I35.0 SEVERE AORTIC STENOSIS: ICD-10-CM

## 2023-12-12 LAB
ACT BLD: 151 SECONDS (ref 74–150)
ACT BLD: 178 SECONDS (ref 74–150)
ACT BLD: 309 SECONDS (ref 74–150)
ANION GAP SERPL CALCULATED.3IONS-SCNC: 12 MMOL/L (ref 7–15)
ANION GAP SERPL CALCULATED.3IONS-SCNC: 12 MMOL/L (ref 7–15)
BLD PROD TYP BPU: NORMAL
BLD PROD TYP BPU: NORMAL
BLOOD COMPONENT TYPE: NORMAL
BLOOD COMPONENT TYPE: NORMAL
BUN SERPL-MCNC: 25.3 MG/DL (ref 8–23)
BUN SERPL-MCNC: 26 MG/DL (ref 8–23)
CALCIUM SERPL-MCNC: 8.5 MG/DL (ref 8.8–10.2)
CALCIUM SERPL-MCNC: 9.5 MG/DL (ref 8.8–10.2)
CHLORIDE SERPL-SCNC: 102 MMOL/L (ref 98–107)
CHLORIDE SERPL-SCNC: 104 MMOL/L (ref 98–107)
CODING SYSTEM: NORMAL
CODING SYSTEM: NORMAL
CREAT SERPL-MCNC: 1.17 MG/DL (ref 0.67–1.17)
CREAT SERPL-MCNC: 1.2 MG/DL (ref 0.67–1.17)
CROSSMATCH: NORMAL
CROSSMATCH: NORMAL
DEPRECATED HCO3 PLAS-SCNC: 24 MMOL/L (ref 22–29)
DEPRECATED HCO3 PLAS-SCNC: 26 MMOL/L (ref 22–29)
EGFRCR SERPLBLD CKD-EPI 2021: 63 ML/MIN/1.73M2
EGFRCR SERPLBLD CKD-EPI 2021: 65 ML/MIN/1.73M2
GLUCOSE SERPL-MCNC: 103 MG/DL (ref 70–99)
GLUCOSE SERPL-MCNC: 103 MG/DL (ref 70–99)
GLUCOSE SERPL-MCNC: 111 MG/DL (ref 70–99)
INR PPP: 1.2 (ref 0.85–1.15)
INR PPP: 1.37 (ref 0.85–1.15)
ISSUE DATE AND TIME: NORMAL
ISSUE DATE AND TIME: NORMAL
POTASSIUM SERPL-SCNC: 3.3 MMOL/L (ref 3.4–5.3)
POTASSIUM SERPL-SCNC: 3.6 MMOL/L (ref 3.4–5.3)
SODIUM SERPL-SCNC: 140 MMOL/L (ref 135–145)
SODIUM SERPL-SCNC: 140 MMOL/L (ref 135–145)
UNIT ABO/RH: NORMAL
UNIT ABO/RH: NORMAL
UNIT NUMBER: NORMAL
UNIT NUMBER: NORMAL
UNIT STATUS: NORMAL
UNIT STATUS: NORMAL
UNIT TYPE ISBT: 6200
UNIT TYPE ISBT: 6200

## 2023-12-12 PROCEDURE — 250N000025 HC SEVOFLURANE, PER MIN: Performed by: INTERNAL MEDICINE

## 2023-12-12 PROCEDURE — 93321 DOPPLER ECHO F-UP/LMTD STD: CPT

## 2023-12-12 PROCEDURE — 93010 ELECTROCARDIOGRAM REPORT: CPT | Performed by: INTERNAL MEDICINE

## 2023-12-12 PROCEDURE — 80048 BASIC METABOLIC PNL TOTAL CA: CPT | Performed by: INTERNAL MEDICINE

## 2023-12-12 PROCEDURE — 250N000009 HC RX 250: Performed by: INTERNAL MEDICINE

## 2023-12-12 PROCEDURE — 86923 COMPATIBILITY TEST ELECTRIC: CPT | Performed by: INTERNAL MEDICINE

## 2023-12-12 PROCEDURE — 250N000009 HC RX 250

## 2023-12-12 PROCEDURE — 93308 TTE F-UP OR LMTD: CPT | Mod: 26 | Performed by: INTERNAL MEDICINE

## 2023-12-12 PROCEDURE — C1889 IMPLANT/INSERT DEVICE, NOC: HCPCS | Performed by: INTERNAL MEDICINE

## 2023-12-12 PROCEDURE — 93325 DOPPLER ECHO COLOR FLOW MAPG: CPT | Mod: 26 | Performed by: INTERNAL MEDICINE

## 2023-12-12 PROCEDURE — 02RF38Z REPLACEMENT OF AORTIC VALVE WITH ZOOPLASTIC TISSUE, PERCUTANEOUS APPROACH: ICD-10-PCS | Performed by: INTERNAL MEDICINE

## 2023-12-12 PROCEDURE — 85610 PROTHROMBIN TIME: CPT | Performed by: SURGERY

## 2023-12-12 PROCEDURE — 85347 COAGULATION TIME ACTIVATED: CPT

## 2023-12-12 PROCEDURE — 250N000011 HC RX IP 250 OP 636: Performed by: INTERNAL MEDICINE

## 2023-12-12 PROCEDURE — 80048 BASIC METABOLIC PNL TOTAL CA: CPT | Performed by: ANESTHESIOLOGY

## 2023-12-12 PROCEDURE — C1894 INTRO/SHEATH, NON-LASER: HCPCS | Performed by: INTERNAL MEDICINE

## 2023-12-12 PROCEDURE — B41F1ZZ FLUOROSCOPY OF RIGHT LOWER EXTREMITY ARTERIES USING LOW OSMOLAR CONTRAST: ICD-10-PCS | Performed by: INTERNAL MEDICINE

## 2023-12-12 PROCEDURE — 36415 COLL VENOUS BLD VENIPUNCTURE: CPT | Performed by: SURGERY

## 2023-12-12 PROCEDURE — 93321 DOPPLER ECHO F-UP/LMTD STD: CPT | Mod: 26 | Performed by: INTERNAL MEDICINE

## 2023-12-12 PROCEDURE — 34201 REMOVAL OF ARTERY CLOT: CPT | Mod: RT | Performed by: SURGERY

## 2023-12-12 PROCEDURE — 93005 ELECTROCARDIOGRAM TRACING: CPT

## 2023-12-12 PROCEDURE — 250N000011 HC RX IP 250 OP 636: Mod: JZ | Performed by: INTERNAL MEDICINE

## 2023-12-12 PROCEDURE — 04CK0ZZ EXTIRPATION OF MATTER FROM RIGHT FEMORAL ARTERY, OPEN APPROACH: ICD-10-PCS | Performed by: INTERNAL MEDICINE

## 2023-12-12 PROCEDURE — 99222 1ST HOSP IP/OBS MODERATE 55: CPT | Mod: 25 | Performed by: NURSE PRACTITIONER

## 2023-12-12 PROCEDURE — 4A023N7 MEASUREMENT OF CARDIAC SAMPLING AND PRESSURE, LEFT HEART, PERCUTANEOUS APPROACH: ICD-10-PCS | Performed by: INTERNAL MEDICINE

## 2023-12-12 PROCEDURE — 272N000001 HC OR GENERAL SUPPLY STERILE: Performed by: INTERNAL MEDICINE

## 2023-12-12 PROCEDURE — C1730 CATH, EP, 19 OR FEW ELECT: HCPCS | Performed by: INTERNAL MEDICINE

## 2023-12-12 PROCEDURE — 250N000011 HC RX IP 250 OP 636: Mod: JZ

## 2023-12-12 PROCEDURE — 250N000013 HC RX MED GY IP 250 OP 250 PS 637: Performed by: INTERNAL MEDICINE

## 2023-12-12 PROCEDURE — 33362 REPLACE AORTIC VALVE OPEN: CPT | Performed by: INTERNAL MEDICINE

## 2023-12-12 PROCEDURE — 258N000003 HC RX IP 258 OP 636: Performed by: INTERNAL MEDICINE

## 2023-12-12 PROCEDURE — 85610 PROTHROMBIN TIME: CPT | Performed by: NURSE PRACTITIONER

## 2023-12-12 PROCEDURE — 370N000017 HC ANESTHESIA TECHNICAL FEE, PER MIN: Performed by: INTERNAL MEDICINE

## 2023-12-12 PROCEDURE — 33362 REPLACE AORTIC VALVE OPEN: CPT | Mod: 80 | Performed by: STUDENT IN AN ORGANIZED HEALTH CARE EDUCATION/TRAINING PROGRAM

## 2023-12-12 PROCEDURE — 33362 REPLACE AORTIC VALVE OPEN: CPT | Mod: 62 | Performed by: SURGERY

## 2023-12-12 PROCEDURE — P9016 RBC LEUKOCYTES REDUCED: HCPCS | Performed by: INTERNAL MEDICINE

## 2023-12-12 PROCEDURE — 210N000002 HC R&B HEART CARE

## 2023-12-12 PROCEDURE — 250N000013 HC RX MED GY IP 250 OP 250 PS 637: Performed by: NURSE PRACTITIONER

## 2023-12-12 PROCEDURE — 93325 DOPPLER ECHO COLOR FLOW MAPG: CPT

## 2023-12-12 PROCEDURE — 250N000009 HC RX 250: Performed by: ANESTHESIOLOGY

## 2023-12-12 PROCEDURE — 250N000011 HC RX IP 250 OP 636: Performed by: SURGERY

## 2023-12-12 PROCEDURE — C1769 GUIDE WIRE: HCPCS | Performed by: INTERNAL MEDICINE

## 2023-12-12 DEVICE — VALVE AORTIC TRANSCATHETER HEART 29MM SAPIEN 3 ULTRA RESILIA: Type: IMPLANTABLE DEVICE | Status: FUNCTIONAL

## 2023-12-12 RX ORDER — FENTANYL CITRATE 50 UG/ML
INJECTION, SOLUTION INTRAMUSCULAR; INTRAVENOUS PRN
Status: DISCONTINUED | OUTPATIENT
Start: 2023-12-12 | End: 2023-12-12

## 2023-12-12 RX ORDER — SODIUM CHLORIDE, SODIUM LACTATE, POTASSIUM CHLORIDE, CALCIUM CHLORIDE 600; 310; 30; 20 MG/100ML; MG/100ML; MG/100ML; MG/100ML
INJECTION, SOLUTION INTRAVENOUS CONTINUOUS
Status: DISCONTINUED | OUTPATIENT
Start: 2023-12-12 | End: 2023-12-12 | Stop reason: HOSPADM

## 2023-12-12 RX ORDER — FENTANYL CITRATE-0.9 % NACL/PF 10 MCG/ML
PLASTIC BAG, INJECTION (ML) INTRAVENOUS PRN
Status: DISCONTINUED | OUTPATIENT
Start: 2023-12-12 | End: 2023-12-12

## 2023-12-12 RX ORDER — ASPIRIN 325 MG
325 TABLET ORAL ONCE
Status: COMPLETED | OUTPATIENT
Start: 2023-12-12 | End: 2023-12-12

## 2023-12-12 RX ORDER — AMLODIPINE BESYLATE 10 MG/1
10 TABLET ORAL DAILY
Status: DISCONTINUED | OUTPATIENT
Start: 2023-12-13 | End: 2023-12-13 | Stop reason: HOSPADM

## 2023-12-12 RX ORDER — ACETAMINOPHEN 325 MG/1
650 TABLET ORAL EVERY 4 HOURS PRN
Status: DISCONTINUED | OUTPATIENT
Start: 2023-12-12 | End: 2023-12-13 | Stop reason: HOSPADM

## 2023-12-12 RX ORDER — ONDANSETRON 4 MG/1
4 TABLET, ORALLY DISINTEGRATING ORAL EVERY 30 MIN PRN
Status: DISCONTINUED | OUTPATIENT
Start: 2023-12-12 | End: 2023-12-12 | Stop reason: HOSPADM

## 2023-12-12 RX ORDER — FENOFIBRATE 160 MG/1
160 TABLET ORAL DAILY
Status: DISCONTINUED | OUTPATIENT
Start: 2023-12-13 | End: 2023-12-13 | Stop reason: HOSPADM

## 2023-12-12 RX ORDER — ALFUZOSIN HYDROCHLORIDE 10 MG/1
10 TABLET, EXTENDED RELEASE ORAL DAILY
Status: DISCONTINUED | OUTPATIENT
Start: 2023-12-13 | End: 2023-12-13 | Stop reason: HOSPADM

## 2023-12-12 RX ORDER — IOPAMIDOL 755 MG/ML
INJECTION, SOLUTION INTRAVASCULAR
Status: DISCONTINUED | OUTPATIENT
Start: 2023-12-12 | End: 2023-12-12 | Stop reason: HOSPADM

## 2023-12-12 RX ORDER — ONDANSETRON 2 MG/ML
4 INJECTION INTRAMUSCULAR; INTRAVENOUS EVERY 30 MIN PRN
Status: DISCONTINUED | OUTPATIENT
Start: 2023-12-12 | End: 2023-12-12 | Stop reason: HOSPADM

## 2023-12-12 RX ORDER — POTASSIUM CHLORIDE 1500 MG/1
20 TABLET, EXTENDED RELEASE ORAL ONCE
Status: COMPLETED | OUTPATIENT
Start: 2023-12-12 | End: 2023-12-12

## 2023-12-12 RX ORDER — FENTANYL CITRATE 50 UG/ML
25 INJECTION, SOLUTION INTRAMUSCULAR; INTRAVENOUS EVERY 5 MIN PRN
Status: DISCONTINUED | OUTPATIENT
Start: 2023-12-12 | End: 2023-12-12 | Stop reason: HOSPADM

## 2023-12-12 RX ORDER — PHENYLEPHRINE HCL IN 0.9% NACL 50MG/250ML
PLASTIC BAG, INJECTION (ML) INTRAVENOUS CONTINUOUS PRN
Status: DISCONTINUED | OUTPATIENT
Start: 2023-12-12 | End: 2023-12-12

## 2023-12-12 RX ORDER — POTASSIUM CHLORIDE 7.45 MG/ML
INJECTION INTRAVENOUS PRN
Status: DISCONTINUED | OUTPATIENT
Start: 2023-12-12 | End: 2023-12-12

## 2023-12-12 RX ORDER — PROPOFOL 10 MG/ML
INJECTION, EMULSION INTRAVENOUS PRN
Status: DISCONTINUED | OUTPATIENT
Start: 2023-12-12 | End: 2023-12-12

## 2023-12-12 RX ORDER — LIDOCAINE 40 MG/G
CREAM TOPICAL
Status: DISCONTINUED | OUTPATIENT
Start: 2023-12-12 | End: 2023-12-12 | Stop reason: HOSPADM

## 2023-12-12 RX ORDER — CLINDAMYCIN PHOSPHATE 900 MG/50ML
900 INJECTION, SOLUTION INTRAVENOUS EVERY 8 HOURS
Qty: 100 ML | Refills: 0 | Status: COMPLETED | OUTPATIENT
Start: 2023-12-12 | End: 2023-12-13

## 2023-12-12 RX ORDER — HYDROMORPHONE HYDROCHLORIDE 1 MG/ML
0.4 INJECTION, SOLUTION INTRAMUSCULAR; INTRAVENOUS; SUBCUTANEOUS EVERY 5 MIN PRN
Status: DISCONTINUED | OUTPATIENT
Start: 2023-12-12 | End: 2023-12-12 | Stop reason: HOSPADM

## 2023-12-12 RX ORDER — HEPARIN SODIUM 1000 [USP'U]/ML
INJECTION, SOLUTION INTRAVENOUS; SUBCUTANEOUS PRN
Status: DISCONTINUED | OUTPATIENT
Start: 2023-12-12 | End: 2023-12-12

## 2023-12-12 RX ORDER — ROSUVASTATIN CALCIUM 20 MG/1
40 TABLET, COATED ORAL EVERY EVENING
Status: DISCONTINUED | OUTPATIENT
Start: 2023-12-12 | End: 2023-12-13 | Stop reason: HOSPADM

## 2023-12-12 RX ORDER — ONDANSETRON 4 MG/1
4 TABLET, ORALLY DISINTEGRATING ORAL EVERY 6 HOURS PRN
Status: DISCONTINUED | OUTPATIENT
Start: 2023-12-12 | End: 2023-12-12

## 2023-12-12 RX ORDER — SODIUM CHLORIDE 9 MG/ML
INJECTION, SOLUTION INTRAVENOUS CONTINUOUS
Status: DISCONTINUED | OUTPATIENT
Start: 2023-12-12 | End: 2023-12-12 | Stop reason: HOSPADM

## 2023-12-12 RX ORDER — HYDRALAZINE HYDROCHLORIDE 20 MG/ML
10 INJECTION INTRAMUSCULAR; INTRAVENOUS
Status: DISCONTINUED | OUTPATIENT
Start: 2023-12-12 | End: 2023-12-13 | Stop reason: HOSPADM

## 2023-12-12 RX ORDER — LIDOCAINE HYDROCHLORIDE 10 MG/ML
INJECTION, SOLUTION INFILTRATION; PERINEURAL
Status: DISCONTINUED | OUTPATIENT
Start: 2023-12-12 | End: 2023-12-12

## 2023-12-12 RX ORDER — CHLORTHALIDONE 25 MG/1
50 TABLET ORAL DAILY
Status: DISCONTINUED | OUTPATIENT
Start: 2023-12-13 | End: 2023-12-13 | Stop reason: HOSPADM

## 2023-12-12 RX ORDER — FENTANYL CITRATE 50 UG/ML
50 INJECTION, SOLUTION INTRAMUSCULAR; INTRAVENOUS EVERY 5 MIN PRN
Status: DISCONTINUED | OUTPATIENT
Start: 2023-12-12 | End: 2023-12-12 | Stop reason: HOSPADM

## 2023-12-12 RX ORDER — DEXAMETHASONE SODIUM PHOSPHATE 4 MG/ML
INJECTION, SOLUTION INTRA-ARTICULAR; INTRALESIONAL; INTRAMUSCULAR; INTRAVENOUS; SOFT TISSUE PRN
Status: DISCONTINUED | OUTPATIENT
Start: 2023-12-12 | End: 2023-12-12

## 2023-12-12 RX ORDER — ONDANSETRON 2 MG/ML
INJECTION INTRAMUSCULAR; INTRAVENOUS PRN
Status: DISCONTINUED | OUTPATIENT
Start: 2023-12-12 | End: 2023-12-12

## 2023-12-12 RX ORDER — LIDOCAINE HYDROCHLORIDE 20 MG/ML
INJECTION, SOLUTION INFILTRATION; PERINEURAL PRN
Status: DISCONTINUED | OUTPATIENT
Start: 2023-12-12 | End: 2023-12-12

## 2023-12-12 RX ORDER — HYDROMORPHONE HYDROCHLORIDE 1 MG/ML
0.2 INJECTION, SOLUTION INTRAMUSCULAR; INTRAVENOUS; SUBCUTANEOUS EVERY 5 MIN PRN
Status: DISCONTINUED | OUTPATIENT
Start: 2023-12-12 | End: 2023-12-12 | Stop reason: HOSPADM

## 2023-12-12 RX ORDER — UBIDECARENONE 100 MG
1 CAPSULE ORAL DAILY
COMMUNITY

## 2023-12-12 RX ORDER — CLINDAMYCIN PHOSPHATE 900 MG/50ML
900 INJECTION, SOLUTION INTRAVENOUS
Status: COMPLETED | OUTPATIENT
Start: 2023-12-12 | End: 2023-12-12

## 2023-12-12 RX ORDER — PROTAMINE SULFATE 10 MG/ML
INJECTION, SOLUTION INTRAVENOUS PRN
Status: DISCONTINUED | OUTPATIENT
Start: 2023-12-12 | End: 2023-12-12

## 2023-12-12 RX ORDER — ONDANSETRON 2 MG/ML
4 INJECTION INTRAMUSCULAR; INTRAVENOUS EVERY 6 HOURS PRN
Status: DISCONTINUED | OUTPATIENT
Start: 2023-12-12 | End: 2023-12-12

## 2023-12-12 RX ORDER — NITROGLYCERIN 0.4 MG/1
0.4 TABLET SUBLINGUAL EVERY 5 MIN PRN
Status: DISCONTINUED | OUTPATIENT
Start: 2023-12-12 | End: 2023-12-13 | Stop reason: HOSPADM

## 2023-12-12 RX ADMIN — POTASSIUM CHLORIDE 10 MEQ: 7.46 INJECTION, SOLUTION INTRAVENOUS at 13:39

## 2023-12-12 RX ADMIN — Medication 100 MCG: at 13:17

## 2023-12-12 RX ADMIN — FENTANYL CITRATE 25 MCG: 50 INJECTION INTRAMUSCULAR; INTRAVENOUS at 15:11

## 2023-12-12 RX ADMIN — FENTANYL CITRATE 50 MCG: 50 INJECTION INTRAMUSCULAR; INTRAVENOUS at 13:08

## 2023-12-12 RX ADMIN — POTASSIUM CHLORIDE 10 MEQ: 7.46 INJECTION, SOLUTION INTRAVENOUS at 14:08

## 2023-12-12 RX ADMIN — POTASSIUM CHLORIDE 20 MEQ: 1500 TABLET, EXTENDED RELEASE ORAL at 20:33

## 2023-12-12 RX ADMIN — HEPARIN SODIUM 14000 UNITS: 1000 INJECTION, SOLUTION INTRAVENOUS; SUBCUTANEOUS at 13:49

## 2023-12-12 RX ADMIN — ROCURONIUM BROMIDE 30 MG: 50 INJECTION, SOLUTION INTRAVENOUS at 13:39

## 2023-12-12 RX ADMIN — LIDOCAINE HYDROCHLORIDE 100 MG: 20 INJECTION, SOLUTION INFILTRATION; PERINEURAL at 13:08

## 2023-12-12 RX ADMIN — FENTANYL CITRATE 25 MCG: 50 INJECTION INTRAMUSCULAR; INTRAVENOUS at 15:15

## 2023-12-12 RX ADMIN — ONDANSETRON 4 MG: 2 INJECTION INTRAMUSCULAR; INTRAVENOUS at 14:40

## 2023-12-12 RX ADMIN — ROSUVASTATIN CALCIUM 40 MG: 20 TABLET, FILM COATED ORAL at 20:33

## 2023-12-12 RX ADMIN — CLINDAMYCIN PHOSPHATE 900 MG: 900 INJECTION, SOLUTION INTRAVENOUS at 20:05

## 2023-12-12 RX ADMIN — PROTAMINE SULFATE 30 MG: 10 INJECTION, SOLUTION INTRAVENOUS at 14:44

## 2023-12-12 RX ADMIN — PROPOFOL 100 MG: 10 INJECTION, EMULSION INTRAVENOUS at 13:08

## 2023-12-12 RX ADMIN — DEXAMETHASONE SODIUM PHOSPHATE 4 MG: 4 INJECTION, SOLUTION INTRA-ARTICULAR; INTRALESIONAL; INTRAMUSCULAR; INTRAVENOUS; SOFT TISSUE at 13:17

## 2023-12-12 RX ADMIN — Medication 0.5 MCG/KG/MIN: at 13:14

## 2023-12-12 RX ADMIN — Medication 100 MCG: at 13:14

## 2023-12-12 RX ADMIN — SODIUM CHLORIDE: 9 INJECTION, SOLUTION INTRAVENOUS at 12:45

## 2023-12-12 RX ADMIN — ROCURONIUM BROMIDE 50 MG: 50 INJECTION, SOLUTION INTRAVENOUS at 13:08

## 2023-12-12 RX ADMIN — CLINDAMYCIN PHOSPHATE 900 MG: 900 INJECTION, SOLUTION INTRAVENOUS at 12:45

## 2023-12-12 RX ADMIN — LIDOCAINE HYDROCHLORIDE 2 ML: 10 INJECTION, SOLUTION INFILTRATION; PERINEURAL at 12:04

## 2023-12-12 RX ADMIN — SUGAMMADEX 200 MG: 100 INJECTION, SOLUTION INTRAVENOUS at 15:01

## 2023-12-12 RX ADMIN — Medication 100 MCG: at 13:08

## 2023-12-12 ASSESSMENT — ACTIVITIES OF DAILY LIVING (ADL)
ADLS_ACUITY_SCORE: 18

## 2023-12-12 ASSESSMENT — LIFESTYLE VARIABLES: TOBACCO_USE: 1

## 2023-12-12 ASSESSMENT — ENCOUNTER SYMPTOMS
DYSRHYTHMIAS: 1
ORTHOPNEA: 0
SEIZURES: 0

## 2023-12-12 NOTE — ANESTHESIA CARE TRANSFER NOTE
Patient: Jonny Goldberg    Procedure: Procedure(s):  Transcatheter Aortic Valve Replacement-Femoral Approach       Diagnosis: valvular disease  Diagnosis Additional Information: No value filed.    Anesthesia Type:   General     Note:    Oropharynx: spontaneously breathing and oropharynx clear of all foreign objects  Level of Consciousness: awake  Oxygen Supplementation: face mask  Level of Supplemental Oxygen (L/min / FiO2): 6  Independent Airway: airway patency satisfactory and stable  Dentition: dentition unchanged  Vital Signs Stable: post-procedure vital signs reviewed and stable  Report to RN Given: handoff report given  Patient transferred to: PACU    Handoff Report: Identifed the Patient, Identified the Reponsible Provider, Reviewed the pertinent medical history, Discussed the surgical course, Reviewed Intra-OP anesthesia mangement and issues during anesthesia, Set expectations for post-procedure period and Allowed opportunity for questions and acknowledgement of understanding      Vitals:  Vitals Value Taken Time   /61    Temp 36.4    Pulse 60 12/12/23 1529   Resp 9 12/12/23 1529   SpO2 98 % 12/12/23 1529   Vitals shown include unfiled device data.    Electronically Signed By: DREW Oliva CRNA  December 12, 2023  3:29 PM

## 2023-12-12 NOTE — ANESTHESIA PREPROCEDURE EVALUATION
Anesthesia Pre-Procedure Evaluation    Patient: Jonny Goldberg   MRN: [id number] : 1945        Procedure : Procedure(s):  Transcatheter Aortic Valve Replacement-Femoral Approach          Past Medical History:   Diagnosis Date    Aortic valve stenosis     severe    Blood clot in the legs     left leg after surgery    Gastro-oesophageal reflux disease     Hodgkins lymphoma (H)     bone marrow biopsy    Hypertension     PAD (peripheral artery disease) (H24)     persistent atrial fibrillation     AFIB    Unspecified cerebral artery occlusion with cerebral infarction 10/01/2011      Past Surgical History:   Procedure Laterality Date    APPENDECTOMY      ARTHROPLASTY REVISION HIP  2011    Procedure:ARTHROPLASTY REVISION HIP; RIGHT TOTAL HIP REVISION WITH BONE GRAFT  ; Surgeon:ANGIE HARRINGTON; Location: OR    BIOPSY  hodgkins lymphoma    bone marrow biopsy, chemo and radiation    CARDIAC SURGERY      cardiac ablation    COLONOSCOPY      CV CORONARY ANGIOGRAM N/A 2022    Procedure: Coronary Angiogram;  Surgeon: Gennaro Cook MD;  Location: [facility] CARDIAC CATH LAB    CV PCI N/A 2022    Procedure: Percutaneous Coronary Intervention;  Surgeon: Gennaro Cook MD;  Location: [facility] CARDIAC CATH LAB    CV RIGHT HEART CATH MEASUREMENTS RECORDED N/A 2022    Procedure: Right Heart Catheterization;  Surgeon: Gennaro Cook MD;  Location: [facility] CARDIAC CATH LAB    EP PACEMAKER DEVICE & LEAD IMPLANT- RIGHT VENTRICULAR N/A 2023    Procedure: Pacemaker Device & Lead Implant- Right ventricular;  Surgeon: Brian Madrid MD;  Location: [facility] CARDIAC CATH LAB    ILIAC ARTERY ANGIOGRAM LEFT Left 10/10/2023    Procedure: Left Groin Cutdown with Repair of Left Femoral Artery; Aborted Transcatheter Aortic Valve Replacement;  Surgeon: German Patel MD;  Location:  HEART CARDIAC CATH LAB    ORTHOPEDIC SURGERY      hip and knee surgeries    ORTHOPEDIC SURGERY      [facility]  tunnel  right hand      Allergies   Allergen Reactions    Irinotecan     Penicillin G Hives     hives      Social History     Tobacco Use    Smoking status: Former     Types: Cigarettes     Quit date: 1987     Years since quittin.7    Smokeless tobacco: Never   Substance Use Topics    Alcohol use: Yes     Comment: 1 glass daily      Wt Readings from Last 1 Encounters:   23 97.1 kg (214 lb)        Anesthesia Evaluation   Pt has had prior anesthetic.     No history of anesthetic complications       ROS/MED HX  ENT/Pulmonary:     (+)                tobacco use, Past use,                   (-) asthma, sleep apnea and recent URI   Neurologic:     (+)              TIA,               (-) no seizures and no CVA   Cardiovascular: Comment: S/p aborted TAVR in 10/2023 requiring LCFA cutdown and surgical repair with pericardial patch     (+)  hypertension- Peripheral Vascular Disease-- Carotid Stenosis.   -  - -   Taking blood thinners   CHF  Last EF: 40-45   HONG.   pacemaker, Reason placed: High degree AVB.         dysrhythmias, a-fib,  valvular problems/murmurs type: AS     Previous cardiac testing   Echo: Date: Results:  Interpretation Summary     1. Left ventricular systolic function is mildly reduced. The visual ejection  fraction is 40-45%.  2. The right ventricle is normal in structure, function and size.  3. Severe aortic stenosis; mean gradient 31 mmHg, peak velocity 3.6 m/sec,  calculated valve area 0.9 cm2, DI 0.22, SVI 38 ml/m2. Visually the valve is  heavily calcified with restricted leaflet motion consistent with severe aortic  stenosis.    10/10/2023:  Interpretation Summary     LVEF 50-55% mild inferior hypokinesis  Severe aortic stenosis with mean gradient 35  Mild AR and MR  1-2+ TR  No pericardial effusion.     TAVR not performed secondary to vascular complication      Stress Test:  Date: Results:    ECG Reviewed:  Date: Results:    Cath:  Date: Results:   (-) orthopnea/PND and syncope  "  METS/Exercise Tolerance:     Hematologic:       Musculoskeletal:       GI/Hepatic:    (-) GERD (Patient denies symptoms and meds) and liver disease   Renal/Genitourinary:    (-) renal disease   Endo:     (+)               Obesity (BMI 34),       Psychiatric/Substance Use:       Infectious Disease:    (-) Recent Fever   Malignancy:       Other:            Physical Exam    Airway        Mallampati: II   TM distance: > 3 FB   Neck ROM: full   Mouth opening: > 3 cm    Respiratory Devices and Support         Dental       (+) Modest Abnormalities - crowns, retainers, 1 or 2 missing teeth    B=Bridge, C=Chipped, L=Loose, M=Missing    Cardiovascular          Rhythm and rate: irregular and normal     Pulmonary           breath sounds clear to auscultation           OUTSIDE LABS:  CBC:   Lab Results   Component Value Date    WBC 5.8 12/08/2023    WBC 10.4 10/16/2023    HGB 14.6 12/08/2023    HGB 12.1 (L) 10/16/2023    HCT 42.8 12/08/2023    HCT 35.5 (L) 10/16/2023     12/08/2023     10/16/2023     BMP:   Lab Results   Component Value Date     12/08/2023     10/16/2023    POTASSIUM 3.6 12/08/2023    POTASSIUM 3.2 (L) 10/16/2023    CHLORIDE 103 12/08/2023    CHLORIDE 101 10/16/2023    CO2 29 12/08/2023    CO2 24 10/16/2023    BUN 27.5 (H) 12/08/2023    BUN 21.3 10/16/2023    CR 1.08 12/08/2023    CR 1.03 10/16/2023    GLC 99 12/08/2023     (H) 10/16/2023     COAGS:   Lab Results   Component Value Date    PTT 30 10/16/2023    INR 2.57 (H) 12/08/2023     POC: No results found for: \"BGM\", \"HCG\", \"HCGS\"  HEPATIC:   Lab Results   Component Value Date    ALBUMIN 3.6 10/16/2023    PROTTOTAL 6.7 10/16/2023    ALT 14 10/16/2023    AST 19 10/16/2023    ALKPHOS 43 10/16/2023    BILITOTAL 0.6 10/16/2023     OTHER:   Lab Results   Component Value Date    PH 7.38 09/23/2022    LACT 0.6 09/23/2022    A1C 5.9 (A) 05/06/2021    SARA 9.3 12/08/2023    PHOS 2.7 10/11/2023    MAG 1.6 (L) 10/11/2023 " "      Anesthesia Plan    ASA Status:  4    NPO Status:  NPO Appropriate    Anesthesia Type: General.     - Airway: ETT   Induction: Intravenous, Propofol.   Maintenance: Inhalation.   Techniques and Equipment:     - Lines/Monitors: 2nd IV, Arterial Line     Consents    Anesthesia Plan(s) and associated risks, benefits, and realistic alternatives discussed. Questions answered and patient/representative(s) expressed understanding.     - Discussed: Risks, Benefits and Alternatives for the PROCEDURE were discussed     - Discussed with:  Patient            Postoperative Care    Pain management: IV analgesics, Oral pain medications, Multi-modal analgesia.   PONV prophylaxis: Ondansetron (or other 5HT-3), Dexamethasone or Solumedrol     Comments:               Itzel Martin MD    I have reviewed the pertinent notes and labs in the chart from the past 30 days and (re)examined the patient.  Any updates or changes from those notes are reflected in this note.            # Drug Induced Coagulation Defect: home medication list includes an anticoagulant medication  # Drug Induced Platelet Defect: home medication list includes an antiplatelet medication  # Obesity: Estimated body mass index is 34.54 kg/m  as calculated from the following:    Height as of 11/24/23: 1.676 m (5' 6\").    Weight as of 11/24/23: 97.1 kg (214 lb).      "

## 2023-12-12 NOTE — OP NOTE
Preoperative diagnosis: Severe aortic valvular stenosis.    Postoperative diagnosis: Same.    Procedure:  1.  Right common femoral artery cutdown with primary closure.  2.  Right femoral-popliteal thrombectomy.  3.  Right lower extremity arteriogram with runoff.    Surgeon:    Anesthesia: General.  Estimated blood loss: About 100 mL.  Complications: None.  Specimens: None.    Heparin: 14,000 units.  Protamine: 30 mg.    Indication for procedure: This is a 78-year-old male who has been evaluated by our cardiology team for transaortic valve replacement.  Previous attempt at a percutaneous approach from the left groin resulted in a complication because he had an ectatic/aneurysmal artery on the left side.  We have decided that we will proceed with cutdown and primary closure for access for his transaortic valve replacement.    Procedure details: Patient was identified and then taken to the hybrid suite of the cardiac catheterization lab and placed in supine position.  General anesthesia was induced.  Preoperative intravenous antibiotics were administered.  Chest, abdomen and both groins were prepped in sterile surgical field was created.    I had marked out his right common femoral artery and its bifurcation.  Preprocedure timeout was conducted.  An oblique incision was made above his femoral crease and deepened with electrocautery.  The right common femoral artery was dissected out and placed in [place].  It had concentric calcification more proximally but it was soft and plantable more distally.  Micropuncture access was gained and a 6 [place] sheath was placed.    Dr. Cook then let the cardiology team and successful deployment of his trans catheter aortic valve.    After the successful completion of the procedure the venous access site was closed with bringing the tissue over the venotomy with 2-0 Vicryl suture.  The 14 [place] sheath was removed from the right common femoral artery and it was planned.  As I  released the distal clamp for backbleeding there was significant amount of thrombus that bled back.  Then we did a thrombectomy with a #4 Iliana balloon catheter and went down to 60 cm and retrieved a small amount of clot and significantly improved black bleeding.  The artery lumen was filled with heparinized saline solution.    Then we did an angiogram for completion to evaluate for any residual clot.  There was free flow of contrast all the way down into the foot with posterior tibial artery being the main runoff.  I did not see additional thrombus.  Then the arteriotomy was repaired with running 5-0 Prolene suture.  1 area required buttressing suture with 6-0 Prolene on a pledget.  There was excellent pulsation in the common femoral artery proximal and distal to the repair.  30 mg of protamine was given.  Femoral sheath was approximated with 2-0 Vicryl in figure-of-eight fashion.  Gwen's layer was approximated with interrupted 2-0 Vicryl in figure-of-eight fashion.  Skin was approximated with 3-0 chromic in vertical mattress fashion.    Sterile dressing was applied.    I was able to feel a 3+ palpable right posterior tibial artery pulse which is consistent with his preoperative exam.    Consult instruments, sponges and needle was noted to be correct.    Patient was awakened and extubated and taken to recovery room in stable condition.

## 2023-12-12 NOTE — PROGRESS NOTES
PTA medications completed by Medication Scribe day of surgery     Medication history sources: Patient, Surescripts, and H&P  In the past week, patient estimated taking medication this percent of the time: Greater than 90%      Significant changes made to the medication list:  None      Additional medication history information:   None    Medication reconciliation completed by provider prior to medication history? No    Time spent in this activity: 25 minutes    The information provided in this note is only as accurate as the sources available at the time of update(s)      Prior to Admission medications    Medication Sig Last Dose Taking? Auth Provider Long Term End Date   alfuzosin ER (UROXATRAL) 10 MG 24 hr tablet Take 1 tablet (10 mg) by mouth daily 12/12/2023 at am Yes Isabel Zapata PA-C     ALPHA LIPOIC ACID PO Take 600 mg by mouth daily 12/8/2023 at am Yes Reported, Patient     amLODIPine (NORVASC) 10 MG tablet Take 1 tablet (10 mg) by mouth daily 12/11/2023 at am Yes Bere Jackson, CNP Yes    ascorbic acid (VITAMIN C) 1000 MG TABS Take 1 tablet by mouth daily 12/8/2023 at am Yes Reported, Patient     aspirin 81 MG EC tablet Take 4 tablets by mouth once (4 x 81 mg = 324 mg) 12/12/2023 at am Yes Reported, Patient     chlorthalidone (HYGROTON) 50 MG tablet Take 50 mg by mouth daily 12/11/2023 at am Yes Reported, Patient Yes    Cholecalciferol (VITAMIN D) 400 UNITS tablet Take 1 tablet by mouth every evening 12/8/2023 at pm Yes Reported, Patient     CINNAMON PO Take 1 capsule by mouth every evening 12/8/2023 at am Yes Reported, Patient     clindamycin (CLEOCIN) 300 MG capsule Take 2 capsules by mouth once as needed (1 hour prior to dental appointments 2 x 300 mg = 300 mg) Unknown at prn Yes Reported, Patient     co-enzyme Q-10 100 MG CAPS capsule Take 1 capsule by mouth daily 12/8/2023 at am Yes Reported, Patient     ezetimibe (ZETIA) 10 MG tablet Take 0.5 tablets (5 mg) by mouth Every Mon, Wed, Fri Morning  12/11/2023 at am Yes Melanie Deshpande, DREW CNP Yes    fenofibrate (TRIGLIDE/LOFIBRA) 160 MG tablet Take 1 tablet (160 mg) by mouth daily 12/11/2023 at am Yes Yanira Grey, NP Yes    multivitamin w/minerals (THERA-VIT-M) tablet Take 1 tablet by mouth every evening 12/8/2023 at pm Yes Reported, Patient     omega 3 1000 MG CAPS Take 1 capsule by mouth every evening 180 EPA/ 20 DHH 12/8/2023 at am Yes Reported, Patient     potassium chloride ER (KLOR-CON M) 20 MEQ CR tablet Take 2 tablets (40 mEq) by mouth daily 12/11/2023 at am Yes Russell Potts, CNP     rosuvastatin (CRESTOR) 40 MG tablet Take 40 mg by mouth every evening 12/11/2023 at pm Yes Reported, Patient Yes    warfarin ANTICOAGULANT (COUMADIN) 4 MG tablet Take 1 tablet by mouth daily 12/6/2023 at pm Yes Reported, Patient         Medication history completed by: Blayne Arias CPhT

## 2023-12-12 NOTE — H&P
"  [facility]      Cardiology H&P  Jonny Goldberg MRN: [id number]  1945  Date of Admission:12/12/2023  Primary care provider: Reid Hogue      Assessment and Plan:     Jonny Goldberg is a pleasant 78 year old admitted on 12/12/2023 for elective TAVR.     # Severe aortic stenosis s/p TAVR with 29 mm De Paz Resilia valve  # RCFA cutdown   Access sites soft without hematoma. No arrhythmias. Neuro's intact    - Bedrest per protocol x 6 hours  - Neuro checks, per protocol  - Echocardiogram tomorrow  - Monitor groin sites  - EKG in morning   - Cardiac rehab/PT/OT  - Continuous telemetry monitoring  - Lifelong antibiotic prophylaxis prior to all dental procedures.      # Chronic diastolic heart failure, NYHA class II   NTpBNP 694, LVEDP 15 mmHg.   - Diurese as needed/able, no PTA diuretics   - Daily weights  - Strict intake/output    # Permanent atrial fibrillation  # High grade AV block s/p PPM  - Monitor on telemetry  - Resume PTA coumadin tomorrow, pharmacy to dose  - Goal INR 2-3    # Hypertension   - Resume PTA chlorthalidone 50 mg daily and amlodipine 10 mg daily   - PTA hydral for SBP > 140     # Hyperlipidemia  - Resume PTA ezetimibe 10 mg daily and fenofibrate 160 mg daily         FEN: Cardiac diet  Disposition: Home in 1-2 days with cardiac rehab pending stable post-op period  Code Status: FULL CODE    DREW Guthrie, CNP  [facility] Structural/Interventional Cardiology Team  Pager: [phone]    Clinically Significant Risk Factors Present on Admission       # Hypokalemia: Lowest K = 3.3 mmol/L in last 2 days, will replace as needed         # Drug Induced Coagulation Defect: home medication list includes an anticoagulant medication  # Drug Induced Platelet Defect: home medication list includes an antiplatelet medication   # Obesity: Estimated body mass index is 33.69 kg/m  as calculated from the following:    Height as of this encounter: 1.676 m (5' 6\").    Weight as of this encounter: 94.7 " kg (208 lb 11.2 oz).             Chief Complaint:   Planned TAVR         History of Present Illness:   Jonny Goldberg is a pleasant 78-year-old gentleman with known aortic stenosis who presents today for transcatheter aortic valve replacement with right femoral artery cutdown.     Other past history is notable for hypertension, chronic atrial fibrillation, and high-grade AV block s/p permanent pacemaker implantation.    Patient is now s/p 29 mm De Paz Resilia valve with RCFA cutdown. His procedure went well without complication. No hemodynamic instability.           Review of Systems:    10 point review of systems negative except for stated above in HPI.          Past Medical History:   Medical History reviewed.   Past Medical History:   Diagnosis Date    Aortic valve stenosis     severe    Blood clot in the legs     left leg after surgery    Gastro-oesophageal reflux disease     Hodgkins lymphoma (H)     bone marrow biopsy    Hypertension     PAD (peripheral artery disease) (H24)     persistent atrial fibrillation     AFIB    Unspecified cerebral artery occlusion with cerebral infarction 10/01/2011             Past Surgical History:   Surgical History reviewed.   Past Surgical History:   Procedure Laterality Date    APPENDECTOMY      ARTHROPLASTY REVISION HIP  12/27/2011    Procedure:ARTHROPLASTY REVISION HIP; RIGHT TOTAL HIP REVISION WITH BONE GRAFT  ; Surgeon:ANGIE HARRINGTON; Location: OR    BIOPSY  hodgkins lymphoma    bone marrow biopsy, chemo and radiation    CARDIAC SURGERY      cardiac ablation    COLONOSCOPY      CV CORONARY ANGIOGRAM N/A 9/23/2022    Procedure: Coronary Angiogram;  Surgeon: Gennaro Cook MD;  Location:  HEART CARDIAC CATH LAB    CV PCI N/A 9/23/2022    Procedure: Percutaneous Coronary Intervention;  Surgeon: Gennaro Cook MD;  Location:  HEART CARDIAC CATH LAB    CV RIGHT HEART CATH MEASUREMENTS RECORDED N/A 9/23/2022    Procedure: Right Heart Catheterization;  Surgeon:  Gennaro Cook MD;  Location: [facility] CARDIAC CATH LAB    EP PACEMAKER DEVICE & LEAD IMPLANT- RIGHT VENTRICULAR N/A 2023    Procedure: Pacemaker Device & Lead Implant- Right ventricular;  Surgeon: Brian Madrid MD;  Location: [facility] CARDIAC CATH LAB    ILIAC ARTERY ANGIOGRAM LEFT Left 10/10/2023    Procedure: Left Groin Cutdown with Repair of Left Femoral Artery; Aborted Transcatheter Aortic Valve Replacement;  Surgeon: German Patel MD;  Location: [facility] CARDIAC CATH LAB    ORTHOPEDIC SURGERY      hip and knee surgeries    ORTHOPEDIC SURGERY      carpal tunnel  right hand             Social History:   Social History reviewed.  Social History     Tobacco Use    Smoking status: Former     Types: Cigarettes     Quit date: 1987     Years since quittin.7    Smokeless tobacco: Never   Substance Use Topics    Alcohol use: Yes     Comment: 1 glass daily             Family History:   Family History reviewed.   Family History   Problem Relation Age of Onset    Hypertension Mother     Heart Failure Mother     Coronary Artery Disease Mother         CABG    Arrhythmia Mother     Cerebrovascular Disease Father     Heart Failure Father     Colon Cancer Father     Coronary Artery Disease Father         CABG    Coronary Artery Disease Brother         CABG    Arrhythmia Brother              Allergies:     Allergies   Allergen Reactions    Irinotecan     Penicillin G Hives     hives             Medications:   Medications Reviewed.   Current Facility-Administered Medications   Medication    [START ON 2023] alfuzosin ER (UROXATRAL) 24 hr tablet 10 mg    [START ON 2023] amLODIPine (NORVASC) tablet 10 mg    [START ON 2023] chlorthalidone (HYGROTON) tablet 50 mg    [START ON 2023] ezetimibe (ZETIA) half-tab 5 mg    fenofibrate (TRIGLIDE/LOFIBRA) tablet 160 mg    HOLD: warfarin (COUMADIN) 5 days pre-procedure    iopamidol (ISOVUE-370) solution    lactated ringers infusion     "lidocaine (LMX4) cream    lidocaine 1 % 0.1-1 mL    lidocaine 1 % 0.1-1 mL    lidocaine 1 %    Patient is NOT allergic to contrast dye    rosuvastatin (CRESTOR) tablet 40 mg    sodium chloride (PF) 0.9% PF flush 3 mL    sodium chloride (PF) 0.9% PF flush 3 mL    sodium chloride (PF) 0.9% PF flush 3 mL    sodium chloride (PF) 0.9% PF flush 3 mL    sodium chloride 0.9 % infusion    [START ON 12/13/2023] Warfarin Dose Required Daily - Pharmacist Managed     Facility-Administered Medications Ordered in Other Encounters   Medication    dexAMETHasone (DECADRON) injection    fentaNYL (PF) (SUBLIMAZE) injection    heparin    lidocaine 2% injection (MDV)    ondansetron (ZOFRAN) injection    phenylephrine (LOGAN-SYNEPHRINE) 50 mg in NaCl 0.9 % 250 mL infusion    phenylephrine (LOGAN-SYNEPHRINE) injection    potassium chloride in 100 mL sterile water infusion    propofol (DIPRIVAN) injection 10 mg/mL vial    protamine injection    rocuronium injection             Physical Exam:   Vitals were reviewed.  Temperature 98.5  F (36.9  C), resp. rate 14, height 1.676 m (5' 6\"), weight 94.7 kg (208 lb 11.2 oz).    General: AAOx3, NAD  Skin: Not jaundiced, no rash, no ecchymoses; incision site CDI, soft, non-tender, no signs of hematoma. No bruit  HEENT: MMM, PERRLA, EOM intact  CV: RRR, normal S1S2, grade I JUNIOR, clicks, rubs  Resp: Clear to auscultation bilaterally, no wheezes, rhonchi  Abd: Soft, non-tender, BS+, no masses appreciated  Extremities: warm and well perfused, palpable pulses, no edema  Neuro: No lateralizing symptoms or focal neurologic deficits        Labs:   Routine Labs:  No results found for: \"TROPI\", \"TROPONIN\", \"TROPR\", \"TROPN\"  CMP  Recent Labs   Lab 12/12/23  1150 12/08/23  1111    141   POTASSIUM 3.3* 3.6   CHLORIDE 102 103   CO2 26 29   ANIONGAP 12 9   *  103* 99   BUN 25.3* 27.5*   CR 1.20* 1.08   GFRESTIMATED 63 71   SARA 9.5 9.3     CBC  Recent Labs   Lab 12/08/23  1111   WBC 5.8   RBC 4.61   HGB " 14.6   HCT 42.8   MCV 93   MCH 31.7   MCHC 34.1   RDW 13.2        INR  Recent Labs   Lab 12/12/23  1150 12/08/23  1111   INR 1.20* 2.57*           Diagnostics:    EKG 12Lead: paced rhythm     Echo: 10/10/23  Interpretation Summary     Pre TAVR     LVEF 50-55% mild inferior hypokinesis  Severe aortic stenosis with mean gradient 35  Mild AR and MR  1-2+ TR  No pericardial effusion.     TAVR not performed secondary to vascular complication      Medical Decision Making         70 MINUTES SPENT BY ME on the date of service doing chart review, history, exam, documentation & further activities per the note.

## 2023-12-13 ENCOUNTER — APPOINTMENT (OUTPATIENT)
Dept: CARDIOLOGY | Facility: CLINIC | Age: 78
DRG: 267 | End: 2023-12-13
Attending: NURSE PRACTITIONER
Payer: COMMERCIAL

## 2023-12-13 ENCOUNTER — APPOINTMENT (OUTPATIENT)
Dept: OCCUPATIONAL THERAPY | Facility: CLINIC | Age: 78
DRG: 267 | End: 2023-12-13
Attending: NURSE PRACTITIONER
Payer: COMMERCIAL

## 2023-12-13 VITALS
OXYGEN SATURATION: 99 % | HEIGHT: 66 IN | SYSTOLIC BLOOD PRESSURE: 138 MMHG | WEIGHT: 208.2 LBS | RESPIRATION RATE: 12 BRPM | TEMPERATURE: 98.6 F | HEART RATE: 67 BPM | DIASTOLIC BLOOD PRESSURE: 72 MMHG | BODY MASS INDEX: 33.46 KG/M2

## 2023-12-13 DIAGNOSIS — Z95.2 S/P TAVR (TRANSCATHETER AORTIC VALVE REPLACEMENT): Primary | ICD-10-CM

## 2023-12-13 LAB
ANION GAP SERPL CALCULATED.3IONS-SCNC: 11 MMOL/L (ref 7–15)
BUN SERPL-MCNC: 28.6 MG/DL (ref 8–23)
CALCIUM SERPL-MCNC: 9.1 MG/DL (ref 8.8–10.2)
CHLORIDE SERPL-SCNC: 100 MMOL/L (ref 98–107)
CREAT SERPL-MCNC: 1.03 MG/DL (ref 0.67–1.17)
DEPRECATED HCO3 PLAS-SCNC: 27 MMOL/L (ref 22–29)
EGFRCR SERPLBLD CKD-EPI 2021: 75 ML/MIN/1.73M2
ERYTHROCYTE [DISTWIDTH] IN BLOOD BY AUTOMATED COUNT: 13.2 % (ref 10–15)
GLUCOSE SERPL-MCNC: 148 MG/DL (ref 70–99)
HCT VFR BLD AUTO: 41.3 % (ref 40–53)
HGB BLD-MCNC: 13.9 G/DL (ref 13.3–17.7)
INR PPP: 1.14 (ref 0.85–1.15)
LVEF ECHO: NORMAL
MAGNESIUM SERPL-MCNC: 1.7 MG/DL (ref 1.7–2.3)
MCH RBC QN AUTO: 31.7 PG (ref 26.5–33)
MCHC RBC AUTO-ENTMCNC: 33.7 G/DL (ref 31.5–36.5)
MCV RBC AUTO: 94 FL (ref 78–100)
PHOSPHATE SERPL-MCNC: 3.1 MG/DL (ref 2.5–4.5)
PLATELET # BLD AUTO: 192 10E3/UL (ref 150–450)
POTASSIUM SERPL-SCNC: 3.8 MMOL/L (ref 3.4–5.3)
RBC # BLD AUTO: 4.38 10E6/UL (ref 4.4–5.9)
SODIUM SERPL-SCNC: 138 MMOL/L (ref 135–145)
WBC # BLD AUTO: 17.1 10E3/UL (ref 4–11)

## 2023-12-13 PROCEDURE — 85027 COMPLETE CBC AUTOMATED: CPT | Performed by: NURSE PRACTITIONER

## 2023-12-13 PROCEDURE — 250N000011 HC RX IP 250 OP 636: Performed by: SURGERY

## 2023-12-13 PROCEDURE — 93321 DOPPLER ECHO F-UP/LMTD STD: CPT

## 2023-12-13 PROCEDURE — 84100 ASSAY OF PHOSPHORUS: CPT | Performed by: NURSE PRACTITIONER

## 2023-12-13 PROCEDURE — 99238 HOSP IP/OBS DSCHRG MGMT 30/<: CPT | Mod: 25 | Performed by: NURSE PRACTITIONER

## 2023-12-13 PROCEDURE — 93010 ELECTROCARDIOGRAM REPORT: CPT | Performed by: INTERNAL MEDICINE

## 2023-12-13 PROCEDURE — 83735 ASSAY OF MAGNESIUM: CPT | Performed by: NURSE PRACTITIONER

## 2023-12-13 PROCEDURE — 97165 OT EVAL LOW COMPLEX 30 MIN: CPT | Mod: GO

## 2023-12-13 PROCEDURE — 85610 PROTHROMBIN TIME: CPT | Performed by: NURSE PRACTITIONER

## 2023-12-13 PROCEDURE — 82565 ASSAY OF CREATININE: CPT | Performed by: NURSE PRACTITIONER

## 2023-12-13 PROCEDURE — 97110 THERAPEUTIC EXERCISES: CPT | Mod: GO

## 2023-12-13 PROCEDURE — 36415 COLL VENOUS BLD VENIPUNCTURE: CPT | Performed by: NURSE PRACTITIONER

## 2023-12-13 PROCEDURE — 93308 TTE F-UP OR LMTD: CPT | Mod: 26 | Performed by: INTERNAL MEDICINE

## 2023-12-13 PROCEDURE — 93325 DOPPLER ECHO COLOR FLOW MAPG: CPT

## 2023-12-13 PROCEDURE — 93325 DOPPLER ECHO COLOR FLOW MAPG: CPT | Mod: 26 | Performed by: INTERNAL MEDICINE

## 2023-12-13 PROCEDURE — 250N000013 HC RX MED GY IP 250 OP 250 PS 637: Performed by: NURSE PRACTITIONER

## 2023-12-13 PROCEDURE — 93321 DOPPLER ECHO F-UP/LMTD STD: CPT | Mod: 26 | Performed by: INTERNAL MEDICINE

## 2023-12-13 PROCEDURE — 82374 ASSAY BLOOD CARBON DIOXIDE: CPT | Performed by: NURSE PRACTITIONER

## 2023-12-13 PROCEDURE — 93005 ELECTROCARDIOGRAM TRACING: CPT

## 2023-12-13 RX ADMIN — ALFUZOSIN HYDROCHLORIDE 10 MG: 10 TABLET, EXTENDED RELEASE ORAL at 07:38

## 2023-12-13 RX ADMIN — CLINDAMYCIN PHOSPHATE 900 MG: 900 INJECTION, SOLUTION INTRAVENOUS at 04:53

## 2023-12-13 RX ADMIN — EZETIMIBE 5 MG: 10 TABLET ORAL at 07:38

## 2023-12-13 RX ADMIN — FENOFIBRATE 160 MG: 160 TABLET ORAL at 07:38

## 2023-12-13 RX ADMIN — AMLODIPINE BESYLATE 10 MG: 10 TABLET ORAL at 07:38

## 2023-12-13 RX ADMIN — CHLORTHALIDONE 50 MG: 25 TABLET ORAL at 07:38

## 2023-12-13 ASSESSMENT — ACTIVITIES OF DAILY LIVING (ADL)
ADLS_ACUITY_SCORE: 20
PREVIOUS_RESPONSIBILITIES: HOUSEKEEPING;MEAL PREP;LAUNDRY;SHOPPING;YARDWORK;MEDICATION MANAGEMENT;FINANCES;DRIVING

## 2023-12-13 NOTE — PLAN OF CARE
A&OX4, RA, VSS, denies pain. Neuro intact, TR band removed ~ 2200, right groin covered with dressing, marked. Urine dark/jonathan color. Tele- 100% V-Paced with BBB

## 2023-12-13 NOTE — PLAN OF CARE
VSS, V paced, room air. R groin site incision with small amount of sanguinous drainage, marked. Neuros are WDL. R Radial TR band removal in progress. Palpable DP pulses bilat. Planned bedrest until 2030. Void was red with small clots, bladder scan post void shows 146. K protocol. Wife involved and supportive.

## 2023-12-13 NOTE — PROGRESS NOTES
Vascular surgery progress note    The patient was examined. Patient is doing well. Right foot feeling normal without weakness or numbness.    Incision clean/drive/intact and dressing with mild strikethrough. Palpable DP and PT on the right side.    OK to discharge from our standpoint. OK to remove dressing tomorrow. OK to shower tomorrow. No bathtub for about 4 to 6 weeks. Sutures will dissolve on their own.    Aleksandar Espino MD, Vascular Surgery Fellow

## 2023-12-13 NOTE — PLAN OF CARE
VSS, not in pain at time of discharge. Pt states all belongings and paperwork are accounted for. . Discharge education complete including meds, follow up and all instructions. Post TAVR instructions gone over with vascular surgery instructions for groin care. No further questions on discharge information. Family here to drive pt home.

## 2023-12-13 NOTE — PHARMACY-ANTICOAGULATION SERVICE
Clinical Pharmacy - Warfarin Dosing Consult     Pharmacy has been consulted to manage this patient s warfarin therapy.  Indication: Atrial Fibrillation  Therapy Goal: INR 2-3  Warfarin Prior to Admission: Yes  Warfarin PTA Regimen: 4mg daily    INR   Date Value Ref Range Status   12/12/2023 1.37 (H) 0.85 - 1.15 Final   12/12/2023 1.20 (H) 0.85 - 1.15 Final       Recommend continuing home dose of warfarin 4 mg at discharge today.  Pharmacy will monitor Jonny Goldberg daily and order warfarin doses to achieve specified goal.      Please contact pharmacy as soon as possible if the warfarin needs to be held for a procedure or if the warfarin goals change.      Kourtney Stacy, PharmD

## 2023-12-13 NOTE — PROGRESS NOTES
12/13/23 0844   Appointment Info   Signing Clinician's Name / Credentials (OT) Yin Jeff OTR/L   Living Environment   People in Home spouse   Current Living Arrangements house  (Multi level home)   Home Accessibility stairs to enter home;stairs within home   Number of Stairs, Main Entrance 4   Number of Stairs, Within Home, Primary greater than 10 stairs   Transportation Anticipated family or friend will provide  (Pt typically drives)   Self-Care   Usual Activity Tolerance good   Current Activity Tolerance moderate   Regular Exercise Yes   Activity/Exercise Type walking   Exercise Amount/Frequency 3-5 times/wk   Equipment Currently Used at Home grab bar, tub/shower;grab bar, toilet;shower chair;walker, standard   Activity/Exercise/Self-Care Comment Independent in all ADLs and mobility at baseline.   Instrumental Activities of Daily Living (IADL)   Previous Responsibilities housekeeping;meal prep;laundry;shopping;yardwork;medication management;finances;driving   General Information   Onset of Illness/Injury or Date of Surgery 12/12/23   Referring Physician Bere Jackson, CNP   Patient/Family Therapy Goal Statement (OT) Home   Additional Occupational Profile Info/Pertinent History of Current Problem Per chart: Transcatheter Aortic Valve Replacement-Femoral Approach. See chart for details.   Cognitive Status Examination   Orientation Status orientation to person, place and time   Transfers   Transfers No deficits identified   Balance   Balance Assessment no deficits identified   Activities of Daily Living   BADL Assessment/Intervention no deficits identified   Clinical Impression   Criteria for Skilled Therapeutic Interventions Met (OT) Yes, treatment indicated   OT Diagnosis Decreased activity tolerance for I/ADLs   Influenced by the following impairments Decreased activity tolerance for I/ADLs   OT Problem List-Impairments impacting ADL problems related to;activity tolerance impaired   Assessment of  Occupational Performance 1-3 Performance Deficits   Identified Performance Deficits Decreased activity tolerance for I/ADLs (dressing, bathing, toileting)   Planned Therapy Interventions (OT) ADL retraining;IADL retraining;risk factor education;progressive activity/exercise;home program guidelines;transfer training   Clinical Decision Making Complexity (OT) problem focused assessment/low complexity   Risk & Benefits of therapy have been explained evaluation/treatment results reviewed;care plan/treatment goals reviewed;risks/benefits reviewed;patient   OT Total Evaluation Time   OT Eval, Low Complexity Minutes (08930) 7   OT Goals   Therapy Frequency (OT) Daily   OT Predicted Duration/Target Date for Goal Attainment 12/14/23   OT Goals Cardiac Phase 1   OT: Understanding of cardiac education to maximize quality of life, condition management, and health outcomes Patient;Verbalize;Demonstrate   OT: Perform aerobic activity with stable cardiovascular response continuous;20 minutes;ambulation;treadmill   OT: Navigation of stairs simulating home set up with stable cardiovascular response in order to return to home and community environment Modified independent;Greater than 10 stairs   Interventions   Interventions Quick Adds Self-Care/Home Management;Therapeutic Procedures/Exercise;Cardiac Rehab   Self-Care/Home Management   Self-Care/Home Mgmt/ADL, Compensatory, Meal Prep Minutes (17549) 5   Treatment Detail/Skilled Intervention Educated in depth on TAVR concepts and education. Educated on walking HEP and safe progression, OMNI Effort Scale, lifting restrictions and resuming daily activities, and OP CR. See below for details. Pt verbalized understanding of all education and handouts provided.   Therapeutic Procedures/Exercise   Therapeutic Procedure: strength, endurance, ROM, flexibillity minutes (61000) 23   Symptoms Noted During/After Treatment fatigue;shortness of breath   Treatment Detail/Skilled Intervention Session  included set up, monitoring vitals throughout session, rest breaks, and symptoms. Pt completed timed ambulation in the hallway for 8 minutes with Mod I and no AD. Pt completed 15 stairs with Mod I.   Treatment Time Includes (CR Only) See specific exercise details intervention group(s);Monitoring of vital signs (see vital signs flowsheet for details)   Ambulation   Duration (minutes) 8 minutes   Effort Scale 2   Symptoms Fatigue  (SOB)   Vital Signs Details See VS for details.   Stairs   Workload 15 stairs   Effort Scale 2   Symptoms Fatigue  (SOB)   Vital Signs Details See VS for details.   Cardiac Education   Education Provided Home exercise program;OMNI Scale;Outpatient Cardiac Rehab;Precautions;Resuming home activities;Signs and symptoms   Education Packet Given to Patient Yes   All Patient Education Handouts Reviewed with Patient and/or Family Yes   Cardiac Rehab Phase II Plan   Phase II Order Received No   Phase II Appointment Status Not scheduled  (Orders sent to MD)   OT Discharge Planning   OT Plan Timed ambulation   OT Discharge Recommendation (DC Rec) home with assist;home with outpatient cardiac rehab   OT Rationale for DC Rec Home with assist in I/ADLs as needed. OP CR for monitored and progressive physical activity and education.   OT Brief overview of current status Timed ambulation completed this session,   OT Equipment Needed at Discharge   (NA)   Total Session Time   Timed Code Treatment Minutes 28   Total Session Time (sum of timed and untimed services) 35

## 2023-12-13 NOTE — DISCHARGE INSTRUCTIONS
TAVR Discharge Instructions  You have just had your aortic valve replaced with a new biological tissue valve. You should feel better after your surgery, but complete recovery may take several weeks. Please follow these instructions carefully and please call your valve coordinator with any questions or concerns.      CONTACT INFORMATION:   [facility]:  [phone] (7 days a week)  Scheduling and general questions - Sona ([phone])  Valve Coordinators - Neli RN ([phone]), Joan RN ([phone]), and Ingrid RN ([phone])    AFTER YOU GO HOME:  Drink extra fluids for 2 days.  You may resume your normal diet.  Do not smoke.  Do not drink alcohol.  Relax and take it easy.  Do not make any important or legal decisions.    FOR 1 WEEK AFTER TAVR:  Do not drive or operate machines at home or at work. No driving until you return for your 1 week follow-up appointment. You and the provider will discuss this at the appointment.   Refrain from sexual intercourse for 1 week.  You may shower the day after your procedure. Do NOT take a bath, or use a hot tub or pool for at least 1 week. Do NOT scrub the site. Do not use lotion or powder near the puncture site.  Do not lift more than 10 pounds.   Do not do any heavy activity such as exercise, lifting, or straining.  No housework, yard work, or any activities that make you sweat.    CARE OF WRIST AND GROIN SITES:  For 2 days, when you cough, sneeze, laugh or move your bowels, hold your hand over the puncture site and press firmly on/above the site.  Remove the bandage after 24 hours. If there is minor oozing, apply another bandage and remove it after 12 hours.  It is normal to have bruising or pea size lump at the site.  If you have a wrist site puncture: Do not use your hand or arm to support your weight (such as rising from a chair) or bend your wrist (such as lifting a garage door) for 1 week.  No stooping or squatting for 1 week.      BLEEDING:  If you start bleeding from the site in your wrist:  Sit down and press firmly on/above the site with your fingers for 10 minutes.   Once bleeding stops, keep your arm still for 2 hours.   Call [facility] or valve coordinator as soon as you can.  If you start bleeding from the site in your groin:  Lie down flat and press firmly on/above the site for 10 minutes.  Once bleeding stops lay flat for 2 hours.   Call [facility] or valve coordinator as soon as you can.  Call 911 right away if you have heavy bleeding or bleeding that does not stop.    MEDICINES:  You may be started on an anti-platelet medication, such as Plavix or aspirin. Please call the [facility] with any questions regarding this medication.  If you have stopped any medicines, check with your provider about when to restart them.  You will require lifetime prophylactic antibiotics for dental cleanings and dental work after your TAVR, please inquire with the Heart Clinic prior to your scheduled cleanings.     FOLLOW-UP APPOINTMENTS:  Follow-up with a Structural Heart advanced practice provider (NP or PA) at [facility] in 7-10 days after your procedure.  You will also follow-up at [facility] 1 month after your procedure which will include a visit with an advanced practice provider an echocardiogram (echo), an electrocardiogram (ECG), and lab work. This follow-up should be scheduled for you prior to you leaving the hospital.  Cardiac Rehab will contact you for follow up care. You can enroll at a site convenient to you.  We will have you see your primary cardiologist about 6 months after your TAVR.  We will see you 1 year after your TAVR with a visit with an advanced practice provider (NP or PA) an echocardiogram (echo), electrocardiogram (ECG), and lab work.     CALL THE CLINIC IF:   You have increased pain or a large or growing hard lump  around the site.  The site is red, swollen, hot, or tender.  Blood or fluid is draining from the site.  You have chills or a fever greater than 101 F (38 C).  Your arm or leg feels numb, cool, or changes color.  You have hives, a rash, or unusual itching.  New pain in the back or belly that you cannot control with Tylenol.  Any questions or concerns.    OTHER INSTRUCTIONS:  You will receive a card with your new valve information in the mail, directly from the . Retain this card with your health care records.    DENTAL WORK:  You must have antibiotics before all dental work to prevent endocarditis, or an infection on your new heart valve. Please call the valve coordinators to get a prescription for this. Please do not schedule any dental cleanings for at least 3-6 months after your TAVR.    Vascular Surgeon Instructions  OK to discharge from our standpoint. OK to remove dressing tomorrow, 12/14. OK to shower tomorrow, 12/14. If drainage noted it is ok to place gauze dressing, change daily. No bathtub for about 4 to 6 weeks. Sutures will dissolve on their own.     Incision Care   Keep gauze tucked in the skin fold to prevent the skin on skin contact.  This will prevent a moist environment which hinders healing.  Call our office to discuss this further if this area looks red or has an odor.  Leg swelling is common after surgery and must be controlled by elevating your legs above your heart.  Propping your legs up on a stool or sitting in a recliner chair will not relieve the swelling, but it will prevent it from getting worse.  Avoid hanging your leg in a dependent position.  For the first few weeks after surgery, you will need to really work at controlling this swelling and will likely need to spend a fair amount of time with your leg elevated.  Eventually, you should be able to manage the swelling by elevating 3-4 times a day for 20-30 minutes.  If the swelling does not decrease after elevating your legs  above your heart for at least 2 hours, you should call our office.  You may shower the day after your surgery - pat the incision dry to prevent irritation from moisture.  You do not need to cover with a bandage unless you have drainage.  You may develop a bruising and firmness near your incision.  This will soften and resolve over the next 1-3 weeks.  Call our office if it enlarges, becomes red, or painful.  On occasion a soft, fluid-filled bulge can develop near a surgical incision - this is called a seroma.  It will likely resolve on its own, but occasionally requires your doctor to drain it in clinic.  You should call our office if you have questions about this.  You may notice numbness around your incision.  This is due to nerve irritation from the surgery and will gradually improve over the next several weeks.  Occasionally, an incisional wound vac may be used.  This is disposable.  If this is used, you will be provided additional details before you leave the hospital.    Activity  Walking is important after surgery.  You will begin walking before you leave the hospital, and then you should gradually increase your distance as tolerated.  You should be taking at least 3-4 short walks a day if leg swelling is not a problem.  You can climb stairs when you feel strong enough.  You should not drive for 1 week.  In addition, you can not be taking prescription strength pain medication and you must feel strong enough to drive safely.  You may return to work once you feel ready - this can be decided at your 2 week post-operative visit.  You should avoid strenuous activity, including running, biking, or weight-lifting until discussed at your post-operative appointment.    Diet  You may resume a regular diet before you leave the hospital.  You may find that it is best to try smaller, more frequent meals until your appetite returns.    Medications  You may be started on a blood thinner after surgery.  If you are taking  Coumadin, you need to have your blood monitored regularly.  You may be given a prescription for pain medication after surgery.  If you need to have this refilled, please call your pharmacy where you had it filled.  They will then call us for approval.  Please do not call after hours for a refill on pain medication.      When to Call our Office  Temperature greater than 101.  When you are unable to feel a pulse in your foot or leg, when you have been monitoring regularly.  If you notice new onset of numbness, tingling, coolness or pain in your leg or foot.  Severe pain, especially if it is new and preventing sleep.  If you will be having an invasive procedure, such as a colonoscopy or dental work, within one year of your surgery, you may need to take an antibiotic prior to the procedure if you had an artificial graft placed with your surgery; please call us so we can assist you with this.    Call our main number, [phone], for assistance with any concerns or questions.  [facility]  [address] [place] [address] [address]  [place] [id number]

## 2023-12-13 NOTE — ANESTHESIA PROCEDURE NOTES
Arterial Line Procedure Note    Pre-Procedure   Staff -        Anesthesiologist:  Itzel Martin MD       Performed By: anesthesiologist       Location: pre-op       Pre-Anesthestic Checklist: patient identified, IV checked, risks and benefits discussed, informed consent, monitors and equipment checked, pre-op evaluation and at physician/surgeon's request  Timeout:       Correct Patient: Yes        Correct Procedure: Yes        Correct Site: Yes        Correct Position: Yes   Line Placement:   This line was placed Pre Induction starting at 12/12/2023 12:04 PM and ending at 12/12/2023 12:08 PM  Procedure   Procedure: arterial line       Diagnosis: AS       Laterality: left       Insertion Site: radial.  Sterile Prep        Standard elements of sterile barrier followed       Skin prep: Chloraprep  Insertion/Injection        Technique: ultrasound guided        1. Ultrasound was used to evaluate the access site.       2. Artery evaluated via ultrasound for patency/adequacy.       3. Using real-time ultrasound the needle/catheter was observed entering the artery/vein.       5. The visualized structures were anatomically normal.       6. There were no apparent abnormal pathologic findings.       Catheter Type/Size: 20 G, 1.75 in/4.5 cm quick cath (integral wire)  Narrative        Tegaderm dressing used.       Complications: None apparent,        Arterial waveform: Yes        IBP within 10% of NIBP: Yes   Comments:  Ultrasound used in real time. No permanent image saved.

## 2023-12-13 NOTE — PLAN OF CARE
Occupational Therapy Discharge Summary    Reason for therapy discharge:    Discharged to home with outpatient therapy.    Progress towards therapy goal(s). See goals on Care Plan in [facility] electronic health record for goal details.  Goals partially met.  Barriers to achieving goals:   discharge from facility.    Therapy recommendation(s):    Continued therapy is recommended.  Rationale/Recommendations:  Home with assist in I/ADLs as needed. OP CR for monitored and progressive physical activity and education..

## 2023-12-13 NOTE — DISCHARGE SUMMARY
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      Hospital Course by Diagnosis:  # Severe aortic stenosis s/p TAVR with 29 mm De Paz Resilia valve  # RCFA cutdown with vascular surgery   Surgical site without hematoma. R radial intact. No arrhythmias. Neuro's intact.  - Cardiac rehab/PT/OT  - Continue coumadin for anticoagulation, no ASA indicated  - Lifelong antibiotic prophylaxis prior to all dental procedures.  - Echocardiogram today  - Follow-up with structural clinic in 1 week.       # Chronic diastolic heart failure, NYHA class II   NTpBNP 694, LVEDP 15 mmHg. Euvolemic, not on diuretic therapy.   - Repeat echo in 1 month     # Permanent atrial fibrillation on coumadin   # High grade AV block s/p PPM  - Continue coumadin for stroke prophylaxis  - Follow-up with INR clinic as planned      # Hypertension, well controlled   - Continue PTA chlorthalidone 50 mg daily and amlodipine 10 mg daily      # Hyperlipidemia with LDL of 63   - Continue PTA ezetimibe 10 mg daily and fenofibrate 160 mg daily       Plan: pending echocardiogram findings, okay to discharge from a cardiology standpoint.       Bere Jackson DNP, APRN, CNP  [facility] hospital Cardiology  [phone]      Discharge medications:   Current Discharge Medication List        CONTINUE these medications which have NOT CHANGED    Details   alfuzosin ER (UROXATRAL) 10 MG 24 hr tablet  Take 1 tablet (10 mg) by mouth daily  Qty: 90 tablet, Refills: 4    Associated Diagnoses: Benign localized prostatic hyperplasia with lower urinary tract symptoms (LUTS)      ALPHA LIPOIC ACID PO Take 600 mg by mouth daily      amLODIPine (NORVASC) 10 MG tablet Take 1 tablet (10 mg) by mouth daily  Qty: 90 tablet, Refills: 0    Associated Diagnoses: Primary hypertension      ascorbic acid (VITAMIN C) 1000 MG TABS Take 1 tablet by mouth daily      chlorthalidone (HYGROTON) 50 MG tablet Take 50 mg by mouth daily      Cholecalciferol (VITAMIN D) 400 UNITS tablet Take 1 tablet by mouth every evening      CINNAMON PO Take 1 capsule by mouth every evening      clindamycin (CLEOCIN) 300 MG capsule Take 2 capsules by mouth once as needed (1 hour prior to dental appointments 2 x 300 mg = 300 mg)      co-enzyme Q-10 100 MG CAPS capsule Take 1 capsule by mouth daily      ezetimibe (ZETIA) 10 MG tablet Take 0.5 tablets (5 mg) by mouth Every Mon, Wed, Fri Morning  Qty: 20 tablet, Refills: 3    Associated Diagnoses: PAD (peripheral artery disease) (H24)      fenofibrate (TRIGLIDE/LOFIBRA) 160 MG tablet Take 1 tablet (160 mg) by mouth daily  Qty: 90 tablet, Refills: 3    Associated Diagnoses: Mixed hyperlipidemia      multivitamin w/minerals (THERA-VIT-M) tablet Take 1 tablet by mouth every evening      omega 3 1000 MG CAPS Take 1 capsule by mouth every evening 180 EPA/ 20 DHH      potassium chloride ER (KLOR-CON M) 20 MEQ CR tablet Take 2 tablets (40 mEq) by mouth daily  Qty: 30 tablet, Refills: 0    Associated Diagnoses: Hypokalemia      rosuvastatin (CRESTOR) 40 MG tablet Take 40 mg by mouth every evening      warfarin ANTICOAGULANT (COUMADIN) 4 MG tablet Take 1 tablet by mouth daily           STOP taking these medications       aspirin 81 MG EC tablet Comments:   Reason for Stopping:             Condition on discharge  Temp:  [97.2  F (36.2  C)-99.3  F (37.4  C)] 98.6  F (37  C)  Pulse:  [59-61] 60  Resp:  [10-20] 12  BP:  (110-172)/() 147/88  MAP:  [67 mmHg-99 mmHg] 92 mmHg  Arterial Line BP: ()/(52-70) 140/64  SpO2:  [91 %-100 %] 100 %  General: Alert, interactive, NAD  Eyes: sclera anicteric, EOMI  Neck: JVP 0, carotid 2+ bilaterally  Cardiovascular: regular rate and rhythm, normal S1 and S2, no murmurs, gallops, or rubs  Resp: clear to auscultation bilaterally, no rales, wheezes, or rhonchi  GI: Soft, nontender, nondistended. +BS.  No HSM or masses, no rebound or guarding.  Extremities: no edema, no cyanosis or clubbing, dorsalis pedis and posterior tibialis pulses 2+ bilaterally  Skin: Warm and dry, no jaundice or rash  Neuro: CN 2-12 intact, moves all extremities equally  Psych: Alert & oriented x 3    Imaging with results:  TAVR: 12/12/2023    Conclusion    1. Lifestyle-limiting severe aortic stenosis.  2. Successful transfemoral transcatheter aortic valve replacement with a 29mm De Paz Miguel 3 valve via surgical cut down.  3. Temporary pacemaker insertion.  4. Left heart catheterization with LVEDP of 18 mmHg.  5. Right common femoral arteriotomy successfully repaired by vascular surgery.            Plan     Follow bedrest per protocol   Continued medical management and lifestyle modifications for cardiovascular risk factor optimizations.   Admit to inpatient        1. Aspirin 81 mg po daily lifelong.  2. Bedrest per protocol.  3. Admit to the primary inpatient team for further evaluation and management.  4. Echocardiogram tomorrow.   5. Lifelong antibiotic prophylaxis prior to all dental procedures     Patient Care Team:  Reid Hogue MD as PCP - General (Family Medicine)  Reid Hayes MD ([facility])  Isabel Zapata PA-C as Assigned OBGYN Provider  Yuan Meredith MD as Assigned Surgical Provider  Bere Jackson CNP as Assigned Heart and Vascular Provider

## 2023-12-13 NOTE — ANESTHESIA POSTPROCEDURE EVALUATION
Patient: Jonny Goldberg    Procedure: Procedure(s):  Transcatheter Aortic Valve Replacement-Femoral Approach       Anesthesia Type:  General    Note:  Disposition: Admission   Postop Pain Control: Uneventful            Sign Out: Well controlled pain   PONV: No   Neuro/Psych: Uneventful            Sign Out: Acceptable/Baseline neuro status   Airway/Respiratory: Uneventful            Sign Out: Acceptable/Baseline resp. status   CV/Hemodynamics: Uneventful            Sign Out: Acceptable CV status; No obvious hypovolemia; No obvious fluid overload   Other NRE: NONE   DID A NON-ROUTINE EVENT OCCUR? No           Last vitals:  Vitals Value Taken Time   /75 12/12/23 1632   Temp 36.2  C (97.2  F) 12/12/23 1527   Pulse 60 12/12/23 1703   Resp 9 12/12/23 1703   SpO2 97 % 12/12/23 1703   Vitals shown include unfiled device data.    Electronically Signed By: Itzel Martin MD  December 12, 2023  6:50 PM

## 2023-12-14 LAB
ATRIAL RATE - MUSE: 468 BPM
ATRIAL RATE - MUSE: 55 BPM
DIASTOLIC BLOOD PRESSURE - MUSE: NORMAL MMHG
DIASTOLIC BLOOD PRESSURE - MUSE: NORMAL MMHG
INTERPRETATION ECG - MUSE: NORMAL
INTERPRETATION ECG - MUSE: NORMAL
P AXIS - MUSE: NORMAL DEGREES
P AXIS - MUSE: NORMAL DEGREES
PR INTERVAL - MUSE: NORMAL MS
PR INTERVAL - MUSE: NORMAL MS
QRS DURATION - MUSE: 178 MS
QRS DURATION - MUSE: 204 MS
QT - MUSE: 498 MS
QT - MUSE: 536 MS
QTC - MUSE: 517 MS
QTC - MUSE: 536 MS
R AXIS - MUSE: -71 DEGREES
R AXIS - MUSE: -76 DEGREES
SYSTOLIC BLOOD PRESSURE - MUSE: NORMAL MMHG
SYSTOLIC BLOOD PRESSURE - MUSE: NORMAL MMHG
T AXIS - MUSE: 100 DEGREES
T AXIS - MUSE: 93 DEGREES
VENTRICULAR RATE- MUSE: 60 BPM
VENTRICULAR RATE- MUSE: 65 BPM

## 2023-12-15 LAB
ATRIAL RATE - MUSE: 94 BPM
DIASTOLIC BLOOD PRESSURE - MUSE: NORMAL MMHG
INTERPRETATION ECG - MUSE: NORMAL
P AXIS - MUSE: NORMAL DEGREES
PR INTERVAL - MUSE: NORMAL MS
QRS DURATION - MUSE: 210 MS
QT - MUSE: 542 MS
QTC - MUSE: 550 MS
R AXIS - MUSE: -82 DEGREES
SYSTOLIC BLOOD PRESSURE - MUSE: NORMAL MMHG
T AXIS - MUSE: 96 DEGREES
VENTRICULAR RATE- MUSE: 62 BPM

## 2023-12-20 ENCOUNTER — OFFICE VISIT (OUTPATIENT)
Dept: CARDIOLOGY | Facility: CLINIC | Age: 78
End: 2023-12-20
Payer: COMMERCIAL

## 2023-12-20 VITALS
HEART RATE: 60 BPM | HEIGHT: 66 IN | SYSTOLIC BLOOD PRESSURE: 128 MMHG | OXYGEN SATURATION: 98 % | DIASTOLIC BLOOD PRESSURE: 70 MMHG | WEIGHT: 215.2 LBS | BODY MASS INDEX: 34.58 KG/M2

## 2023-12-20 DIAGNOSIS — Z95.2 S/P TAVR (TRANSCATHETER AORTIC VALVE REPLACEMENT): Primary | ICD-10-CM

## 2023-12-20 PROCEDURE — 99214 OFFICE O/P EST MOD 30 MIN: CPT | Mod: 24 | Performed by: NURSE PRACTITIONER

## 2023-12-20 NOTE — LETTER
12/20/2023    Reid Hogue MD  [address]  [facility] [address]    RE: Jonny Goldberg       Dear Colleague,     I had the pleasure of seeing Jonny Goldberg in the [facility].  Cardiology Clinic Progress Note  Jonny Goldberg MRN# [id number]   YOB: 1945 Age: 78 year old     Primary cardiologist: Dr. 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 Cook.    His procedure went well without any complication.  Postprocedure echocardiogram showed well-seated bioprosthetic aortic valve with mean gradient of 8 mmHg. he has a normal LV systolic function.  He was tolerating cardiac rehab and discharged on POD #1.     Today Jonny feels he is doing well from a cardiovascular standpoint.  He has more energy and is less fatigued.  He denies any exertional symptoms.  He specifically has no chest pain, shortness of breath, syncope or near syncope, or palpitations.  He denies any drainage or erythema erythema from his surgical site.         Assessment and Plan:     ASSESSMENT:    Severe aortic stenosis s/p TAVR 29 mm De Paz Resilia valve with RCFA cutdown.  Jonny is recovering well.  His valve is functioning within normal parameters.  He has no significant exertional symptoms.  His surgical incision has healed well with no signs of infection.  He is on Coumadin for anticoagulation.  Chronic diastolic heart failure, NYHA class I.  No clinical signs of heart failure, not on diuretic therapy.  Permanent atrial fibrillation.  On Coumadin for stroke prophylaxis.  High-grade AV block status post permanent pacemaker.  Most  "recent device check shows normal function.  Hypertension.  Well-controlled on amlodipine 10 mg daily and chlorthalidone 50 mg daily.  Hyperlipidemia with LDL of 63.  Any acetamide 10 mg daily and fenofibrate 160 mg daily.      PLAN:     Jonny has made an excellent recovery following his valve procedure.  He will continue his current cardiac regimen, I have made no changes today.  He will start outpatient cardiac rehab next week.  He will need lifelong antibiotic prophylaxis prior to any dental procedure.  Follow-up with me in 1 month with repeat echocardiogram, labs, and EKG.         Bere Jackson, DNP, APRN, CNP  Page: [phone] (8a-5p M-F)    Orders this Visit:  No orders of the defined types were placed in this encounter.    No orders of the defined types were placed in this encounter.    There are no discontinued medications.    Today's clinic visit entailed:  Review of the result(s) of each unique test - echo, TAVR, labs  Ordering of each unique test  Prescription drug management  35 minutes spent by me on the date of the encounter doing chart review, review of test results, interpretation of tests, patient visit, and documentation   Provider  Link to [facility] Help Grid     The level of medical decision making during this visit was of moderate complexity.           Review of Systems:     Review of Systems:  Skin:        Eyes:       ENT:       Respiratory:  Positive for dyspnea on exertion  Cardiovascular:  dizziness;lightheadedness;syncope or near-syncope;chest pain;palpitations;Negative for Positive for;fatigue;edema  Gastroenterology:      Genitourinary:       Musculoskeletal:       Neurologic:  Positive for numbness or tingling of hands  Psychiatric:       Heme/Lymph/Imm:  Positive for allergies  Endocrine:  Negative for thyroid disorder;diabetes            Physical Exam:   Vitals: /70   Pulse 60   Ht 1.676 m (5' 6\")   Wt 97.6 kg (215 lb 3.2 oz)   SpO2 98%   BMI 34.73 kg/m    Constitutional:  " cooperative        Skin:  warm and dry to the touch incision healing well      Head:  normocephalic        Eyes:  pupils equal and round        ENT:  no pallor or cyanosis        Neck:  JVP normal        Chest:  normal breath sounds, clear to auscultation, normal A-P diameter, normal symmetry, normal respiratory excursion, no use of accessory muscles        Cardiac: regular rhythm;normal S1 and S2       systolic ejection murmur;grade 1          Abdomen:  abdomen soft        Vascular:   pulses below the femoral arteries are diminished                                    Extremities and Back:           Neurological:  no gross motor deficits;affect appropriate             Medications:     Current Outpatient Medications   Medication Sig Dispense Refill    alfuzosin ER (UROXATRAL) 10 MG 24 hr tablet Take 1 tablet (10 mg) by mouth daily 90 tablet 4    ALPHA LIPOIC ACID PO Take 600 mg by mouth daily      amLODIPine (NORVASC) 10 MG tablet Take 1 tablet (10 mg) by mouth daily 90 tablet 0    ascorbic acid (VITAMIN C) 1000 MG TABS Take 1 tablet by mouth daily      chlorthalidone (HYGROTON) 50 MG tablet Take 50 mg by mouth daily      Cholecalciferol (VITAMIN D) 400 UNITS tablet Take 1 tablet by mouth every evening      CINNAMON PO Take 1 capsule by mouth every evening      clindamycin (CLEOCIN) 300 MG capsule Take 2 capsules by mouth once as needed (1 hour prior to dental appointments 2 x 300 mg = 300 mg)      co-enzyme Q-10 100 MG CAPS capsule Take 1 capsule by mouth daily      ezetimibe (ZETIA) 10 MG tablet Take 0.5 tablets (5 mg) by mouth Every Mon, Wed, Fri Morning 20 tablet 3    fenofibrate (TRIGLIDE/LOFIBRA) 160 MG tablet Take 1 tablet (160 mg) by mouth daily 90 tablet 3    multivitamin w/minerals (THERA-VIT-M) tablet Take 1 tablet by mouth every evening      omega 3 1000 MG CAPS Take 1 capsule by mouth every evening 180 EPA/ 20 DHH      potassium chloride ER (KLOR-CON M) 20 MEQ CR tablet Take 2 tablets (40 mEq) by mouth  daily 30 tablet 0    rosuvastatin (CRESTOR) 40 MG tablet Take 40 mg by mouth every evening      warfarin ANTICOAGULANT (COUMADIN) 4 MG tablet Take 1 tablet by mouth daily         Family History   Problem Relation Age of Onset    Hypertension Mother     Heart Failure Mother     Coronary Artery Disease Mother         CABG    Arrhythmia Mother     Cerebrovascular Disease Father     Heart Failure Father     Colon Cancer Father     Coronary Artery Disease Father         CABG    Coronary Artery Disease Brother         CABG    Arrhythmia Brother        Social History     Socioeconomic History    Marital status:      Spouse name: Not on file    Number of children: Not on file    Years of education: Not on file    Highest education level: Not on file   Occupational History    Not on file   Tobacco Use    Smoking status: Former     Types: Cigarettes     Quit date: 1987     Years since quittin.7    Smokeless tobacco: Never   Vaping Use    Vaping Use: Never used   Substance and Sexual Activity    Alcohol use: Yes     Comment: 1 glass daily    Drug use: No    Sexual activity: Not on file   Other Topics Concern    Parent/sibling w/ CABG, MI or angioplasty before 65F 55M? Not Asked     Service Not Asked    Blood Transfusions Not Asked    Caffeine Concern No     Comment: 2-3 a day    Occupational Exposure Not Asked    Hobby Hazards Not Asked    Sleep Concern Yes     Comment: depends    Stress Concern Not Asked    Weight Concern No     Comment: weight loss    Special Diet No    Back Care Not Asked    Exercise No     Comment: limited due to knee pain    Bike Helmet Not Asked    Seat Belt Yes    Self-Exams Not Asked   Social History Narrative    Not on file     Social Determinants of Health     Financial Resource Strain: Not on file   Food Insecurity: Not on file   Transportation Needs: Not on file   Physical Activity: Not on file   Stress: Not on file   Social Connections: Not on file   Interpersonal Safety:  Not on file   Housing Stability: Not on file            Past Medical History:     Past Medical History:   Diagnosis Date    Aortic valve stenosis     severe    Blood clot in the legs     left leg after surgery    Gastro-oesophageal reflux disease     Hodgkins lymphoma (H)     bone marrow biopsy    Hypertension     PAD (peripheral artery disease) (H24)     persistent atrial fibrillation     AFIB    Unspecified cerebral artery occlusion with cerebral infarction 10/01/2011              Past Surgical History:     Past Surgical History:   Procedure Laterality Date    APPENDECTOMY      ARTHROPLASTY REVISION HIP  12/27/2011    Procedure:ARTHROPLASTY REVISION HIP; RIGHT TOTAL HIP REVISION WITH BONE GRAFT  ; Surgeon:ANGIE HARRINGTON; Location: OR    BIOPSY  hodgkins lymphoma    bone marrow biopsy, chemo and radiation    CARDIAC SURGERY      cardiac ablation    COLONOSCOPY      CV CORONARY ANGIOGRAM N/A 9/23/2022    Procedure: Coronary Angiogram;  Surgeon: Gennaro Cook MD;  Location:  HEART CARDIAC CATH LAB    CV PCI N/A 9/23/2022    Procedure: Percutaneous Coronary Intervention;  Surgeon: Gennaro Cook MD;  Location:  HEART CARDIAC CATH LAB    CV RIGHT HEART CATH MEASUREMENTS RECORDED N/A 9/23/2022    Procedure: Right Heart Catheterization;  Surgeon: Gennaro Cook MD;  Location:  HEART CARDIAC CATH LAB    CV TRANSCATHETER AORTIC VALVE REPLACEMENT-FEMORAL APPROACH N/A 12/12/2023    Procedure: Transcatheter Aortic Valve Replacement-Femoral Approach;  Surgeon: Gennaro Cook MD;  Location:  HEART CARDIAC CATH LAB    EP PACEMAKER DEVICE & LEAD IMPLANT- RIGHT VENTRICULAR N/A 2/27/2023    Procedure: Pacemaker Device & Lead Implant- Right ventricular;  Surgeon: Brian Madrid MD;  Location: [facility] CARDIAC CATH LAB    ILIAC ARTERY ANGIOGRAM LEFT Left 10/10/2023    Procedure: Left Groin Cutdown with Repair of Left Femoral Artery; Aborted Transcatheter Aortic Valve Replacement;  Surgeon: Jorge  German Valencia MD;  Location:  HEART CARDIAC CATH LAB    ORTHOPEDIC SURGERY      hip and knee surgeries    ORTHOPEDIC SURGERY      carpal tunnel  right hand              Allergies:   Irinotecan and Penicillin g       Data:   All laboratory data reviewed:    Recent Labs   Lab Test 12/08/23  1111 10/02/23  1151 05/09/23  1000 11/03/21  1151 05/05/21  0000 08/04/20  0000 10/18/18  0000 10/17/17  0000 07/21/16  0835 12/16/15  0934   LDL  --   --   --   --  21  --  81 98 81 95   HDL  --   --   --   --  38  --  43 47 47 43   NHDL  --   --   --   --  69  --   --   --  112 133*   CHOL  --   --   --   --  128 185 155 168 159 176   TRIG  --   --  130 77 117  --  154* 115 156* 190*   NTBNP 694 1,063  --   --   --   --   --   --   --   --        Lab Results   Component Value Date    WBC 17.1 (H) 12/13/2023    WBC 5.4 08/04/2020    RBC 4.38 (L) 12/13/2023    RBC 4.86 08/04/2020    HGB 13.9 12/13/2023    HGB 15.3 08/04/2020    HCT 41.3 12/13/2023    HCT 47.0 08/04/2020    MCV 94 12/13/2023    MCV 96.7 08/04/2020    MCH 31.7 12/13/2023    MCH 31.6 08/04/2020    MCHC 33.7 12/13/2023    MCHC 32.7 08/04/2020    RDW 13.2 12/13/2023    RDW 13.4 08/04/2020     12/13/2023     08/04/2020       Lab Results   Component Value Date     12/13/2023     05/05/2021    POTASSIUM 3.8 12/13/2023    POTASSIUM 3.0 (L) 09/28/2022    POTASSIUM 3.7 05/05/2021    CHLORIDE 100 12/13/2023    CHLORIDE 102 05/09/2023    CHLORIDE 100 05/05/2021    CO2 27 12/13/2023    CO2 27 09/28/2022    CO2 28 05/05/2021    ANIONGAP 11 12/13/2023    ANIONGAP 8 09/28/2022    ANIONGAP 1.9 08/04/2020     (H) 12/13/2023     (H) 09/28/2022    GLC 98 05/05/2021    BUN 28.6 (H) 12/13/2023    BUN 32 (H) 09/28/2022    BUN 27 05/05/2021    CR 1.03 12/13/2023    CR 1 05/05/2021    GFRESTIMATED 75 12/13/2023    GFRESTIMATED 62 05/05/2021    GFRESTBLACK 72 05/05/2021    SARA 9.1 12/13/2023    SARA 1.16 05/05/2021      Lab Results   Component Value  Date    AST 19 10/16/2023    AST 42 05/05/2021    ALT 14 10/16/2023    ALT 28 05/05/2021       Lab Results   Component Value Date    A1C 5.9 (A) 05/06/2021       Lab Results   Component Value Date    INR 1.14 12/13/2023    INR 1.37 (H) 12/12/2023    INR 2.3 05/09/2023    INR 2.4 09/12/2022    INR 1.15 (H) 12/30/2011    INR 1.05 12/29/2011         Thank you for allowing me to participate in the care of your patient.      Sincerely,     Bere Jackson, [facility]  cc:   No referring provider defined for this encounter.

## 2023-12-20 NOTE — PROGRESS NOTES
Cardiology Clinic Progress Note  Jonny Goldberg MRN# [id number]   YOB: 1945 Age: 78 year old     Primary cardiologist: Dr. 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 Cook.    His procedure went well without any complication.  Postprocedure echocardiogram showed well-seated bioprosthetic aortic valve with mean gradient of 8 mmHg. he has a normal LV systolic function.  He was tolerating cardiac rehab and discharged on POD #1.     Today Jonny feels he is doing well from a cardiovascular standpoint.  He has more energy and is less fatigued.  He denies any exertional symptoms.  He specifically has no chest pain, shortness of breath, syncope or near syncope, or palpitations.  He denies any drainage or erythema erythema from his surgical site.         Assessment and Plan:     ASSESSMENT:    Severe aortic stenosis s/p TAVR 29 mm De Paz Resilia valve with RCFA cutdown.  Jonny is recovering well.  His valve is functioning within normal parameters.  He has no significant exertional symptoms.  His surgical incision has healed well with no signs of infection.  He is on Coumadin for anticoagulation.  Chronic diastolic heart failure, NYHA class I.  No clinical signs of heart failure, not on diuretic therapy.  Permanent atrial fibrillation.  On Coumadin for stroke prophylaxis.  High-grade AV block status post permanent pacemaker.  Most recent device check shows normal function.  Hypertension.  Well-controlled on amlodipine 10 mg daily and chlorthalidone 50 mg daily.  Hyperlipidemia with LDL of 63.  Any acetamide 10 mg daily and fenofibrate  "160 mg daily.      PLAN:     Jonny has made an excellent recovery following his valve procedure.  He will continue his current cardiac regimen, I have made no changes today.  He will start outpatient cardiac rehab next week.  He will need lifelong antibiotic prophylaxis prior to any dental procedure.  Follow-up with me in 1 month with repeat echocardiogram, labs, and EKG.         Bere Jackson DNP, APRN, CNP  Page: [phone] (8a-5p M-F)    Orders this Visit:  No orders of the defined types were placed in this encounter.    No orders of the defined types were placed in this encounter.    There are no discontinued medications.    Today's clinic visit entailed:  Review of the result(s) of each unique test - echo, TAVR, labs  Ordering of each unique test  Prescription drug management  35 minutes spent by me on the date of the encounter doing chart review, review of test results, interpretation of tests, patient visit, and documentation   Provider  Link to [facility] Help Grid     The level of medical decision making during this visit was of moderate complexity.           Review of Systems:     Review of Systems:  Skin:        Eyes:       ENT:       Respiratory:  Positive for dyspnea on exertion  Cardiovascular:  dizziness;lightheadedness;syncope or near-syncope;chest pain;palpitations;Negative for Positive for;fatigue;edema  Gastroenterology:      Genitourinary:       Musculoskeletal:       Neurologic:  Positive for numbness or tingling of hands  Psychiatric:       Heme/Lymph/Imm:  Positive for allergies  Endocrine:  Negative for thyroid disorder;diabetes            Physical Exam:   Vitals: /70   Pulse 60   Ht 1.676 m (5' 6\")   Wt 97.6 kg (215 lb 3.2 oz)   SpO2 98%   BMI 34.73 kg/m    Constitutional:  cooperative        Skin:  warm and dry to the touch incision healing well      Head:  normocephalic        Eyes:  pupils equal and round        ENT:  no pallor or cyanosis        Neck:  JVP normal        Chest:  " normal breath sounds, clear to auscultation, normal A-P diameter, normal symmetry, normal respiratory excursion, no use of accessory muscles        Cardiac: regular rhythm;normal S1 and S2       systolic ejection murmur;grade 1          Abdomen:  abdomen soft        Vascular:   pulses below the femoral arteries are diminished                                    Extremities and Back:           Neurological:  no gross motor deficits;affect appropriate             Medications:     Current Outpatient Medications   Medication Sig Dispense Refill    alfuzosin ER (UROXATRAL) 10 MG 24 hr tablet Take 1 tablet (10 mg) by mouth daily 90 tablet 4    ALPHA LIPOIC ACID PO Take 600 mg by mouth daily      amLODIPine (NORVASC) 10 MG tablet Take 1 tablet (10 mg) by mouth daily 90 tablet 0    ascorbic acid (VITAMIN C) 1000 MG TABS Take 1 tablet by mouth daily      chlorthalidone (HYGROTON) 50 MG tablet Take 50 mg by mouth daily      Cholecalciferol (VITAMIN D) 400 UNITS tablet Take 1 tablet by mouth every evening      CINNAMON PO Take 1 capsule by mouth every evening      clindamycin (CLEOCIN) 300 MG capsule Take 2 capsules by mouth once as needed (1 hour prior to dental appointments 2 x 300 mg = 300 mg)      co-enzyme Q-10 100 MG CAPS capsule Take 1 capsule by mouth daily      ezetimibe (ZETIA) 10 MG tablet Take 0.5 tablets (5 mg) by mouth Every Mon, Wed, Fri Morning 20 tablet 3    fenofibrate (TRIGLIDE/LOFIBRA) 160 MG tablet Take 1 tablet (160 mg) by mouth daily 90 tablet 3    multivitamin w/minerals (THERA-VIT-M) tablet Take 1 tablet by mouth every evening      omega 3 1000 MG CAPS Take 1 capsule by mouth every evening 180 EPA/ 20 DHH      potassium chloride ER (KLOR-CON M) 20 MEQ CR tablet Take 2 tablets (40 mEq) by mouth daily 30 tablet 0    rosuvastatin (CRESTOR) 40 MG tablet Take 40 mg by mouth every evening      warfarin ANTICOAGULANT (COUMADIN) 4 MG tablet Take 1 tablet by mouth daily         Family History   Problem Relation  Age of Onset    Hypertension Mother     Heart Failure Mother     Coronary Artery Disease Mother         CABG    Arrhythmia Mother     Cerebrovascular Disease Father     Heart Failure Father     Colon Cancer Father     Coronary Artery Disease Father         CABG    Coronary Artery Disease Brother         CABG    Arrhythmia Brother        Social History     Socioeconomic History    Marital status:      Spouse name: Not on file    Number of children: Not on file    Years of education: Not on file    Highest education level: Not on file   Occupational History    Not on file   Tobacco Use    Smoking status: Former     Types: Cigarettes     Quit date: 1987     Years since quittin.7    Smokeless tobacco: Never   Vaping Use    Vaping Use: Never used   Substance and Sexual Activity    Alcohol use: Yes     Comment: 1 glass daily    Drug use: No    Sexual activity: Not on file   Other Topics Concern    Parent/sibling w/ CABG, MI or angioplasty before 65F 55M? Not Asked     Service Not Asked    Blood Transfusions Not Asked    Caffeine Concern No     Comment: 2-3 a day    Occupational Exposure Not Asked    Hobby Hazards Not Asked    Sleep Concern Yes     Comment: depends    Stress Concern Not Asked    Weight Concern No     Comment: weight loss    Special Diet No    Back Care Not Asked    Exercise No     Comment: limited due to knee pain    Bike Helmet Not Asked    Seat Belt Yes    Self-Exams Not Asked   Social History Narrative    Not on file     Social Determinants of Health     Financial Resource Strain: Not on file   Food Insecurity: Not on file   Transportation Needs: Not on file   Physical Activity: Not on file   Stress: Not on file   Social Connections: Not on file   Interpersonal Safety: Not on file   Housing Stability: Not on file            Past Medical History:     Past Medical History:   Diagnosis Date    Aortic valve stenosis     severe    Blood clot in the legs     left leg after surgery     Gastro-oesophageal reflux disease     Hodgkins lymphoma (H)     bone marrow biopsy    Hypertension     PAD (peripheral artery disease) (H24)     persistent atrial fibrillation     AFIB    Unspecified cerebral artery occlusion with cerebral infarction 10/01/2011              Past Surgical History:     Past Surgical History:   Procedure Laterality Date    APPENDECTOMY      ARTHROPLASTY REVISION HIP  12/27/2011    Procedure:ARTHROPLASTY REVISION HIP; RIGHT TOTAL HIP REVISION WITH BONE GRAFT  ; Surgeon:ANGIE HARRINGTON; Location: OR    BIOPSY  hodgkins lymphoma    bone marrow biopsy, chemo and radiation    CARDIAC SURGERY      cardiac ablation    COLONOSCOPY      CV CORONARY ANGIOGRAM N/A 9/23/2022    Procedure: Coronary Angiogram;  Surgeon: Gennaro Cook MD;  Location:  HEART CARDIAC CATH LAB    CV PCI N/A 9/23/2022    Procedure: Percutaneous Coronary Intervention;  Surgeon: Gennaro Cook MD;  Location:  HEART CARDIAC CATH LAB    CV RIGHT HEART CATH MEASUREMENTS RECORDED N/A 9/23/2022    Procedure: Right Heart Catheterization;  Surgeon: Gennaro Cook MD;  Location:  HEART CARDIAC CATH LAB    CV TRANSCATHETER AORTIC VALVE REPLACEMENT-FEMORAL APPROACH N/A 12/12/2023    Procedure: Transcatheter Aortic Valve Replacement-Femoral Approach;  Surgeon: Gennaro Cook MD;  Location:  HEART CARDIAC CATH LAB    EP PACEMAKER DEVICE & LEAD IMPLANT- RIGHT VENTRICULAR N/A 2/27/2023    Procedure: Pacemaker Device & Lead Implant- Right ventricular;  Surgeon: Brian Madrid MD;  Location: [facility] CARDIAC CATH LAB    ILIAC ARTERY ANGIOGRAM LEFT Left 10/10/2023    Procedure: Left Groin Cutdown with Repair of Left Femoral Artery; Aborted Transcatheter Aortic Valve Replacement;  Surgeon: German Patel MD;  Location:  HEART CARDIAC CATH LAB    ORTHOPEDIC SURGERY      hip and knee surgeries    ORTHOPEDIC SURGERY      carpal tunnel  right hand              Allergies:   Irinotecan and Penicillin  g       Data:   All laboratory data reviewed:    Recent Labs   Lab Test 12/08/23  1111 10/02/23  1151 05/09/23  1000 11/03/21  1151 05/05/21  0000 08/04/20  0000 10/18/18  0000 10/17/17  0000 07/21/16  0835 12/16/15  0934   LDL  --   --   --   --  21  --  81 98 81 95   HDL  --   --   --   --  38  --  43 47 47 43   NHDL  --   --   --   --  69  --   --   --  112 133*   CHOL  --   --   --   --  128 185 155 168 159 176   TRIG  --   --  130 77 117  --  154* 115 156* 190*   NTBNP 694 1,063  --   --   --   --   --   --   --   --        Lab Results   Component Value Date    WBC 17.1 (H) 12/13/2023    WBC 5.4 08/04/2020    RBC 4.38 (L) 12/13/2023    RBC 4.86 08/04/2020    HGB 13.9 12/13/2023    HGB 15.3 08/04/2020    HCT 41.3 12/13/2023    HCT 47.0 08/04/2020    MCV 94 12/13/2023    MCV 96.7 08/04/2020    MCH 31.7 12/13/2023    MCH 31.6 08/04/2020    MCHC 33.7 12/13/2023    MCHC 32.7 08/04/2020    RDW 13.2 12/13/2023    RDW 13.4 08/04/2020     12/13/2023     08/04/2020       Lab Results   Component Value Date     12/13/2023     05/05/2021    POTASSIUM 3.8 12/13/2023    POTASSIUM 3.0 (L) 09/28/2022    POTASSIUM 3.7 05/05/2021    CHLORIDE 100 12/13/2023    CHLORIDE 102 05/09/2023    CHLORIDE 100 05/05/2021    CO2 27 12/13/2023    CO2 27 09/28/2022    CO2 28 05/05/2021    ANIONGAP 11 12/13/2023    ANIONGAP 8 09/28/2022    ANIONGAP 1.9 08/04/2020     (H) 12/13/2023     (H) 09/28/2022    GLC 98 05/05/2021    BUN 28.6 (H) 12/13/2023    BUN 32 (H) 09/28/2022    BUN 27 05/05/2021    CR 1.03 12/13/2023    CR 1 05/05/2021    GFRESTIMATED 75 12/13/2023    GFRESTIMATED 62 05/05/2021    GFRESTBLACK 72 05/05/2021    SARA 9.1 12/13/2023    SARA 1.16 05/05/2021      Lab Results   Component Value Date    AST 19 10/16/2023    AST 42 05/05/2021    ALT 14 10/16/2023    ALT 28 05/05/2021       Lab Results   Component Value Date    A1C 5.9 (A) 05/06/2021       Lab Results   Component Value Date    INR 1.14  12/13/2023    INR 1.37 (H) 12/12/2023    INR 2.3 05/09/2023    INR 2.4 09/12/2022    INR 1.15 (H) 12/30/2011    INR 1.05 12/29/2011

## 2023-12-20 NOTE — PATIENT INSTRUCTIONS
Today's Plan:     - Start cardiac rehab.   - Follow-up with me in 1 month with repeat echocardiogram and labs.     Neli RN ([phone]), Joan RN ([phone]), and Ingrid RN ([phone])    After Hours: [phone], option #2, ask for cardiologist on-call    Scheduling phone number: [phone]    Reminder: Please bring in all current medications, over the counter supplements and vitamin bottles to your next appointment.-    It was a pleasure seeing you today!     Bere Jackson, GUMARO  12/20/2023    -

## 2023-12-26 ENCOUNTER — HOSPITAL ENCOUNTER (OUTPATIENT)
Dept: CARDIAC REHAB | Facility: CLINIC | Age: 78
Discharge: HOME | End: 2023-12-26
Attending: NURSE PRACTITIONER
Payer: COMMERCIAL

## 2023-12-26 DIAGNOSIS — Z95.2 S/P TAVR (TRANSCATHETER AORTIC VALVE REPLACEMENT): ICD-10-CM

## 2023-12-26 DIAGNOSIS — I35.0 SEVERE AORTIC STENOSIS: ICD-10-CM

## 2023-12-26 PROCEDURE — 93798 PHYS/QHP OP CAR RHAB W/ECG: CPT

## 2023-12-26 PROCEDURE — 93797 PHYS/QHP OP CAR RHAB WO ECG: CPT | Mod: XU

## 2023-12-27 NOTE — OP NOTE
OPERATIVE DATE: 12/12/2023    PRE-OPERATIVE DIAGNOSIS:  1) Severe symptomatic aortic stenosis  2) Peripheral artery disease  3) Chronic diastolic heart failure  4) Chronic atrial fibrillation  5) Hypertension  6) Hyperlipidemia    POST-OPERATIVE DIAGNOSIS:  1) Severe symptomatic aortic stenosis  2) Peripheral artery disease  3) Chronic diastolic heart failure  4) Chronic atrial fibrillation  5) Hypertension  6) Hyperlipidemia    PROCEDURE:  1) Temporary pacemaker insertion  2) Iliofemoral artery angiography  3) Ascending aorta angiography  4) Left heart catheterization  5) Successful transfemoral transcatheter aortic valve replacement with a 29 mm De Paz Miguel 3 valve    SURGEON: PHILLIP Batista MD    CARDIOLOGIST: Gennaro Cook MD    ANESTHESIA: General    ESTIMATED BLOOD LOSS: 50 mL    INDICATIONS:  Mr. Jonny Goldberg is a 78 year old year old male with severe symptomatic aortic stenosis. We were asked to evaluate for transcatheter aortic valve replacement. Risks and benefits of the operation were explained to the patient and their family including, but not limited to, bleeding, infection, stroke and even death. They understood these risks and agreed to proceed electively.    OPERATIVE REPORT:  The patient was transferred to the operating room and positioned supine on the OR table. General endotracheal anesthesia care was begun. The patients chest, abdomen and bilateral lower extremities were clipped, prepped and draped in sterile fashion. A pre-procedure time-out was performed confirming the correct patient, correct site and correct procedure.    Intravenous heparin was administered. Arterial access of the right common femoral artery and vein was obtained by the vascular surgery service. Right radial artery access was obtained by the interventional cardiology team.    A temporary transvenous pacemaker was placed.    A Lunderquist wire was advanced to support the De Paz sheath insertion. A pigtail catheter was  advanced to the aortic root and angiogram performed to confirm optimal implant angle. The pigtail was placed in the non-coronary cusp.    The LV was accessed using an AL-1 catheter over a straight wire. The pigtail catheter was advanced into the LV. The pigtail catheter was used to advance a Safari wire into the LV and the pigtail catheter was removed.    The bioprosthetic valve was positioned across the native aortic valve and deployed using rapid pacing in a 90/10 position.    Transthoracic echocardiography showed no paravalvular insufficiency.    The deployment system and wires were removed. The femoral access site was closed by the vascular surgery team.    A final iliofemoral angiogram showed no extravasation or stenosis.    The patient was then transferred to the recovery area in stable condition.    All needle, sponge and instrument counts were correct at the end of the case.    PHILLIP Batista MD  Cardiothoracic Surgery  Pager [phone]     No

## 2024-01-04 ENCOUNTER — HOSPITAL ENCOUNTER (OUTPATIENT)
Dept: CARDIAC REHAB | Facility: CLINIC | Age: 79
Discharge: HOME | End: 2024-01-04
Attending: INTERNAL MEDICINE
Payer: COMMERCIAL

## 2024-01-04 PROCEDURE — 93798 PHYS/QHP OP CAR RHAB W/ECG: CPT | Performed by: REHABILITATION PRACTITIONER

## 2024-01-05 ENCOUNTER — HOSPITAL ENCOUNTER (OUTPATIENT)
Dept: CARDIAC REHAB | Facility: CLINIC | Age: 79
Discharge: HOME | End: 2024-01-05
Attending: INTERNAL MEDICINE
Payer: COMMERCIAL

## 2024-01-05 PROCEDURE — 93798 PHYS/QHP OP CAR RHAB W/ECG: CPT

## 2024-01-08 ENCOUNTER — OFFICE VISIT (OUTPATIENT)
Dept: OTHER | Facility: CLINIC | Age: 79
End: 2024-01-08
Attending: SURGERY
Payer: COMMERCIAL

## 2024-01-08 ENCOUNTER — HOSPITAL ENCOUNTER (OUTPATIENT)
Dept: CARDIAC REHAB | Facility: CLINIC | Age: 79
Discharge: HOME | End: 2024-01-08
Attending: INTERNAL MEDICINE
Payer: COMMERCIAL

## 2024-01-08 VITALS — HEART RATE: 72 BPM | DIASTOLIC BLOOD PRESSURE: 85 MMHG | SYSTOLIC BLOOD PRESSURE: 144 MMHG

## 2024-01-08 DIAGNOSIS — I35.0 NONRHEUMATIC AORTIC VALVE STENOSIS: Primary | ICD-10-CM

## 2024-01-08 PROCEDURE — G0463 HOSPITAL OUTPT CLINIC VISIT: HCPCS | Performed by: SURGERY

## 2024-01-08 PROCEDURE — 99024 POSTOP FOLLOW-UP VISIT: CPT | Performed by: SURGERY

## 2024-01-08 PROCEDURE — 93798 PHYS/QHP OP CAR RHAB W/ECG: CPT

## 2024-01-08 NOTE — PROGRESS NOTES
Mr. Goldberg is a very pleasant 78-year-old gentleman with history of aortic valve stenosis.  We did a right common femoral artery cutdown for aortic valve replacement.    He tells me that his life is significantly improved since after having his aortic valve replaced.    Both groin incisions reveal nicely.    Him and his wife are concerned about edema in lower extremities with walking.  On examination is clearly from a lymphedema pump chronic venous insufficiency.    Prescription strength compression stockings were given to him.    He can follow-up as needed.

## 2024-01-08 NOTE — PROGRESS NOTES
[facility]        Patient is here for a  follow up.      Pt is currently taking Statin and Warfarin.    BP (!) 144/85 (BP Location: Left arm, Patient Position: Chair, Cuff Size: Adult Regular)   Pulse 72     The provider has been notified that the patient has no concerns.     Questions patient would like addressed today are: N/A.    Refills are needed: N/A    Has homecare services and agency name:  Kathleen Card MA

## 2024-01-11 ENCOUNTER — HOSPITAL ENCOUNTER (OUTPATIENT)
Dept: CARDIAC REHAB | Facility: CLINIC | Age: 79
Discharge: HOME | End: 2024-01-11
Attending: INTERNAL MEDICINE
Payer: COMMERCIAL

## 2024-01-11 PROCEDURE — 93798 PHYS/QHP OP CAR RHAB W/ECG: CPT | Performed by: OCCUPATIONAL THERAPIST

## 2024-01-12 ENCOUNTER — LAB (OUTPATIENT)
Dept: LAB | Facility: CLINIC | Age: 79
End: 2024-01-12
Attending: INTERNAL MEDICINE
Payer: COMMERCIAL

## 2024-01-12 ENCOUNTER — OFFICE VISIT (OUTPATIENT)
Dept: CARDIOLOGY | Facility: CLINIC | Age: 79
End: 2024-01-12
Attending: INTERNAL MEDICINE
Payer: COMMERCIAL

## 2024-01-12 ENCOUNTER — HOSPITAL ENCOUNTER (OUTPATIENT)
Dept: CARDIOLOGY | Facility: CLINIC | Age: 79
Discharge: HOME | End: 2024-01-12
Attending: INTERNAL MEDICINE | Admitting: INTERNAL MEDICINE
Payer: COMMERCIAL

## 2024-01-12 VITALS
OXYGEN SATURATION: 95 % | SYSTOLIC BLOOD PRESSURE: 132 MMHG | HEIGHT: 66 IN | DIASTOLIC BLOOD PRESSURE: 70 MMHG | WEIGHT: 213.2 LBS | HEART RATE: 60 BPM | BODY MASS INDEX: 34.27 KG/M2

## 2024-01-12 DIAGNOSIS — Z95.3 S/P TAVR (TRANSCATHETER AORTIC VALVE REPLACEMENT), BIOPROSTHETIC: Primary | ICD-10-CM

## 2024-01-12 DIAGNOSIS — Z95.3 S/P TAVR (TRANSCATHETER AORTIC VALVE REPLACEMENT), BIOPROSTHETIC: ICD-10-CM

## 2024-01-12 DIAGNOSIS — I48.21 PERMANENT ATRIAL FIBRILLATION (H): ICD-10-CM

## 2024-01-12 DIAGNOSIS — I50.32 CHRONIC DIASTOLIC CONGESTIVE HEART FAILURE (H): ICD-10-CM

## 2024-01-12 DIAGNOSIS — E11.51 TYPE II DIABETES MELLITUS WITH PERIPHERAL CIRCULATORY DISORDER (H): ICD-10-CM

## 2024-01-12 DIAGNOSIS — E78.5 HYPERLIPIDEMIA LDL GOAL <70: ICD-10-CM

## 2024-01-12 DIAGNOSIS — I10 BENIGN ESSENTIAL HYPERTENSION: ICD-10-CM

## 2024-01-12 DIAGNOSIS — I73.9 PAD (PERIPHERAL ARTERY DISEASE) (H): ICD-10-CM

## 2024-01-12 LAB
ANION GAP SERPL CALCULATED.3IONS-SCNC: 9 MMOL/L (ref 7–15)
BUN SERPL-MCNC: 25.8 MG/DL (ref 8–23)
CALCIUM SERPL-MCNC: 9.6 MG/DL (ref 8.8–10.2)
CHLORIDE SERPL-SCNC: 104 MMOL/L (ref 98–107)
CREAT SERPL-MCNC: 1.12 MG/DL (ref 0.67–1.17)
DEPRECATED HCO3 PLAS-SCNC: 29 MMOL/L (ref 22–29)
EGFRCR SERPLBLD CKD-EPI 2021: 68 ML/MIN/1.73M2
ERYTHROCYTE [DISTWIDTH] IN BLOOD BY AUTOMATED COUNT: 13.3 % (ref 10–15)
GLUCOSE SERPL-MCNC: 104 MG/DL (ref 70–99)
HCT VFR BLD AUTO: 44.6 % (ref 40–53)
HGB BLD-MCNC: 15.1 G/DL (ref 13.3–17.7)
LVEF ECHO: NORMAL
MCH RBC QN AUTO: 31.7 PG (ref 26.5–33)
MCHC RBC AUTO-ENTMCNC: 33.9 G/DL (ref 31.5–36.5)
MCV RBC AUTO: 94 FL (ref 78–100)
PLATELET # BLD AUTO: 170 10E3/UL (ref 150–450)
POTASSIUM SERPL-SCNC: 3.6 MMOL/L (ref 3.4–5.3)
RBC # BLD AUTO: 4.76 10E6/UL (ref 4.4–5.9)
SODIUM SERPL-SCNC: 142 MMOL/L (ref 135–145)
WBC # BLD AUTO: 5.4 10E3/UL (ref 4–11)

## 2024-01-12 PROCEDURE — 255N000002 HC RX 255 OP 636: Performed by: INTERNAL MEDICINE

## 2024-01-12 PROCEDURE — 36415 COLL VENOUS BLD VENIPUNCTURE: CPT | Performed by: INTERNAL MEDICINE

## 2024-01-12 PROCEDURE — 99214 OFFICE O/P EST MOD 30 MIN: CPT | Mod: 24 | Performed by: NURSE PRACTITIONER

## 2024-01-12 PROCEDURE — 93306 TTE W/DOPPLER COMPLETE: CPT | Mod: 26 | Performed by: INTERNAL MEDICINE

## 2024-01-12 PROCEDURE — 999N000208 ECHOCARDIOGRAM COMPLETE

## 2024-01-12 PROCEDURE — 80048 BASIC METABOLIC PNL TOTAL CA: CPT | Performed by: INTERNAL MEDICINE

## 2024-01-12 PROCEDURE — C8929 TTE W OR WO FOL WCON,DOPPLER: HCPCS

## 2024-01-12 PROCEDURE — 93000 ELECTROCARDIOGRAM COMPLETE: CPT | Performed by: INTERNAL MEDICINE

## 2024-01-12 PROCEDURE — 85027 COMPLETE CBC AUTOMATED: CPT | Performed by: INTERNAL MEDICINE

## 2024-01-12 RX ADMIN — HUMAN ALBUMIN MICROSPHERES AND PERFLUTREN 3 ML: 10; .22 INJECTION, SOLUTION INTRAVENOUS at 09:11

## 2024-01-12 NOTE — PROGRESS NOTES
Cardiology Clinic Progress Note  Jonny Goldberg MRN# [id number]   YOB: 1945 Age: 78 year old     Primary cardiologist: Dr. Cook     Reason for visit: s/p TAVR    History of presenting illness:    Jonny Goldberg is a pleasant 78 year old patient with past medical history significant for hypertension, chronic atrial fibrillation on Coumadin, high-grade AV block s/p permanent pacemaker, PAD, type II diabetes, hyperlipidemia, and severe aortic stenosis s/p transcatheter aortic valve replacement with right common femoral artery cutdown on 12/12/2023 with Dr. Cook.    His procedure went well without any complication.  Postprocedure echocardiogram showed well-seated bioprosthetic aortic valve with mean gradient of 8 mmHg. He has a normal LV systolic function.  He was tolerating cardiac rehab and discharged on POD #1.     Since that time he continues to do well.  He is tolerating cardiac rehab without any exertional symptoms.  Overall he feels significantly better since his valve replacement.  He has more energy and is less fatigued.  He is walking up to a mile on most days.    Repeat echocardiogram today is pending.    I reviewed his labs.  His BMP and CBC are unremarkable.         Assessment and Plan:     ASSESSMENT:    Severe aortic stenosis s/p TAVR 29 mm De Paz Resilia valve with RCFA cutdown.  Jonny is doing excellent from a cardiovascular standpoint.  He is tolerating cardiac rehab without any exertional symptoms.   PAD.  Follows with vascular surgery, surgical site has healed well.  Chronic diastolic heart failure, NYHA class I.  No clinical signs of heart failure, not on diuretic therapy.  Permanent atrial fibrillation.  On Coumadin for stroke prophylaxis.  High-grade AV block s/p permanent pacemaker.  Most recent device check shows normal function.  Hypertension.  Well-controlled on amlodipine 10 mg daily and chlorthalidone 50 mg daily.  Hyperlipidemia with LDL of 63.  On ezetimibe 10 mg daily and  "fenofibrate 160 mg daily.  Type 2 diabetes with A1c of 6.4.  Being managed by PCP.       PLAN:     Jonny is doing well today without any exertional symptoms, I have made no changes to his regimen.  Continue outpatient cardiac rehab.  We discussed the need for lifelong antibiotic prophylaxis prior to any dental procedure.  Repeat echocardiogram in 1 year, sooner if needed.  He will follow-up with Dr. Cook in approximately 6 months.       Bere Jackson, DNP, APRN, CNP  Page: [phone] (8a-5p M-F)    Orders this Visit:  No orders of the defined types were placed in this encounter.    No orders of the defined types were placed in this encounter.    There are no discontinued medications.    Today's clinic visit entailed:  Review of the result(s) of each unique test - echo, TAVR, labs  Ordering of each unique test  Prescription drug management  35 minutes spent by me on the date of the encounter doing chart review, review of test results, interpretation of tests, patient visit, and documentation   Provider  Link to [facility] Help Grid     The level of medical decision making during this visit was of moderate complexity.           Review of Systems:     Review of Systems:  Skin:  not assessed     Eyes:  not assessed    ENT:  not assessed    Respiratory:  Negative    Cardiovascular:    Positive for;lightheadedness;edema;fatigue  Gastroenterology: not assessed    Genitourinary:  not assessed    Musculoskeletal:  not assessed    Neurologic:  not assessed    Psychiatric:  not assessed    Heme/Lymph/Imm:  not assessed    Endocrine:  not assessed              Physical Exam:   Vitals: /70   Pulse 60   Ht 1.676 m (5' 6\")   Wt 96.7 kg (213 lb 3.2 oz)   SpO2 95%   BMI 34.41 kg/m    Constitutional:  cooperative        Skin:  warm and dry to the touch        Head:  normocephalic        Eyes:  pupils equal and round        ENT:  not assessed this visit        Neck:  JVP normal        Chest:  clear to auscultation;normal " symmetry        Cardiac: regular rhythm;normal S1 and S2       systolic ejection murmur;grade 1          Abdomen:  abdomen soft        Vascular:   pulses below the femoral arteries are diminished                                    Extremities and Back:      wears compression stockings    Neurological:  no gross motor deficits;affect appropriate             Medications:     Current Outpatient Medications   Medication Sig Dispense Refill    alfuzosin ER (UROXATRAL) 10 MG 24 hr tablet Take 1 tablet (10 mg) by mouth daily 90 tablet 4    ALPHA LIPOIC ACID PO Take 600 mg by mouth daily      amLODIPine (NORVASC) 10 MG tablet Take 1 tablet (10 mg) by mouth daily 90 tablet 0    ascorbic acid (VITAMIN C) 1000 MG TABS Take 1 tablet by mouth daily      chlorthalidone (HYGROTON) 50 MG tablet Take 50 mg by mouth daily      Cholecalciferol (VITAMIN D) 400 UNITS tablet Take 1 tablet by mouth every evening      CINNAMON PO Take 1 capsule by mouth every evening      clindamycin (CLEOCIN) 300 MG capsule Take 2 capsules by mouth once as needed (1 hour prior to dental appointments 2 x 300 mg = 300 mg)      co-enzyme Q-10 100 MG CAPS capsule Take 1 capsule by mouth daily      ezetimibe (ZETIA) 10 MG tablet Take 0.5 tablets (5 mg) by mouth Every Mon, Wed, Fri Morning 20 tablet 3    fenofibrate (TRIGLIDE/LOFIBRA) 160 MG tablet Take 1 tablet (160 mg) by mouth daily 90 tablet 3    multivitamin w/minerals (THERA-VIT-M) tablet Take 1 tablet by mouth every evening      omega 3 1000 MG CAPS Take 1 capsule by mouth every evening 180 EPA/ 20 DHH      potassium chloride ER (KLOR-CON M) 20 MEQ CR tablet Take 2 tablets (40 mEq) by mouth daily 30 tablet 0    rosuvastatin (CRESTOR) 40 MG tablet Take 40 mg by mouth every evening      warfarin ANTICOAGULANT (COUMADIN) 4 MG tablet Take 1 tablet by mouth daily         Family History   Problem Relation Age of Onset    Hypertension Mother     Heart Failure Mother     Coronary Artery Disease Mother          CABG    Arrhythmia Mother     Cerebrovascular Disease Father     Heart Failure Father     Colon Cancer Father     Coronary Artery Disease Father         CABG    Coronary Artery Disease Brother         CABG    Arrhythmia Brother        Social History     Socioeconomic History    Marital status:      Spouse name: Not on file    Number of children: Not on file    Years of education: Not on file    Highest education level: Not on file   Occupational History    Not on file   Tobacco Use    Smoking status: Former     Types: Cigarettes     Quit date: 1987     Years since quittin.7    Smokeless tobacco: Never   Vaping Use    Vaping Use: Never used   Substance and Sexual Activity    Alcohol use: Yes     Comment: 1 glass daily    Drug use: No    Sexual activity: Not on file   Other Topics Concern    Parent/sibling w/ CABG, MI or angioplasty before 65F 55M? Not Asked     Service Not Asked    Blood Transfusions Not Asked    Caffeine Concern No     Comment: 2-3 a day    Occupational Exposure Not Asked    Hobby Hazards Not Asked    Sleep Concern Yes     Comment: depends    Stress Concern Not Asked    Weight Concern No     Comment: weight loss    Special Diet No    Back Care Not Asked    Exercise No     Comment: limited due to knee pain    Bike Helmet Not Asked    Seat Belt Yes    Self-Exams Not Asked   Social History Narrative    Not on file     Social Determinants of Health     Financial Resource Strain: Not on file   Food Insecurity: Not on file   Transportation Needs: Not on file   Physical Activity: Not on file   Stress: Not on file   Social Connections: Not on file   Interpersonal Safety: Not on file   Housing Stability: Not on file            Past Medical History:     Past Medical History:   Diagnosis Date    Aortic valve stenosis     severe    Blood clot in the legs     left leg after surgery    Gastro-oesophageal reflux disease     Hodgkins lymphoma (H)     bone marrow biopsy    Hypertension      PAD (peripheral artery disease) (H24)     persistent atrial fibrillation     AFIB    Unspecified cerebral artery occlusion with cerebral infarction 10/01/2011              Past Surgical History:     Past Surgical History:   Procedure Laterality Date    APPENDECTOMY      ARTHROPLASTY REVISION HIP  12/27/2011    Procedure:ARTHROPLASTY REVISION HIP; RIGHT TOTAL HIP REVISION WITH BONE GRAFT  ; Surgeon:ANGIE HARRINGTON; Location: OR    BIOPSY  hodgkins lymphoma    bone marrow biopsy, chemo and radiation    CARDIAC SURGERY      cardiac ablation    COLONOSCOPY      CV CORONARY ANGIOGRAM N/A 9/23/2022    Procedure: Coronary Angiogram;  Surgeon: Gennaro Cook MD;  Location:  HEART CARDI[facility] CATH LAB    CV PCI N/A 9/23/2022    Procedure: Percutaneous Coronary Intervention;  Surgeon: Gennaro Cook MD;  Location:  [facility]    CV RIGHT HEART CATH MEASUREMENTS RECORDED N/A 9/23/2022    Procedure: Right Heart Catheterization;  Surgeon: Gennaro Cook MD;  Location:  HEART [facility]ARDI[facility] CATH LAB    CV TRANSCATHETER AORTIC VALVE REPLACEMENT-FEMORAL APPROACH N/A 12/12/2023    Procedure: Transcatheter Aortic Valve Replacement-Femoral Approach;  Surgeon: Gennaro Cook MD;  Location:  HEART CARDI[facility] CATH LAB    EP PACEMAKER DEVICE & LEAD IMPLANT- RIGHT VENTRICULAR N/A 2/27/2023    Procedure: Pacemaker Device & Lead Implant- Right ventricular;  Surgeon: Brian Madrid MD;  Location: [facility] CARDIAC CATH LAB    ILIAC ARTERY ANGIOGRAM LEFT Left 10/10/2023    Procedure: Left Groin Cutdown with Repair of Left Femoral Artery; Aborted Transcatheter Aortic Valve Replacement;  Surgeon: German Patel MD;  Location:  HEART CARDIAC CATH LAB    ORTHOPEDIC SURGERY      hip and knee surgeries    ORTHOPEDIC SURGERY      carpal tunnel  right hand              Allergies:   Irinotecan and Penicillin g       Data:   All laboratory data reviewed:    Recent Labs   Lab Test 12/08/23  1111 10/02/23  1151  05/09/23  1000 11/03/21  1151 05/05/21  0000 08/04/20  0000 10/18/18  0000 10/17/17  0000 07/21/16  0835 12/16/15  0934   LDL  --   --   --   --  21  --  81 98 81 95   HDL  --   --   --   --  38  --  43 47 47 43   NHDL  --   --   --   --  69  --   --   --  112 133*   CHOL  --   --   --   --  128 185 155 168 159 176   TRIG  --   --  130 77 117  --  154* 115 156* 190*   NTBNP 694 1,063  --   --   --   --   --   --   --   --        Lab Results   Component Value Date    WBC 5.4 01/12/2024    WBC 5.4 08/04/2020    RBC 4.76 01/12/2024    RBC 4.86 08/04/2020    HGB 15.1 01/12/2024    HGB 15.3 08/04/2020    HCT 44.6 01/12/2024    HCT 47.0 08/04/2020    MCV 94 01/12/2024    MCV 96.7 08/04/2020    MCH 31.7 01/12/2024    MCH 31.6 08/04/2020    MCHC 33.9 01/12/2024    MCHC 32.7 08/04/2020    RDW 13.3 01/12/2024    RDW 13.4 08/04/2020     01/12/2024     08/04/2020       Lab Results   Component Value Date     01/12/2024     05/05/2021    POTASSIUM 3.6 01/12/2024    POTASSIUM 3.0 (L) 09/28/2022    POTASSIUM 3.7 05/05/2021    CHLORIDE 104 01/12/2024    CHLORIDE 102 05/09/2023    CHLORIDE 100 05/05/2021    CO2 29 01/12/2024    CO2 27 09/28/2022    CO2 28 05/05/2021    ANIONGAP 9 01/12/2024    ANIONGAP 8 09/28/2022    ANIONGAP 1.9 08/04/2020     (H) 01/12/2024     (H) 09/28/2022    GLC 98 05/05/2021    BUN 25.8 (H) 01/12/2024    BUN 32 (H) 09/28/2022    BUN 27 05/05/2021    CR 1.12 01/12/2024    CR 1 05/05/2021    GFRESTIMATED 68 01/12/2024    GFRESTIMATED 62 05/05/2021    GFRESTBLACK 72 05/05/2021    SARA 9.6 01/12/2024    SARA 1.16 05/05/2021      Lab Results   Component Value Date    AST 19 10/16/2023    AST 42 05/05/2021    ALT 14 10/16/2023    ALT 28 05/05/2021       Lab Results   Component Value Date    A1C 5.9 (A) 05/06/2021       Lab Results   Component Value Date    INR 1.14 12/13/2023    INR 1.37 (H) 12/12/2023    INR 2.3 05/09/2023    INR 2.4 09/12/2022    INR 1.15 (H) 12/30/2011     INR 1.05 12/29/2011       KCCQ-12  5  5  5  4  7  7  5  5  5  5  5  5

## 2024-01-12 NOTE — LETTER
1/12/2024    Reid Hogue MD  [address]  [facility] [address]    RE: Jonny Goldberg       Dear Colleague,     I had the pleasure of seeing Jonny Goldberg in the [facility].  Cardiology Clinic Progress Note  Jonny Goldberg MRN# [id number]   YOB: 1945 Age: 78 year old     Primary cardiologist: Dr. Cook     Reason for visit: s/p TAVR    History of presenting illness:    Jonny Goldberg is a pleasant 78 year old patient with past medical history significant for hypertension, chronic atrial fibrillation on Coumadin, high-grade AV block s/p permanent pacemaker, PAD, type II diabetes, hyperlipidemia, and severe aortic stenosis s/p transcatheter aortic valve replacement with right common femoral artery cutdown on 12/12/2023 with Dr. Cook.    His procedure went well without any complication.  Postprocedure echocardiogram showed well-seated bioprosthetic aortic valve with mean gradient of 8 mmHg. He has a normal LV systolic function.  He was tolerating cardiac rehab and discharged on POD #1.     Since that time he continues to do well.  He is tolerating cardiac rehab without any exertional symptoms.  Overall he feels significantly better since his valve replacement.  He has more energy and is less fatigued.  He is walking up to a mile on most days.    Repeat echocardiogram today is pending.    I reviewed his labs.  His BMP and CBC are unremarkable.         Assessment and Plan:     ASSESSMENT:    Severe aortic stenosis s/p TAVR 29 mm De Paz Resilia valve with RCFA cutdown.  Jonny is doing excellent from a cardiovascular standpoint.  He is tolerating cardiac rehab without any exertional symptoms.   PAD.  Follows with vascular surgery, surgical site has healed well.  Chronic diastolic heart failure, NYHA class I.  No clinical signs of heart failure, not on diuretic therapy.  Permanent atrial fibrillation.  On Coumadin for stroke prophylaxis.  High-grade AV block s/p permanent  "pacemaker.  Most recent device check shows normal function.  Hypertension.  Well-controlled on amlodipine 10 mg daily and chlorthalidone 50 mg daily.  Hyperlipidemia with LDL of 63.  On ezetimibe 10 mg daily and fenofibrate 160 mg daily.  Type 2 diabetes with A1c of 6.4.  Being managed by PCP.       PLAN:     Jonny is doing well today without any exertional symptoms, I have made no changes to his regimen.  Continue outpatient cardiac rehab.  We discussed the need for lifelong antibiotic prophylaxis prior to any dental procedure.  Repeat echocardiogram in 1 year, sooner if needed.  He will follow-up with Dr. Cook in approximately 6 months.       Bere Jackson, DNP, APRN, CNP  Page: [phone] (8a-5p M-F)    Orders this Visit:  No orders of the defined types were placed in this encounter.    No orders of the defined types were placed in this encounter.    There are no discontinued medications.    Today's clinic visit entailed:  Review of the result(s) of each unique test - echo, TAVR, labs  Ordering of each unique test  Prescription drug management  35 minutes spent by me on the date of the encounter doing chart review, review of test results, interpretation of tests, patient visit, and documentation   Provider  Link to [facility] Help Grid     The level of medical decision making during this visit was of moderate complexity.           Review of Systems:     Review of Systems:  Skin:  not assessed     Eyes:  not assessed    ENT:  not assessed    Respiratory:  Negative    Cardiovascular:    Positive for;lightheadedness;edema;fatigue  Gastroenterology: not assessed    Genitourinary:  not assessed    Musculoskeletal:  not assessed    Neurologic:  not assessed    Psychiatric:  not assessed    Heme/Lymph/Imm:  not assessed    Endocrine:  not assessed              Physical Exam:   Vitals: /70   Pulse 60   Ht 1.676 m (5' 6\")   Wt 96.7 kg (213 lb 3.2 oz)   SpO2 95%   BMI 34.41 kg/m    Constitutional:  cooperative    "     Skin:  warm and dry to the touch        Head:  normocephalic        Eyes:  pupils equal and round        ENT:  not assessed this visit        Neck:  JVP normal        Chest:  clear to auscultation;normal symmetry        Cardiac: regular rhythm;normal S1 and S2       systolic ejection murmur;grade 1          Abdomen:  abdomen soft        Vascular:   pulses below the femoral arteries are diminished                                    Extremities and Back:      wears compression stockings    Neurological:  no gross motor deficits;affect appropriate             Medications:     Current Outpatient Medications   Medication Sig Dispense Refill    alfuzosin ER (UROXATRAL) 10 MG 24 hr tablet Take 1 tablet (10 mg) by mouth daily 90 tablet 4    ALPHA LIPOIC ACID PO Take 600 mg by mouth daily      amLODIPine (NORVASC) 10 MG tablet Take 1 tablet (10 mg) by mouth daily 90 tablet 0    ascorbic acid (VITAMIN C) 1000 MG TABS Take 1 tablet by mouth daily      chlorthalidone (HYGROTON) 50 MG tablet Take 50 mg by mouth daily      Cholecalciferol (VITAMIN D) 400 UNITS tablet Take 1 tablet by mouth every evening      CINNAMON PO Take 1 capsule by mouth every evening      clindamycin (CLEOCIN) 300 MG capsule Take 2 capsules by mouth once as needed (1 hour prior to dental appointments 2 x 300 mg = 300 mg)      co-enzyme Q-10 100 MG CAPS capsule Take 1 capsule by mouth daily      ezetimibe (ZETIA) 10 MG tablet Take 0.5 tablets (5 mg) by mouth Every Mon, Wed, Fri Morning 20 tablet 3    fenofibrate (TRIGLIDE/LOFIBRA) 160 MG tablet Take 1 tablet (160 mg) by mouth daily 90 tablet 3    multivitamin w/minerals (THERA-VIT-M) tablet Take 1 tablet by mouth every evening      omega 3 1000 MG CAPS Take 1 capsule by mouth every evening 180 EPA/ 20 DHH      potassium chloride ER (KLOR-CON M) 20 MEQ CR tablet Take 2 tablets (40 mEq) by mouth daily 30 tablet 0    rosuvastatin (CRESTOR) 40 MG tablet Take 40 mg by mouth every evening      warfarin  ANTICOAGULANT (COUMADIN) 4 MG tablet Take 1 tablet by mouth daily         Family History   Problem Relation Age of Onset    Hypertension Mother     Heart Failure Mother     Coronary Artery Disease Mother         CABG    Arrhythmia Mother     Cerebrovascular Disease Father     Heart Failure Father     Colon Cancer Father     Coronary Artery Disease Father         CABG    Coronary Artery Disease Brother         CABG    Arrhythmia Brother        Social History     Socioeconomic History    Marital status:      Spouse name: Not on file    Number of children: Not on file    Years of education: Not on file    Highest education level: Not on file   Occupational History    Not on file   Tobacco Use    Smoking status: Former     Types: Cigarettes     Quit date: 1987     Years since quittin.7    Smokeless tobacco: Never   Vaping Use    Vaping Use: Never used   Substance and Sexual Activity    Alcohol use: Yes     Comment: 1 glass daily    Drug use: No    Sexual activity: Not on file   Other Topics Concern    Parent/sibling w/ CABG, MI or angioplasty before 65F 55M? Not Asked     Service Not Asked    Blood Transfusions Not Asked    Caffeine Concern No     Comment: 2-3 a day    Occupational Exposure Not Asked    Hobby Hazards Not Asked    Sleep Concern Yes     Comment: depends    Stress Concern Not Asked    Weight Concern No     Comment: weight loss    Special Diet No    Back Care Not Asked    Exercise No     Comment: limited due to knee pain    Bike Helmet Not Asked    Seat Belt Yes    Self-Exams Not Asked   Social History Narrative    Not on file     Social Determinants of Health     Financial Resource Strain: Not on file   Food Insecurity: Not on file   Transportation Needs: Not on file   Physical Activity: Not on file   Stress: Not on file   Social Connections: Not on file   Interpersonal Safety: Not on file   Housing Stability: Not on file            Past Medical History:     Past Medical History:    Diagnosis Date    Aortic valve stenosis     severe    Blood clot in the legs     left leg after surgery    Gastro-oesophageal reflux disease     Hodgkins lymphoma (H)     bone marrow biopsy    Hypertension     PAD (peripheral artery disease) (H24)     persistent atrial fibrillation     AFIB    Unspecified cerebral artery occlusion with cerebral infarction 10/01/2011              Past Surgical History:     Past Surgical History:   Procedure Laterality Date    APPENDECTOMY      ARTHROPLASTY REVISION HIP  12/27/2011    Procedure:ARTHROPLASTY REVISION HIP; RIGHT TOTAL HIP REVISION WITH BONE GRAFT  ; Surgeon:ANGIE HARRINGTON; Location: OR    BIOPSY  hodgkins lymphoma    bone marrow biopsy, chemo and radiation    CARDIAC SURGERY      cardiac ablation    COLONOSCOPY      CV CORONARY ANGIOGRAM N/A 9/23/2022    Procedure: Coronary Angiogram;  Surgeon: Gennaro Cook MD;  Location: [facility] CARDIAC CATH LAB    CV PCI N/A 9/23/2022    Procedure: Percutaneous Coronary Intervention;  Surgeon: Gennaro Cook MD;  Location:  HEART CARDIAC CATH LAB    CV RIGHT HEART CATH MEASUREMENTS RECORDED N/A 9/23/2022    Procedure: Right Heart Catheterization;  Surgeon: Gennaro Cook MD;  Location: [facility] CARDIAC CATH LAB    CV TRANSCATHETER AORTIC VALVE REPLACEMENT-FEMORAL APPROACH N/A 12/12/2023    Procedure: Transcatheter Aortic Valve Replacement-Femoral Approach;  Surgeon: Gennaro Cook MD;  Location: [facility] CARDIAC CATH LAB    EP PACEMAKER DEVICE & LEAD IMPLANT- RIGHT VENTRICULAR N/A 2/27/2023    Procedure: Pacemaker Device & Lead Implant- Right ventricular;  Surgeon: Brian Madrid MD;  Location: [facility] CARDIAC CATH LAB    ILIAC ARTERY ANGIOGRAM LEFT Left 10/10/2023    Procedure: Left Groin Cutdown with Repair of Left Femoral Artery; Aborted Transcatheter Aortic Valve Replacement;  Surgeon: German Patel MD;  Location: [facility] CARDIAC CATH LAB    ORTHOPEDIC SURGERY      hip and knee surgeries     ORTHOPEDIC SURGERY      carpal tunnel  right hand              Allergies:   Irinotecan and Penicillin g       Data:   All laboratory data reviewed:    Recent Labs   Lab Test 12/08/23  1111 10/02/23  1151 05/09/23  1000 11/03/21  1151 05/05/21  0000 08/04/20  0000 10/18/18  0000 10/17/17  0000 07/21/16  0835 12/16/15  0934   LDL  --   --   --   --  21  --  81 98 81 95   HDL  --   --   --   --  38  --  43 47 47 43   NHDL  --   --   --   --  69  --   --   --  112 133*   CHOL  --   --   --   --  128 185 155 168 159 176   TRIG  --   --  130 77 117  --  154* 115 156* 190*   NTBNP 694 1,063  --   --   --   --   --   --   --   --        Lab Results   Component Value Date    WBC 5.4 01/12/2024    WBC 5.4 08/04/2020    RBC 4.76 01/12/2024    RBC 4.86 08/04/2020    HGB 15.1 01/12/2024    HGB 15.3 08/04/2020    HCT 44.6 01/12/2024    HCT 47.0 08/04/2020    MCV 94 01/12/2024    MCV 96.7 08/04/2020    MCH 31.7 01/12/2024    MCH 31.6 08/04/2020    MCHC 33.9 01/12/2024    MCHC 32.7 08/04/2020    RDW 13.3 01/12/2024    RDW 13.4 08/04/2020     01/12/2024     08/04/2020       Lab Results   Component Value Date     01/12/2024     05/05/2021    POTASSIUM 3.6 01/12/2024    POTASSIUM 3.0 (L) 09/28/2022    POTASSIUM 3.7 05/05/2021    CHLORIDE 104 01/12/2024    CHLORIDE 102 05/09/2023    CHLORIDE 100 05/05/2021    CO2 29 01/12/2024    CO2 27 09/28/2022    CO2 28 05/05/2021    ANIONGAP 9 01/12/2024    ANIONGAP 8 09/28/2022    ANIONGAP 1.9 08/04/2020     (H) 01/12/2024     (H) 09/28/2022    GLC 98 05/05/2021    BUN 25.8 (H) 01/12/2024    BUN 32 (H) 09/28/2022    BUN 27 05/05/2021    CR 1.12 01/12/2024    CR 1 05/05/2021    GFRESTIMATED 68 01/12/2024    GFRESTIMATED 62 05/05/2021    GFRESTBLACK 72 05/05/2021    SARA 9.6 01/12/2024    SARA 1.16 05/05/2021      Lab Results   Component Value Date    AST 19 10/16/2023    AST 42 05/05/2021    ALT 14 10/16/2023    ALT 28 05/05/2021     Lab Results   Component  Value Date    A1C 5.9 (A) 05/06/2021     Lab Results   Component Value Date    INR 1.14 12/13/2023    INR 1.37 (H) 12/12/2023    INR 2.3 05/09/2023    INR 2.4 09/12/2022    INR 1.15 (H) 12/30/2011    INR 1.05 12/29/2011       KCCQ-12  5  5  5  4  7  7  5  5  5  5  5  5    Thank you for allowing me to participate in the care of your patient.      Sincerely,   Bere Jackson, [facility]  cc:   Gennaro Cook MD  [address]  [place] [address]

## 2024-01-12 NOTE — PATIENT INSTRUCTIONS
Today's Plan:     - Continue cardiac rehab.   - Follow-up with Dr. Cook in 6 months.     Neli RN ([phone]), Joan RN ([phone]), and Ingrid RN ([phone])    After Hours: [phone], option #2, ask for cardiologist on-call    Scheduling phone number: [phone]    Reminder: Please bring in all current medications, over the counter supplements and vitamin bottles to your next appointment.-    It was a pleasure seeing you today!     Bere Jackson, CNP  1/12/2024    -

## 2024-01-15 ENCOUNTER — HOSPITAL ENCOUNTER (OUTPATIENT)
Dept: CARDIAC REHAB | Facility: CLINIC | Age: 79
Discharge: HOME | End: 2024-01-15
Attending: INTERNAL MEDICINE
Payer: COMMERCIAL

## 2024-01-15 PROCEDURE — 93798 PHYS/QHP OP CAR RHAB W/ECG: CPT

## 2024-01-22 ENCOUNTER — HOSPITAL ENCOUNTER (OUTPATIENT)
Dept: CARDIAC REHAB | Facility: CLINIC | Age: 79
Discharge: HOME | End: 2024-01-22
Attending: INTERNAL MEDICINE
Payer: COMMERCIAL

## 2024-01-22 PROCEDURE — 93798 PHYS/QHP OP CAR RHAB W/ECG: CPT | Performed by: REHABILITATION PRACTITIONER

## 2024-01-25 ENCOUNTER — HOSPITAL ENCOUNTER (OUTPATIENT)
Dept: CARDIAC REHAB | Facility: CLINIC | Age: 79
Discharge: HOME | End: 2024-01-25
Attending: INTERNAL MEDICINE
Payer: COMMERCIAL

## 2024-01-25 PROCEDURE — 93798 PHYS/QHP OP CAR RHAB W/ECG: CPT

## 2024-01-29 ENCOUNTER — HOSPITAL ENCOUNTER (OUTPATIENT)
Dept: CARDIAC REHAB | Facility: CLINIC | Age: 79
Discharge: HOME | End: 2024-01-29
Attending: INTERNAL MEDICINE
Payer: COMMERCIAL

## 2024-01-29 PROCEDURE — 93798 PHYS/QHP OP CAR RHAB W/ECG: CPT | Performed by: OCCUPATIONAL THERAPIST

## 2024-02-05 DIAGNOSIS — I10 PRIMARY HYPERTENSION: ICD-10-CM

## 2024-02-05 RX ORDER — AMLODIPINE BESYLATE 10 MG/1
10 TABLET ORAL DAILY
Qty: 90 TABLET | Refills: 1 | Status: SHIPPED | OUTPATIENT
Start: 2024-02-05 | End: 2024-07-31

## 2024-02-27 ENCOUNTER — ANCILLARY PROCEDURE (OUTPATIENT)
Dept: CARDIOLOGY | Facility: CLINIC | Age: 79
End: 2024-02-27
Attending: INTERNAL MEDICINE
Payer: COMMERCIAL

## 2024-02-27 DIAGNOSIS — Z95.0 CARDIAC PACEMAKER IN SITU: ICD-10-CM

## 2024-02-27 PROCEDURE — 93296 REM INTERROG EVL PM/IDS: CPT | Performed by: INTERNAL MEDICINE

## 2024-02-27 PROCEDURE — 93294 REM INTERROG EVL PM/LDLS PM: CPT | Performed by: INTERNAL MEDICINE

## 2024-02-29 LAB
MDC_IDC_EPISODE_DTM: NORMAL
MDC_IDC_EPISODE_ID: NORMAL
MDC_IDC_EPISODE_TYPE: NORMAL
MDC_IDC_LEAD_CONNECTION_STATUS: NORMAL
MDC_IDC_LEAD_IMPLANT_DT: NORMAL
MDC_IDC_LEAD_LOCATION: NORMAL
MDC_IDC_LEAD_LOCATION_DETAIL_1: NORMAL
MDC_IDC_LEAD_MFG: NORMAL
MDC_IDC_LEAD_MODEL: NORMAL
MDC_IDC_LEAD_POLARITY_TYPE: NORMAL
MDC_IDC_LEAD_SERIAL: NORMAL
MDC_IDC_MSMT_BATTERY_DTM: NORMAL
MDC_IDC_MSMT_BATTERY_REMAINING_LONGEVITY: 108 MO
MDC_IDC_MSMT_BATTERY_REMAINING_PERCENTAGE: 100 %
MDC_IDC_MSMT_BATTERY_STATUS: NORMAL
MDC_IDC_MSMT_LEADCHNL_RV_IMPEDANCE_VALUE: 649 OHM
MDC_IDC_MSMT_LEADCHNL_RV_PACING_THRESHOLD_AMPLITUDE: 0.7 V
MDC_IDC_MSMT_LEADCHNL_RV_PACING_THRESHOLD_PULSEWIDTH: 0.4 MS
MDC_IDC_PG_IMPLANT_DTM: NORMAL
MDC_IDC_PG_MFG: NORMAL
MDC_IDC_PG_MODEL: NORMAL
MDC_IDC_PG_SERIAL: NORMAL
MDC_IDC_PG_TYPE: NORMAL
MDC_IDC_SESS_CLINIC_NAME: NORMAL
MDC_IDC_SESS_DTM: NORMAL
MDC_IDC_SESS_TYPE: NORMAL
MDC_IDC_SET_BRADY_LOWRATE: 60 {BEATS}/MIN
MDC_IDC_SET_BRADY_MAX_SENSOR_RATE: 115 {BEATS}/MIN
MDC_IDC_SET_BRADY_MODE: NORMAL
MDC_IDC_SET_LEADCHNL_RV_PACING_AMPLITUDE: 1.2 V
MDC_IDC_SET_LEADCHNL_RV_PACING_CAPTURE_MODE: NORMAL
MDC_IDC_SET_LEADCHNL_RV_PACING_POLARITY: NORMAL
MDC_IDC_SET_LEADCHNL_RV_PACING_PULSEWIDTH: 0.4 MS
MDC_IDC_SET_LEADCHNL_RV_SENSING_ADAPTATION_MODE: NORMAL
MDC_IDC_SET_LEADCHNL_RV_SENSING_POLARITY: NORMAL
MDC_IDC_SET_LEADCHNL_RV_SENSING_SENSITIVITY: 2.5 MV
MDC_IDC_SET_ZONE_DETECTION_INTERVAL: 375 MS
MDC_IDC_SET_ZONE_STATUS: NORMAL
MDC_IDC_SET_ZONE_TYPE: NORMAL
MDC_IDC_SET_ZONE_VENDOR_TYPE: NORMAL
MDC_IDC_STAT_BRADY_DTM_END: NORMAL
MDC_IDC_STAT_BRADY_DTM_START: NORMAL
MDC_IDC_STAT_BRADY_RV_PERCENT_PACED: 96 %
MDC_IDC_STAT_EPISODE_RECENT_COUNT: 0
MDC_IDC_STAT_EPISODE_RECENT_COUNT: 0
MDC_IDC_STAT_EPISODE_RECENT_COUNT: 1
MDC_IDC_STAT_EPISODE_RECENT_COUNT_DTM_END: NORMAL
MDC_IDC_STAT_EPISODE_RECENT_COUNT_DTM_START: NORMAL
MDC_IDC_STAT_EPISODE_TYPE: NORMAL
MDC_IDC_STAT_EPISODE_VENDOR_TYPE: NORMAL
MDC_IDC_STAT_EPISODE_VENDOR_TYPE: NORMAL

## 2024-03-18 DIAGNOSIS — I73.9 PAD (PERIPHERAL ARTERY DISEASE) (H): ICD-10-CM

## 2024-03-18 RX ORDER — EZETIMIBE 10 MG/1
5 TABLET ORAL
Qty: 20 TABLET | Refills: 3 | Status: SHIPPED | OUTPATIENT
Start: 2024-03-18

## 2024-03-21 ENCOUNTER — TRANSFERRED RECORDS (OUTPATIENT)
Dept: HEALTH INFORMATION MANAGEMENT | Facility: CLINIC | Age: 79
End: 2024-03-21

## 2024-04-22 ENCOUNTER — OFFICE VISIT (OUTPATIENT)
Dept: CARDIOLOGY | Facility: CLINIC | Age: 79
End: 2024-04-22
Payer: COMMERCIAL

## 2024-04-22 VITALS
SYSTOLIC BLOOD PRESSURE: 130 MMHG | HEART RATE: 60 BPM | OXYGEN SATURATION: 97 % | HEIGHT: 66 IN | BODY MASS INDEX: 34.2 KG/M2 | WEIGHT: 212.8 LBS | DIASTOLIC BLOOD PRESSURE: 72 MMHG

## 2024-04-22 DIAGNOSIS — Z95.2 S/P TAVR (TRANSCATHETER AORTIC VALVE REPLACEMENT): Primary | ICD-10-CM

## 2024-04-22 PROCEDURE — 99214 OFFICE O/P EST MOD 30 MIN: CPT | Performed by: INTERNAL MEDICINE

## 2024-04-22 NOTE — LETTER
4/22/2024    Reid Hogue MD  [address]  [facility] [address]    RE: Jonny Goldberg       Dear Colleague,     I had the pleasure of seeing Jonny Goldberg in the [facility].  CARDIOLOGY CLINIC CONSULTATION    PRIMARY CARE PHYSICIAN:  Reid Hogue    HISTORY OF PRESENT ILLNESS:  The patient is a very pleasant 78-year-old gentleman with history of aortic stenosis status post recent TAVR, high-grade AV block status post pacemaker implantation, peripheral chill disease, type 2 diabetes, persistent atrial fibrillation and hypertension is here for follow-up.    He was evaluated last year for progressive symptomatic aortic stenosis.  He eventually proceeded to have transfemoral transcatheter aortic valve replacement in December 2023 with 29 mm De Paz Resilia valve via right common femoral cutdown.  He has done quite well from cardiac point of view since then.  His last echocardiogram from January showed well-seated valve, normal LV function no gradient.    He is active and walks 1 to 2 miles every day.    PAST MEDICAL HISTORY:  Past Medical History:   Diagnosis Date    Aortic valve stenosis     severe    Blood clot in the legs     left leg after surgery    Gastro-oesophageal reflux disease     Hodgkins lymphoma (H)     bone marrow biopsy    Hypertension     PAD (peripheral artery disease) (H24)     persistent atrial fibrillation     AFIB    Unspecified cerebral artery occlusion with cerebral infarction 10/01/2011       MEDICATIONS:  Current Outpatient Medications   Medication Sig Dispense Refill    alfuzosin ER (UROXATRAL) 10 MG 24 hr tablet Take 1 tablet (10 mg) by mouth daily 90 tablet 4    ALPHA LIPOIC ACID PO Take 600 mg by mouth daily      amLODIPine (NORVASC) 10 MG tablet Take 1 tablet (10 mg) by mouth daily 90 tablet 1    ascorbic acid (VITAMIN C) 1000 MG TABS Take 1 tablet by mouth daily      chlorthalidone (HYGROTON) 50 MG tablet Take 50 mg by mouth daily      Cholecalciferol  (VITAMIN D) 400 UNITS tablet Take 1 tablet by mouth every evening      CINNAMON PO Take 1 capsule by mouth every evening      clindamycin (CLEOCIN) 300 MG capsule Take 2 capsules by mouth once as needed (1 hour prior to dental appointments 2 x 300 mg = 300 mg)      co-enzyme Q-10 100 MG CAPS capsule Take 1 capsule by mouth daily      ezetimibe (ZETIA) 10 MG tablet Take 0.5 tablets (5 mg) by mouth Every Mon, Wed, Fri Morning 20 tablet 3    fenofibrate (TRIGLIDE/LOFIBRA) 160 MG tablet Take 1 tablet (160 mg) by mouth daily 90 tablet 3    multivitamin w/minerals (THERA-VIT-M) tablet Take 1 tablet by mouth every evening      omega 3 1000 MG CAPS Take 1 capsule by mouth every evening 180 EPA/ 20 DHH      potassium chloride ER (KLOR-CON M) 20 MEQ CR tablet Take 2 tablets (40 mEq) by mouth daily 30 tablet 0    rosuvastatin (CRESTOR) 40 MG tablet Take 40 mg by mouth every evening      warfarin ANTICOAGULANT (COUMADIN) 4 MG tablet Take 1 tablet by mouth daily       No current facility-administered medications for this visit.       SOCIAL HISTORY:  I have reviewed this patient's social history and updated it with pertinent information if needed. Jonny Goldberg  reports that he quit smoking about 37 years ago. His smoking use included cigarettes. He has never used smokeless tobacco. He reports current alcohol use. He reports that he does not use drugs.    PHYSICAL EXAM:  Pulse:  [60] 60  BP: (130-150)/(72-79) 130/72  SpO2:  [97 %] 97 %  212 lbs 12.8 oz    Constitutional: alert, no distress  Respiratory: Good bilateral air entry  Cardiovascular: Regular rate and rhythm, faint systolic ejection murmur  GI: nondistended  Neuropsychiatric: appropriate affact    ASSESSMENT: Pertinent issues addressed/ reviewed during this cardiology visit  History of severe aortic stenosis status post TAVR with a 29 mm De Paz Resilia valve  Peripheral chill disease, asymptomatic  Chronic diastolic heart failure  Permanent atrial fibrillation, on  Coumadin  High-grade AV block status post pacemaker implantation  Type 2 diabetes    RECOMMENDATIONS:  He is doing well from cardiac point of view.  Risk factors are well-controlled.  Continue current medical program.  Repeat echo later this year with follow-up as arranged.    It was a pleasure seeing this patient in clinic today. Please do not hesitate to contact me with any future questions.     Gennaro Cook MD, [facility]  Cardiology - [facility]  April 22, 2024    Review of the result(s) of each unique test - Last echocardiogram, ECG, BMP     The level of medical decision making during this visit was of moderate complexity.    This note was completed in part using dictation via the Dragon voice recognition software. Some word and grammatical errors may occur and must be interpreted in the appropriate clinical context.  If there are any questions pertaining to this issue, please contact me for further clarification.      Thank you for allowing me to participate in the care of your patient.      Sincerely,     Gennaro Cook MD, MD     [facility]  cc:   No referring provider defined for this encounter.

## 2024-04-22 NOTE — PROGRESS NOTES
CARDIOLOGY CLINIC CONSULTATION    PRIMARY CARE PHYSICIAN:  Reid Hogue    HISTORY OF PRESENT ILLNESS:  The patient is a very pleasant 78-year-old gentleman with history of aortic stenosis status post recent TAVR, high-grade AV block status post pacemaker implantation, peripheral chill disease, type 2 diabetes, persistent atrial fibrillation and hypertension is here for follow-up.    He was evaluated last year for progressive symptomatic aortic stenosis.  He eventually proceeded to have transfemoral transcatheter aortic valve replacement in December 2023 with 29 mm De Paz Resilia valve via right common femoral cutdown.  He has done quite well from cardiac point of view since then.  His last echocardiogram from January showed well-seated valve, normal LV function no gradient.    He is active and walks 1 to 2 miles every day.    PAST MEDICAL HISTORY:  Past Medical History:   Diagnosis Date    Aortic valve stenosis     severe    Blood clot in the legs     left leg after surgery    Gastro-oesophageal reflux disease     Hodgkins lymphoma (H)     bone marrow biopsy    Hypertension     PAD (peripheral artery disease) (H24)     persistent atrial fibrillation     AFIB    Unspecified cerebral artery occlusion with cerebral infarction 10/01/2011       MEDICATIONS:  Current Outpatient Medications   Medication Sig Dispense Refill    alfuzosin ER (UROXATRAL) 10 MG 24 hr tablet Take 1 tablet (10 mg) by mouth daily 90 tablet 4    ALPHA LIPOIC ACID PO Take 600 mg by mouth daily      amLODIPine (NORVASC) 10 MG tablet Take 1 tablet (10 mg) by mouth daily 90 tablet 1    ascorbic acid (VITAMIN C) 1000 MG TABS Take 1 tablet by mouth daily      chlorthalidone (HYGROTON) 50 MG tablet Take 50 mg by mouth daily      Cholecalciferol (VITAMIN D) 400 UNITS tablet Take 1 tablet by mouth every evening      CINNAMON PO Take 1 capsule by mouth every evening      clindamycin (CLEOCIN) 300 MG capsule Take 2 capsules by mouth once as needed (1 hour  prior to dental appointments 2 x 300 mg = 300 mg)      co-enzyme Q-10 100 MG CAPS capsule Take 1 capsule by mouth daily      ezetimibe (ZETIA) 10 MG tablet Take 0.5 tablets (5 mg) by mouth Every Mon, Wed, Fri Morning 20 tablet 3    fenofibrate (TRIGLIDE/LOFIBRA) 160 MG tablet Take 1 tablet (160 mg) by mouth daily 90 tablet 3    multivitamin w/minerals (THERA-VIT-M) tablet Take 1 tablet by mouth every evening      omega 3 1000 MG CAPS Take 1 capsule by mouth every evening 180 EPA/ 20 DHH      potassium chloride ER (KLOR-CON M) 20 MEQ CR tablet Take 2 tablets (40 mEq) by mouth daily 30 tablet 0    rosuvastatin (CRESTOR) 40 MG tablet Take 40 mg by mouth every evening      warfarin ANTICOAGULANT (COUMADIN) 4 MG tablet Take 1 tablet by mouth daily       No current facility-administered medications for this visit.       SOCIAL HISTORY:  I have reviewed this patient's social history and updated it with pertinent information if needed. Jonny Goldberg  reports that he quit smoking about 37 years ago. His smoking use included cigarettes. He has never used smokeless tobacco. He reports current alcohol use. He reports that he does not use drugs.    PHYSICAL EXAM:  Pulse:  [60] 60  BP: (130-150)/(72-79) 130/72  SpO2:  [97 %] 97 %  212 lbs 12.8 oz    Constitutional: alert, no distress  Respiratory: Good bilateral air entry  Cardiovascular: Regular rate and rhythm, faint systolic ejection murmur  GI: nondistended  Neuropsychiatric: appropriate affact    ASSESSMENT: Pertinent issues addressed/ reviewed during this cardiology visit  History of severe aortic stenosis status post TAVR with a 29 mm De Paz Resilia valve  Peripheral chill disease, asymptomatic  Chronic diastolic heart failure  Permanent atrial fibrillation, on Coumadin  High-grade AV block status post pacemaker implantation  Type 2 diabetes    RECOMMENDATIONS:  He is doing well from cardiac point of view.  Risk factors are well-controlled.  Continue current medical  program.  Repeat echo later this year with follow-up as arranged.    It was a pleasure seeing this patient in clinic today. Please do not hesitate to contact me with any future questions.     Gennaro Cook MD, [facility]  Cardiology - [facility]  April 22, 2024    Review of the result(s) of each unique test - Last echocardiogram, ECG, BMP     The level of medical decision making during this visit was of moderate complexity.    This note was completed in part using dictation via the Dragon voice recognition software. Some word and grammatical errors may occur and must be interpreted in the appropriate clinical context.  If there are any questions pertaining to this issue, please contact me for further clarification.

## 2024-06-12 ENCOUNTER — ANCILLARY PROCEDURE (OUTPATIENT)
Dept: CARDIOLOGY | Facility: CLINIC | Age: 79
End: 2024-06-12
Attending: INTERNAL MEDICINE
Payer: COMMERCIAL

## 2024-06-12 DIAGNOSIS — Z95.0 CARDIAC PACEMAKER IN SITU: ICD-10-CM

## 2024-06-12 PROCEDURE — 93294 REM INTERROG EVL PM/LDLS PM: CPT | Performed by: INTERNAL MEDICINE

## 2024-06-12 PROCEDURE — 93296 REM INTERROG EVL PM/IDS: CPT | Performed by: INTERNAL MEDICINE

## 2024-06-13 LAB
MDC_IDC_EPISODE_DTM: NORMAL
MDC_IDC_EPISODE_ID: NORMAL
MDC_IDC_EPISODE_TYPE: NORMAL
MDC_IDC_LEAD_CONNECTION_STATUS: NORMAL
MDC_IDC_LEAD_IMPLANT_DT: NORMAL
MDC_IDC_LEAD_LOCATION: NORMAL
MDC_IDC_LEAD_LOCATION_DETAIL_1: NORMAL
MDC_IDC_LEAD_MFG: NORMAL
MDC_IDC_LEAD_MODEL: NORMAL
MDC_IDC_LEAD_POLARITY_TYPE: NORMAL
MDC_IDC_LEAD_SERIAL: NORMAL
MDC_IDC_MSMT_BATTERY_DTM: NORMAL
MDC_IDC_MSMT_BATTERY_REMAINING_LONGEVITY: 102 MO
MDC_IDC_MSMT_BATTERY_REMAINING_PERCENTAGE: 100 %
MDC_IDC_MSMT_BATTERY_STATUS: NORMAL
MDC_IDC_MSMT_LEADCHNL_RV_IMPEDANCE_VALUE: 532 OHM
MDC_IDC_MSMT_LEADCHNL_RV_PACING_THRESHOLD_AMPLITUDE: 1.1 V
MDC_IDC_MSMT_LEADCHNL_RV_PACING_THRESHOLD_PULSEWIDTH: 0.4 MS
MDC_IDC_PG_IMPLANT_DTM: NORMAL
MDC_IDC_PG_MFG: NORMAL
MDC_IDC_PG_MODEL: NORMAL
MDC_IDC_PG_SERIAL: NORMAL
MDC_IDC_PG_TYPE: NORMAL
MDC_IDC_SESS_CLINIC_NAME: NORMAL
MDC_IDC_SESS_DTM: NORMAL
MDC_IDC_SESS_TYPE: NORMAL
MDC_IDC_SET_BRADY_LOWRATE: 60 {BEATS}/MIN
MDC_IDC_SET_BRADY_MAX_SENSOR_RATE: 115 {BEATS}/MIN
MDC_IDC_SET_BRADY_MODE: NORMAL
MDC_IDC_SET_LEADCHNL_RV_PACING_AMPLITUDE: 1.5 V
MDC_IDC_SET_LEADCHNL_RV_PACING_CAPTURE_MODE: NORMAL
MDC_IDC_SET_LEADCHNL_RV_PACING_POLARITY: NORMAL
MDC_IDC_SET_LEADCHNL_RV_PACING_PULSEWIDTH: 0.4 MS
MDC_IDC_SET_LEADCHNL_RV_SENSING_ADAPTATION_MODE: NORMAL
MDC_IDC_SET_LEADCHNL_RV_SENSING_POLARITY: NORMAL
MDC_IDC_SET_LEADCHNL_RV_SENSING_SENSITIVITY: 2.5 MV
MDC_IDC_SET_ZONE_DETECTION_INTERVAL: 375 MS
MDC_IDC_SET_ZONE_STATUS: NORMAL
MDC_IDC_SET_ZONE_TYPE: NORMAL
MDC_IDC_SET_ZONE_VENDOR_TYPE: NORMAL
MDC_IDC_STAT_BRADY_DTM_END: NORMAL
MDC_IDC_STAT_BRADY_DTM_START: NORMAL
MDC_IDC_STAT_BRADY_RV_PERCENT_PACED: 97 %
MDC_IDC_STAT_EPISODE_RECENT_COUNT: 0
MDC_IDC_STAT_EPISODE_RECENT_COUNT: 0
MDC_IDC_STAT_EPISODE_RECENT_COUNT: 1
MDC_IDC_STAT_EPISODE_RECENT_COUNT_DTM_END: NORMAL
MDC_IDC_STAT_EPISODE_RECENT_COUNT_DTM_START: NORMAL
MDC_IDC_STAT_EPISODE_TYPE: NORMAL
MDC_IDC_STAT_EPISODE_VENDOR_TYPE: NORMAL
MDC_IDC_STAT_EPISODE_VENDOR_TYPE: NORMAL

## 2024-07-05 ENCOUNTER — TELEPHONE (OUTPATIENT)
Dept: CARDIOLOGY | Facility: CLINIC | Age: 79
End: 2024-07-05
Payer: COMMERCIAL

## 2024-07-05 NOTE — TELEPHONE ENCOUNTER
1st attempt- Left voicemail for the patient to call back and schedule the following:    Appointment type:  RTN Structural   Provider:  Dr. Cook   Return date:  12/12/24  Additional appointment(s) needed:  EKG, Echo, Labs   Additonal Notes:  Please Xfer pt to me [phone]    Georgette Adamson  Structural Heart Procedure   [facility]/ [facility]

## 2024-07-31 DIAGNOSIS — I10 PRIMARY HYPERTENSION: ICD-10-CM

## 2024-07-31 RX ORDER — AMLODIPINE BESYLATE 10 MG/1
10 TABLET ORAL DAILY
Qty: 90 TABLET | Refills: 2 | Status: SHIPPED | OUTPATIENT
Start: 2024-07-31

## 2024-09-04 DIAGNOSIS — E78.2 MIXED HYPERLIPIDEMIA: ICD-10-CM

## 2024-09-04 RX ORDER — FENOFIBRATE 160 MG/1
160 TABLET ORAL DAILY
Qty: 90 TABLET | Refills: 2 | Status: SHIPPED | OUTPATIENT
Start: 2024-09-04

## 2024-09-30 ENCOUNTER — TRANSFERRED RECORDS (OUTPATIENT)
Dept: HEALTH INFORMATION MANAGEMENT | Facility: CLINIC | Age: 79
End: 2024-09-30
Payer: COMMERCIAL

## 2024-10-01 ENCOUNTER — ANCILLARY PROCEDURE (OUTPATIENT)
Dept: CARDIOLOGY | Facility: CLINIC | Age: 79
End: 2024-10-01
Attending: INTERNAL MEDICINE
Payer: COMMERCIAL

## 2024-10-01 DIAGNOSIS — I44.2 COMPLETE HEART BLOCK (H): Primary | ICD-10-CM

## 2024-10-01 DIAGNOSIS — I49.5 SICK SINUS SYNDROME (H): ICD-10-CM

## 2024-10-01 DIAGNOSIS — Z95.0 CARDIAC PACEMAKER IN SITU: ICD-10-CM

## 2024-10-01 LAB
MDC_IDC_LEAD_CONNECTION_STATUS: NORMAL
MDC_IDC_LEAD_IMPLANT_DT: NORMAL
MDC_IDC_LEAD_LOCATION: NORMAL
MDC_IDC_LEAD_LOCATION_DETAIL_1: NORMAL
MDC_IDC_LEAD_MFG: NORMAL
MDC_IDC_LEAD_MODEL: NORMAL
MDC_IDC_LEAD_POLARITY_TYPE: NORMAL
MDC_IDC_LEAD_SERIAL: NORMAL
MDC_IDC_MSMT_BATTERY_DTM: NORMAL
MDC_IDC_MSMT_BATTERY_REMAINING_LONGEVITY: 96 MO
MDC_IDC_MSMT_BATTERY_REMAINING_PERCENTAGE: 100 %
MDC_IDC_MSMT_BATTERY_STATUS: NORMAL
MDC_IDC_MSMT_LEADCHNL_RV_IMPEDANCE_VALUE: 572 OHM
MDC_IDC_MSMT_LEADCHNL_RV_PACING_THRESHOLD_AMPLITUDE: 0.9 V
MDC_IDC_MSMT_LEADCHNL_RV_PACING_THRESHOLD_PULSEWIDTH: 0.4 MS
MDC_IDC_PG_IMPLANT_DTM: NORMAL
MDC_IDC_PG_MFG: NORMAL
MDC_IDC_PG_MODEL: NORMAL
MDC_IDC_PG_SERIAL: NORMAL
MDC_IDC_PG_TYPE: NORMAL
MDC_IDC_SESS_CLINIC_NAME: NORMAL
MDC_IDC_SESS_DTM: NORMAL
MDC_IDC_SESS_TYPE: NORMAL
MDC_IDC_SET_BRADY_LOWRATE: 60 {BEATS}/MIN
MDC_IDC_SET_BRADY_MAX_SENSOR_RATE: 115 {BEATS}/MIN
MDC_IDC_SET_BRADY_MODE: NORMAL
MDC_IDC_SET_LEADCHNL_RV_PACING_AMPLITUDE: 1.5 V
MDC_IDC_SET_LEADCHNL_RV_PACING_CAPTURE_MODE: NORMAL
MDC_IDC_SET_LEADCHNL_RV_PACING_POLARITY: NORMAL
MDC_IDC_SET_LEADCHNL_RV_PACING_PULSEWIDTH: 0.4 MS
MDC_IDC_SET_LEADCHNL_RV_SENSING_ADAPTATION_MODE: NORMAL
MDC_IDC_SET_LEADCHNL_RV_SENSING_POLARITY: NORMAL
MDC_IDC_SET_LEADCHNL_RV_SENSING_SENSITIVITY: 2.5 MV
MDC_IDC_SET_ZONE_DETECTION_INTERVAL: 375 MS
MDC_IDC_SET_ZONE_STATUS: NORMAL
MDC_IDC_SET_ZONE_TYPE: NORMAL
MDC_IDC_SET_ZONE_VENDOR_TYPE: NORMAL
MDC_IDC_STAT_BRADY_DTM_END: NORMAL
MDC_IDC_STAT_BRADY_DTM_START: NORMAL
MDC_IDC_STAT_BRADY_RV_PERCENT_PACED: 97 %
MDC_IDC_STAT_EPISODE_RECENT_COUNT: 0
MDC_IDC_STAT_EPISODE_RECENT_COUNT: 0
MDC_IDC_STAT_EPISODE_RECENT_COUNT: 1
MDC_IDC_STAT_EPISODE_RECENT_COUNT_DTM_END: NORMAL
MDC_IDC_STAT_EPISODE_RECENT_COUNT_DTM_START: NORMAL
MDC_IDC_STAT_EPISODE_TYPE: NORMAL
MDC_IDC_STAT_EPISODE_VENDOR_TYPE: NORMAL
MDC_IDC_STAT_EPISODE_VENDOR_TYPE: NORMAL

## 2024-10-01 PROCEDURE — 93279 PRGRMG DEV EVAL PM/LDLS PM: CPT | Performed by: INTERNAL MEDICINE

## 2024-10-03 ENCOUNTER — APPOINTMENT (OUTPATIENT)
Dept: CT IMAGING | Facility: CLINIC | Age: 79
End: 2024-10-03
Attending: EMERGENCY MEDICINE
Payer: COMMERCIAL

## 2024-10-03 ENCOUNTER — HOSPITAL ENCOUNTER (EMERGENCY)
Facility: CLINIC | Age: 79
Discharge: HOME | End: 2024-10-03
Attending: EMERGENCY MEDICINE | Admitting: EMERGENCY MEDICINE
Payer: COMMERCIAL

## 2024-10-03 VITALS
SYSTOLIC BLOOD PRESSURE: 130 MMHG | DIASTOLIC BLOOD PRESSURE: 80 MMHG | OXYGEN SATURATION: 97 % | HEIGHT: 67 IN | WEIGHT: 200 LBS | RESPIRATION RATE: 16 BRPM | TEMPERATURE: 97.5 F | BODY MASS INDEX: 31.39 KG/M2 | HEART RATE: 60 BPM

## 2024-10-03 DIAGNOSIS — R61 NIGHT SWEATS: ICD-10-CM

## 2024-10-03 DIAGNOSIS — R53.83 OTHER FATIGUE: ICD-10-CM

## 2024-10-03 LAB
ALBUMIN SERPL BCG-MCNC: 3.7 G/DL (ref 3.5–5.2)
ALBUMIN UR-MCNC: 30 MG/DL
ALP SERPL-CCNC: 52 U/L (ref 40–150)
ALT SERPL W P-5'-P-CCNC: 80 U/L (ref 0–70)
ANION GAP SERPL CALCULATED.3IONS-SCNC: 11 MMOL/L (ref 7–15)
APPEARANCE UR: CLEAR
AST SERPL W P-5'-P-CCNC: 85 U/L (ref 0–45)
ATRIAL RATE - MUSE: 53 BPM
BASE EXCESS BLDV CALC-SCNC: 4 MMOL/L (ref -3–3)
BASOPHILS # BLD AUTO: 0.1 10E3/UL (ref 0–0.2)
BASOPHILS NFR BLD AUTO: 1 %
BILIRUB SERPL-MCNC: 0.6 MG/DL
BILIRUB UR QL STRIP: NEGATIVE
BUN SERPL-MCNC: 30 MG/DL (ref 8–23)
CALCIUM SERPL-MCNC: 9.7 MG/DL (ref 8.8–10.4)
CHLORIDE SERPL-SCNC: 95 MMOL/L (ref 98–107)
COLOR UR AUTO: YELLOW
CREAT SERPL-MCNC: 1.15 MG/DL (ref 0.67–1.17)
DIASTOLIC BLOOD PRESSURE - MUSE: NORMAL MMHG
EGFRCR SERPLBLD CKD-EPI 2021: 66 ML/MIN/1.73M2
EOSINOPHIL # BLD AUTO: 0.1 10E3/UL (ref 0–0.7)
EOSINOPHIL NFR BLD AUTO: 1 %
ERYTHROCYTE [DISTWIDTH] IN BLOOD BY AUTOMATED COUNT: 13 % (ref 10–15)
FLUAV RNA SPEC QL NAA+PROBE: NEGATIVE
FLUBV RNA RESP QL NAA+PROBE: NEGATIVE
GLUCOSE SERPL-MCNC: 130 MG/DL (ref 70–99)
GLUCOSE UR STRIP-MCNC: NEGATIVE MG/DL
HCO3 BLDV-SCNC: 30 MMOL/L (ref 21–28)
HCO3 SERPL-SCNC: 28 MMOL/L (ref 22–29)
HCT VFR BLD AUTO: 46.5 % (ref 40–53)
HGB BLD-MCNC: 15.9 G/DL (ref 13.3–17.7)
HGB UR QL STRIP: NEGATIVE
IMM GRANULOCYTES # BLD: 0 10E3/UL
IMM GRANULOCYTES NFR BLD: 0 %
INR PPP: 1.94 (ref 0.85–1.15)
INTERPRETATION ECG - MUSE: NORMAL
KETONES UR STRIP-MCNC: ABNORMAL MG/DL
LACTATE BLD-SCNC: 1.4 MMOL/L
LEUKOCYTE ESTERASE UR QL STRIP: NEGATIVE
LYMPHOCYTES # BLD AUTO: 1.9 10E3/UL (ref 0.8–5.3)
LYMPHOCYTES NFR BLD AUTO: 23 %
MCH RBC QN AUTO: 31.9 PG (ref 26.5–33)
MCHC RBC AUTO-ENTMCNC: 34.2 G/DL (ref 31.5–36.5)
MCV RBC AUTO: 93 FL (ref 78–100)
MONOCYTES # BLD AUTO: 0.8 10E3/UL (ref 0–1.3)
MONOCYTES NFR BLD AUTO: 10 %
MUCOUS THREADS #/AREA URNS LPF: PRESENT /LPF
NEUTROPHILS # BLD AUTO: 5.4 10E3/UL (ref 1.6–8.3)
NEUTROPHILS NFR BLD AUTO: 65 %
NITRATE UR QL: NEGATIVE
NRBC # BLD AUTO: 0 10E3/UL
NRBC BLD AUTO-RTO: 0 /100
P AXIS - MUSE: NORMAL DEGREES
PCO2 BLDV: 46 MM HG (ref 40–50)
PH BLDV: 7.42 [PH] (ref 7.32–7.43)
PH UR STRIP: 5.5 [PH] (ref 5–7)
PLAT MORPH BLD: ABNORMAL
PLATELET # BLD AUTO: 225 10E3/UL (ref 150–450)
PO2 BLDV: 15 MM HG (ref 25–47)
POTASSIUM SERPL-SCNC: 3.8 MMOL/L (ref 3.4–5.3)
PR INTERVAL - MUSE: NORMAL MS
PROT SERPL-MCNC: 7.2 G/DL (ref 6.4–8.3)
QRS DURATION - MUSE: 186 MS
QT - MUSE: 496 MS
QTC - MUSE: 496 MS
R AXIS - MUSE: -78 DEGREES
RBC # BLD AUTO: 4.98 10E6/UL (ref 4.4–5.9)
RBC MORPH BLD: ABNORMAL
RBC URINE: 7 /HPF
RSV RNA SPEC NAA+PROBE: NEGATIVE
SAO2 % BLDV: 20 % (ref 70–75)
SARS-COV-2 RNA RESP QL NAA+PROBE: NEGATIVE
SODIUM SERPL-SCNC: 134 MMOL/L (ref 135–145)
SP GR UR STRIP: 1.03 (ref 1–1.03)
SQUAMOUS EPITHELIAL: <1 /HPF
SYSTOLIC BLOOD PRESSURE - MUSE: NORMAL MMHG
T AXIS - MUSE: 109 DEGREES
TROPONIN T SERPL HS-MCNC: 25 NG/L
UROBILINOGEN UR STRIP-MCNC: NORMAL MG/DL
VARIANT LYMPHS BLD QL SMEAR: PRESENT
VENTRICULAR RATE- MUSE: 60 BPM
WBC # BLD AUTO: 8.2 10E3/UL (ref 4–11)
WBC URINE: 2 /HPF

## 2024-10-03 PROCEDURE — 85004 AUTOMATED DIFF WBC COUNT: CPT | Performed by: EMERGENCY MEDICINE

## 2024-10-03 PROCEDURE — 250N000009 HC RX 250: Performed by: EMERGENCY MEDICINE

## 2024-10-03 PROCEDURE — 74177 CT ABD & PELVIS W/CONTRAST: CPT

## 2024-10-03 PROCEDURE — 87637 SARSCOV2&INF A&B&RSV AMP PRB: CPT | Performed by: EMERGENCY MEDICINE

## 2024-10-03 PROCEDURE — 86618 LYME DISEASE ANTIBODY: CPT | Performed by: EMERGENCY MEDICINE

## 2024-10-03 PROCEDURE — 82803 BLOOD GASES ANY COMBINATION: CPT

## 2024-10-03 PROCEDURE — 36415 COLL VENOUS BLD VENIPUNCTURE: CPT | Performed by: EMERGENCY MEDICINE

## 2024-10-03 PROCEDURE — 84484 ASSAY OF TROPONIN QUANT: CPT | Performed by: EMERGENCY MEDICINE

## 2024-10-03 PROCEDURE — 85610 PROTHROMBIN TIME: CPT | Performed by: EMERGENCY MEDICINE

## 2024-10-03 PROCEDURE — 93005 ELECTROCARDIOGRAM TRACING: CPT

## 2024-10-03 PROCEDURE — 87040 BLOOD CULTURE FOR BACTERIA: CPT | Performed by: EMERGENCY MEDICINE

## 2024-10-03 PROCEDURE — 99285 EMERGENCY DEPT VISIT HI MDM: CPT | Mod: 25

## 2024-10-03 PROCEDURE — 250N000011 HC RX IP 250 OP 636: Performed by: EMERGENCY MEDICINE

## 2024-10-03 PROCEDURE — 80053 COMPREHEN METABOLIC PANEL: CPT | Performed by: EMERGENCY MEDICINE

## 2024-10-03 PROCEDURE — 87798 DETECT AGENT NOS DNA AMP: CPT | Performed by: EMERGENCY MEDICINE

## 2024-10-03 PROCEDURE — 85025 COMPLETE CBC W/AUTO DIFF WBC: CPT | Performed by: EMERGENCY MEDICINE

## 2024-10-03 PROCEDURE — 81001 URINALYSIS AUTO W/SCOPE: CPT | Performed by: EMERGENCY MEDICINE

## 2024-10-03 PROCEDURE — 70450 CT HEAD/BRAIN W/O DYE: CPT

## 2024-10-03 RX ORDER — ACETAMINOPHEN 325 MG/1
650 TABLET ORAL ONCE
Status: COMPLETED | OUTPATIENT
Start: 2024-10-03 | End: 2024-10-03

## 2024-10-03 RX ORDER — IOPAMIDOL 755 MG/ML
101 INJECTION, SOLUTION INTRAVASCULAR ONCE
Status: COMPLETED | OUTPATIENT
Start: 2024-10-03 | End: 2024-10-03

## 2024-10-03 RX ADMIN — IOPAMIDOL 101 ML: 755 INJECTION, SOLUTION INTRAVENOUS at 19:50

## 2024-10-03 RX ADMIN — SODIUM CHLORIDE 71 ML: 9 INJECTION, SOLUTION INTRAVENOUS at 19:50

## 2024-10-03 ASSESSMENT — ACTIVITIES OF DAILY LIVING (ADL)
ADLS_ACUITY_SCORE: 36

## 2024-10-03 ASSESSMENT — COLUMBIA-SUICIDE SEVERITY RATING SCALE - C-SSRS
2. HAVE YOU ACTUALLY HAD ANY THOUGHTS OF KILLING YOURSELF IN THE PAST MONTH?: NO
1. IN THE PAST MONTH, HAVE YOU WISHED YOU WERE DEAD OR WISHED YOU COULD GO TO SLEEP AND NOT WAKE UP?: NO
6. HAVE YOU EVER DONE ANYTHING, STARTED TO DO ANYTHING, OR PREPARED TO DO ANYTHING TO END YOUR LIFE?: NO

## 2024-10-03 NOTE — ED TRIAGE NOTES
Generalized fatigue and hot/cold sweats that started on 9/22. Has not gone away since.        Triage Assessment (Adult)       Row Name 10/03/24 5250          Triage Assessment    Airway WDL WDL        Respiratory WDL    Respiratory WDL WDL        Peripheral/Neurovascular WDL    Peripheral Neurovascular WDL X  sweats        Cognitive/Neuro/Behavioral WDL    Cognitive/Neuro/Behavioral WDL WDL

## 2024-10-03 NOTE — ED PROVIDER NOTES
Emergency Department Note      History of Present Illness     Chief Complaint   Fatigue and Sweats      HPI   Jonny Goldberg is a 79 year old male anticoagulated on Warfarin for a-fib with past medical history of hypertension, hyperlipidemia , PAD, CVA, aortic valve stenosis and Hodgkin's lymphoma here for evaluation of fatigue and sweats. Patient's wife states that he has had onset of significant fatigue and muscle weakness on 9/22. Since then he has had daily chills and night sweats. States that he sweats so much that his clothes and bed sheet become soaked. Reports significant shaking two days ago because he was cold, no seizure activity. He saw a PA at [facility] earlier this week, she ordered a routine CT for end of October.  He has had some weight loss as well.  He has a history of lymphoma.  Patient reports a mild headache here in the ER. Denies chest pain, abdominal pain, vomiting, diarrhea cough, cold symptoms, fever, urinary or bowel symptoms. Patient has pacemaker.  [place] the [facility].  There are takes up there, but he does not remember any tick bites.    Independent Historian   Wife as detailed above.    Review of External Notes   4/22/24 Progress note with Dr. Cook     Past Medical History     Medical History and Problem List   Aortic valve stenosis   Blood clot in the legs  GERD  Hodgkin's lymphoma   HTN  PAD  Persistent atrial fibrillation   Cerebral artery occlusion with cerebral infarction   Mixed hyperlipidemia   Atherosclerotic cerebrovascular disease   Paroxysmal VT  Sinus pause         Medications   Uroxatral   Norvasc   Zetia   Fenofibrate   Potassium chloride   Hygroton   Cleocin   Crestor  Warfarin 4 mg     Surgical History   Appendectomy  Arthroplasty revision hip, right  Cardiac ablation  Colonoscopy   Heart catheterization  PCI  EP pacemaker device and lead implant, right ventricular  Groin cutdown with repair of left  "femoral artery, aborted TAVR, left  Carpal tunnel release, right   Knee surgeries          Physical Exam     Patient Vitals for the past 24 hrs:   BP Temp Temp src Pulse Resp SpO2 Height Weight   10/03/24 1938 -- -- -- -- -- 97 % -- --   10/03/24 1937 -- -- -- -- -- 94 % -- --   10/03/24 1936 130/80 -- -- 60 -- -- -- --   10/03/24 1435 119/68 97.5  F (36.4  C) Temporal 63 16 96 % 1.702 m (5' 7\") 90.7 kg (200 lb)     Physical Exam  General:  Sitting on bed.  HENT:  No obvious trauma to head  Right Ear:  External ear normal.   Left Ear:  External ear normal.   Nose:  Nose normal.   Eyes:  Conjunctivae and EOM are normal. Pupils are equal, round, and reactive.   Neck: Normal range of motion. Neck supple. No tracheal deviation present.   CV:  Normal heart sounds. No murmur heard.  Pulm/Chest: Effort normal and breath sounds normal.   Abd: Soft. No distension. There is no tenderness. There is no rigidity, no rebound and no guarding.   M/S: Normal range of motion.   Neuro: Awake and alert. CN II-XII grossly intact. No focal weakness. No meningeal signs.  Skin: Skin is warm and dry. No rash noted. Not diaphoretic.   Psych: Normal mood and affect. Behavior is normal.       Diagnostics     Lab Results   Labs Ordered and Resulted from Time of ED Arrival to Time of ED Departure   COMPREHENSIVE METABOLIC PANEL - Abnormal       Result Value    Sodium 134 (*)     Potassium 3.8      Carbon Dioxide (CO2) 28      Anion Gap 11      Urea Nitrogen 30.0 (*)     Creatinine 1.15      GFR Estimate 66      Calcium 9.7      Chloride 95 (*)     Glucose 130 (*)     Alkaline Phosphatase 52      AST 85 (*)     ALT 80 (*)     Protein Total 7.2      Albumin 3.7      Bilirubin Total 0.6     TROPONIN T, HIGH SENSITIVITY - Abnormal    Troponin T, High Sensitivity 25 (*)    RBC AND PLATELET MORPHOLOGY - Abnormal    RBC Morphology Confirmed RBC Indices      Platelet Assessment        Value: Automated Count Confirmed. Platelet morphology is normal.    " Reactive Lymphocytes Present (*)    INR - Abnormal    INR 1.94 (*)    ROUTINE UA WITH MICROSCOPIC REFLEX TO CULTURE - Abnormal    Color Urine Yellow      Appearance Urine Clear      Glucose Urine Negative      Bilirubin Urine Negative      Ketones Urine Trace (*)     Specific Gravity Urine 1.032      Blood Urine Negative      pH Urine 5.5      Protein Albumin Urine 30 (*)     Urobilinogen Urine Normal      Nitrite Urine Negative      Leukocyte Esterase Urine Negative      Mucus Urine Present (*)     RBC Urine 7 (*)     WBC Urine 2      Squamous Epithelials Urine <1     ISTAT GASES LACTATE VENOUS POCT - Abnormal    Lactic Acid POCT 1.4      Bicarbonate Venous POCT 30 (*)     O2 Sat, Venous POCT 20 (*)     pCO2 Venous POCT 46      pH Venous POCT 7.42      pO2 Venous POCT 15 (*)     Base Excess/Deficit (+/-) POCT 4.0 (*)    INFLUENZA A/B, RSV, & SARS-COV2 PCR - Normal    Influenza A PCR Negative      Influenza B PCR Negative      RSV PCR Negative      SARS CoV2 PCR Negative     CBC WITH PLATELETS AND DIFFERENTIAL    WBC Count 8.2      RBC Count 4.98      Hemoglobin 15.9      Hematocrit 46.5      MCV 93      MCH 31.9      MCHC 34.2      RDW 13.0      Platelet Count 225      % Neutrophils 65      % Lymphocytes 23      % Monocytes 10      % Eosinophils 1      % Basophils 1      % Immature Granulocytes 0      NRBCs per 100 WBC 0      Absolute Neutrophils 5.4      Absolute Lymphocytes 1.9      Absolute Monocytes 0.8      Absolute Eosinophils 0.1      Absolute Basophils 0.1      Absolute Immature Granulocytes 0.0      Absolute NRBCs 0.0     TICK-BORNE DISEASE PANEL BY PCR   LYME DISEASE TOTAL ANTIBODIES WITH REFLEX TO CONFIRMATION       Imaging   CT Chest/Abdomen/Pelvis w Contrast   Final Result   IMPRESSION:    1.  No evidence of lymphadenopathy within the chest, abdomen, or pelvis.   2.  Infrarenal abdominal aortic aneurysm, measuring up to 3.3 cm.   3.  Colonic diverticulosis.   4.  Mild hepatic steatosis.   5.  Tiny  nonobstructing right renal stone.   6.  Right total hip arthroplasty, with prominent osteolysis involving the acetabular component. Orthopedic follow-up recommended.   7.  Mild cardiomegaly.   8.  Indeterminate 4 mm nodule along the minor fissure, possibly a fissural lymph node.         Head CT w/o contrast   Final Result   IMPRESSION:     1.  No evidence of acute intracranial hemorrhage or mass effect.   2.  Encephalomalacia within the frontal lobes bilaterally.   3.  Mild nonspecific white matter changes.   4.  Moderate brain parenchymal volume loss.          EKG   ECG results from 10/03/24   EKG 12-lead, tracing only     Value    Systolic Blood Pressure     Diastolic Blood Pressure     Ventricular Rate 60    Atrial Rate 53    AL Interval     QRS Duration 186        QTc 496    P Axis     R AXIS -78    T Axis 109    Interpretation ECG      Ventricular-paced rhythm  Abnormal ECG  When compared with ECG of 13-Dec-2023 07:10,  Premature ventricular complexes are no longer Present  Vent. rate has decreased by   2 bpm  Confirmed by GENERATED REPORT, COMPUTER ([id number]),  Yahaira Corley ([id number]) on 10/3/2024 8:00:37 PM       Independent Interpretation   None    ED Course      Medications Administered   Medications   acetaminophen (TYLENOL) tablet 650 mg (has no administration in time range)   iopamidol (ISOVUE-370) solution 101 mL (101 mLs Intravenous $Given 10/3/24 1950)   sodium chloride 0.9 % bag 500 mL for CT scan flush use (71 mLs Intravenous $Given 10/3/24 1950)       Procedures   Procedures     Discussion of Management   None    ED Course   ED Course as of 10/03/24 2102   Thu Oct 03, 2024   1853 I obtained history and examined the patient as noted above.        Additional Documentation  None    Medical Decision Making / Diagnosis     CMS Diagnoses: None    MIPS       None    MDM   Jonny Goldberg is a very pleasant 79 year old male who presents with recurrent night sweats and fatigue.  This has been  present for almost 2 weeks.  He has not had any measurable fever.  The patient has a history of lymphoma.  He saw his primary who ordered outpatient CT.  He has not had this yet.  The patient had been [place] his [place] [facility] doing a lot of work, but felt quite fatigued and has been unable to do so since then.  The patient does not have any focal pain.  He has not had any cold symptoms.  COVID, influenza and RSV are all negative.  He is on warfarin and INR is therapeutic.  We considered intracranial hemorrhage and obtained a head CT.  Fortunately, this is negative.  No evidence of acute bleed, mass occupying lesion or stroke.  I reviewed the incidental findings within the head CT.  He does not have any focal neurologic deficits to suggest new stroke.  I do not believe an emergent MRI is necessary.  His main concern are the night sweats which require him to change his sheets every night.  He is afebrile here and has no leukocytosis.  Urine analysis is negative.  Given his history of lymphoma and unexplained night sweats, the CT scan of his chest, abdomen and pelvis was ordered here.  Fortunately, this is negative.  There are multiple incidentals as above including the need to follow-up with orthopedics regarding his prior hip replacement.  He is aware of this and plans to do so.  At this time I do not have definitive etiology for his night sweats and fatigue.  Since he has a cabin [facility] a tick screen was sent out.  I discussed the callback nurse will call him if any of these return positive.  Screening EKG showed a paced rhythm.  His troponin was detectable but is his baseline he has not had any chest pain or chest pressure and all of his symptoms have been ongoing for almost 2 weeks so serial troponins are not necessary.  Being on warfarin and not short of breath I doubt pulmonary embolism.  He denies any rash on his body to suggest cellulitis or erythema migrans.  He has no meningeal signs to suggest meningitis.   Out of concern for underlying bacteremia causing his night sweats, 1 blood culture was obtained.  Reviewed this with them.  I discussed the callback nurse will call them if it returns positive.    The treatment plan was discussed with the patient and they expressed understanding of this plan and consented to the plan.  In addition, the patient will return to the emergency department if their symptoms persist, worsen, if new symptoms arise or if there is any concern as other pathology may be present that is not evident at this time. They also understand the importance of close follow up in the clinic and if unable to do so will return to the emergency department for a reevaluation. All questions were answered.    Disposition   The patient was discharged.     Diagnosis     ICD-10-CM    1. Night sweats  R61       2. Other fatigue  R53.83            Discharge Medications   New Prescriptions    No medications on file         Scribe Disclosure:  I, Latasha Vo, am serving as a scribe at 7:00 PM on 10/3/2024 to document services personally performed by Boy Castillo DO based on my observations and the provider's statements to me.        Boy Castillo DO  10/03/24 6100     Detail Level: Detailed Depth Of Biopsy: dermis Was A Bandage Applied: Yes Size Of Lesion In Cm: 0.3 X Size Of Lesion In Cm: 0 Biopsy Type: H and E Biopsy Method: Personna blade Anesthesia Type: 0.5% lidocaine without epinephrine Anesthesia Volume In Cc: 1.5 Hemostasis: Electrocautery Wound Care: Vaseline Dressing: Band-Aid Destruction After The Procedure: No Type Of Destruction Used: Electrodesiccation Curettage Text: The wound bed was treated with curettage after the biopsy was performed. Cryotherapy Text: The wound bed was treated with cryotherapy after the biopsy was performed. Electrodesiccation Text: The wound bed was treated with electrodesiccation after the biopsy was performed. Electrodesiccation And Curettage Text: The wound bed was treated with electrodesiccation and curettage after the biopsy was performed. Silver Nitrate Text: The wound bed was treated with silver nitrate after the biopsy was performed. Lab: [id number] Lab Facility:  Consent: Verbal consent was obtained and risks were reviewed including but not limited to scarring, infection, bleeding, scabbing, incomplete removal, nerve damage and allergy to anesthesia. Post-Care Instructions: I reviewed with the patient in detail post-care instructions. Patient is to keep the biopsy site dry overnight, and then apply bacitracin/petroleum  twice daily until healed. Patient may apply hydrogen peroxide soaks to remove any crusting. Notification Instructions: Patient will be notified of biopsy results. However, patient instructed to call the office if not contacted within 2 weeks. Billing Type: Third-Party Bill Information: Selecting Yes will display possible errors in your note based on the variables you have selected. This validation is only offered as a suggestion for you. PLEASE NOTE THAT THE VALIDATION TEXT WILL BE REMOVED WHEN YOU FINALIZE YOUR NOTE. IF YOU WANT TO FAX A PRELIMINARY NOTE YOU WILL NEED TO TOGGLE THIS TO 'NO' IF YOU DO NOT WANT IT IN YOUR FAXED NOTE.

## 2024-10-04 LAB
A PHAGOCYTOPH DNA BLD QL NAA+PROBE: NOT DETECTED
B BURGDOR IGG+IGM SER QL: 0.11
BABESIA DNA BLD QL NAA+PROBE: NOT DETECTED
EHRLICHIA DNA SPEC QL NAA+PROBE: NOT DETECTED

## 2024-10-09 ENCOUNTER — OFFICE VISIT (OUTPATIENT)
Dept: UROLOGY | Facility: CLINIC | Age: 79
End: 2024-10-09
Payer: COMMERCIAL

## 2024-10-09 VITALS — DIASTOLIC BLOOD PRESSURE: 70 MMHG | SYSTOLIC BLOOD PRESSURE: 118 MMHG

## 2024-10-09 DIAGNOSIS — R97.20 ELEVATED PROSTATE SPECIFIC ANTIGEN (PSA): Primary | ICD-10-CM

## 2024-10-09 LAB
BACTERIA BLD CULT: NO GROWTH
RESIDUAL VOLUME (RV) (EXTERNAL): 16

## 2024-10-09 PROCEDURE — 99214 OFFICE O/P EST MOD 30 MIN: CPT | Mod: 25 | Performed by: UROLOGY

## 2024-10-09 PROCEDURE — 51798 US URINE CAPACITY MEASURE: CPT | Performed by: UROLOGY
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
## 2024-10-09 NOTE — NURSING NOTE
Chief Complaint   Patient presents with    Elevated PSA     Pt reports getting night sweats, fatigue.  Many tests with his primary and oncology and no answers yet.  Reports his PSA continues to rise and he is concerned this may be the cause.    PVR: 16 mL by bladder scan    Kimberly Youssef, Clinic Assistant
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
## 2024-10-17 ENCOUNTER — LAB (OUTPATIENT)
Dept: LAB | Facility: CLINIC | Age: 79
End: 2024-10-17
Payer: COMMERCIAL

## 2024-10-17 DIAGNOSIS — R97.20 ELEVATED PROSTATE SPECIFIC ANTIGEN (PSA): ICD-10-CM

## 2024-10-17 PROCEDURE — 84153 ASSAY OF PSA TOTAL: CPT

## 2024-10-17 PROCEDURE — 36415 COLL VENOUS BLD VENIPUNCTURE: CPT

## 2024-10-18 LAB — PSA SERPL DL<=0.01 NG/ML-MCNC: 7.4 NG/ML (ref 0–6.5)

## 2024-10-23 ENCOUNTER — TELEPHONE (OUTPATIENT)
Dept: UROLOGY | Facility: CLINIC | Age: 79
End: 2024-10-23
Payer: COMMERCIAL

## 2024-10-23 DIAGNOSIS — R97.20 ELEVATED PSA: Primary | ICD-10-CM

## 2024-10-24 ENCOUNTER — TELEPHONE (OUTPATIENT)
Dept: UROLOGY | Facility: CLINIC | Age: 79
End: 2024-10-24
Payer: COMMERCIAL

## 2024-10-24 NOTE — TELEPHONE ENCOUNTER
----- Message from Yuan Meredith sent at 10/23/2024 10:37 AM CDT -----  This patient need an MRI prostate (ordered)  I will call him with results thanks

## 2024-10-25 ENCOUNTER — HOSPITAL ENCOUNTER (OUTPATIENT)
Dept: GENERAL RADIOLOGY | Facility: CLINIC | Age: 79
Discharge: HOME | End: 2024-10-25
Attending: INTERNAL MEDICINE | Admitting: INTERNAL MEDICINE
Payer: COMMERCIAL

## 2024-10-25 ENCOUNTER — TRANSFERRED RECORDS (OUTPATIENT)
Dept: HEALTH INFORMATION MANAGEMENT | Facility: CLINIC | Age: 79
End: 2024-10-25

## 2024-10-25 DIAGNOSIS — D47.2 MONOCLONAL GAMMOPATHY: ICD-10-CM

## 2024-10-25 DIAGNOSIS — C81.10 HODGKIN'S DISEASE WITH NODULAR SCLEROSIS (H): ICD-10-CM

## 2024-10-25 PROCEDURE — 77075 RADEX OSSEOUS SURVEY COMPL: CPT

## 2024-10-30 ENCOUNTER — TELEPHONE (OUTPATIENT)
Dept: CARDIOLOGY | Facility: CLINIC | Age: 79
End: 2024-10-30
Payer: COMMERCIAL

## 2024-10-30 NOTE — TELEPHONE ENCOUNTER
Received call from patients PA with primary care Norma Aponte. She states that patient has been experiencing ongoing fatigue and they want to rule out cardiac cause. Patient has already had extensive workup for this fatigue though primary care and oncology.   Called and spoke with patient and his wife Bailey Fuller states that he has had ongoing fatigue since 9/22/24. He states that he also has night sweats. Reviewed with Bere Jackson CNP who recommended that patient move up his one year post TAVR follow up. This was rescheduled to 11/14/24 (soonest available) and patient and wife are aware of this change. Asked that patient call in the meantime with any questions or concerns.

## 2024-11-14 ENCOUNTER — LAB (OUTPATIENT)
Dept: LAB | Facility: CLINIC | Age: 79
End: 2024-11-14
Payer: COMMERCIAL

## 2024-11-14 ENCOUNTER — OFFICE VISIT (OUTPATIENT)
Dept: CARDIOLOGY | Facility: CLINIC | Age: 79
End: 2024-11-14
Payer: COMMERCIAL

## 2024-11-14 ENCOUNTER — HOSPITAL ENCOUNTER (OUTPATIENT)
Dept: CARDIOLOGY | Facility: CLINIC | Age: 79
Discharge: HOME | End: 2024-11-14
Attending: INTERNAL MEDICINE
Payer: COMMERCIAL

## 2024-11-14 VITALS
HEART RATE: 63 BPM | OXYGEN SATURATION: 98 % | BODY MASS INDEX: 32.71 KG/M2 | WEIGHT: 203.5 LBS | DIASTOLIC BLOOD PRESSURE: 67 MMHG | HEIGHT: 66 IN | SYSTOLIC BLOOD PRESSURE: 129 MMHG

## 2024-11-14 DIAGNOSIS — Z95.3 S/P TAVR (TRANSCATHETER AORTIC VALVE REPLACEMENT), BIOPROSTHETIC: ICD-10-CM

## 2024-11-14 DIAGNOSIS — Z95.2 S/P TAVR (TRANSCATHETER AORTIC VALVE REPLACEMENT): Primary | ICD-10-CM

## 2024-11-14 DIAGNOSIS — I35.0 SEVERE AORTIC STENOSIS: ICD-10-CM

## 2024-11-14 LAB
ANION GAP SERPL CALCULATED.3IONS-SCNC: 8 MMOL/L (ref 7–15)
BUN SERPL-MCNC: 29.8 MG/DL (ref 8–23)
CALCIUM SERPL-MCNC: 9.6 MG/DL (ref 8.8–10.4)
CHLORIDE SERPL-SCNC: 104 MMOL/L (ref 98–107)
CREAT SERPL-MCNC: 1.21 MG/DL (ref 0.67–1.17)
EGFRCR SERPLBLD CKD-EPI 2021: 62 ML/MIN/1.73M2
ERYTHROCYTE [DISTWIDTH] IN BLOOD BY AUTOMATED COUNT: 13.1 % (ref 10–15)
GLUCOSE SERPL-MCNC: 100 MG/DL (ref 70–99)
HCO3 SERPL-SCNC: 28 MMOL/L (ref 22–29)
HCT VFR BLD AUTO: 40.5 % (ref 40–53)
HGB BLD-MCNC: 13.6 G/DL (ref 13.3–17.7)
LVEF ECHO: NORMAL
MCH RBC QN AUTO: 31.6 PG (ref 26.5–33)
MCHC RBC AUTO-ENTMCNC: 33.6 G/DL (ref 31.5–36.5)
MCV RBC AUTO: 94 FL (ref 78–100)
PLATELET # BLD AUTO: 254 10E3/UL (ref 150–450)
POTASSIUM SERPL-SCNC: 4 MMOL/L (ref 3.4–5.3)
RBC # BLD AUTO: 4.31 10E6/UL (ref 4.4–5.9)
SODIUM SERPL-SCNC: 140 MMOL/L (ref 135–145)
WBC # BLD AUTO: 7.3 10E3/UL (ref 4–11)

## 2024-11-14 PROCEDURE — 255N000002 HC RX 255 OP 636: Performed by: INTERNAL MEDICINE

## 2024-11-14 PROCEDURE — 999N000208 ECHOCARDIOGRAM COMPLETE

## 2024-11-14 RX ORDER — DOXYCYCLINE 100 MG/1
100 CAPSULE ORAL 2 TIMES DAILY
COMMUNITY
Start: 2024-11-04

## 2024-11-14 RX ADMIN — HUMAN ALBUMIN MICROSPHERES AND PERFLUTREN 9 ML: 10; .22 INJECTION, SOLUTION INTRAVENOUS at 11:51

## 2024-11-14 NOTE — PATIENT INSTRUCTIONS
Today's Plan:     - We will call you with the echocardiogram results.   - Follow-up in 6 months, sooner if needed.      If you have questions or concerns please call my nurse team:  Britt RN ([phone]) or Ingrid RN ([phone])    After Hours: [phone], option #2, ask for cardiologist on-call    Scheduling phone number: [phone]    Reminder: Please bring in all current medications, over the counter supplements and vitamin bottles to your next appointment.-    It was a pleasure seeing you today!     Bere Jackson, GUMARO  11/14/2024    -

## 2024-11-14 NOTE — PROGRESS NOTES
Cardiology Clinic Progress Note  Jonny Goldberg MRN# [id number]   YOB: 1945 Age: 79 year old     Primary cardiologist: Dr. 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    He was evaluated in 2023 for progressive symptomatic aortic stenosis.  He eventually underwent transcatheter aortic valve replacement in December 2023 with 29 mm De Paz Resilia valve via right common femoral cutdown.  He was last seen by Dr. Cook April 2024 at which time he was doing quite well.    More recently, he has been experiencing progressive fatigue, night sweats, fevers.  He has had an extensive workup through his primary care and oncology team.  Labs including Lyme's disease, other tickborne illnesses, UA, and blood cultures have all been negative.  Nonetheless, he has been treated with doxycycline for presumed infection.  He had elevated PSA level and has been referred to urology.  He also has upcoming dental appointment for possible infected tooth.    Today he returns for his 1-year TAVR follow-up.  He denies chest pain, shortness of breath, syncope or near syncope, or palpitations.  He is currently on his second course of doxycycline which has seemed to significantly improve his symptoms.  He has decreased night sweats and no recent fevers.    His blood pressure is well-controlled today.  EKG in clinic shows paced rhythm.    His most recent echocardiogram 1/2024 shows well-functioning bioprosthetic aortic valve with normal gradient, no AI or PVL.  The LV function is within normal limits.           Assessment and Plan:     ASSESSMENT: Pertinent issues addressed during this visit    History of  severe aortic stenosis status post TAVR with a 29 mm De Paz Resilia valve  Chronic diastolic heart failure, NYHA class I, euvolemic on exam  Peripheral arterial disease, asymptomatic  Permanent atrial fibrillation, on Coumadin  High-grade AV block status post pacemaker implantation  Hypertension, well-controlled  Intermittent fevers, night sweats, and progressive fatigue of unclear etiology  Elevated PSA, scheduled for prostate MRI 12/2024      PLAN:     -The cause of his intermittent fevers and progressive fatigue remains unclear, though he has been feeling significantly better on his second course of doxycycline.  He had a repeat echocardiogram today which is pending, I have a low suspicion for infective endocarditis as blood cultures have been negative and he has had no recurrent fevers, though certainly we need to rule this out.  We will touch base with him regarding the results of his echocardiogram later today.   -He is scheduled to undergo prostate MRI next month, he likely has prostatitis.  -Encouraged to follow-up with dentist for possible infected tooth.   -Assuming his echocardiogram is reassuring today, okay to follow-up with us in about 6 months, sooner if needed.         Bere Jackson, RUI, APRN, CNP  Page: [phone] (8a-5p M-F)    Orders this Visit:  Orders Placed This Encounter   Procedures    Follow-Up with Cardiology    EKG 12-lead complete w/read - Clinics (performed today)     Orders Placed This Encounter   Medications    doxycycline hyclate (VIBRAMYCIN) 100 MG capsule     Sig: Take 100 mg by mouth 2 times daily.     There are no discontinued medications.    Today's clinic visit entailed:  Review of the result(s) of each unique test - echo, labs, EKG, device check, TAVR, cath  Ordering of each unique test  Prescription drug management  35 minutes spent by me on the date of the encounter doing chart review, review of test results, interpretation of tests, patient visit, and documentation  "  Provider  Link to [facility] Help Grid     The level of medical decision making during this visit was of moderate complexity.           Review of Systems:     Review of Systems:  Skin:  not assessed lumps or bumps;scaling   Eyes:       ENT:       Respiratory:  Positive for dyspnea on exertion  Cardiovascular:  dizziness;syncope or near-syncope;chest pain;palpitations;Negative Positive for;lightheadedness;edema;fatigue;dizziness  Gastroenterology:      Genitourinary:       Musculoskeletal:  Positive for joint pain;gout  Neurologic:  not assessed numbness or tingling of hands  Psychiatric:       Heme/Lymph/Imm:  Positive for allergies;chills;fever;night sweats  Endocrine:  Negative thyroid disorder;diabetes            Physical Exam:   Vitals: /67 (BP Location: Left arm)   Pulse 63   Ht 1.676 m (5' 6\")   Wt 92.3 kg (203 lb 8 oz)   SpO2 98%   BMI 32.85 kg/m    Constitutional:  cooperative        Skin:  warm and dry to the touch        Head:  normocephalic        Eyes:  pupils equal and round        ENT:  not assessed this visit        Neck:  JVP normal        Chest:  normal breath sounds, clear to auscultation, normal A-P diameter, normal symmetry, normal respiratory excursion, no use of accessory muscles        Cardiac: regular rhythm;normal S1 and S2       systolic ejection murmur;grade 1          Abdomen:  abdomen soft        Vascular: pulses full and equal                                      Extremities and Back:           Neurological:  no gross motor deficits;affect appropriate             Medications:     Current Outpatient Medications   Medication Sig Dispense Refill    alfuzosin ER (UROXATRAL) 10 MG 24 hr tablet Take 1 tablet (10 mg) by mouth daily 90 tablet 4    ALPHA LIPOIC ACID PO Take 600 mg by mouth daily      amLODIPine (NORVASC) 10 MG tablet Take 1 tablet (10 mg) by mouth daily 90 tablet 2    ascorbic acid (VITAMIN C) 1000 MG TABS Take 1 tablet by mouth daily      chlorthalidone (HYGROTON) 50 MG " tablet Take 50 mg by mouth daily      Cholecalciferol (VITAMIN D) 400 UNITS tablet Take 1 tablet by mouth every evening      CINNAMON PO Take 1 capsule by mouth every evening      clindamycin (CLEOCIN) 300 MG capsule Take 2 capsules by mouth once as needed (1 hour prior to dental appointments 2 x 300 mg = 300 mg)      co-enzyme Q-10 100 MG CAPS capsule Take 1 capsule by mouth daily      doxycycline hyclate (VIBRAMYCIN) 100 MG capsule Take 100 mg by mouth 2 times daily.      ezetimibe (ZETIA) 10 MG tablet Take 0.5 tablets (5 mg) by mouth Every Mon, Wed, Fri Morning 20 tablet 3    fenofibrate (TRIGLIDE/LOFIBRA) 160 MG tablet Take 1 tablet (160 mg) by mouth daily. 90 tablet 2    multivitamin w/minerals (THERA-VIT-M) tablet Take 1 tablet by mouth every evening      omega 3 1000 MG CAPS Take 1 capsule by mouth every evening 180 EPA/ 20 DHH      potassium chloride ER (KLOR-CON M) 20 MEQ CR tablet Take 2 tablets (40 mEq) by mouth daily 30 tablet 0    rosuvastatin (CRESTOR) 40 MG tablet Take 40 mg by mouth every evening      warfarin ANTICOAGULANT (COUMADIN) 4 MG tablet Take 1 tablet by mouth daily         Family History   Problem Relation Age of Onset    Hypertension Mother     Heart Failure Mother     Coronary Artery Disease Mother         CABG    Arrhythmia Mother     Cerebrovascular Disease Father     Heart Failure Father     Colon Cancer Father     Coronary Artery Disease Father         CABG    Coronary Artery Disease Brother         CABG    Arrhythmia Brother        Social History     Socioeconomic History    Marital status:      Spouse name: Not on file    Number of children: Not on file    Years of education: Not on file    Highest education level: Not on file   Occupational History    Not on file   Tobacco Use    Smoking status: Former     Current packs/day: 0.00     Types: Cigarettes     Quit date: 1987     Years since quittin.6    Smokeless tobacco: Never   Vaping Use    Vaping status: Never Used    Substance and Sexual Activity    Alcohol use: Yes     Comment: 1 glass daily    Drug use: No    Sexual activity: Not on file   Other Topics Concern    Parent/sibling w/ CABG, MI or angioplasty before 65F 55M? Not Asked     Service Not Asked    Blood Transfusions Not Asked    Caffeine Concern No     Comment: 2-3 a day    Occupational Exposure Not Asked    Hobby Hazards Not Asked    Sleep Concern Yes     Comment: depends    Stress Concern Not Asked    Weight Concern No     Comment: weight loss    Special Diet No    Back Care Not Asked    Exercise No     Comment: limited due to knee pain    Bike Helmet Not Asked    Seat Belt Yes    Self-Exams Not Asked   Social History Narrative    Not on file     Social Drivers of Health     Financial Resource Strain: Not on file   Food Insecurity: Not on file   Transportation Needs: Not on file   Physical Activity: Not on file   Stress: Not on file   Social Connections: Not on file   Interpersonal Safety: Not on file   Housing Stability: Not on file            Past Medical History:     Past Medical History:   Diagnosis Date    Aortic valve stenosis     severe    Blood clot in the legs     left leg after surgery    Gastro-oesophageal reflux disease     Hodgkins lymphoma (H)     bone marrow biopsy    Hypertension     PAD (peripheral artery disease) (H)     persistent atrial fibrillation     AFIB    Unspecified cerebral artery occlusion with cerebral infarction 10/01/2011              Past Surgical History:     Past Surgical History:   Procedure Laterality Date    APPENDECTOMY      ARTHROPLASTY REVISION HIP  12/27/2011    Procedure:ARTHROPLASTY REVISION HIP; RIGHT TOTAL HIP REVISION WITH BONE GRAFT  ; Surgeon:ANGIE HARRINGTON; Location:[facility] OR    BIOPSY  hodgkins lymphoma    bone marrow biopsy, chemo and radiation    CARDIAC SURGERY      cardiac ablation    COLONOSCOPY      CV CORONARY ANGIOGRAM N/A 9/23/2022    Procedure: Coronary Angiogram;  Surgeon: Gennaro Cook MD;   Location:  HEART CARDIAC CATH LAB    CV PCI N/A 9/23/2022    Procedure: Percutaneous Coronary Intervention;  Surgeon: Gennaro Cook MD;  Location:  HEART CARDIAC CATH LAB    CV RIGHT HEART CATH MEASUREMENTS RECORDED N/A 9/23/2022    Procedure: Right Heart Catheterization;  Surgeon: Gennaro Cook MD;  Location:  HEART CARDIAC CATH LAB    CV TRANSCATHETER AORTIC VALVE REPLACEMENT-FEMORAL APPROACH N/A 12/12/2023    Procedure: Transcatheter Aortic Valve Replacement-Femoral Approach;  Surgeon: Gennaro Cook MD;  Location:  HEART CARDIAC CATH LAB    EP PACEMAKER DEVICE & LEAD IMPLANT- RIGHT VENTRICULAR N/A 2/27/2023    Procedure: Pacemaker Device & Lead Implant- Right ventricular;  Surgeon: Brian Madrid MD;  Location:  HEART CARDIAC CATH LAB    ILIAC ARTERY ANGIOGRAM LEFT Left 10/10/2023    Procedure: Left Groin Cutdown with Repair of Left Femoral Artery; Aborted Transcatheter Aortic Valve Replacement;  Surgeon: German Patel MD;  Location:  HEART CARDIAC CATH LAB    ORTHOPEDIC SURGERY      hip and knee surgeries    ORTHOPEDIC SURGERY      carpal tunnel  right hand              Allergies:   Irinotecan and Penicillin g       Data:   All laboratory data reviewed:    Recent Labs   Lab Test 12/08/23  1111 10/02/23  1151 05/09/23  1000 11/03/21  1151 05/05/21  0000 08/04/20  0000 10/18/18  0000 10/17/17  0000   LDL  --   --   --   --  21  --  81 98   HDL  --   --   --   --  38  --  43 47   NHDL  --   --   --   --  69  --   --   --    CHOL  --   --   --   --  128 185 155 168   TRIG  --   --  130 77 117  --  154* 115   NTBNP 694 1,063  --   --   --   --   --   --        Lab Results   Component Value Date    WBC 7.3 11/14/2024    WBC 5.4 08/04/2020    RBC 4.31 (L) 11/14/2024    RBC 4.86 08/04/2020    HGB 13.6 11/14/2024    HGB 15.3 08/04/2020    HCT 40.5 11/14/2024    HCT 47.0 08/04/2020    MCV 94 11/14/2024    MCV 96.7 08/04/2020    MCH 31.6 11/14/2024    MCH 31.6 08/04/2020    MCHC 33.6  11/14/2024    MCHC 32.7 08/04/2020    RDW 13.1 11/14/2024    RDW 13.4 08/04/2020     11/14/2024     08/04/2020       Lab Results   Component Value Date     11/14/2024     05/05/2021    POTASSIUM 4.0 11/14/2024    POTASSIUM 3.0 (L) 09/28/2022    POTASSIUM 3.7 05/05/2021    CHLORIDE 104 11/14/2024    CHLORIDE 102 10/23/2023    CHLORIDE 100 05/05/2021    CO2 28 11/14/2024    CO2 27 09/28/2022    CO2 28 05/05/2021    ANIONGAP 8 11/14/2024    ANIONGAP 8 09/28/2022    ANIONGAP 1.9 08/04/2020     (H) 11/14/2024     (H) 09/28/2022    GLC 98 05/05/2021    BUN 29.8 (H) 11/14/2024    BUN 32 (H) 09/28/2022    BUN 27 05/05/2021    CR 1.21 (H) 11/14/2024    CR 1 05/05/2021    GFRESTIMATED 62 11/14/2024    GFRESTIMATED 62 05/05/2021    GFRESTBLACK 72 05/05/2021    SARA 9.6 11/14/2024    SARA 1.16 05/05/2021      Lab Results   Component Value Date    AST 85 (H) 10/03/2024    AST 42 05/05/2021    ALT 80 (H) 10/03/2024    ALT 28 05/05/2021       Lab Results   Component Value Date    A1C 5.9 (A) 05/06/2021       Lab Results   Component Value Date    INR 1.94 (H) 10/03/2024    INR 2.2 03/21/2024    INR 1.14 12/13/2023    INR 3.7 (H) 11/02/2023    INR 1.15 (H) 12/30/2011    INR 1.05 12/29/2011       KCCQ-12  5  5  6  3  7  7  5  5  4  6  5  5

## 2024-11-14 NOTE — LETTER
11/14/2024    Reid Hogue MD  [address]  [facility] [address]    RE: Jonny Goldberg       Dear Colleague,     I had the pleasure of seeing Jonny Goldberg in the [facility].  Cardiology Clinic Progress Note  Jonny Goldberg MRN# [id number]   YOB: 1945 Age: 79 year old     Primary cardiologist: Dr. 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    He was evaluated in 2023 for progressive symptomatic aortic stenosis.  He eventually underwent transcatheter aortic valve replacement in December 2023 with 29 mm De Paz Resilia valve via right common femoral cutdown.  He was last seen by Dr. Cook April 2024 at which time he was doing quite well.    More recently, he has been experiencing progressive fatigue, night sweats, fevers.  He has had an extensive workup through his primary care and oncology team.  Labs including Lyme's disease, other tickborne illnesses, UA, and blood cultures have all been negative.  Nonetheless, he has been treated with doxycycline for presumed infection.  He had elevated PSA level and has been referred to urology.  He also has upcoming dental appointment for possible infected tooth.    Today he returns for his 1-year TAVR follow-up.  He denies chest pain, shortness of breath, syncope or near syncope, or palpitations.  He is currently on his second course of doxycycline which has seemed to significantly improve his symptoms.  He has decreased night sweats and no recent fevers.    His blood pressure is well-controlled today.  EKG in clinic shows paced rhythm.    His most recent echocardiogram 1/2024 shows well-functioning  bioprosthetic aortic valve with normal gradient, no AI or PVL.  The LV function is within normal limits.           Assessment and Plan:     ASSESSMENT: Pertinent issues addressed during this visit    History of severe aortic stenosis status post TAVR with a 29 mm De Paz Resilia valve  Chronic diastolic heart failure, NYHA class I, euvolemic on exam  Peripheral arterial disease, asymptomatic  Permanent atrial fibrillation, on Coumadin  High-grade AV block status post pacemaker implantation  Hypertension, well-controlled  Intermittent fevers, night sweats, and progressive fatigue of unclear etiology  Elevated PSA, scheduled for prostate MRI 12/2024      PLAN:     -The cause of his intermittent fevers and progressive fatigue remains unclear, though he has been feeling significantly better on his second course of doxycycline.  He had a repeat echocardiogram today which is pending, I have a low suspicion for infective endocarditis as blood cultures have been negative and he has had no recurrent fevers, though certainly we need to rule this out.  We will touch base with him regarding the results of his echocardiogram later today.   -He is scheduled to undergo prostate MRI next month.  -Encouraged to follow-up with dentist for possible infected tooth.   -Assuming his echocardiogram today is reassuring, okay to follow-up with us in about 6 months, sooner if needed.         Bere Jackson, RUI, APRN, CNP  Page: [phone] (8a-5p M-F)    Orders this Visit:  Orders Placed This Encounter   Procedures     Follow-Up with Cardiology     EKG 12-lead complete w/read - Clinics (performed today)     Orders Placed This Encounter   Medications     doxycycline hyclate (VIBRAMYCIN) 100 MG capsule     Sig: Take 100 mg by mouth 2 times daily.     There are no discontinued medications.    Today's clinic visit entailed:  Review of the result(s) of each unique test - echo, labs, EKG, device check, TAVR, cath  Ordering of each unique  "test  Prescription drug management  35 minutes spent by me on the date of the encounter doing chart review, review of test results, interpretation of tests, patient visit, and documentation   Provider  Link to [facility] Help Grid     The level of medical decision making during this visit was of moderate complexity.           Review of Systems:     Review of Systems:  Skin:  not assessed lumps or bumps;scaling   Eyes:       ENT:       Respiratory:  Positive for dyspnea on exertion  Cardiovascular:  dizziness;syncope or near-syncope;chest pain;palpitations;Negative Positive for;lightheadedness;edema;fatigue;dizziness  Gastroenterology:      Genitourinary:       Musculoskeletal:  Positive for joint pain;gout  Neurologic:  not assessed numbness or tingling of hands  Psychiatric:       Heme/Lymph/Imm:  Positive for allergies;chills;fever;night sweats  Endocrine:  Negative thyroid disorder;diabetes            Physical Exam:   Vitals: /67 (BP Location: Left arm)   Pulse 63   Ht 1.676 m (5' 6\")   Wt 92.3 kg (203 lb 8 oz)   SpO2 98%   BMI 32.85 kg/m    Constitutional:  cooperative        Skin:  warm and dry to the touch        Head:  normocephalic        Eyes:  pupils equal and round        ENT:  not assessed this visit        Neck:  JVP normal        Chest:  normal breath sounds, clear to auscultation, normal A-P diameter, normal symmetry, normal respiratory excursion, no use of accessory muscles        Cardiac: regular rhythm;normal S1 and S2       systolic ejection murmur;grade 1          Abdomen:  abdomen soft        Vascular: pulses full and equal                                      Extremities and Back:           Neurological:  no gross motor deficits;affect appropriate             Medications:     Current Outpatient Medications   Medication Sig Dispense Refill     alfuzosin ER (UROXATRAL) 10 MG 24 hr tablet Take 1 tablet (10 mg) by mouth daily 90 tablet 4     ALPHA LIPOIC ACID PO Take 600 mg by mouth daily   "     amLODIPine (NORVASC) 10 MG tablet Take 1 tablet (10 mg) by mouth daily 90 tablet 2     ascorbic acid (VITAMIN C) 1000 MG TABS Take 1 tablet by mouth daily       chlorthalidone (HYGROTON) 50 MG tablet Take 50 mg by mouth daily       Cholecalciferol (VITAMIN D) 400 UNITS tablet Take 1 tablet by mouth every evening       CINNAMON PO Take 1 capsule by mouth every evening       clindamycin (CLEOCIN) 300 MG capsule Take 2 capsules by mouth once as needed (1 hour prior to dental appointments 2 x 300 mg = 300 mg)       co-enzyme Q-10 100 MG CAPS capsule Take 1 capsule by mouth daily       doxycycline hyclate (VIBRAMYCIN) 100 MG capsule Take 100 mg by mouth 2 times daily.       ezetimibe (ZETIA) 10 MG tablet Take 0.5 tablets (5 mg) by mouth Every Mon, Wed, Fri Morning 20 tablet 3     fenofibrate (TRIGLIDE/LOFIBRA) 160 MG tablet Take 1 tablet (160 mg) by mouth daily. 90 tablet 2     multivitamin w/minerals (THERA-VIT-M) tablet Take 1 tablet by mouth every evening       omega 3 1000 MG CAPS Take 1 capsule by mouth every evening 180 EPA/ 20 DHH       potassium chloride ER (KLOR-CON M) 20 MEQ CR tablet Take 2 tablets (40 mEq) by mouth daily 30 tablet 0     rosuvastatin (CRESTOR) 40 MG tablet Take 40 mg by mouth every evening       warfarin ANTICOAGULANT (COUMADIN) 4 MG tablet Take 1 tablet by mouth daily         Family History   Problem Relation Age of Onset     Hypertension Mother      Heart Failure Mother      Coronary Artery Disease Mother         CABG     Arrhythmia Mother      Cerebrovascular Disease Father      Heart Failure Father      Colon Cancer Father      Coronary Artery Disease Father         CABG     Coronary Artery Disease Brother         CABG     Arrhythmia Brother        Social History     Socioeconomic History     Marital status:      Spouse name: Not on file     Number of children: Not on file     Years of education: Not on file     Highest education level: Not on file   Occupational History     Not  on file   Tobacco Use     Smoking status: Former     Current packs/day: 0.00     Types: Cigarettes     Quit date: 1987     Years since quittin.6     Smokeless tobacco: Never   Vaping Use     Vaping status: Never Used   Substance and Sexual Activity     Alcohol use: Yes     Comment: 1 glass daily     Drug use: No     Sexual activity: Not on file   Other Topics Concern     Parent/sibling w/ CABG, MI or angioplasty before 65F 55M? Not Asked      Service Not Asked     Blood Transfusions Not Asked     Caffeine Concern No     Comment: 2-3 a day     Occupational Exposure Not Asked     Hobby Hazards Not Asked     Sleep Concern Yes     Comment: depends     Stress Concern Not Asked     Weight Concern No     Comment: weight loss     Special Diet No     Back Care Not Asked     Exercise No     Comment: limited due to knee pain     Bike Helmet Not Asked     Seat Belt Yes     Self-Exams Not Asked   Social History Narrative     Not on file     Social Drivers of Health     Financial Resource Strain: Not on file   Food Insecurity: Not on file   Transportation Needs: Not on file   Physical Activity: Not on file   Stress: Not on file   Social Connections: Not on file   Interpersonal Safety: Not on file   Housing Stability: Not on file            Past Medical History:     Past Medical History:   Diagnosis Date     Aortic valve stenosis     severe     Blood clot in the legs     left leg after surgery     Gastro-oesophageal reflux disease      Hodgkins lymphoma (H)     bone marrow biopsy     Hypertension      PAD (peripheral artery disease) (H)      persistent atrial fibrillation     AFIB     Unspecified cerebral artery occlusion with cerebral infarction 10/01/2011              Past Surgical History:     Past Surgical History:   Procedure Laterality Date     APPENDECTOMY       ARTHROPLASTY REVISION HIP  2011    Procedure:ARTHROPLASTY REVISION HIP; RIGHT TOTAL HIP REVISION WITH BONE GRAFT  ; Surgeon:ANGIE HARRINGTON  NATALIIA; Location: OR     BIOPSY  hodgkins lymphoma    bone marrow biopsy, chemo and radiation     CARDIAC SURGERY      cardiac ablation     COLONOSCOPY       CV CORONARY ANGIOGRAM N/A 9/23/2022    Procedure: Coronary Angiogram;  Surgeon: Gennaro Cook MD;  Location:  HEART CARDIAC CATH LAB     CV PCI N/A 9/23/2022    Procedure: Percutaneous Coronary Intervention;  Surgeon: Gennaro Cook MD;  Location:  HEART CARDIAC CATH LAB     CV RIGHT HEART CATH MEASUREMENTS RECORDED N/A 9/23/2022    Procedure: Right Heart Catheterization;  Surgeon: Gennaro Cook MD;  Location:  HEART CARDIAC CATH LAB     CV TRANSCATHETER AORTIC VALVE REPLACEMENT-FEMORAL APPROACH N/A 12/12/2023    Procedure: Transcatheter Aortic Valve Replacement-Femoral Approach;  Surgeon: Gennaro Cook MD;  Location:  HEART CARDIAC CATH LAB     EP PACEMAKER DEVICE & LEAD IMPLANT- RIGHT VENTRICULAR N/A 2/27/2023    Procedure: Pacemaker Device & Lead Implant- Right ventricular;  Surgeon: Brian Madrid MD;  Location:  HEART CARDIAC CATH LAB     ILIAC ARTERY ANGIOGRAM LEFT Left 10/10/2023    Procedure: Left Groin Cutdown with Repair of Left Femoral Artery; Aborted Transcatheter Aortic Valve Replacement;  Surgeon: German Patel MD;  Location:  HEART CARDIAC CATH LAB     ORTHOPEDIC SURGERY      hip and knee surgeries     ORTHOPEDIC SURGERY      carpal tunnel  right hand              Allergies:   Irinotecan and Penicillin g       Data:   All laboratory data reviewed:    Recent Labs   Lab Test 12/08/23  1111 10/02/23  1151 05/09/23  1000 11/03/21  1151 05/05/21  0000 08/04/20  0000 10/18/18  0000 10/17/17  0000   LDL  --   --   --   --  21  --  81 98   HDL  --   --   --   --  38  --  43 47   NHDL  --   --   --   --  69  --   --   --    CHOL  --   --   --   --  128 185 155 168   TRIG  --   --  130 77 117  --  154* 115   NTBNP 694 1,063  --   --   --   --   --   --        Lab Results   Component Value Date    WBC 7.3  11/14/2024    WBC 5.4 08/04/2020    RBC 4.31 (L) 11/14/2024    RBC 4.86 08/04/2020    HGB 13.6 11/14/2024    HGB 15.3 08/04/2020    HCT 40.5 11/14/2024    HCT 47.0 08/04/2020    MCV 94 11/14/2024    MCV 96.7 08/04/2020    MCH 31.6 11/14/2024    MCH 31.6 08/04/2020    MCHC 33.6 11/14/2024    MCHC 32.7 08/04/2020    RDW 13.1 11/14/2024    RDW 13.4 08/04/2020     11/14/2024     08/04/2020       Lab Results   Component Value Date     11/14/2024     05/05/2021    POTASSIUM 4.0 11/14/2024    POTASSIUM 3.0 (L) 09/28/2022    POTASSIUM 3.7 05/05/2021    CHLORIDE 104 11/14/2024    CHLORIDE 102 10/23/2023    CHLORIDE 100 05/05/2021    CO2 28 11/14/2024    CO2 27 09/28/2022    CO2 28 05/05/2021    ANIONGAP 8 11/14/2024    ANIONGAP 8 09/28/2022    ANIONGAP 1.9 08/04/2020     (H) 11/14/2024     (H) 09/28/2022    GLC 98 05/05/2021    BUN 29.8 (H) 11/14/2024    BUN 32 (H) 09/28/2022    BUN 27 05/05/2021    CR 1.21 (H) 11/14/2024    CR 1 05/05/2021    GFRESTIMATED 62 11/14/2024    GFRESTIMATED 62 05/05/2021    GFRESTBLACK 72 05/05/2021    SARA 9.6 11/14/2024    SARA 1.16 05/05/2021      Lab Results   Component Value Date    AST 85 (H) 10/03/2024    AST 42 05/05/2021    ALT 80 (H) 10/03/2024    ALT 28 05/05/2021       Lab Results   Component Value Date    A1C 5.9 (A) 05/06/2021       Lab Results   Component Value Date    INR 1.94 (H) 10/03/2024    INR 2.2 03/21/2024    INR 1.14 12/13/2023    INR 3.7 (H) 11/02/2023    INR 1.15 (H) 12/30/2011    INR 1.05 12/29/2011       KCCQ-12  5  5  6  3  7  7  5  5  4  6  5  5        Thank you for allowing me to participate in the care of your patient.      Sincerely,     Bere Jackson [facility]  cc:   No referring provider defined for this encounter.

## 2024-11-19 ENCOUNTER — MEDICAL CORRESPONDENCE (OUTPATIENT)
Dept: HEALTH INFORMATION MANAGEMENT | Facility: CLINIC | Age: 79
End: 2024-11-19
Payer: COMMERCIAL

## 2024-12-04 ENCOUNTER — ANCILLARY PROCEDURE (OUTPATIENT)
Dept: CARDIOLOGY | Facility: CLINIC | Age: 79
End: 2024-12-04
Attending: INTERNAL MEDICINE
Payer: COMMERCIAL

## 2024-12-04 ENCOUNTER — HOSPITAL ENCOUNTER (OUTPATIENT)
Dept: MRI IMAGING | Facility: CLINIC | Age: 79
Discharge: HOME | End: 2024-12-04
Attending: UROLOGY
Payer: COMMERCIAL

## 2024-12-04 VITALS — OXYGEN SATURATION: 91 % | HEART RATE: 70 BPM

## 2024-12-04 DIAGNOSIS — Z45.02 ENCOUNTER FOR ADJUSTMENT AND MANAGEMENT OF AUTOMATIC IMPLANTABLE CARDIAC DEFIBRILLATOR: ICD-10-CM

## 2024-12-04 DIAGNOSIS — I49.5 SICK SINUS SYNDROME (H): ICD-10-CM

## 2024-12-04 DIAGNOSIS — R97.20 ELEVATED PSA: ICD-10-CM

## 2024-12-04 DIAGNOSIS — I49.5 SICK SINUS SYNDROME (H): Primary | ICD-10-CM

## 2024-12-04 LAB
MDC_IDC_LEAD_CONNECTION_STATUS: NORMAL
MDC_IDC_LEAD_IMPLANT_DT: NORMAL
MDC_IDC_LEAD_LOCATION: NORMAL
MDC_IDC_LEAD_LOCATION_DETAIL_1: NORMAL
MDC_IDC_LEAD_MFG: NORMAL
MDC_IDC_LEAD_MODEL: NORMAL
MDC_IDC_LEAD_POLARITY_TYPE: NORMAL
MDC_IDC_LEAD_SERIAL: NORMAL
MDC_IDC_MSMT_BATTERY_DTM: NORMAL
MDC_IDC_MSMT_BATTERY_REMAINING_LONGEVITY: 102 MO
MDC_IDC_MSMT_BATTERY_REMAINING_PERCENTAGE: 100 %
MDC_IDC_MSMT_BATTERY_STATUS: NORMAL
MDC_IDC_MSMT_LEADCHNL_RV_IMPEDANCE_VALUE: 549 OHM
MDC_IDC_MSMT_LEADCHNL_RV_PACING_THRESHOLD_AMPLITUDE: 0.7 V
MDC_IDC_MSMT_LEADCHNL_RV_PACING_THRESHOLD_PULSEWIDTH: 0.4 MS
MDC_IDC_PG_IMPLANT_DTM: NORMAL
MDC_IDC_PG_MFG: NORMAL
MDC_IDC_PG_MODEL: NORMAL
MDC_IDC_PG_SERIAL: NORMAL
MDC_IDC_PG_TYPE: NORMAL
MDC_IDC_SESS_CLINIC_NAME: NORMAL
MDC_IDC_SESS_DTM: NORMAL
MDC_IDC_SESS_TYPE: NORMAL
MDC_IDC_SET_BRADY_LOWRATE: 60 {BEATS}/MIN
MDC_IDC_SET_BRADY_MAX_SENSOR_RATE: 115 {BEATS}/MIN
MDC_IDC_SET_BRADY_MODE: NORMAL
MDC_IDC_SET_LEADCHNL_RV_PACING_AMPLITUDE: 1.3 V
MDC_IDC_SET_LEADCHNL_RV_PACING_CAPTURE_MODE: NORMAL
MDC_IDC_SET_LEADCHNL_RV_PACING_POLARITY: NORMAL
MDC_IDC_SET_LEADCHNL_RV_PACING_PULSEWIDTH: 0.4 MS
MDC_IDC_SET_LEADCHNL_RV_SENSING_ADAPTATION_MODE: NORMAL
MDC_IDC_SET_LEADCHNL_RV_SENSING_POLARITY: NORMAL
MDC_IDC_SET_LEADCHNL_RV_SENSING_SENSITIVITY: 2.5 MV
MDC_IDC_SET_ZONE_DETECTION_INTERVAL: 375 MS
MDC_IDC_SET_ZONE_STATUS: NORMAL
MDC_IDC_SET_ZONE_TYPE: NORMAL
MDC_IDC_SET_ZONE_VENDOR_TYPE: NORMAL
MDC_IDC_STAT_BRADY_DTM_END: NORMAL
MDC_IDC_STAT_BRADY_DTM_START: NORMAL
MDC_IDC_STAT_BRADY_RV_PERCENT_PACED: 96 %
MDC_IDC_STAT_EPISODE_RECENT_COUNT: 0
MDC_IDC_STAT_EPISODE_RECENT_COUNT_DTM_END: NORMAL
MDC_IDC_STAT_EPISODE_RECENT_COUNT_DTM_START: NORMAL
MDC_IDC_STAT_EPISODE_TYPE: NORMAL
MDC_IDC_STAT_EPISODE_VENDOR_TYPE: NORMAL
MDC_IDC_STAT_EPISODE_VENDOR_TYPE: NORMAL

## 2024-12-04 PROCEDURE — 93286 PERI-PX EVAL PM/LDLS PM IP: CPT | Mod: 26 | Performed by: INTERNAL MEDICINE

## 2024-12-04 PROCEDURE — 72197 MRI PELVIS W/O & W/DYE: CPT

## 2024-12-04 PROCEDURE — 93286 PERI-PX EVAL PM/LDLS PM IP: CPT

## 2024-12-04 PROCEDURE — 72197 MRI PELVIS W/O & W/DYE: CPT | Mod: 26 | Performed by: STUDENT IN AN ORGANIZED HEALTH CARE EDUCATION/TRAINING PROGRAM

## 2024-12-04 PROCEDURE — 255N000002 HC RX 255 OP 636: Performed by: UROLOGY

## 2024-12-04 PROCEDURE — A9585 GADOBUTROL INJECTION: HCPCS | Performed by: UROLOGY

## 2024-12-04 RX ORDER — GADOBUTROL 604.72 MG/ML
10 INJECTION INTRAVENOUS ONCE
Status: COMPLETED | OUTPATIENT
Start: 2024-12-04 | End: 2024-12-04

## 2024-12-04 RX ADMIN — GADOBUTROL 9 ML: 604.72 INJECTION INTRAVENOUS at 09:57

## 2024-12-05 ENCOUNTER — TELEPHONE (OUTPATIENT)
Dept: SURGERY | Facility: CLINIC | Age: 79
End: 2024-12-05
Payer: COMMERCIAL

## 2024-12-05 DIAGNOSIS — R97.20 ELEVATED PROSTATE SPECIFIC ANTIGEN (PSA): Primary | ICD-10-CM

## 2024-12-05 LAB
MDC_IDC_LEAD_CONNECTION_STATUS: NORMAL
MDC_IDC_LEAD_IMPLANT_DT: NORMAL
MDC_IDC_LEAD_LOCATION: NORMAL
MDC_IDC_LEAD_LOCATION_DETAIL_1: NORMAL
MDC_IDC_LEAD_MFG: NORMAL
MDC_IDC_LEAD_MODEL: NORMAL
MDC_IDC_LEAD_POLARITY_TYPE: NORMAL
MDC_IDC_LEAD_SERIAL: NORMAL
MDC_IDC_MSMT_BATTERY_DTM: NORMAL
MDC_IDC_MSMT_BATTERY_REMAINING_LONGEVITY: 78 MO
MDC_IDC_MSMT_BATTERY_REMAINING_PERCENTAGE: 97 %
MDC_IDC_MSMT_BATTERY_STATUS: NORMAL
MDC_IDC_MSMT_LEADCHNL_RV_IMPEDANCE_VALUE: 534 OHM
MDC_IDC_MSMT_LEADCHNL_RV_PACING_THRESHOLD_AMPLITUDE: 0.6 V
MDC_IDC_MSMT_LEADCHNL_RV_PACING_THRESHOLD_PULSEWIDTH: 0.4 MS
MDC_IDC_PG_IMPLANT_DTM: NORMAL
MDC_IDC_PG_MFG: NORMAL
MDC_IDC_PG_MODEL: NORMAL
MDC_IDC_PG_SERIAL: NORMAL
MDC_IDC_PG_TYPE: NORMAL
MDC_IDC_SESS_CLINIC_NAME: NORMAL
MDC_IDC_SESS_DTM: NORMAL
MDC_IDC_SESS_TYPE: NORMAL
MDC_IDC_SET_BRADY_LOWRATE: 60 {BEATS}/MIN
MDC_IDC_SET_BRADY_MAX_SENSOR_RATE: 115 {BEATS}/MIN
MDC_IDC_SET_BRADY_MODE: NORMAL
MDC_IDC_SET_LEADCHNL_RV_PACING_AMPLITUDE: 3.5 V
MDC_IDC_SET_LEADCHNL_RV_PACING_CAPTURE_MODE: NORMAL
MDC_IDC_SET_LEADCHNL_RV_PACING_POLARITY: NORMAL
MDC_IDC_SET_LEADCHNL_RV_PACING_PULSEWIDTH: 0.4 MS
MDC_IDC_SET_LEADCHNL_RV_SENSING_ADAPTATION_MODE: NORMAL
MDC_IDC_SET_LEADCHNL_RV_SENSING_POLARITY: NORMAL
MDC_IDC_SET_LEADCHNL_RV_SENSING_SENSITIVITY: 2.5 MV
MDC_IDC_SET_ZONE_DETECTION_INTERVAL: 375 MS
MDC_IDC_SET_ZONE_STATUS: NORMAL
MDC_IDC_SET_ZONE_TYPE: NORMAL
MDC_IDC_SET_ZONE_VENDOR_TYPE: NORMAL
MDC_IDC_STAT_BRADY_DTM_END: NORMAL
MDC_IDC_STAT_BRADY_DTM_START: NORMAL
MDC_IDC_STAT_BRADY_RV_PERCENT_PACED: 96 %
MDC_IDC_STAT_EPISODE_RECENT_COUNT: 0
MDC_IDC_STAT_EPISODE_RECENT_COUNT: 0
MDC_IDC_STAT_EPISODE_RECENT_COUNT: 1
MDC_IDC_STAT_EPISODE_RECENT_COUNT_DTM_END: NORMAL
MDC_IDC_STAT_EPISODE_RECENT_COUNT_DTM_START: NORMAL
MDC_IDC_STAT_EPISODE_TYPE: NORMAL
MDC_IDC_STAT_EPISODE_VENDOR_TYPE: NORMAL
MDC_IDC_STAT_EPISODE_VENDOR_TYPE: NORMAL

## 2024-12-12 DIAGNOSIS — R50.2 HEMOGLOBINURIA AND PYREXIA DUE TO QUININE INTOXICATION: ICD-10-CM

## 2024-12-12 DIAGNOSIS — Z95.2 HEART VALVE REPLACED: Primary | ICD-10-CM

## 2024-12-12 DIAGNOSIS — R82.3 HEMOGLOBINURIA AND PYREXIA DUE TO QUININE INTOXICATION: ICD-10-CM

## 2024-12-12 DIAGNOSIS — R61 GENERALIZED HYPERHIDROSIS: ICD-10-CM

## 2024-12-12 DIAGNOSIS — T37.2X1A HEMOGLOBINURIA AND PYREXIA DUE TO QUININE INTOXICATION: ICD-10-CM

## 2024-12-26 DIAGNOSIS — R61 GENERALIZED HYPERHIDROSIS: ICD-10-CM

## 2024-12-26 DIAGNOSIS — R50.9 FUO (FEVER OF UNKNOWN ORIGIN): Primary | ICD-10-CM

## 2024-12-27 ENCOUNTER — LAB (OUTPATIENT)
Dept: LAB | Facility: CLINIC | Age: 79
End: 2024-12-27
Attending: SPECIALIST
Payer: COMMERCIAL

## 2024-12-27 ENCOUNTER — HOSPITAL ENCOUNTER (OUTPATIENT)
Dept: CT IMAGING | Facility: CLINIC | Age: 79
Discharge: HOME | End: 2024-12-27
Attending: SPECIALIST
Payer: COMMERCIAL

## 2024-12-27 DIAGNOSIS — R50.9 HYPERPYREXIA: ICD-10-CM

## 2024-12-27 DIAGNOSIS — R97.20 ELEVATED PROSTATE SPECIFIC ANTIGEN (PSA): ICD-10-CM

## 2024-12-27 DIAGNOSIS — R50.9 FUO (FEVER OF UNKNOWN ORIGIN): ICD-10-CM

## 2024-12-27 DIAGNOSIS — R61 GENERALIZED HYPERHIDROSIS: ICD-10-CM

## 2024-12-27 LAB
CREAT BLD-MCNC: 1.2 MG/DL (ref 0.7–1.3)
EGFRCR SERPLBLD CKD-EPI 2021: >60 ML/MIN/1.73M2
PSA SERPL DL<=0.01 NG/ML-MCNC: 5.24 NG/ML (ref 0–6.5)

## 2024-12-27 PROCEDURE — 84153 ASSAY OF PSA TOTAL: CPT

## 2024-12-27 PROCEDURE — 250N000009 HC RX 250: Performed by: SPECIALIST

## 2024-12-27 PROCEDURE — 36415 COLL VENOUS BLD VENIPUNCTURE: CPT

## 2024-12-27 PROCEDURE — 250N000011 HC RX IP 250 OP 636: Performed by: SPECIALIST

## 2024-12-27 PROCEDURE — 82565 ASSAY OF CREATININE: CPT

## 2024-12-27 PROCEDURE — 87040 BLOOD CULTURE FOR BACTERIA: CPT

## 2024-12-27 PROCEDURE — 74178 CT ABD&PLV WO CNTR FLWD CNTR: CPT

## 2024-12-27 RX ORDER — IOPAMIDOL 755 MG/ML
99 INJECTION, SOLUTION INTRAVASCULAR ONCE
Status: COMPLETED | OUTPATIENT
Start: 2024-12-27 | End: 2024-12-27

## 2024-12-27 RX ADMIN — IOPAMIDOL 99 ML: 755 INJECTION, SOLUTION INTRAVENOUS at 11:02

## 2024-12-27 RX ADMIN — SODIUM CHLORIDE 68 ML: 9 INJECTION, SOLUTION INTRAVENOUS at 11:02

## 2025-01-01 LAB
BACTERIA BLD CULT: NO GROWTH
BACTERIA BLD CULT: NO GROWTH

## 2025-01-09 ENCOUNTER — ANCILLARY PROCEDURE (OUTPATIENT)
Dept: CARDIOLOGY | Facility: CLINIC | Age: 80
End: 2025-01-09
Attending: INTERNAL MEDICINE
Payer: COMMERCIAL

## 2025-01-09 DIAGNOSIS — I49.5 SICK SINUS SYNDROME (H): ICD-10-CM

## 2025-01-09 DIAGNOSIS — Z95.0 CARDIAC PACEMAKER IN SITU: ICD-10-CM

## 2025-01-09 DIAGNOSIS — I44.2 COMPLETE HEART BLOCK (H): ICD-10-CM

## 2025-01-09 LAB
MDC_IDC_EPISODE_DTM: NORMAL
MDC_IDC_EPISODE_ID: NORMAL
MDC_IDC_EPISODE_TYPE: NORMAL
MDC_IDC_LEAD_CONNECTION_STATUS: NORMAL
MDC_IDC_LEAD_IMPLANT_DT: NORMAL
MDC_IDC_LEAD_LOCATION: NORMAL
MDC_IDC_LEAD_LOCATION_DETAIL_1: NORMAL
MDC_IDC_LEAD_MFG: NORMAL
MDC_IDC_LEAD_MODEL: NORMAL
MDC_IDC_LEAD_POLARITY_TYPE: NORMAL
MDC_IDC_LEAD_SERIAL: NORMAL
MDC_IDC_MSMT_BATTERY_DTM: NORMAL
MDC_IDC_MSMT_BATTERY_REMAINING_LONGEVITY: 96 MO
MDC_IDC_MSMT_BATTERY_REMAINING_PERCENTAGE: 100 %
MDC_IDC_MSMT_BATTERY_STATUS: NORMAL
MDC_IDC_MSMT_LEADCHNL_RV_IMPEDANCE_VALUE: 502 OHM
MDC_IDC_MSMT_LEADCHNL_RV_PACING_THRESHOLD_AMPLITUDE: 0.9 V
MDC_IDC_MSMT_LEADCHNL_RV_PACING_THRESHOLD_PULSEWIDTH: 0.4 MS
MDC_IDC_PG_IMPLANT_DTM: NORMAL
MDC_IDC_PG_MFG: NORMAL
MDC_IDC_PG_MODEL: NORMAL
MDC_IDC_PG_SERIAL: NORMAL
MDC_IDC_PG_TYPE: NORMAL
MDC_IDC_SESS_CLINIC_NAME: NORMAL
MDC_IDC_SESS_DTM: NORMAL
MDC_IDC_SESS_TYPE: NORMAL
MDC_IDC_SET_BRADY_LOWRATE: 60 {BEATS}/MIN
MDC_IDC_SET_BRADY_MAX_SENSOR_RATE: 115 {BEATS}/MIN
MDC_IDC_SET_BRADY_MODE: NORMAL
MDC_IDC_SET_LEADCHNL_RV_PACING_AMPLITUDE: 1.4 V
MDC_IDC_SET_LEADCHNL_RV_PACING_CAPTURE_MODE: NORMAL
MDC_IDC_SET_LEADCHNL_RV_PACING_POLARITY: NORMAL
MDC_IDC_SET_LEADCHNL_RV_PACING_PULSEWIDTH: 0.4 MS
MDC_IDC_SET_LEADCHNL_RV_SENSING_ADAPTATION_MODE: NORMAL
MDC_IDC_SET_LEADCHNL_RV_SENSING_POLARITY: NORMAL
MDC_IDC_SET_LEADCHNL_RV_SENSING_SENSITIVITY: 2.5 MV
MDC_IDC_SET_ZONE_DETECTION_INTERVAL: 375 MS
MDC_IDC_SET_ZONE_STATUS: NORMAL
MDC_IDC_SET_ZONE_TYPE: NORMAL
MDC_IDC_SET_ZONE_VENDOR_TYPE: NORMAL
MDC_IDC_STAT_BRADY_DTM_END: NORMAL
MDC_IDC_STAT_BRADY_DTM_START: NORMAL
MDC_IDC_STAT_BRADY_RV_PERCENT_PACED: 94 %
MDC_IDC_STAT_EPISODE_RECENT_COUNT: 0
MDC_IDC_STAT_EPISODE_RECENT_COUNT_DTM_END: NORMAL
MDC_IDC_STAT_EPISODE_RECENT_COUNT_DTM_START: NORMAL
MDC_IDC_STAT_EPISODE_TYPE: NORMAL
MDC_IDC_STAT_EPISODE_VENDOR_TYPE: NORMAL
MDC_IDC_STAT_EPISODE_VENDOR_TYPE: NORMAL

## 2025-01-09 PROCEDURE — 93296 REM INTERROG EVL PM/IDS: CPT | Performed by: INTERNAL MEDICINE

## 2025-01-09 PROCEDURE — 93294 REM INTERROG EVL PM/LDLS PM: CPT | Performed by: INTERNAL MEDICINE

## 2025-01-28 ENCOUNTER — TRANSFERRED RECORDS (OUTPATIENT)
Dept: HEALTH INFORMATION MANAGEMENT | Facility: CLINIC | Age: 80
End: 2025-01-28

## 2025-03-05 DIAGNOSIS — I10 PRIMARY HYPERTENSION: ICD-10-CM

## 2025-03-05 RX ORDER — AMLODIPINE BESYLATE 10 MG/1
10 TABLET ORAL DAILY
Qty: 90 TABLET | Refills: 2 | Status: SHIPPED | OUTPATIENT
Start: 2025-03-05

## 2025-03-10 ENCOUNTER — APPOINTMENT (OUTPATIENT)
Dept: GENERAL RADIOLOGY | Facility: CLINIC | Age: 80
DRG: 280 | End: 2025-03-10
Attending: EMERGENCY MEDICINE
Payer: COMMERCIAL

## 2025-03-10 ENCOUNTER — HOSPITAL ENCOUNTER (INPATIENT)
Facility: CLINIC | Age: 80
DRG: 280 | End: 2025-03-10
Attending: EMERGENCY MEDICINE | Admitting: STUDENT IN AN ORGANIZED HEALTH CARE EDUCATION/TRAINING PROGRAM
Payer: COMMERCIAL

## 2025-03-10 DIAGNOSIS — J96.21 ACUTE AND CHRONIC RESPIRATORY FAILURE WITH HYPOXIA (H): ICD-10-CM

## 2025-03-10 DIAGNOSIS — I10 PRIMARY HYPERTENSION: Primary | ICD-10-CM

## 2025-03-10 DIAGNOSIS — I50.9 ACUTE DECOMPENSATED HEART FAILURE (H): ICD-10-CM

## 2025-03-10 DIAGNOSIS — R50.9 FEVER, UNSPECIFIED FEVER CAUSE: ICD-10-CM

## 2025-03-10 LAB
ALBUMIN SERPL BCG-MCNC: 3.4 G/DL (ref 3.5–5.2)
ALBUMIN UR-MCNC: NEGATIVE MG/DL
ALP SERPL-CCNC: 77 U/L (ref 40–150)
ALT SERPL W P-5'-P-CCNC: 18 U/L (ref 0–70)
ANION GAP SERPL CALCULATED.3IONS-SCNC: 13 MMOL/L (ref 7–15)
APPEARANCE UR: CLEAR
AST SERPL W P-5'-P-CCNC: 35 U/L (ref 0–45)
ATRIAL RATE - MUSE: NORMAL BPM
BASE EXCESS BLDV CALC-SCNC: 0 MMOL/L (ref -3–3)
BASE EXCESS BLDV CALC-SCNC: 2 MMOL/L (ref -3–3)
BASOPHILS # BLD AUTO: 0.1 10E3/UL (ref 0–0.2)
BASOPHILS NFR BLD AUTO: 1 %
BILIRUB SERPL-MCNC: 0.5 MG/DL
BILIRUB UR QL STRIP: NEGATIVE
BUN SERPL-MCNC: 21 MG/DL (ref 8–23)
C PNEUM DNA SPEC QL NAA+PROBE: NOT DETECTED
CALCIUM SERPL-MCNC: 9 MG/DL (ref 8.8–10.4)
CHLORIDE SERPL-SCNC: 103 MMOL/L (ref 98–107)
COLOR UR AUTO: ABNORMAL
CREAT SERPL-MCNC: 1.03 MG/DL (ref 0.67–1.17)
CRP SERPL-MCNC: 55.83 MG/L
DIASTOLIC BLOOD PRESSURE - MUSE: NORMAL MMHG
EGFRCR SERPLBLD CKD-EPI 2021: 75 ML/MIN/1.73M2
EOSINOPHIL # BLD AUTO: 0 10E3/UL (ref 0–0.7)
EOSINOPHIL NFR BLD AUTO: 0 %
ERYTHROCYTE [DISTWIDTH] IN BLOOD BY AUTOMATED COUNT: 15.5 % (ref 10–15)
ERYTHROCYTE [SEDIMENTATION RATE] IN BLOOD BY WESTERGREN METHOD: 72 MM/HR (ref 0–20)
EST. AVERAGE GLUCOSE BLD GHB EST-MCNC: 103 MG/DL
FLUAV H1 2009 PAND RNA SPEC QL NAA+PROBE: NOT DETECTED
FLUAV H1 RNA SPEC QL NAA+PROBE: NOT DETECTED
FLUAV H3 RNA SPEC QL NAA+PROBE: NOT DETECTED
FLUAV RNA SPEC QL NAA+PROBE: NEGATIVE
FLUAV RNA SPEC QL NAA+PROBE: NOT DETECTED
FLUBV RNA RESP QL NAA+PROBE: NEGATIVE
FLUBV RNA SPEC QL NAA+PROBE: NOT DETECTED
GLUCOSE SERPL-MCNC: 142 MG/DL (ref 70–99)
GLUCOSE UR STRIP-MCNC: NEGATIVE MG/DL
HADV DNA SPEC QL NAA+PROBE: NOT DETECTED
HBA1C MFR BLD: 5.2 %
HCO3 BLDV-SCNC: 25 MMOL/L (ref 21–28)
HCO3 BLDV-SCNC: 25 MMOL/L (ref 21–28)
HCO3 SERPL-SCNC: 25 MMOL/L (ref 22–29)
HCOV PNL SPEC NAA+PROBE: NOT DETECTED
HCT VFR BLD AUTO: 33.6 % (ref 40–53)
HGB BLD-MCNC: 10.9 G/DL (ref 13.3–17.7)
HGB UR QL STRIP: NEGATIVE
HMPV RNA SPEC QL NAA+PROBE: NOT DETECTED
HOLD SPECIMEN: NORMAL
HPIV1 RNA SPEC QL NAA+PROBE: NOT DETECTED
HPIV2 RNA SPEC QL NAA+PROBE: NOT DETECTED
HPIV3 RNA SPEC QL NAA+PROBE: NOT DETECTED
HPIV4 RNA SPEC QL NAA+PROBE: NOT DETECTED
HYALINE CASTS: 7 /LPF
IMM GRANULOCYTES # BLD: 0.1 10E3/UL
IMM GRANULOCYTES NFR BLD: 0 %
INR PPP: 2.74 (ref 0.85–1.15)
INR PPP: 3.04 (ref 0.85–1.15)
INTERPRETATION ECG - MUSE: NORMAL
KETONES UR STRIP-MCNC: NEGATIVE MG/DL
LACTATE BLD-SCNC: 0.9 MMOL/L
LACTATE BLD-SCNC: 2.3 MMOL/L
LEUKOCYTE ESTERASE UR QL STRIP: NEGATIVE
LYMPHOCYTES # BLD AUTO: 2.5 10E3/UL (ref 0.8–5.3)
LYMPHOCYTES NFR BLD AUTO: 17 %
M PNEUMO DNA SPEC QL NAA+PROBE: NOT DETECTED
MAGNESIUM SERPL-MCNC: 1.6 MG/DL (ref 1.7–2.3)
MCH RBC QN AUTO: 28.9 PG (ref 26.5–33)
MCHC RBC AUTO-ENTMCNC: 32.4 G/DL (ref 31.5–36.5)
MCV RBC AUTO: 89 FL (ref 78–100)
MONOCYTES # BLD AUTO: 0.9 10E3/UL (ref 0–1.3)
MONOCYTES NFR BLD AUTO: 6 %
MUCOUS THREADS #/AREA URNS LPF: PRESENT /LPF
NEUTROPHILS # BLD AUTO: 11.2 10E3/UL (ref 1.6–8.3)
NEUTROPHILS NFR BLD AUTO: 76 %
NITRATE UR QL: NEGATIVE
NRBC # BLD AUTO: 0 10E3/UL
NRBC BLD AUTO-RTO: 0 /100
NT-PROBNP SERPL-MCNC: 3073 PG/ML (ref 0–1800)
P AXIS - MUSE: NORMAL DEGREES
PCO2 BLDV: 33 MM HG (ref 40–50)
PCO2 BLDV: 42 MM HG (ref 40–50)
PH BLDV: 7.38 [PH] (ref 7.32–7.43)
PH BLDV: 7.49 [PH] (ref 7.32–7.43)
PH UR STRIP: 5 [PH] (ref 5–7)
PLATELET # BLD AUTO: 237 10E3/UL (ref 150–450)
PO2 BLDV: 28 MM HG (ref 25–47)
PO2 BLDV: 46 MM HG (ref 25–47)
POTASSIUM SERPL-SCNC: 3.7 MMOL/L (ref 3.4–5.3)
PR INTERVAL - MUSE: NORMAL MS
PROCALCITONIN SERPL IA-MCNC: 0.68 NG/ML
PROT SERPL-MCNC: 7.7 G/DL (ref 6.4–8.3)
QRS DURATION - MUSE: 114 MS
QT - MUSE: 448 MS
QTC - MUSE: 448 MS
R AXIS - MUSE: -72 DEGREES
RBC # BLD AUTO: 3.77 10E6/UL (ref 4.4–5.9)
RBC URINE: 1 /HPF
RSV RNA SPEC NAA+PROBE: NEGATIVE
RSV RNA SPEC QL NAA+PROBE: NOT DETECTED
RSV RNA SPEC QL NAA+PROBE: NOT DETECTED
RV+EV RNA SPEC QL NAA+PROBE: NOT DETECTED
SAO2 % BLDV: 51 % (ref 70–75)
SAO2 % BLDV: 85 % (ref 70–75)
SARS-COV-2 RNA RESP QL NAA+PROBE: NEGATIVE
SODIUM SERPL-SCNC: 141 MMOL/L (ref 135–145)
SP GR UR STRIP: 1.01 (ref 1–1.03)
SYSTOLIC BLOOD PRESSURE - MUSE: NORMAL MMHG
T AXIS - MUSE: 10 DEGREES
TROPONIN T SERPL HS-MCNC: 37 NG/L
TROPONIN T SERPL HS-MCNC: 57 NG/L
TROPONIN T SERPL HS-MCNC: 66 NG/L
TROPONIN T SERPL HS-MCNC: 72 NG/L
UROBILINOGEN UR STRIP-MCNC: NORMAL MG/DL
VENTRICULAR RATE- MUSE: 60 BPM
WBC # BLD AUTO: 14.7 10E3/UL (ref 4–11)
WBC URINE: 1 /HPF

## 2025-03-10 PROCEDURE — 83036 HEMOGLOBIN GLYCOSYLATED A1C: CPT | Performed by: PHYSICIAN ASSISTANT

## 2025-03-10 PROCEDURE — 81003 URINALYSIS AUTO W/O SCOPE: CPT | Performed by: PHYSICIAN ASSISTANT

## 2025-03-10 PROCEDURE — 87486 CHLMYD PNEUM DNA AMP PROBE: CPT | Performed by: PHYSICIAN ASSISTANT

## 2025-03-10 PROCEDURE — 99291 CRITICAL CARE FIRST HOUR: CPT | Mod: 25

## 2025-03-10 PROCEDURE — 99223 1ST HOSP IP/OBS HIGH 75: CPT | Performed by: PHYSICIAN ASSISTANT

## 2025-03-10 PROCEDURE — 82803 BLOOD GASES ANY COMBINATION: CPT

## 2025-03-10 PROCEDURE — 99207 PR NO BILLABLE SERVICE THIS VISIT: CPT | Performed by: STUDENT IN AN ORGANIZED HEALTH CARE EDUCATION/TRAINING PROGRAM

## 2025-03-10 PROCEDURE — 84484 ASSAY OF TROPONIN QUANT: CPT | Performed by: PHYSICIAN ASSISTANT

## 2025-03-10 PROCEDURE — 36415 COLL VENOUS BLD VENIPUNCTURE: CPT | Performed by: PHYSICIAN ASSISTANT

## 2025-03-10 PROCEDURE — 71045 X-RAY EXAM CHEST 1 VIEW: CPT

## 2025-03-10 PROCEDURE — 999N000157 HC STATISTIC RCP TIME EA 10 MIN

## 2025-03-10 PROCEDURE — 96365 THER/PROPH/DIAG IV INF INIT: CPT

## 2025-03-10 PROCEDURE — 250N000013 HC RX MED GY IP 250 OP 250 PS 637: Performed by: STUDENT IN AN ORGANIZED HEALTH CARE EDUCATION/TRAINING PROGRAM

## 2025-03-10 PROCEDURE — 86140 C-REACTIVE PROTEIN: CPT | Performed by: PHYSICIAN ASSISTANT

## 2025-03-10 PROCEDURE — 80053 COMPREHEN METABOLIC PANEL: CPT | Performed by: EMERGENCY MEDICINE

## 2025-03-10 PROCEDURE — 85018 HEMOGLOBIN: CPT | Performed by: EMERGENCY MEDICINE

## 2025-03-10 PROCEDURE — 83605 ASSAY OF LACTIC ACID: CPT

## 2025-03-10 PROCEDURE — 36415 COLL VENOUS BLD VENIPUNCTURE: CPT | Performed by: EMERGENCY MEDICINE

## 2025-03-10 PROCEDURE — 84484 ASSAY OF TROPONIN QUANT: CPT | Performed by: EMERGENCY MEDICINE

## 2025-03-10 PROCEDURE — 99223 1ST HOSP IP/OBS HIGH 75: CPT | Performed by: INTERNAL MEDICINE

## 2025-03-10 PROCEDURE — 87040 BLOOD CULTURE FOR BACTERIA: CPT | Performed by: PHYSICIAN ASSISTANT

## 2025-03-10 PROCEDURE — 94660 CPAP INITIATION&MGMT: CPT

## 2025-03-10 PROCEDURE — 250N000011 HC RX IP 250 OP 636: Performed by: EMERGENCY MEDICINE

## 2025-03-10 PROCEDURE — 96375 TX/PRO/DX INJ NEW DRUG ADDON: CPT

## 2025-03-10 PROCEDURE — 93005 ELECTROCARDIOGRAM TRACING: CPT

## 2025-03-10 PROCEDURE — 84145 PROCALCITONIN (PCT): CPT | Performed by: EMERGENCY MEDICINE

## 2025-03-10 PROCEDURE — 120N000001 HC R&B MED SURG/OB

## 2025-03-10 PROCEDURE — 250N000011 HC RX IP 250 OP 636: Performed by: STUDENT IN AN ORGANIZED HEALTH CARE EDUCATION/TRAINING PROGRAM

## 2025-03-10 PROCEDURE — 87581 M.PNEUMON DNA AMP PROBE: CPT | Performed by: PHYSICIAN ASSISTANT

## 2025-03-10 PROCEDURE — 87040 BLOOD CULTURE FOR BACTERIA: CPT | Performed by: EMERGENCY MEDICINE

## 2025-03-10 PROCEDURE — 85652 RBC SED RATE AUTOMATED: CPT | Performed by: PHYSICIAN ASSISTANT

## 2025-03-10 PROCEDURE — 85610 PROTHROMBIN TIME: CPT | Performed by: EMERGENCY MEDICINE

## 2025-03-10 PROCEDURE — 85004 AUTOMATED DIFF WBC COUNT: CPT | Performed by: EMERGENCY MEDICINE

## 2025-03-10 PROCEDURE — 85610 PROTHROMBIN TIME: CPT | Performed by: PHYSICIAN ASSISTANT

## 2025-03-10 PROCEDURE — 250N000013 HC RX MED GY IP 250 OP 250 PS 637: Performed by: PHYSICIAN ASSISTANT

## 2025-03-10 PROCEDURE — 83880 ASSAY OF NATRIURETIC PEPTIDE: CPT | Performed by: EMERGENCY MEDICINE

## 2025-03-10 PROCEDURE — 87637 SARSCOV2&INF A&B&RSV AMP PRB: CPT | Performed by: EMERGENCY MEDICINE

## 2025-03-10 PROCEDURE — 250N000011 HC RX IP 250 OP 636: Performed by: INTERNAL MEDICINE

## 2025-03-10 PROCEDURE — 83735 ASSAY OF MAGNESIUM: CPT | Performed by: PHYSICIAN ASSISTANT

## 2025-03-10 RX ORDER — CALCIUM CARBONATE 500 MG/1
1000 TABLET, CHEWABLE ORAL 4 TIMES DAILY PRN
Status: DISCONTINUED | OUTPATIENT
Start: 2025-03-10 | End: 2025-03-14 | Stop reason: HOSPADM

## 2025-03-10 RX ORDER — ASCORBIC ACID 500 MG
1000 TABLET ORAL DAILY
Status: DISCONTINUED | OUTPATIENT
Start: 2025-03-10 | End: 2025-03-14 | Stop reason: HOSPADM

## 2025-03-10 RX ORDER — FUROSEMIDE 10 MG/ML
40 INJECTION INTRAMUSCULAR; INTRAVENOUS EVERY 12 HOURS
Status: DISCONTINUED | OUTPATIENT
Start: 2025-03-10 | End: 2025-03-10

## 2025-03-10 RX ORDER — AMOXICILLIN 250 MG
1 CAPSULE ORAL 2 TIMES DAILY PRN
Status: DISCONTINUED | OUTPATIENT
Start: 2025-03-10 | End: 2025-03-14 | Stop reason: HOSPADM

## 2025-03-10 RX ORDER — POLYETHYLENE GLYCOL 3350 17 G/17G
17 POWDER, FOR SOLUTION ORAL 2 TIMES DAILY PRN
Status: DISCONTINUED | OUTPATIENT
Start: 2025-03-10 | End: 2025-03-14 | Stop reason: HOSPADM

## 2025-03-10 RX ORDER — ACETAMINOPHEN 500 MG
1000 TABLET ORAL 3 TIMES DAILY PRN
COMMUNITY

## 2025-03-10 RX ORDER — ONDANSETRON 4 MG/1
4 TABLET, ORALLY DISINTEGRATING ORAL EVERY 6 HOURS PRN
Status: DISCONTINUED | OUTPATIENT
Start: 2025-03-10 | End: 2025-03-14 | Stop reason: HOSPADM

## 2025-03-10 RX ORDER — HYDROMORPHONE HCL IN WATER/PF 6 MG/30 ML
0.2 PATIENT CONTROLLED ANALGESIA SYRINGE INTRAVENOUS
Status: DISCONTINUED | OUTPATIENT
Start: 2025-03-10 | End: 2025-03-14 | Stop reason: HOSPADM

## 2025-03-10 RX ORDER — AMOXICILLIN 250 MG
2 CAPSULE ORAL 2 TIMES DAILY PRN
Status: DISCONTINUED | OUTPATIENT
Start: 2025-03-10 | End: 2025-03-14 | Stop reason: HOSPADM

## 2025-03-10 RX ORDER — LIDOCAINE 40 MG/G
CREAM TOPICAL
Status: DISCONTINUED | OUTPATIENT
Start: 2025-03-10 | End: 2025-03-14 | Stop reason: HOSPADM

## 2025-03-10 RX ORDER — MULTIPLE VITAMINS W/ MINERALS TAB 9MG-400MCG
1 TAB ORAL EVERY EVENING
Status: DISCONTINUED | OUTPATIENT
Start: 2025-03-10 | End: 2025-03-14 | Stop reason: HOSPADM

## 2025-03-10 RX ORDER — FENOFIBRATE 160 MG/1
160 TABLET ORAL DAILY
Status: DISCONTINUED | OUTPATIENT
Start: 2025-03-10 | End: 2025-03-10

## 2025-03-10 RX ORDER — CEFTRIAXONE 2 G/1
2 INJECTION, POWDER, FOR SOLUTION INTRAMUSCULAR; INTRAVENOUS EVERY 24 HOURS
Status: DISCONTINUED | OUTPATIENT
Start: 2025-03-10 | End: 2025-03-13

## 2025-03-10 RX ORDER — ACETAMINOPHEN 325 MG/1
650 TABLET ORAL EVERY 4 HOURS PRN
Status: DISCONTINUED | OUTPATIENT
Start: 2025-03-10 | End: 2025-03-14 | Stop reason: HOSPADM

## 2025-03-10 RX ORDER — ALFUZOSIN HYDROCHLORIDE 10 MG/1
10 TABLET, EXTENDED RELEASE ORAL DAILY
Status: DISCONTINUED | OUTPATIENT
Start: 2025-03-10 | End: 2025-03-14 | Stop reason: HOSPADM

## 2025-03-10 RX ORDER — ONDANSETRON 2 MG/ML
4 INJECTION INTRAMUSCULAR; INTRAVENOUS EVERY 6 HOURS PRN
Status: DISCONTINUED | OUTPATIENT
Start: 2025-03-10 | End: 2025-03-14 | Stop reason: HOSPADM

## 2025-03-10 RX ORDER — VALSARTAN 80 MG/1
80 TABLET ORAL DAILY
Status: DISCONTINUED | OUTPATIENT
Start: 2025-03-11 | End: 2025-03-14 | Stop reason: HOSPADM

## 2025-03-10 RX ORDER — NITROGLYCERIN 20 MG/100ML
10-200 INJECTION INTRAVENOUS CONTINUOUS
Status: DISCONTINUED | OUTPATIENT
Start: 2025-03-10 | End: 2025-03-10

## 2025-03-10 RX ORDER — WARFARIN SODIUM 4 MG/1
4 TABLET ORAL
Status: COMPLETED | OUTPATIENT
Start: 2025-03-10 | End: 2025-03-10

## 2025-03-10 RX ORDER — POTASSIUM CHLORIDE 1500 MG/1
40 TABLET, EXTENDED RELEASE ORAL DAILY
Status: DISCONTINUED | OUTPATIENT
Start: 2025-03-10 | End: 2025-03-14 | Stop reason: HOSPADM

## 2025-03-10 RX ORDER — FUROSEMIDE 10 MG/ML
40 INJECTION INTRAMUSCULAR; INTRAVENOUS
Status: DISCONTINUED | OUTPATIENT
Start: 2025-03-10 | End: 2025-03-12

## 2025-03-10 RX ORDER — PROCHLORPERAZINE MALEATE 5 MG/1
5 TABLET ORAL EVERY 6 HOURS PRN
Status: DISCONTINUED | OUTPATIENT
Start: 2025-03-10 | End: 2025-03-14 | Stop reason: HOSPADM

## 2025-03-10 RX ORDER — ACETAMINOPHEN 650 MG/1
650 SUPPOSITORY RECTAL EVERY 4 HOURS PRN
Status: DISCONTINUED | OUTPATIENT
Start: 2025-03-10 | End: 2025-03-14 | Stop reason: HOSPADM

## 2025-03-10 RX ORDER — OXYCODONE HYDROCHLORIDE 5 MG/1
5 TABLET ORAL EVERY 4 HOURS PRN
Status: DISCONTINUED | OUTPATIENT
Start: 2025-03-10 | End: 2025-03-14 | Stop reason: HOSPADM

## 2025-03-10 RX ORDER — FUROSEMIDE 10 MG/ML
60 INJECTION INTRAMUSCULAR; INTRAVENOUS ONCE
Status: COMPLETED | OUTPATIENT
Start: 2025-03-10 | End: 2025-03-10

## 2025-03-10 RX ORDER — HYDRALAZINE HYDROCHLORIDE 20 MG/ML
10 INJECTION INTRAMUSCULAR; INTRAVENOUS EVERY 4 HOURS PRN
Status: DISCONTINUED | OUTPATIENT
Start: 2025-03-10 | End: 2025-03-14 | Stop reason: HOSPADM

## 2025-03-10 RX ORDER — AMLODIPINE BESYLATE 5 MG/1
10 TABLET ORAL DAILY
Status: DISCONTINUED | OUTPATIENT
Start: 2025-03-10 | End: 2025-03-10

## 2025-03-10 RX ORDER — ROSUVASTATIN CALCIUM 20 MG/1
40 TABLET, COATED ORAL EVERY EVENING
Status: DISCONTINUED | OUTPATIENT
Start: 2025-03-10 | End: 2025-03-14 | Stop reason: HOSPADM

## 2025-03-10 RX ORDER — CHLORTHALIDONE 25 MG/1
50 TABLET ORAL DAILY
Status: DISCONTINUED | OUTPATIENT
Start: 2025-03-10 | End: 2025-03-10

## 2025-03-10 RX ADMIN — CEFTRIAXONE SODIUM 2 G: 2 INJECTION, POWDER, FOR SOLUTION INTRAMUSCULAR; INTRAVENOUS at 12:30

## 2025-03-10 RX ADMIN — POTASSIUM CHLORIDE 40 MEQ: 1500 TABLET, EXTENDED RELEASE ORAL at 14:08

## 2025-03-10 RX ADMIN — OXYCODONE HYDROCHLORIDE AND ACETAMINOPHEN 1000 MG: 500 TABLET ORAL at 14:08

## 2025-03-10 RX ADMIN — WARFARIN SODIUM 4 MG: 4 TABLET ORAL at 18:30

## 2025-03-10 RX ADMIN — EZETIMIBE 5 MG: 10 TABLET ORAL at 14:08

## 2025-03-10 RX ADMIN — ALFUZOSIN HYDROCHLORIDE 10 MG: 10 TABLET, EXTENDED RELEASE ORAL at 14:08

## 2025-03-10 RX ADMIN — AMLODIPINE BESYLATE 10 MG: 5 TABLET ORAL at 14:08

## 2025-03-10 RX ADMIN — FUROSEMIDE 60 MG: 10 INJECTION, SOLUTION INTRAVENOUS at 07:32

## 2025-03-10 RX ADMIN — NITROGLYCERIN 10 MCG/MIN: 20 INJECTION INTRAVENOUS at 06:53

## 2025-03-10 RX ADMIN — FUROSEMIDE 40 MG: 10 INJECTION, SOLUTION INTRAMUSCULAR; INTRAVENOUS at 18:30

## 2025-03-10 RX ADMIN — Medication 1 TABLET: at 20:56

## 2025-03-10 RX ADMIN — FENOFIBRATE 160 MG: 160 TABLET ORAL at 14:08

## 2025-03-10 RX ADMIN — ROSUVASTATIN CALCIUM 40 MG: 20 TABLET, FILM COATED ORAL at 20:55

## 2025-03-10 ASSESSMENT — ACTIVITIES OF DAILY LIVING (ADL)
ADLS_ACUITY_SCORE: 43

## 2025-03-10 NOTE — PROGRESS NOTES
Pt arrived via EMS on non rebreather mask. Placed on bipap per MD. Sats 94%. WOB improved after bipap placement. Current CPAP/BiPAP Settings  IPAP: 12  EPAP: 6  Rate: 14  FiO2: 25%  Timed Inspiration (sec): 0.9      CPAP/BiPAP/AVAPS/AVAPS AE Patient Assessment  Skin Assessment: Intact  Barriers Applied: Liquicell applied to bridge of nose    Plan: Rotate masks Q4. Continue to monitor.    Kourtney Huitron, RT  March 10, 2025

## 2025-03-10 NOTE — ED NOTES
[facility]  ED Nurse Handoff Report    ED Chief complaint: Shortness of Breath      ED Diagnosis:   Final diagnoses:   Acute and chronic respiratory failure with hypoxia (H)   Acute decompensated heart failure (H)   Fever, unspecified fever cause       Code Status: Hospitalist to discuss with patient    Allergies:   Allergies   Allergen Reactions    Irinotecan     Penicillin G Hives     hives       Patient Story: Arrived via ems with c/o sob. Patient also endorsed chills/. Per ems patient O2 saturations is in mid 80's. Patient also given 2x nitroglycerin and was put on Hi flow. Patient is aox4. H/o of years of chest pain. Patient is on coumadin for a-fib.   Focused Assessment:  No CP or SOB at this time    Treatments and/or interventions provided:   Medications   alfuzosin ER (UROXATRAL) 24 hr tablet 10 mg (10 mg Oral $Given 3/10/25 1408)   vitamin C (ASCORBIC ACID) tablet 1,000 mg (1,000 mg Oral $Given 3/10/25 1408)   ezetimibe (ZETIA) half-tab 5 mg (5 mg Oral $Given 3/10/25 1408)   multivitamin w/minerals (THERA-VIT-M) tablet 1 tablet (has no administration in time range)   potassium chloride april ER (KLOR-CON M20) CR tablet 40 mEq (40 mEq Oral $Given 3/10/25 1408)   rosuvastatin (CRESTOR) tablet 40 mg (has no administration in time range)   Warfarin Dose Required Daily - Pharmacist Managed (has no administration in time range)   Reason ACE/ARB/ARNI order not selected (has no administration in time range)   hydrALAZINE (APRESOLINE) injection 10 mg (has no administration in time range)   Reason beta blocker not prescribed (has no administration in time range)   lidocaine 1 % 0.1-1 mL (has no administration in time range)   lidocaine (LMX4) cream (has no administration in time range)   sodium chloride (PF) 0.9% PF flush 3 mL (3 mLs Intracatheter $Given 3/10/25 1230)   sodium chloride (PF) 0.9% PF flush 3 mL (has no administration in time range)   senna-docusate (SENOKOT-S/PERICOLACE) 8.6-50 MG per  tablet 1 tablet (has no administration in time range)     Or   senna-docusate (SENOKOT-S/PERICOLACE) 8.6-50 MG per tablet 2 tablet (has no administration in time range)   calcium carbonate (TUMS) chewable tablet 1,000 mg (has no administration in time range)   Patient is already receiving anticoagulation with heparin, enoxaparin (LOVENOX), warfarin (COUMADIN)  or other anticoagulant medication (has no administration in time range)   acetaminophen (TYLENOL) tablet 650 mg (has no administration in time range)     Or   acetaminophen (TYLENOL) Suppository 650 mg (has no administration in time range)   oxyCODONE IR (ROXICODONE) half-tab 2.5 mg (has no administration in time range)   oxyCODONE (ROXICODONE) tablet 5 mg (has no administration in time range)   HYDROmorphone (DILAUDID) injection 0.2 mg (has no administration in time range)   polyethylene glycol (MIRALAX) Packet 17 g (has no administration in time range)   ondansetron (ZOFRAN ODT) ODT tab 4 mg (has no administration in time range)     Or   ondansetron (ZOFRAN) injection 4 mg (has no administration in time range)   prochlorperazine (COMPAZINE) injection 5 mg (has no administration in time range)     Or   prochlorperazine (COMPAZINE) tablet 5 mg (has no administration in time range)   cefTRIAXone (ROCEPHIN) 2 g vial to attach to  ml bag for ADULTS or NS 50 ml bag for PEDS (0 g Intravenous Stopped 3/10/25 1300)   furosemide (LASIX) injection 40 mg (40 mg Intravenous $Given 3/10/25 1830)   valsartan (DIOVAN) tablet 80 mg (has no administration in time range)   furosemide (LASIX) injection 60 mg (60 mg Intravenous $Given 3/10/25 0732)   warfarin ANTICOAGULANT (COUMADIN) tablet 4 mg (4 mg Oral $Given 3/10/25 1830)     XR Chest Port 1 View   Final Result   IMPRESSION: Cardiomegaly. Pacemaker with lead tip in the region of the right ventricle. Endovascular aortic valve replacement. Mild pulmonary vascular congestion changes. No pneumothorax. No focal lung  infiltrates. Degenerative changes in the bilateral    shoulders.      Echocardiogram Complete    (Results Pending)     Patient's response to treatments and/or interventions: Symptoms improved    To be done/followed up on inpatient unit:  Per MD orders    Does this patient have any cognitive concerns?:  none    Activity level - Baseline/Home:  Independent  Activity Level - Current:   Stand with Assist    Patient's Preferred language: English   Needed?: No    Isolation: None  Infection: Not Applicable  Patient tested for COVID 19 prior to admission: YES  Bariatric?: No    Vital Signs:   Vitals:    03/10/25 1415 03/10/25 1500 03/10/25 1700 03/10/25 1800   BP: 129/68 118/60 108/49 128/69   Pulse: 60 60 60 60   Resp: 16 23 23 18   Temp:       TempSrc:       SpO2: 94% 94% 93% 94%   Weight:       Height:           Cardiac Rhythm:Cardiac Rhythm: Junctional rhythm    Was the PSS-3 completed:   Yes  What interventions are required if any?  none    For the majority of the shift this patient's behavior was Green.   Behavioral interventions performed were none.    ED NURSE PHONE NUMBER: [phone]

## 2025-03-10 NOTE — CONSULTS
Inpatient Cardiology Consultation.   [facility]  Date of Admission: 3/10/2025  Date of Consult: March 10, 2025      PRIMARY CARDIOLOGY TEAM:  Dr. Gennaro Cook MD.      DIAGNOSES/ASSESSMENT:    Acute dyspnea: Possible heart failure with preserved ejection fraction.  Several months of episodic dyspnea, chills, and night sweats with history of Hodgkin's lymphoma, needing further evaluation by oncology with pending bone marrow biopsy results.  Paroxysmal atrial fibrillation on warfarin anticoagulation with presenting therapeutic INR of 3.0. ecent device interrogation inconclusive for afib burden due to single-lead ventricular pacemaker.  VVIR single-lead ventricular pacemaker in situ for high-grade AV block.  Well-functioning transcatheter aortic valve implanted 2023.  Type 2 diabetes mellitus  Hypertension: /65 mmHg  Hyperlipidemia.  BMI 32.  Remote tobacco use (quit 37 years ago).  No home oxygen or C-PAP use.    PLAN:    Continue IV furosemide 60 mg daily.  Reduce chlorthalidone to 25 mg daily.  Discontinue amlodipine 10 mg daily.  Start valsartan 80 mg daily for GDMT.  Up-to-date transthoracic echocardiogram pending.  Await bone marrow biopsy results and oncology evaluation.  Consider DOAC switch from warfarin, if affordable.  Beta blockers not currently indicated and will increase pacing burden.  Patient's RN updated.  Cardiology will follow.      Breann Hunt MD, MD FACC  Cardiology    Total time today 80 minutes.  High complexity medical decision making and care coordination.      CODE STATUS:  Full Code      REASON FOR CONSULT:  Acute shortness of breath    HISTORY OF PRESENT ILLNESS:  Jonny Goldberg is a 79 year old male with past medical history significant for aortic stenosis status post TAVR with a 29mm [place] 3 valve in 12/2023, high-grade AV block status post pacemaker implantation, peripheral arterial disease, type 2 diabetes, persistent atrial fibrillation,  hx of Hodgkins lymphoma, remote tobacco use (quit 37 years ago) and hypertension. No home oxygen or C-PAP use. BMI 32.    Over the last six months, he has experienced mid afternoon dyspnea and chills and night sweats associated with shortness of breath. He was admitted after waking with acute shortness of breath. EMS noted his oxygen saturation in the low 80s, paced heart rhythm and he had symptomatic improvement with sublingual nitroglycerin and oxygen. Since admission, patient received IV Lasix with some improvement in breathing but still requires oxygen. Minimal lower extremity edema noted on exam.    Over the past 6 months, patient experienced chills, shaking chills (afternoons), night sweats, and shortness of breath. Recent bone marrow biopsy results are pending. On the night of admission, patient woke with acute shortness of breath and called 911. EMS found patient wet with oxygen saturation in low 80s and paced cardiac rhythm. Symptoms improved with sublingual nitroglycerin and oxygen administration.    Labs show elevated inflammatory markers (CRP 55), elevated NTproBNP of 3000, minimally elevated and insignificant cardiac troponins, stable creatinine, normal electrolytes, decreased hemoglobin (10.9 from 13.6), leukocytosis (14.7), and decreased hematocrit (33).    CXR: Stable pacemaker lead in RV, endovascular aortic valve replacement, mild pulmonary congestion, no pneumonia/infiltrates/effusions.    EKG: Ventricular paced rhythm.    Recent transthoracic echocardiogram (11-) showed normal left ventricular function (EF 60%), septal wall motion consistent with RV pacing, moderate concentric LV hypertrophy, severe left atrial enlargement, and well-seated bioprosthetic aortic valve. PYP scan suggested for amyloidosis evaluation.    Recent device interrogation showed a single-lead ventricular pacemaker in VVIR mode, 94% pacing,    REVIEW OF SYSTEMS:  A comprehensive 10 point review of systems was completed  and the pertinent positives are documented in history of present illness.    FAMILY HISTORY:  Family History   Problem Relation Age of Onset    Hypertension Mother     Heart Failure Mother     Coronary Artery Disease Mother         CABG    Arrhythmia Mother     Cerebrovascular Disease Father     Heart Failure Father     Colon Cancer Father     Coronary Artery Disease Father         CABG    Coronary Artery Disease Brother         CABG    Arrhythmia Brother        MEDICATIONS:  Prior to Admission Medications   Prescriptions Last Dose Informant Patient Reported? Taking?   ALPHA LIPOIC ACID PO 3/9/2025 Morning Self Yes Yes   Sig: Take 600 mg by mouth daily   CINNAMON PO 3/9/2025 Morning Self Yes Yes   Sig: Take 1 capsule by mouth every evening   Cholecalciferol (VITAMIN D) 400 UNITS tablet 3/9/2025 Morning Self Yes Yes   Sig: Take 1 tablet by mouth every evening   acetaminophen (TYLENOL) 500 MG tablet 3/9/2025 Bedtime  Yes Yes   Sig: Take 1,000 mg by mouth 3 times daily as needed for mild pain.   alfuzosin ER (UROXATRAL) 10 MG 24 hr tablet 3/9/2025 Morning Self No Yes   Sig: Take 1 tablet (10 mg) by mouth daily   amLODIPine (NORVASC) 10 MG tablet 3/9/2025 Morning  No Yes   Sig: Take 1 tablet (10 mg) by mouth daily.   ascorbic acid (VITAMIN C) 1000 MG TABS 3/9/2025 Morning Self Yes Yes   Sig: Take 1 tablet by mouth daily   chlorthalidone (HYGROTON) 50 MG tablet 3/9/2025 Morning Self Yes Yes   Sig: Take 50 mg by mouth daily   clindamycin (CLEOCIN) 300 MG capsule  Self Yes Yes   Sig: Take 2 capsules by mouth once as needed (1 hour prior to dental appointments 2 x 300 mg = 300 mg)   co-enzyme Q-10 100 MG CAPS capsule 3/9/2025 Morning Self Yes Yes   Sig: Take 1 capsule by mouth daily   ezetimibe (ZETIA) 10 MG tablet Past Week  No Yes   Sig: Take 0.5 tablets (5 mg) by mouth Every Mon, Wed, Fri Morning   fenofibrate (TRIGLIDE/LOFIBRA) 160 MG tablet 3/9/2025 Morning  No Yes   Sig: Take 1 tablet (160 mg) by mouth daily.    multivitamin w/minerals (THERA-VIT-M) tablet 3/9/2025 Morning Self Yes Yes   Sig: Take 1 tablet by mouth every evening   omega 3 1000 MG CAPS 3/9/2025 Evening Self Yes Yes   Sig: Take 1 capsule by mouth every evening 180 EPA/ 20 DHH   potassium chloride ER (KLOR-CON M) 20 MEQ CR tablet 3/9/2025 Morning Self No Yes   Sig: Take 2 tablets (40 mEq) by mouth daily   rosuvastatin (CRESTOR) 40 MG tablet 3/9/2025 Evening Self Yes Yes   Sig: Take 40 mg by mouth every evening   warfarin ANTICOAGULANT (COUMADIN) 4 MG tablet 3/9/2025 Evening Self Yes Yes   Sig: Take by mouth See Admin Instructions. Take 2mg Sun, Tue and 4mg all other days      Facility-Administered Medications: None       HOME MEDICATIONS:  Prior to Admission Medications   Prescriptions Last Dose Informant Patient Reported? Taking?   ALPHA LIPOIC ACID PO 3/9/2025 Morning Self Yes Yes   Sig: Take 600 mg by mouth daily   CINNAMON PO 3/9/2025 Morning Self Yes Yes   Sig: Take 1 capsule by mouth every evening   Cholecalciferol (VITAMIN D) 400 UNITS tablet 3/9/2025 Morning Self Yes Yes   Sig: Take 1 tablet by mouth every evening   acetaminophen (TYLENOL) 500 MG tablet 3/9/2025 Bedtime  Yes Yes   Sig: Take 1,000 mg by mouth 3 times daily as needed for mild pain.   alfuzosin ER (UROXATRAL) 10 MG 24 hr tablet 3/9/2025 Morning Self No Yes   Sig: Take 1 tablet (10 mg) by mouth daily   amLODIPine (NORVASC) 10 MG tablet 3/9/2025 Morning  No Yes   Sig: Take 1 tablet (10 mg) by mouth daily.   ascorbic acid (VITAMIN C) 1000 MG TABS 3/9/2025 Morning Self Yes Yes   Sig: Take 1 tablet by mouth daily   chlorthalidone (HYGROTON) 50 MG tablet 3/9/2025 Morning Self Yes Yes   Sig: Take 50 mg by mouth daily   clindamycin (CLEOCIN) 300 MG capsule  Self Yes Yes   Sig: Take 2 capsules by mouth once as needed (1 hour prior to dental appointments 2 x 300 mg = 300 mg)   co-enzyme Q-10 100 MG CAPS capsule 3/9/2025 Morning Self Yes Yes   Sig: Take 1 capsule by mouth daily   ezetimibe (ZETIA)  10 MG tablet Past Week  No Yes   Sig: Take 0.5 tablets (5 mg) by mouth Every Mon, Wed, Fri Morning   fenofibrate (TRIGLIDE/LOFIBRA) 160 MG tablet 3/9/2025 Morning  No Yes   Sig: Take 1 tablet (160 mg) by mouth daily.   multivitamin w/minerals (THERA-VIT-M) tablet 3/9/2025 Morning Self Yes Yes   Sig: Take 1 tablet by mouth every evening   omega 3 1000 MG CAPS 3/9/2025 Evening Self Yes Yes   Sig: Take 1 capsule by mouth every evening 180 EPA/ 20 DHH   potassium chloride ER (KLOR-CON M) 20 MEQ CR tablet 3/9/2025 Morning Self No Yes   Sig: Take 2 tablets (40 mEq) by mouth daily   rosuvastatin (CRESTOR) 40 MG tablet 3/9/2025 Evening Self Yes Yes   Sig: Take 40 mg by mouth every evening   warfarin ANTICOAGULANT (COUMADIN) 4 MG tablet 3/9/2025 Evening Self Yes Yes   Sig: Take by mouth See Admin Instructions. Take 2mg Sun, Tue and 4mg all other days      Facility-Administered Medications: None       ALLERGIES:  Allergies   Allergen Reactions    Irinotecan     Penicillin G Hives     hives       PAST MEDICAL HISTORY:  Past Medical History:   Diagnosis Date    Aortic valve stenosis     severe    Blood clot in the legs     left leg after surgery    Gastro-oesophageal reflux disease     Hodgkins lymphoma (H)     bone marrow biopsy    Hypertension     PAD (peripheral artery disease)     persistent atrial fibrillation     AFIB    Unspecified cerebral artery occlusion with cerebral infarction 10/01/2011       PAST SURGICAL HISTORY:  Past Surgical History:   Procedure Laterality Date    APPENDECTOMY      ARTHROPLASTY REVISION HIP  12/27/2011    Procedure:ARTHROPLASTY REVISION HIP; RIGHT TOTAL HIP REVISION WITH BONE GRAFT  ; Surgeon:ANGIE HARRINGTON; Location: OR    BIOPSY  hodgkins lymphoma    bone marrow biopsy, chemo and radiation    CARDIAC SURGERY      cardiac ablation    COLONOSCOPY      CV CORONARY ANGIOGRAM N/A 9/23/2022    Procedure: Coronary Angiogram;  Surgeon: Gennaro Cook MD;  Location:  HEART CARDIAC CATH  LAB    CV PCI N/A 9/23/2022    Procedure: Percutaneous Coronary Intervention;  Surgeon: Gennaro Cook MD;  Location:  HE[facility]ARD[facility] CATH LAB    CV RIGHT HEART CATH MEASUREMENTS RECORDED N/A 9/23/2022    Procedure: Right Heart Catheterization;  Surgeon: Gennaro Cook MD;  Location:  HEART CARDIAC CATH LAB    CV TRANSCATHETER AORTIC VALVE REPLACEMENT-FEMORAL APPROACH N/A 12/12/2023    Procedure: Transcatheter Aortic Valve Replacement-Femoral Approach;  Surgeon: Gennaro Cook MD;  Location:  HEART CARDIAC CATH LAB    EP PACEMAKER DEVICE & LEAD IMPLANT- RIGHT VENTRICULAR N/A 2/27/2023    Procedure: Pacemaker Device & Lead Implant- Right ventricular;  Surgeon: Brian Madrid MD;  Location: [facility] CARDIAC CATH LAB    ILIAC ARTERY ANGIOGRAM LEFT Left 10/10/2023    Procedure: Left Groin Cutdown with Repair of Left Femoral Artery; Aborted Transcatheter Aortic Valve Replacement;  Surgeon: German Patel MD;  Location:  HEART CARDIAC CATH LAB    ORTHOPEDIC SURGERY      hip and knee surgeries    ORTHOPEDIC SURGERY      carpal tunnel  right hand       SOCIAL HISTORY:   Jonny Goldberg  reports that he quit smoking about 37 years ago. His smoking use included cigarettes. He has never used smokeless tobacco. He reports current alcohol use. He reports that he does not use drugs.      PHYSICAL EXAMINATION:  Temp: 100.8  F (38.2  C) Temp src: Oral BP: 129/74 Pulse: 60   Resp: 18 SpO2: 96 % O2 Device: Nasal cannula Oxygen Delivery: 2 LPM  No intake/output data recorded.  Vitals:    03/10/25 0500   Weight: 91.3 kg (201 lb 4.5 oz)       Clinically Significant Risk Factors Present on Admission             # Hypomagnesemia: Lowest Mg = 1.6 mg/dL in last 2 days, will replace as needed   # Hypoalbuminemia: Lowest albumin = 3.4 g/dL at 3/10/2025  6:48 AM, will monitor as appropriate  # Drug Induced Coagulation Defect: home medication list includes an anticoagulant medication    # Hypertension: Noted on  "problem list      # Anemia: based on hgb <11       # Obesity: Estimated body mass index is 32.49 kg/m  as calculated from the following:    Height as of this encounter: 1.676 m (5' 6\").    Weight as of this encounter: 91.3 kg (201 lb 4.5 oz).        # Pacemaker present      Heart Valve Replacement  Diastolic acute    Mothilal Antoinette Hunt MD, MD    This note was completed in part using dictation via the Dragon voice recognition software. Some word and grammatical errors may occur and must be interpreted in the appropriate clinical context. If there are any questions pertaining to this issue, please contact me for further clarification.     "

## 2025-03-10 NOTE — ED NOTES
Arrived via ems with c/o sob. Patient also endorsed chills/. Per ems patient O2 saturations is in mid 80's. Patient also given 2x nitroglycerin and was put on Hi flow. Patient is aox4. H/o of years of chest pain. Patient is on coumadin for a-fib.

## 2025-03-10 NOTE — PHARMACY-ADMISSION MEDICATION HISTORY
Pharmacist Admission Medication History    Admission medication history is complete. The information provided in this note is only as accurate as the sources available at the time of the update.    Information Source(s): Patient, Family member, Hospital records, and CareEverywhere/SureScripts via in-person    Pertinent Information: None    Changes made to PTA medication list:  Added: None  Deleted: None  Changed: Warfarin dose updated      Medication History Completed By: Kole Baker [facility] 3/10/2025 9:38 AM    PTA Med List   Medication Sig Last Dose/Taking    acetaminophen (TYLENOL) 500 MG tablet Take 1,000 mg by mouth 3 times daily as needed for mild pain. 3/9/2025 Bedtime    alfuzosin ER (UROXATRAL) 10 MG 24 hr tablet Take 1 tablet (10 mg) by mouth daily 3/9/2025 Morning    ALPHA LIPOIC ACID PO Take 600 mg by mouth daily 3/9/2025 Morning    amLODIPine (NORVASC) 10 MG tablet Take 1 tablet (10 mg) by mouth daily. 3/9/2025 Morning    ascorbic acid (VITAMIN C) 1000 MG TABS Take 1 tablet by mouth daily 3/9/2025 Morning    chlorthalidone (HYGROTON) 50 MG tablet Take 50 mg by mouth daily 3/9/2025 Morning    Cholecalciferol (VITAMIN D) 400 UNITS tablet Take 1 tablet by mouth every evening 3/9/2025 Morning    CINNAMON PO Take 1 capsule by mouth every evening 3/9/2025 Morning    clindamycin (CLEOCIN) 300 MG capsule Take 2 capsules by mouth once as needed (1 hour prior to dental appointments 2 x 300 mg = 300 mg) Taking As Needed    co-enzyme Q-10 100 MG CAPS capsule Take 1 capsule by mouth daily 3/9/2025 Morning    ezetimibe (ZETIA) 10 MG tablet Take 0.5 tablets (5 mg) by mouth Every Mon, Wed, Fri Morning Past Week    fenofibrate (TRIGLIDE/LOFIBRA) 160 MG tablet Take 1 tablet (160 mg) by mouth daily. 3/9/2025 Morning    multivitamin w/minerals (THERA-VIT-M) tablet Take 1 tablet by mouth every evening 3/9/2025 Morning    omega 3 1000 MG CAPS Take 1 capsule by mouth every evening 180 EPA/ 20 DHH 3/9/2025 Evening     potassium chloride ER (KLOR-CON M) 20 MEQ CR tablet Take 2 tablets (40 mEq) by mouth daily 3/9/2025 Morning    rosuvastatin (CRESTOR) 40 MG tablet Take 40 mg by mouth every evening 3/9/2025 Evening    warfarin ANTICOAGULANT (COUMADIN) 4 MG tablet Take by mouth See Admin Instructions. Take 2mg Sun, Tue and 4mg all other days 3/9/2025 Evening

## 2025-03-10 NOTE — ED NOTES
Bed: [facility]  Expected date: 3/10/25  Expected time: 6:40 AM  Means of arrival: Ambulance  Comments:  HEMS 79M respiratory distress; edema

## 2025-03-10 NOTE — PHARMACY-ANTICOAGULATION SERVICE
Clinical Pharmacy - Warfarin Dosing Consult     Pharmacy has been consulted to manage this patient s warfarin therapy.  Indication: Atrial Fibrillation  Therapy Goal: INR 2-3  Warfarin Prior to Admission: Yes  Warfarin PTA Regimen: Take 2mg Sun, Tue and 4mg all other days  Significant drug interactions: CTX (IP order, incr risk bleed); Amlo, Fenofibrate, Crestor (PTA med, incr INR, bleeding)    INR   Date Value Ref Range Status   03/10/2025 2.74 (H) 0.85 - 1.15 Final   03/10/2025 3.04 (H) 0.85 - 1.15 Final       Recommend warfarin 4 mg today.  Pharmacy will monitor Jonny Goldberg daily and order warfarin doses to achieve specified goal.  Will continue home dose and watch for interaction w/ceftriaxone, and hgb given slightly lower than baseline.    Please contact pharmacy as soon as possible if the warfarin needs to be held for a procedure or if the warfarin goals change.      Thank you,  Maribel Oneil, PharmD

## 2025-03-10 NOTE — ED PROVIDER NOTES
"  Emergency Department Note      History of Present Illness     Chief Complaint   Shortness of Breath      HPI   Jonny Goldberg is a 79 year old male presents to the emergency department for shortness of breath and paroxysmal nocturnal dyspnea.  Patient states that he \"fights for his life every night\" with shortness of breath.  He also states he gets shaking chills associated with this.  Tonight his breathing became worse and on fire arrival O2 saturations were in the low 80s.  He appeared wet for EMS and received 2 sublingual nitro which improved his respirations.  They were missing a piece and cut and start him on CPAP.  He had a paced rhythm.  He states he has had years of chest pain but no acute change and no chest pain currently.  He is on chlorthalidone for diuretic.  He states compliance with his medications.  Denies fevers or URI symptoms at home.  He is had a mild cough.  He is found to have a fever of 100.8 here.  He did endorse shaking chills overnight.  Some lower extremity edema.  He has a history of a DVT 40 years ago but none since then.  He is on Coumadin for A-fib and is now paced he states.  He states he seen multiple doctors for his nighttime shortness of breath without any resolution.  Not on oxygen or CPAP at home.    Independent Historian   None    Review of External Notes   Progress note from 12/12/2024 where he was seen for fever and night sweats.  Patient has a valve replacement that he is also on Coumadin for.    Past Medical History     Medical History and Problem List   Past Medical History:   Diagnosis Date    Aortic valve stenosis     Blood clot in the legs     Gastro-oesophageal reflux disease     Hodgkins lymphoma (H)     Hypertension     PAD (peripheral artery disease)     persistent atrial fibrillation     Unspecified cerebral artery occlusion with cerebral infarction 10/01/2011       Medications   alfuzosin ER (UROXATRAL) 10 MG 24 hr tablet  ALPHA LIPOIC ACID PO  amLODIPine " (NORVASC) 10 MG tablet  ascorbic acid (VITAMIN C) 1000 MG TABS  chlorthalidone (HYGROTON) 50 MG tablet  Cholecalciferol (VITAMIN D) 400 UNITS tablet  CINNAMON PO  clindamycin (CLEOCIN) 300 MG capsule  co-enzyme Q-10 100 MG CAPS capsule  doxycycline hyclate (VIBRAMYCIN) 100 MG capsule  ezetimibe (ZETIA) 10 MG tablet  fenofibrate (TRIGLIDE/LOFIBRA) 160 MG tablet  multivitamin w/minerals (THERA-VIT-M) tablet  omega 3 1000 MG CAPS  potassium chloride ER (KLOR-CON M) 20 MEQ CR tablet  rosuvastatin (CRESTOR) 40 MG tablet  warfarin ANTICOAGULANT (COUMADIN) 4 MG tablet        Surgical History   Past Surgical History:   Procedure Laterality Date    APPENDECTOMY      ARTHROPLASTY REVISION HIP  12/27/2011    Procedure:ARTHROPLASTY REVISION HIP; RIGHT TOTAL HIP REVISION WITH BONE GRAFT  ; Surgeon:ANGIE HARRINGTON; Location: OR    BIOPSY  hodgkins lymphoma    bone marrow biopsy, chemo and radiation    CARDIAC SURGERY      cardiac ablation    COLONOSCOPY      CV CORONARY ANGIOGRAM N/A 9/23/2022    Procedure: Coronary Angiogram;  Surgeon: Gennaro Cook MD;  Location: [facility] CARDIAC CATH LAB    CV PCI N/A 9/23/2022    Procedure: Percutaneous Coronary Intervention;  Surgeon: Gennaro Cook MD;  Location:  HEART CARDIAC CATH LAB    CV RIGHT HEART CATH MEASUREMENTS RECORDED N/A 9/23/2022    Procedure: Right Heart Catheterization;  Surgeon: Gennaro Cook MD;  Location:  HEART CARDIAC CATH LAB    CV TRANSCATHETER AORTIC VALVE REPLACEMENT-FEMORAL APPROACH N/A 12/12/2023    Procedure: Transcatheter Aortic Valve Replacement-Femoral Approach;  Surgeon: Gennaro Cook MD;  Location:  HEART CARDIAC CATH LAB    EP PACEMAKER DEVICE & LEAD IMPLANT- RIGHT VENTRICULAR N/A 2/27/2023    Procedure: Pacemaker Device & Lead Implant- Right ventricular;  Surgeon: Brian Madird MD;  Location:  HEART CARDIAC CATH LAB    ILIAC ARTERY ANGIOGRAM LEFT Left 10/10/2023    Procedure: Left Groin Cutdown with Repair of Left  Femoral Artery; Aborted Transcatheter Aortic Valve Replacement;  Surgeon: German Patel MD;  Location:  HEART CARDIAC CATH LAB    ORTHOPEDIC SURGERY      hip and knee surgeries    ORTHOPEDIC SURGERY      carpal tunnel  right hand       Physical Exam     Patient Vitals for the past 24 hrs:   BP Temp Temp src Pulse Resp SpO2 Weight   03/10/25 0650 130/64 100.8  F (38.2  C) Oral 60 26 98 % --   03/10/25 0500 -- -- -- -- -- -- 91.3 kg (201 lb 4.5 oz)     Physical Exam  General: Patient is alert and dyspneic appearing  HEENT: Head atraumatic    Eyes: pupils equal and reactive. Conjunctiva clear   Nares: patent   Oropharynx: no lesions, uvula midline, no palatal draping, normal voice, no trismus  Neck: Supple without lymphadenopathy, no meningismus  Chest: Heart regular rate and rhythm.   Lungs: Breath sounds bilaterally with tachypnea and conversational dyspnea speaking in 1 or 2 word sentences  Abdomen: Soft, non tender, nondistended, normal bowel sounds  Back: No costovertebral angle tenderness, no midline C, T or L spine tenderness  Neuro: Grossly nonfocal, normal speech, strength equal bilaterally, CN 2-12 intact  Extremities: No deformities, equal radial and DP pulses. No clubbing, cyanosis.  No edema  Skin: Warm and dry with no rash.       Diagnostics     Lab Results   Labs Ordered and Resulted from Time of ED Arrival to Time of ED Departure   INR - Abnormal       Result Value    INR 3.04 (*)    COMPREHENSIVE METABOLIC PANEL - Abnormal    Sodium 141      Potassium 3.7      Carbon Dioxide (CO2) 25      Anion Gap 13      Urea Nitrogen 21.0      Creatinine 1.03      GFR Estimate 75      Calcium 9.0      Chloride 103      Glucose 142 (*)     Alkaline Phosphatase 77      AST 35      ALT 18      Protein Total 7.7      Albumin 3.4 (*)     Bilirubin Total 0.5     TROPONIN T, HIGH SENSITIVITY - Abnormal    Troponin T, High Sensitivity 37 (*)    NT PROBNP INPATIENT - Abnormal    N terminal Pro BNP Inpatient 3,073  (*)    CBC WITH PLATELETS AND DIFFERENTIAL - Abnormal    WBC Count 14.7 (*)     RBC Count 3.77 (*)     Hemoglobin 10.9 (*)     Hematocrit 33.6 (*)     MCV 89      MCH 28.9      MCHC 32.4      RDW 15.5 (*)     Platelet Count 237      % Neutrophils 76      % Lymphocytes 17      % Monocytes 6      % Eosinophils 0      % Basophils 1      % Immature Granulocytes 0      NRBCs per 100 WBC 0      Absolute Neutrophils 11.2 (*)     Absolute Lymphocytes 2.5      Absolute Monocytes 0.9      Absolute Eosinophils 0.0      Absolute Basophils 0.1      Absolute Immature Granulocytes 0.1      Absolute NRBCs 0.0     ISTAT GASES LACTATE VENOUS POCT - Abnormal    Lactic Acid POCT 2.3 (*)     Bicarbonate Venous POCT 25      O2 Sat, Venous POCT 51 (*)     pCO2 Venous POCT 42      pH Venous POCT 7.38      pO2 Venous POCT 28      Base Excess/Deficit (+/-) POCT 0.0     TROPONIN T, HIGH SENSITIVITY - Abnormal    Troponin T, High Sensitivity 72 (*)    ISTAT GASES LACTATE VENOUS POCT - Abnormal    Lactic Acid POCT 0.9      Bicarbonate Venous POCT 25      O2 Sat, Venous POCT 85 (*)     pCO2 Venous POCT 33 (*)     pH Venous POCT 7.49 (*)     pO2 Venous POCT 46      Base Excess/Deficit (+/-) POCT 2.0     ERYTHROCYTE SEDIMENTATION RATE AUTO - Abnormal    Erythrocyte Sedimentation Rate 72 (*)    INFLUENZA A/B, RSV AND SARS-COV2 PCR - Normal    Influenza A PCR Negative      Influenza B PCR Negative      RSV PCR Negative      SARS CoV2 PCR Negative     PROCALCITONIN   CRP INFLAMMATION   ROUTINE UA WITH MICROSCOPIC REFLEX TO CULTURE   BLOOD CULTURE   BLOOD CULTURE       Imaging   XR Chest Port 1 View   Final Result   IMPRESSION: Cardiomegaly. Pacemaker with lead tip in the region of the right ventricle. Endovascular aortic valve replacement. Mild pulmonary vascular congestion changes. No pneumothorax. No focal lung infiltrates. Degenerative changes in the bilateral    shoulders.          EKG   ECG results from 03/10/25   EKG 12-lead, tracing only      Value    Systolic Blood Pressure     Diastolic Blood Pressure     Ventricular Rate 60    Atrial Rate     KY Interval     QRS Duration 114        QTc 448    P Axis     R AXIS -72    T Axis 10    Interpretation ECG      Junctional rhythm  Left axis deviation  RSR' or QR pattern in V1 suggests right ventricular conduction delay  Inferior infarct , age undetermined  Anterolateral infarct , age undetermined  Abnormal ECG  When compared with ECG of 03-Oct-2024 19:10,  Junctional rhythm has replaced Electronic ventricular pacemaker           Independent Interpretation   CXR: No pneumothorax.  Evidence of pulmonary venous congestion and pulmonary edema    ED Course      Medications Administered   Medications   nitroGLYcerin 50 mg in D5W 250 mL (adult std) infusion CENTRAL (0 mcg/min Intravenous Stopped 3/10/25 0727)   furosemide (LASIX) injection 60 mg (60 mg Intravenous $Given 3/10/25 0732)       Procedures   Procedures     Discussion of Management   Admitting Hospitalist, Jenniffer Holland 0915    ED Course    0645 patient brought into stable room.  Acute respiratory distress.  Placed on BiPAP and a nitro drip was started.  Patient seen and examined by me.  0730 I reevaluated the patient  0800 I reevaluated the patient.  0920 I updated the patient on the findings and the plan.  Wife is at bedside and updated her as well  1000 discussed with Jenniffer Holland admitting hospitalist    Additional Documentation  None    Medical Decision Making / Diagnosis     CMS Diagnoses: None    MIPS       None    MDM   Jonny Goldberg is a 79 year old male with a PMH of hypoxia and shortness of breath who presents for evaluation of shortness of breath.  I considered a broad differential of their dyspnea including CHF exacerbation, PE, dissection, hemothorax, pleural effusion, pneumonia, acute coronary syndrome, reactive airway disease, bronchitis, upper airway obstruction, foreign body, etc.  Given the history and exam plus laboratory findings  I feel congestive heart failure is the most likely etiology and would not do extensive workup for other causes as mentioned above at this time.  The patient received IV diuretics in ED and felt improved.patient initially on BiPAP as well as nitro drip.  Been able to wean off the nitro drip and BiPAP and he is tolerating nasal cannula oxygen now at this time and feels improved and his work of breathing has improved.  Will admit at this time for further cares.  On oxygen.      I would rule them out for ACS in the hospital given their presentation but this seems classic CHF.  Opponent is bump which I feel is likely due to his demand.  It did increase on the second troponin therefore second will be sent.  He has a therapeutic INR of 3 and would not start heparin at this time.    Secondarily patient has history of night sweats that he has been worked up by rheumatology, infectious disease as well as bone marrow biopsy without unclear etiology.  He does have a known valve but no evidence of endocarditis has been seen in the past.  Will send blood cultures.  He did have a temp of 100.8 here but no evidence of pneumonia would not cover with antibiotics at this time as no source identified.  Will continue to trend in the hospital any further fevers.    Critical care time of 30 minutes excluding procedures          Disposition   The patient was admitted to the hospital.     Diagnosis     ICD-10-CM    1. Acute and chronic respiratory failure with hypoxia (H)  J96.21       2. Acute decompensated heart failure (H)  I50.9       3. Fever, unspecified fever cause  R50.9            Discharge Medications   New Prescriptions    No medications on file         MD Jessica Pandey, Simin Meade MD  03/10/25 1005

## 2025-03-10 NOTE — H&P
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 Hospitalization complicated by ongoing intermittent FUO as outpatient with recurrent fever here and leukocytosis.     Acute decompensation HFpEF - EF 55-60% with acute hypoxic respiratory failure  S/p TAVR   Hypertension  Presented with acute shortness of breath and lightheadedness, dizziness, and 2+ peripheral edema with findings of pulmonary vascular congestion on chest x-ray, and elevated proBNP of 3000. Initially required BiPAP which was weaned off rapidly to 2L NC after 1 dose 60mg IV furosemide. Denies any particular dietary indiscretions or medication noncompliance. Has known diastolic HF with last EF 55-60%. Follows with [facility] cardiology.  *Last ECHO 11/2024: EF 55 to 60%, mild to moderate concentric LVH, severe LA enlargement, TAVR, mild mitral regurgitation  PTA Regimen: amlodipine 10mg/d, chlorthalidone 50mg/d, ezetimibe 5mg M/W/F, fenofibrate 160mg/d, KCl 40mEq daily, rosuvastatin 40mg Q HS  - Heart center if bed available  - Telemetry  - Intake/Output  - Daily standing weights if able  - Cardiology consultation for HF given 1st time admission for decompensated HF, defer to them if they would like CORE to evaluate or start any new HF medications  - IV furosemide 40mg Q 12 hours  - Not on any PTA beta blocker and ACEi/ARB/ARNI  - Hold PTA  chlorthalidone while on loop diuretic  - Check expanded resp panel  - Check and replete lytes, K+  - Trend basic labs    No intake or output data in the 24 hours ending 03/10/25 0924    Vitals:    03/10/25 0500 03/10/25 1245   Weight: 91.3 kg (201 lb 4.5 oz) 91.3 kg (201 lb 4.5 oz)       Wt Readings from Last 4 Encounters:   03/10/25 91.3 kg (201 lb 4.5 oz)   11/14/24 92.3 kg (203 lb 8 oz)   10/03/24 90.7 kg (200 lb)   04/22/24 96.5 kg (212 lb 12.8 oz)       Troponin elevation, mild  Suspect related to demand however, rising from 37-72. No chest pain but had episode of SOB which prompted presentation. EKG reviewed.  -Cardiac monitoring  -Trend Troponin  -Cardiology already consulted  -Hold on starting heparin, already therapeutic warfarin and lower suspicion this is ACS  -Monitor closely for additional cardiac symptoms    FUO  Leukocytosis  Has had FUO and night sweats ongoing for many months and has seen ID for this and had a robust workup which has been unrevealing. Treated with doxycycline prolonged course at one point as well. His WBC were previously normal on last 3 checks and now elevated. No infilrates on CXR. LA initially 2.3 which normalized after giving furosemide IV and oxygen support. Overall presentation c/w fluid overload/HF exacerbation however there are findings concerning for possible underlying infection.   -Draw BCx x2, PCT, CRP, ESR - Inflammatory markers higher than previous, PCT 0.68  -Check UA/UC - neg  -ECHO  -Will start ceftriaxone for now and monitor closely  -Consider endocarditis on differential, pending workup may need to consider ARLET which had previously been considered  -Could consider CT C/A/P pending above  -Consider ID consultation pending above, has seen ID in clinic, see notes from January Dr. Treviño    Chronic atrial fibrillation on chronic anticoagulation  High-grade AV block status post pacemaker implantation  No rate controlling medications, baseline HR 50s-60s. Anticoagulated  "with warfarin, therapeutic on admission.  -Telemetry  -Continue PTA anticoagulation, Pharmacy to dose warfarin, Daily INR  -Monitor K+/Mg++, replace PRN    Chronic stable diagnoses and other pertinent medical history: Appropriate PTA medications will be resumed.   Remote history Hodgkin Lymphoma  History of type 2 diabetes, last A1c under 6.  Resolved, will recheck A1c.  Takes no meds at home  For material disease  Elevated PSA - Has been following with Urology  Monoclonal gammopathy        Diet: Fluid restriction 2000 ML FLUID (and additional linked orders)  2 Gram Sodium Diet  DVT Prophylaxis: Warfarin  Miranda Catheter: Not present  Lines: None     Cardiac Monitoring: ACTIVE order. Indication: Acute decompensated heart failure (48 hours)  Code Status: Full Code - confirmed with patient on admission    Clinically Significant Risk Factors Present on Admission             # Hypomagnesemia: Lowest Mg = 1.6 mg/dL in last 2 days, will replace as needed   # Hypoalbuminemia: Lowest albumin = 3.4 g/dL at 3/10/2025  6:48 AM, will monitor as appropriate    # Drug Induced Coagulation Defect: home medication list includes an anticoagulant medication    # Hypertension: Noted on problem list      # Anemia: based on hgb <11       # Obesity: Estimated body mass index is 32.49 kg/m  as calculated from the following:    Height as of this encounter: 1.676 m (5' 6\").    Weight as of this encounter: 91.3 kg (201 lb 4.5 oz).        # Pacemaker present       Disposition Plan     Medically Ready for Discharge: Anticipated in 2-4 Days         The patient's care was discussed with the Attending Physician, Dr. Rosa, Bedside Nurse, Patient, Patient's Family, and ED Physician Dr. Lopez .    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   He states that last night he awoke from sleep and he could not breathe and felt like he might pass out.  Denies any chest pain or palpitations.  He is compliant with all medications and his INR is therapeutic.  He denies any dietary indiscretions.  He states he has not had a hospital stay for heart failure before.      Of note, the patient states he has had a prolonged workup with infectious disease, rheumatology, and oncology for rigors, night sweats, and persistent fever of unknown origin he has had a robust workup that has been largely negative including blood cultures, bone biopsy, among others.    In the ED, Temp 100.8' F low grade fever, hypoxia requiring 2L NC and tachypneic to 22-26, remainder of vitals stable. Found to be volume overloaded on CXR and 2+ pitting peripheral edema. INR thepeutic at 3. ProBNP 3K. LA 2.3 which normalized after IV furosemide. Troponin mildly elevated at 37. Leukocytosis 14.7. EKG without acute changes. Resp panel neg. Initiated on BiPAP which was rapidly weaned to 2L NC after 1 dose of 60mg IV furosemide. Hospitalist called for admission for decompensated HF. I requested blood cultures, PCT, inflammatory markers, and UA given patient's fever and leukocytosis.       Past Medical History    Past Medical History:   Diagnosis  Date    Aortic valve stenosis     severe    Blood clot in the legs     left leg after surgery    Gastro-oesophageal reflux disease     Hodgkins lymphoma (H)     bone marrow biopsy    Hypertension     PAD (peripheral artery disease)     persistent atrial fibrillation     AFIB    Unspecified cerebral artery occlusion with cerebral infarction 10/01/2011       Past Surgical History   Past Surgical History:   Procedure Laterality Date    APPENDECTOMY      ARTHROPLASTY REVISION HIP  12/27/2011    Procedure:ARTHROPLASTY REVISION HIP; RIGHT TOTAL HIP REVISION WITH BONE GRAFT  ; Surgeon:ANGIE HARRINGTON; Location: OR    BIOPSY  hodgkins lymphoma    bone marrow biopsy, chemo and radiation    CARDIAC SURGERY      cardiac ablation    COLONOSCOPY      CV CORONARY ANGIOGRAM N/A 9/23/2022    Procedure: Coronary Angiogram;  Surgeon: Gennaro Cook MD;  Location:  HEART CARDIAC CATH LAB    CV PCI N/A 9/23/2022    Procedure: Percutaneous Coronary Intervention;  Surgeon: Gennaro Cook MD;  Location:  HEART CARDIAC CATH LAB    CV RIGHT HEART CATH MEASUREMENTS RECORDED N/A 9/23/2022    Procedure: Right Heart Catheterization;  Surgeon: Gennaro Cook MD;  Location:  HEART CARDIAC CATH LAB    CV TRANSCATHETER AORTIC VALVE REPLACEMENT-FEMORAL APPROACH N/A 12/12/2023    Procedure: Transcatheter Aortic Valve Replacement-Femoral Approach;  Surgeon: Gennaro Cook MD;  Location: [facility] CARDIAC CATH LAB    EP PACEMAKER DEVICE & LEAD IMPLANT- RIGHT VENTRICULAR N/A 2/27/2023    Procedure: Pacemaker Device & Lead Implant- Right ventricular;  Surgeon: Brian Madrid MD;  Location: [facility] CARDIAC CATH LAB    ILIAC ARTERY ANGIOGRAM LEFT Left 10/10/2023    Procedure: Left Groin Cutdown with Repair of Left Femoral Artery; Aborted Transcatheter Aortic Valve Replacement;  Surgeon: German Patel MD;  Location: [facility] CARDIAC CATH LAB    ORTHOPEDIC SURGERY      hip and knee surgeries    ORTHOPEDIC SURGERY       carpal tunnel  right hand       Prior to Admission Medications   Prior to Admission Medications   Prescriptions Last Dose Informant Patient Reported? Taking?   ALPHA LIPOIC ACID PO 3/9/2025 Morning Self Yes Yes   Sig: Take 600 mg by mouth daily   CINNAMON PO 3/9/2025 Morning Self Yes Yes   Sig: Take 1 capsule by mouth every evening   Cholecalciferol (VITAMIN D) 400 UNITS tablet 3/9/2025 Morning Self Yes Yes   Sig: Take 1 tablet by mouth every evening   acetaminophen (TYLENOL) 500 MG tablet 3/9/2025 Bedtime  Yes Yes   Sig: Take 1,000 mg by mouth 3 times daily as needed for mild pain.   alfuzosin ER (UROXATRAL) 10 MG 24 hr tablet 3/9/2025 Morning Self No Yes   Sig: Take 1 tablet (10 mg) by mouth daily   amLODIPine (NORVASC) 10 MG tablet 3/9/2025 Morning  No Yes   Sig: Take 1 tablet (10 mg) by mouth daily.   ascorbic acid (VITAMIN C) 1000 MG TABS 3/9/2025 Morning Self Yes Yes   Sig: Take 1 tablet by mouth daily   chlorthalidone (HYGROTON) 50 MG tablet 3/9/2025 Morning Self Yes Yes   Sig: Take 50 mg by mouth daily   clindamycin (CLEOCIN) 300 MG capsule  Self Yes Yes   Sig: Take 2 capsules by mouth once as needed (1 hour prior to dental appointments 2 x 300 mg = 300 mg)   co-enzyme Q-10 100 MG CAPS capsule 3/9/2025 Morning Self Yes Yes   Sig: Take 1 capsule by mouth daily   ezetimibe (ZETIA) 10 MG tablet Past Week  No Yes   Sig: Take 0.5 tablets (5 mg) by mouth Every Mon, Wed, Fri Morning   fenofibrate (TRIGLIDE/LOFIBRA) 160 MG tablet 3/9/2025 Morning  No Yes   Sig: Take 1 tablet (160 mg) by mouth daily.   multivitamin w/minerals (THERA-VIT-M) tablet 3/9/2025 Morning Self Yes Yes   Sig: Take 1 tablet by mouth every evening   omega 3 1000 MG CAPS 3/9/2025 Evening Self Yes Yes   Sig: Take 1 capsule by mouth every evening 180 EPA/ 20 DHH   potassium chloride ER (KLOR-CON M) 20 MEQ CR tablet 3/9/2025 Morning Self No Yes   Sig: Take 2 tablets (40 mEq) by mouth daily   rosuvastatin (CRESTOR) 40 MG tablet 3/9/2025 Evening Self  Yes Yes   Sig: Take 40 mg by mouth every evening   warfarin ANTICOAGULANT (COUMADIN) 4 MG tablet 3/9/2025 Evening Self Yes Yes   Sig: Take by mouth See Admin Instructions. Take 2mg Sun, Tue and 4mg all other days      Facility-Administered Medications: None        Review of Systems    The 10 point Review of Systems is negative other than noted in the HPI or here.     Social History   I have reviewed this patient's social history and updated it with pertinent information if needed.  Social History     Tobacco Use    Smoking status: Former     Current packs/day: 0.00     Types: Cigarettes     Quit date: 1987     Years since quittin.9    Smokeless tobacco: Never   Vaping Use    Vaping status: Never Used   Substance Use Topics    Alcohol use: Yes     Comment: 1 glass daily    Drug use: No         Allergies   Allergies   Allergen Reactions    Irinotecan     Penicillin G Hives     hives        Physical Exam   Vital Signs: Temp: 98.2  F (36.8  C) Temp src: Oral BP: 129/68 Pulse: 60   Resp: 30 SpO2: 95 % O2 Device: Nasal cannula Oxygen Delivery: 2 LPM  Weight: 201 lbs 4.48 oz    Physical Exam    General: Awake, alert, very pleasant man who appears stated age. Looks comfortable sitting up in bed. No acute distress.  HEENT: Normocephalic, atraumatic. Extraocular movements intact.   Respiratory: 2L NC in place without increased WOB. Speaking in full sentences without respiratory distress. Course to bases. No wheezing.   Cardiovascular: Regular rate and rhythm, +S1 and S2, no murmur auscultated. 2+ peripheral edema.   Gastrointestinal: Soft, non-tender, non-distended. Bowel sounds present.  Skin: Warm, dry. No obvious rashes or lesions on exposed skin. Dorsalis pedis pulses palpable bilaterally.  Musculoskeletal: No joint swelling, erythema or tenderness. Moves all extremities equally.  Neurologic: AAO x3.   Psychiatric: Appropriate mood and affect. No obvious anxiety or depression.      Medical Decision Making        >75 MINUTES SPENT BY ME on the date of service doing chart review, history, exam, documentation & further activities per the note.      Data     I have personally reviewed the following data over the past 24 hrs:    14.7 (H)  \   10.9 (L)   / 237     141 103 21.0 /  142 (H)   3.7 25 1.03 \     ALT: 18 AST: 35 AP: 77 TBILI: 0.5   ALB: 3.4 (L) TOT PROTEIN: 7.7 LIPASE: N/A     Trop: 66 (H) BNP: 3,073 (H)     TSH: N/A T4: N/A A1C: 5.2     Procal: 0.68 (H) CRP: 55.83 (H) Lactic Acid: 0.9       INR:  2.74 (H) PTT:  N/A   D-dimer:  N/A Fibrinogen:  N/A       Imaging results reviewed over the past 24 hrs:   Recent Results (from the past 24 hours)   XR Chest Port 1 View    Narrative    EXAM: XR CHEST PORT 1 VIEW  LOCATION: [facility]  DATE: 3/10/2025    INDICATION: shortness of breath  COMPARISON: 1/17/2025      Impression    IMPRESSION: Cardiomegaly. Pacemaker with lead tip in the region of the right ventricle. Endovascular aortic valve replacement. Mild pulmonary vascular congestion changes. No pneumothorax. No focal lung infiltrates. Degenerative changes in the bilateral   shoulders.

## 2025-03-11 ENCOUNTER — APPOINTMENT (OUTPATIENT)
Dept: CARDIOLOGY | Facility: CLINIC | Age: 80
DRG: 280 | End: 2025-03-11
Attending: PHYSICIAN ASSISTANT
Payer: COMMERCIAL

## 2025-03-11 ENCOUNTER — APPOINTMENT (OUTPATIENT)
Dept: GENERAL RADIOLOGY | Facility: CLINIC | Age: 80
DRG: 280 | End: 2025-03-11
Attending: PHYSICIAN ASSISTANT
Payer: COMMERCIAL

## 2025-03-11 DIAGNOSIS — R97.20 ELEVATED PROSTATE SPECIFIC ANTIGEN (PSA): Primary | ICD-10-CM

## 2025-03-11 LAB
ANION GAP SERPL CALCULATED.3IONS-SCNC: 10 MMOL/L (ref 7–15)
BUN SERPL-MCNC: 25 MG/DL (ref 8–23)
CALCIUM SERPL-MCNC: 9.4 MG/DL (ref 8.8–10.4)
CHLORIDE SERPL-SCNC: 96 MMOL/L (ref 98–107)
CREAT SERPL-MCNC: 1.23 MG/DL (ref 0.67–1.17)
EGFRCR SERPLBLD CKD-EPI 2021: 60 ML/MIN/1.73M2
ERYTHROCYTE [DISTWIDTH] IN BLOOD BY AUTOMATED COUNT: 15.4 % (ref 10–15)
GLUCOSE SERPL-MCNC: 114 MG/DL (ref 70–99)
HCO3 SERPL-SCNC: 28 MMOL/L (ref 22–29)
HCT VFR BLD AUTO: 34.3 % (ref 40–53)
HGB BLD-MCNC: 11.3 G/DL (ref 13.3–17.7)
INR PPP: 2.61 (ref 0.85–1.15)
LVEF ECHO: NORMAL
MCH RBC QN AUTO: 28.8 PG (ref 26.5–33)
MCHC RBC AUTO-ENTMCNC: 32.9 G/DL (ref 31.5–36.5)
MCV RBC AUTO: 88 FL (ref 78–100)
PLATELET # BLD AUTO: 211 10E3/UL (ref 150–450)
POTASSIUM SERPL-SCNC: 3.5 MMOL/L (ref 3.4–5.3)
RBC # BLD AUTO: 3.92 10E6/UL (ref 4.4–5.9)
SODIUM SERPL-SCNC: 134 MMOL/L (ref 135–145)
WBC # BLD AUTO: 8.3 10E3/UL (ref 4–11)

## 2025-03-11 PROCEDURE — 93306 TTE W/DOPPLER COMPLETE: CPT | Mod: 26 | Performed by: INTERNAL MEDICINE

## 2025-03-11 PROCEDURE — 99233 SBSQ HOSP IP/OBS HIGH 50: CPT | Performed by: INTERNAL MEDICINE

## 2025-03-11 PROCEDURE — 82565 ASSAY OF CREATININE: CPT | Performed by: PHYSICIAN ASSISTANT

## 2025-03-11 PROCEDURE — 250N000011 HC RX IP 250 OP 636: Performed by: INTERNAL MEDICINE

## 2025-03-11 PROCEDURE — 250N000013 HC RX MED GY IP 250 OP 250 PS 637: Performed by: INTERNAL MEDICINE

## 2025-03-11 PROCEDURE — 85610 PROTHROMBIN TIME: CPT | Performed by: PHYSICIAN ASSISTANT

## 2025-03-11 PROCEDURE — 85018 HEMOGLOBIN: CPT | Performed by: PHYSICIAN ASSISTANT

## 2025-03-11 PROCEDURE — 80048 BASIC METABOLIC PNL TOTAL CA: CPT | Performed by: PHYSICIAN ASSISTANT

## 2025-03-11 PROCEDURE — 36415 COLL VENOUS BLD VENIPUNCTURE: CPT | Performed by: PHYSICIAN ASSISTANT

## 2025-03-11 PROCEDURE — 120N000001 HC R&B MED SURG/OB

## 2025-03-11 PROCEDURE — C8929 TTE W OR WO FOL WCON,DOPPLER: HCPCS

## 2025-03-11 PROCEDURE — 250N000013 HC RX MED GY IP 250 OP 250 PS 637: Performed by: STUDENT IN AN ORGANIZED HEALTH CARE EDUCATION/TRAINING PROGRAM

## 2025-03-11 PROCEDURE — 71046 X-RAY EXAM CHEST 2 VIEWS: CPT

## 2025-03-11 PROCEDURE — 82310 ASSAY OF CALCIUM: CPT | Performed by: PHYSICIAN ASSISTANT

## 2025-03-11 PROCEDURE — 999N000208 ECHOCARDIOGRAM COMPLETE

## 2025-03-11 PROCEDURE — 250N000013 HC RX MED GY IP 250 OP 250 PS 637: Performed by: PHYSICIAN ASSISTANT

## 2025-03-11 PROCEDURE — 99232 SBSQ HOSP IP/OBS MODERATE 35: CPT | Mod: 25 | Performed by: PHYSICIAN ASSISTANT

## 2025-03-11 PROCEDURE — 255N000002 HC RX 255 OP 636: Performed by: PHYSICIAN ASSISTANT

## 2025-03-11 RX ORDER — NALOXONE HYDROCHLORIDE 0.4 MG/ML
0.2 INJECTION, SOLUTION INTRAMUSCULAR; INTRAVENOUS; SUBCUTANEOUS
Status: DISCONTINUED | OUTPATIENT
Start: 2025-03-11 | End: 2025-03-14 | Stop reason: HOSPADM

## 2025-03-11 RX ORDER — NALOXONE HYDROCHLORIDE 0.4 MG/ML
0.4 INJECTION, SOLUTION INTRAMUSCULAR; INTRAVENOUS; SUBCUTANEOUS
Status: DISCONTINUED | OUTPATIENT
Start: 2025-03-11 | End: 2025-03-14 | Stop reason: HOSPADM

## 2025-03-11 RX ORDER — WARFARIN SODIUM 2 MG/1
2 TABLET ORAL
Status: COMPLETED | OUTPATIENT
Start: 2025-03-11 | End: 2025-03-11

## 2025-03-11 RX ADMIN — WARFARIN SODIUM 2 MG: 2 TABLET ORAL at 18:08

## 2025-03-11 RX ADMIN — OXYCODONE HYDROCHLORIDE AND ACETAMINOPHEN 1000 MG: 500 TABLET ORAL at 08:36

## 2025-03-11 RX ADMIN — ALFUZOSIN HYDROCHLORIDE 10 MG: 10 TABLET, EXTENDED RELEASE ORAL at 08:34

## 2025-03-11 RX ADMIN — CEFTRIAXONE SODIUM 2 G: 2 INJECTION, POWDER, FOR SOLUTION INTRAMUSCULAR; INTRAVENOUS at 12:43

## 2025-03-11 RX ADMIN — PERFLUTREN 10 ML: 6.52 INJECTION, SUSPENSION INTRAVENOUS at 12:43

## 2025-03-11 RX ADMIN — Medication 1 TABLET: at 20:12

## 2025-03-11 RX ADMIN — VALSARTAN 80 MG: 80 TABLET, FILM COATED ORAL at 08:36

## 2025-03-11 RX ADMIN — POTASSIUM CHLORIDE 40 MEQ: 1500 TABLET, EXTENDED RELEASE ORAL at 08:36

## 2025-03-11 RX ADMIN — ROSUVASTATIN CALCIUM 40 MG: 20 TABLET, FILM COATED ORAL at 20:12

## 2025-03-11 RX ADMIN — FUROSEMIDE 40 MG: 10 INJECTION, SOLUTION INTRAMUSCULAR; INTRAVENOUS at 08:40

## 2025-03-11 ASSESSMENT — ACTIVITIES OF DAILY LIVING (ADL)
ADLS_ACUITY_SCORE: 17
ADLS_ACUITY_SCORE: 19
ADLS_ACUITY_SCORE: 22
ADLS_ACUITY_SCORE: 17
ADLS_ACUITY_SCORE: 22
ADLS_ACUITY_SCORE: 19
ADLS_ACUITY_SCORE: 22
ADLS_ACUITY_SCORE: 17
ADLS_ACUITY_SCORE: 22
ADLS_ACUITY_SCORE: 17
ADLS_ACUITY_SCORE: 22

## 2025-03-11 NOTE — PROGRESS NOTES
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 Found to be hypoxic in the 80s upon EMS arrival to his home. Also reported chills and was noted to have low-grade fever. Labs showed elevated inflammatory markers (CRP 55), elevated NTproBNP of 3000, minimally elevated and insignificant cardiac troponins, stable creatinine, normal electrolytes, decreased hemoglobin (10.9 from 13.6), leukocytosis (14.7), and decreased hematocrit (33).     Assessment and Plan:  Dyspnea, PND, concerning for HFpEF or new cardiomyopathy. NT proBNP 3073. CXR with cardiomegaly and pulmonary vascular congestion. Received IV diuresis yesterday with furosemide 100 mg IV. Output questionable. Weight 85.5 kg --> 85 kg. BMP pending.  Off oxygen clinically improving.   Awaiting TTE  Furosmide 40 mg BID, potassium 40 mEq  Valsartan 80 mg daily  Atrial Fibrillation, paroxysmal, on warfarin with presenting INR 3.0  pharmD Liaison for price  check of DOACS-   Severe coronary artery calcifications, on CT 12/2024.   High-grade AV block S/p PPM, VVIR single-lead ventricular pacemaker  S/p TAVR 2023, imaging last fall showed well-seated, normally functioning valve.   Hypertension, prior to admission on chlorthalidone 50 mg daily, amlodipine 10 mg daily. Amlodipine discontinued in favor of valsartan this admission.   Hyperlipidemia, PTA on rosuvastatin 40 mg daily ane ezetimibe 5 mg MM/W/F  "as well as fenofibrate.   Type II DM  Infrarenal abdominal aorta with aneurysm, stable on CT in 12/2024 at 31 mm  Hx of Hodgkin lymphoma    Plan:  TTE today  Furosamide 40 mg IV BID with potassium supplementation   BMP daily while diuresing   I/O, daily weights   Consider transition from warfarin to DOAC if not cost prohibitive for patient- xarelto is $35/month or $70/3 months with [facility]  Awaiting bone marrow biopsy results and oncology evaluation   Avoid Beta-Blockers to avoid increased burden of pacing       20 minutes spent by me on the date of service doing chart review, history, exam, documentation, coordination and discussion with other care providers & further activities per the note. MD time separate.   Ankita Hayden PA-C  [facility]  Pager: through [employer]    Interval History   NO acute events overnight.  Patient reports he is feeling better.  Off oxygen.  Reports he did not have orthopnea, or night chills yesterday.  Feels less short of breath but has not exerted himself very much.  No leg swelling.  No chest pain or pressure.  No dizziness or lightheadedness.      Data   Last 24 hours diagnostics reviewed:  Vital signs:  Temp: 98.6  F (37  C) Temp src: Oral BP: 120/65 Pulse: 68   Resp: 18 SpO2: 96 % O2 Device: None (Room air) Oxygen Delivery: 2 LPM Height: 167.6 cm (5' 5.98\") Weight: 85 kg (187 lb 4.8 oz)  Estimated body mass index is 30.25 kg/m  as calculated from the following:    Height as of this encounter: 1.676 m (5' 5.98\").    Weight as of this encounter: 85 kg (187 lb 4.8 oz).      11/2024 Echocardiogram :  The visual ejection fraction is 55-60%. Abnormal septal motion from conduction  abnormality with ventricular dysynchrony. Anteroseptal hypokinesis cannot be  ruled out.  There is mild to moderate concentric left ventricular hypertrophy. Proximal  septal thickening is noted. Severe LA enlargement. Consider PYP scanning for  evaluation of amyloidosis.  The right ventricle is normal in " size and function.  The left atrium is severely dilated.  There is a bioprosthetic aortic valve. TAVR 29mm De Paz Miguel 3 by history.  MG is 10 mm Hg.  There is no pericardial effusion.    1/2025  [employer] Accolade L310 (S) Remote PPM Device Check     Mode: VVIR 60/115  Presenting Rhythm:   Last In-Clinic Underlying Rhythm/Date: permanent AF w/ CHB, no JE at VVI 30 as of 10/1/24     Pacing/Histogram Data since: 10/1/24  AP: n/a  : 94%  Heart Rate: adequate rates per histograms     Sensing: stable  Pacing Threshold: stable  Impedance: stable  Battery Status: 8 years     Arrhythmia Data Since: 10/1/24  Atrial Arrhythmia: n/a  Ventricular Arrhythmia: none       Imaging:   3/11/2025:                                                              IMPRESSION: Cardiomegaly. Pacemaker with lead tip in the region of the right ventricle. Endovascular aortic valve replacement. Mild pulmonary vascular congestion changes. No pneumothorax. No focal lung infiltrates. Degenerative changes in the bilateral   shoulders.      Physical Exam   Temp: 98.6  F (37  C) Temp src: Oral BP: 120/65 Pulse: 68   Resp: 18 SpO2: 96 % O2 Device: None (Room air) Oxygen Delivery: 2 LPM  Vitals:    03/10/25 1245 03/10/25 2300 03/11/25 0746   Weight: 91.3 kg (201 lb 4.5 oz) 85.5 kg (188 lb 6.4 oz) 85 kg (187 lb 4.8 oz)       GEN: Awake, alert, oriented  HEART: Regular rate and rhythm  LUNGS: Diminished breath sounds at bases  EXT: Significant venous dilation, no leg swelling    Medications   Current Facility-Administered Medications   Medication Dose Route Frequency Provider Last Rate Last Admin    Patient is already receiving anticoagulation with heparin, enoxaparin (LOVENOX), warfarin (COUMADIN)  or other anticoagulant medication   Does not apply Continuous PRN Umm Holland PA-C        Reason ACE/ARB/ARNI order not selected   Other DOES NOT GO TO Umm Mtz PA-C        Reason beta blocker not prescribed    Other DOES NOT GO TO Umm Mtz PA-C         Current Facility-Administered Medications   Medication Dose Route Frequency Provider Last Rate Last Admin    alfuzosin ER (UROXATRAL) 24 hr tablet 10 mg  10 mg Oral Daily Umm Holland PA-C   10 mg at 03/11/25 0834    cefTRIAXone (ROCEPHIN) 2 g vial to attach to  ml bag for ADULTS or NS 50 ml bag for PEDS  2 g Intravenous Q24H Dione Simmons DO   Stopped at 03/10/25 1300    ezetimibe (ZETIA) half-tab 5 mg  5 mg Oral Q Mon Wed Fri AM Umm Holland PA-C   5 mg at 03/10/25 1408    furosemide (LASIX) injection 40 mg  40 mg Intravenous BID Dione Simmons DO   40 mg at 03/11/25 0840    multivitamin w/minerals (THERA-VIT-M) tablet 1 tablet  1 tablet Oral QPM Umm Holland PA-C   1 tablet at 03/10/25 2056    potassium chloride april ER (KLOR-CON M20) CR tablet 40 mEq  40 mEq Oral Daily Umm Holland PA-C   40 mEq at 03/11/25 0836    rosuvastatin (CRESTOR) tablet 40 mg  40 mg Oral QPM Umm Holland PA-C   40 mg at 03/10/25 2055    sodium chloride (PF) 0.9% PF flush 3 mL  3 mL Intracatheter Q8H Umm Holland PA-C   3 mL at 03/11/25 0627    valsartan (DIOVAN) tablet 80 mg  80 mg Oral Daily Dione Simmons DO   80 mg at 03/11/25 0836    vitamin C (ASCORBIC ACID) tablet 1,000 mg  1,000 mg Oral Daily Umm Holland PA-C   1,000 mg at 03/11/25 0836    Warfarin Dose Required Daily - Pharmacist Managed  1 each Oral See Admin Instructions Umm Holland PA-C

## 2025-03-11 NOTE — CONSULTS
"BRIEF NUTRITION ASSESSMENT      REASON FOR ASSESSMENT:  Jonny Goldberg is a 79 year old male seen by Registered Dietitian for Admission Nutrition Risk Screen:  Have you recently lost weight without trying? \"14-23#\"  Have you been eating poorly because of a decreased appetite? \"NO\"      CURRENT DIET AND INTAKE:  Diet:  2gm Na              Chart reviewed  Visited with pt this morning - sitting up in chair  Hopeful to go home today - \"but you know how slow everything is in a hospital\"  Pt tolerated breakfast - \"a bit bland\"  Notes that for the past several months, his appetite has been decreased  \"I have been eating smaller amounts - I'm not really hungry, but I am eating\"  Has not been consuming any nutrition supplements  Happy to be losing wt - \"I need to lose more wt\"    ANTHROPOMETRICS:  Height: 5' 5.984\"  Weight:(3/11) 85 kg /  187 lbs 4.8 oz  Body mass index is 30.25 kg/m .   Weight Status: Obesity Grade I BMI 30-34.9  IBW:  64.5 kg  %IBW: 131%  Weight History:   Records reflect wt is down ~163 (8#) from 4 months ago  Pt confirms wt loss - \"it has been gradual over the past several months, and I really needed to lose some wt\"  Wt Readings from Last 10 Encounters:   03/11/25 85 kg (187 lb 4.8 oz)   11/14/24 92.3 kg (203 lb 8 oz)   10/03/24 90.7 kg (200 lb)   04/22/24 96.5 kg (212 lb 12.8 oz)   01/12/24 96.7 kg (213 lb 3.2 oz)   12/20/23 97.6 kg (215 lb 3.2 oz)   12/13/23 94.4 kg (208 lb 3.2 oz)   11/24/23 97.1 kg (214 lb)   11/09/23 95.3 kg (210 lb)   10/19/23 96.8 kg (213 lb 8 oz)         LABS:  Labs noted    MALNUTRITION:  Patient does not meet two of the following criteria necessary for diagnosing malnutrition, but is at increased risk with wt loss and decreased appetite     % Weight Loss:  Weight loss does not meet criteria for malnutrition  - gradual wt loss of 8% in the past 4 months  % Intake:  <75% for >/= 3 months (moderate malnutrition)  Subcutaneous Fat Loss:  None observed  Muscle Loss:  Clavicle bone " region mild depletion  Fluid Retention:  None noted    NUTRITION INTERVENTION:  Nutrition Diagnosis:  No nutrition diagnosis at this time.    Implementation:  Nutrition Education ---> reviewed current diet order (2gm Na) and rationale  Will send a strawberry Ensure at 2pm for pt to trial    FOLLOW UP/MONITORING:   Will re-evaluate in 7 - 10 days, or sooner, if re-consulted.

## 2025-03-11 NOTE — PROGRESS NOTES
[facility]    Hospitalist Progress Note    Date of Admission: 3/10/2025    Assessment & Plan   Jonny Goldberg is a 79 year old male with PMHx of aortic stenosis s/p TAVR, high degree AVB s/p ppm, PAD, persistent afib on chronic anticoagulation with warfarin, hypertension, HFpEF, DM II, hx of Hodgkin's lymphoma and hx of fever of unknown origin who was admitted on 3/10/2025 with nocturnal shortness of breath and hypoxia suspected secondary to to CHF exacerbation.     Acute exacerbation of chronic HFpEF, with acute hypxoic respiratory failure  Elevated troponin, suspect dt demand ischemia in setting of CHF exacerbation  Aortic stenosis s/p TAVR  Hypertension  PAD  Persistent afib, on chronic anticoagulation with warfarin  Hx of high degree AVB s/p ppm  *Known hx of HFpEF. Echo in 11/2024 showed EF 55-60%, mild MR, TAVR  *Presented to ED fore valuation of worsening shortness of breath, dizziness/lightheadedness and worsening peripheral edema. EMS called on the night of admission for worsening shortness of breath. Upon EMS arrival, was noted to be hypoxic with O2 sats in low 80s on RA  *In the ED, he had low grade fever (Tmax 100.8) in setting of known FUO but was otherwise hemodynamically stable. O2 sats stable on 2L NC (though no documented hypoxia). Labs notable for stable lytes/renal function, WBC 14.7,lactate 2.3, procal 0.68 (mod risk), elevated trop with flat trend (37 - 72 - 66 - 57) and no reports of chest pain, proBNP 3k, lytes/renal function stable, viral swab neg. CXR showed pulmonary vascular congestion. Was initially placed on BiPAP but was quickly weaned to 2L NC after receicing IV Lasix.   *Cardiology consulted on admission. In agreement with diuresis with IV Lasix. Meds further adjusted -- stopped amlodipine, started valsartan, reduced chlorthalidone dose; no role for bb.   -- repeat TTE ordered  -- cont diuresis with Lasix 40mg IV BID -- hold this afternoon's dose due to bump  in cr; monitor Is/Os and daily wts  -- cont chlorthalidone 25mg daily, valsartan 80mg daily  -- pharmacy managing warfarin dosing while hospitalized, daily INR; consider transition to DOAC if not cost prohibitive    Fever of unknown origin  Leukocytosis: Resolved  *Has been undergoing workup of fever of unknown origin in the OP setting with reported months long history of fever/night sweats. Extensive workup done per ID was unrevealing. Had been previously treated with a prolonged course of doxycycline.   *Low grade temp on admission this stay, Tmax 100.8. WBC 14 (had been normal on recent labs). Viral swabs neg. Procal mod risk. CXR w/o infiltrate. UA bland. Lactate quickly normalized. Se rate elevated at 72, CRP up to 55. Was placed on empiric ceftriaxone on admission given concern for ddx including endocarditis   -- cont ceftriaxone (3/10-present)  -- consider additional evaluation with ARLET, CT chest/abd/pelvis  -- will ask ID for recs regarding abx plan given improvement overnight  -- will also ask heme/onc to weigh in on FUO workup (had BM biopsy 3 weeks ago, no results in Epic)  -- patient considering second opinion from [facility]    Remote history Hodgkin lymphoma  Monoclonal gammopathy   *Follows with [facility].  Recently seen by Dr. Michele and underwent a bone marrow biopsy few weeks prior to admission.  No reports available in Epic.  Patient unable to recall the specifics of the report other than he had some degree of anemia    DM II, well managed  A1c 5.2 this stay. No need to monitor accuchecks or use sliding scale insulin while hospitalized    Elevated PSA, follows with Urology    FEN: no IVFs, lytes stable, regular diet  DVT Prophylaxis: warfarin  Code Status: Full Code    Disposition: Anticipate discharge home in next 1 to 2 days with support from spouse, pending recommendations per specialist, response to treatment and additional testing needed    Medically Ready for Discharge: Anticipated in 2-4  "Days      Dione Simmons, DO    Clinically Significant Risk Factors Present on Admission             # Hypomagnesemia: Lowest Mg = 1.6 mg/dL in last 2 days, will replace as needed   # Hypoalbuminemia: Lowest albumin = 3.4 g/dL at 3/10/2025  6:48 AM, will monitor as appropriate  # Drug Induced Coagulation Defect: home medication list includes an anticoagulant medication    # Hypertension: Noted on problem list      # Anemia: based on hgb <11       # Obesity: Estimated body mass index is 30.42 kg/m  as calculated from the following:    Height as of this encounter: 1.676 m (5' 5.98\").    Weight as of this encounter: 85.5 kg (188 lb 6.4 oz).        # Pacemaker present         Medical Decision Making       Please see A&P for additional details of medical decision making.           Interval History   Chart reviewed.  Seen this afternoon.  Patient had been ambulating in hallways and reported feeling mildly dyspneic.  O2 sats 99% on room air.  Denies cough, chest pain, shortness of breath, abdominal pain, nausea or vomiting.  No fevers today but has been receiving IV antibiotics.    -Data reviewed today: I reviewed all new labs and imaging results over the last 24 hours. I personally reviewed no images or EKG's today.    Physical Exam   Temp: 98.6  F (37  C) Temp src: Oral BP: 120/65 Pulse: 68   Resp: 18 SpO2: 95 % O2 Device: Nasal cannula Oxygen Delivery: 2 LPM  Vitals:    03/10/25 0500 03/10/25 1245 03/10/25 2300   Weight: 91.3 kg (201 lb 4.5 oz) 91.3 kg (201 lb 4.5 oz) 85.5 kg (188 lb 6.4 oz)     Vital Signs with Ranges  Temp:  [98.2  F (36.8  C)-98.7  F (37.1  C)] 98.6  F (37  C)  Pulse:  [59-68] 68  Resp:  [16-30] 18  BP: (108-141)/(49-89) 120/65  SpO2:  [92 %-97 %] 95 %  I/O last 3 completed shifts:  In: 1760 [P.O.:1650; I.V.:10; IV Piggyback:100]  Out: 2075 [Urine:2075]    Constitutional: Resting comfortably, alert and conversing appropriately, NAD  Respiratory: Few faint bibasilar crackles (R > L), no " wheeze, no increased work breathing or  muscle use   Cardiovascular: HRRR, no MGR,+bilateral lower extremity edema  GI: S, NT, ND  Skin/Integumen: Warm/dry  Other:      Medications   Current Facility-Administered Medications   Medication Dose Route Frequency Provider Last Rate Last Admin    Patient is already receiving anticoagulation with heparin, enoxaparin (LOVENOX), warfarin (COUMADIN)  or other anticoagulant medication   Does not apply Continuous PRN Umm Holland PA-C        Reason ACE/ARB/ARNI order not selected   Other DOES NOT GO TO Umm Mtz PA-C        Reason beta blocker not prescribed   Other DOES NOT GO TO Umm Mtz PA-C         Current Facility-Administered Medications   Medication Dose Route Frequency Provider Last Rate Last Admin    alfuzosin ER (UROXATRAL) 24 hr tablet 10 mg  10 mg Oral Daily Umm Holland PA-C   10 mg at 03/10/25 1408    cefTRIAXone (ROCEPHIN) 2 g vial to attach to  ml bag for ADULTS or NS 50 ml bag for PEDS  2 g Intravenous Q24H Dione Simmons DO   Stopped at 03/10/25 1300    ezetimibe (ZETIA) half-tab 5 mg  5 mg Oral Q Mon Wed Fri AM Umm Holland PA-C   5 mg at 03/10/25 1408    furosemide (LASIX) injection 40 mg  40 mg Intravenous BID Dione Simmons DO   40 mg at 03/10/25 1830    multivitamin w/minerals (THERA-VIT-M) tablet 1 tablet  1 tablet Oral QPM Umm Holland PA-C   1 tablet at 03/10/25 2056    potassium chloride april ER (KLOR-CON M20) CR tablet 40 mEq  40 mEq Oral Daily Umm Holland PA-C   40 mEq at 03/10/25 1408    rosuvastatin (CRESTOR) tablet 40 mg  40 mg Oral QPM Umm Holland PA-C   40 mg at 03/10/25 2055    sodium chloride (PF) 0.9% PF flush 3 mL  3 mL Intracatheter Q8H Umm Holland PA-C   3 mL at 03/11/25 0627    valsartan (DIOVAN) tablet 80 mg  80 mg Oral Daily Dione Simmons,         vitamin  C (ASCORBIC ACID) tablet 1,000 mg  1,000 mg Oral Daily Umm Holland PA-C   1,000 mg at 03/10/25 1408    Warfarin Dose Required Daily - Pharmacist Managed  1 each Oral See Admin Instructions Umm Holland PA-C           Data   Recent Labs   Lab 03/10/25  1229 03/10/25  0648   WBC  --  14.7*   HGB  --  10.9*   MCV  --  89   PLT  --  237   INR 2.74* 3.04*   NA  --  141   POTASSIUM  --  3.7   CHLORIDE  --  103   CO2  --  25   BUN  --  21.0   CR  --  1.03   ANIONGAP  --  13   SARA  --  9.0   GLC  --  142*   ALBUMIN  --  3.4*   PROTTOTAL  --  7.7   BILITOTAL  --  0.5   ALKPHOS  --  77   ALT  --  18   AST  --  35       No results found for this or any previous visit (from the past 24 hours).

## 2025-03-11 NOTE — PLAN OF CARE
0700-1930  Pt A&O, able to make needs known.  Doing well on room air.  Walking in room and hallways independently.  Using urinal ind.  Creat 1.23, lasix now on hold.  PIV X2.  Tele V-paced.  Tolerating diet.  Echo completed this shift.  ID and Oncology consulted today.

## 2025-03-11 NOTE — PLAN OF CARE
Summary: [id number]  Primary Diagnosis: Acute dyspnea     Orientation: A&Ox4, pleasant and cooperative  Aggression Stop Light: Green  Activity: SBA  Diet/BS Checks: 2gm Sodium  Tele: V Paced  IV Access/Drains: R&L PIV, SL  Pain Management: Denied on this shift  Abnormal VS/Results: VSS, on 2L via NC  Bowel/Bladder: Continent, uses a urinal at bedside  Skin/Wounds: Intact  Consults: Cardiology  D/C Disposition: Pending  Other Info:

## 2025-03-11 NOTE — PROGRESS NOTES
RECEIVING UNIT ED HANDOFF REVIEW    ED Nurse Handoff Report was reviewed by: Christine Julian RN on March 10, 2025 at 8:51 PM

## 2025-03-11 NOTE — CONSULTS
Patient has Medicare Advantage through [facility]    Xarelto/Eliquis  --$35/mo ($70 for 3 mo at [facility]).    Jardiance/Farxiga  --$35/mo ($70 for 3 mo at [facility]).    Entresto  --$35/mo ($70 for 3 mo at [facility]).    If/when total out of pocket drug costs exceed $2000, patient will pay $0 for all covered drugs for the remainder of the year.    Shayy Smith  Pharmacy Technician/Liaison, Discharge Pharmacy   [phone] (voice or text)  [email or web address]  Pharmacy test claims are estimates and may not reflect final costs.   Suggested alternatives aim to be cost-effective but may not be therapeutically equivalent as this consult is informational and does not constitute medical advice.   Clinical decisions should be made by qualified healthcare providers.

## 2025-03-11 NOTE — CONSULTS
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 the right ventricle. The right  ventricle is normal in size and function.     Atria  The left atrium is severely dilated. The right atrium is mildly dilated.     Mitral Valve  The mitral valve leaflets are mildly thickened. There is mild mitral annular  calcification.     Tricuspid Valve  There is mild (1+) tricuspid regurgitation. The right ventricular systolic  pressure is approximated at 22.5 mmHg plus the right atrial pressure.     Aortic Valve  There is a bioprosthetic aortic valve. The prosthetic aortic valve is well-  seated.     Pulmonic Valve  The pulmonic valve is not well visualized.     Vessels  Normal size aorta. Normal size ascending aorta. IVC diameter <2.1 cm  collapsing >50% with sniff suggests a normal RA pressure of 3 mmHg.     Pericardium  There is no pericardial effusion.     Rhythm  The rhythm was atrial fibrillation with wide QRS rhythm.  ______________________________________________________________________________  MMode/2D Measurements & Calculations  IVSd: 1.3 cm     LVIDd: 5.5 cm  LVIDs: 4.0 cm  LVPWd: 1.3 cm  FS: 27.5 %  LV mass(C)d: 306.7 grams  LV mass(C)dI: 157.8 grams/m2  Ao root diam: 3.0  cm  asc Aorta Diam: 3.7 cm  LVOT diam: 2.5 cm  LVOT area: 4.7 cm2  Ao root diam index Ht(cm/m): 1.8  Ao root diam index BSA (cm/m2): 1.6  Asc Ao diam index BSA (cm/m2): 1.9  Asc Ao diam index Ht(cm/m): 2.2  LA Volume (BP): 192.0 ml     LA Volume Index (BP): 99.0 ml/m2  RV Base: 4.3 cm  RWT: 0.47  TAPSE: 2.2 cm     Doppler Measurements & Calculations  MV E max papi: 129.0 cm/sec  MV dec slope: 553.1 cm/sec2  MV dec time: 0.23 sec  Ao V2 max: 346.7 cm/sec  Ao max P.0 mmHg  Ao V2 mean: 260.3 cm/sec  Ao mean P.6 mmHg  Ao V2 VTI: 67.9 cm  JACOBY(I,D): 1.3 cm2  JACOBY(V,D): 1.2 cm2  Ao acc time: 0.10 sec  LV V1 max PG: 3.0 mmHg  LV V1 max: 86.6 cm/sec  LV V1 VTI: 18.7 cm  SV(LVOT): 88.1 ml  SI(LVOT): 45.3 ml/m2  PA acc time: 0.06 sec  TR max papi: 237.0 cm/sec  TR max P.5 mmHg  AV Papi Ratio (DI): 0.25     JACOBY Index (cm2/m2): 0.67  E/E' av.4  Lateral E/e': 17.7  Medial E/e': 17.2  RV S Papi: 12.0 cm/sec     ______________________________________________________________________________  Report approved by: Vladislav Ramirez MD on 2025 05:33 PM

## 2025-03-12 ENCOUNTER — APPOINTMENT (OUTPATIENT)
Dept: CARDIOLOGY | Facility: CLINIC | Age: 80
DRG: 280 | End: 2025-03-12
Attending: PHYSICIAN ASSISTANT
Payer: COMMERCIAL

## 2025-03-12 LAB
ANION GAP SERPL CALCULATED.3IONS-SCNC: 11 MMOL/L (ref 7–15)
BUN SERPL-MCNC: 28.5 MG/DL (ref 8–23)
CALCIUM SERPL-MCNC: 9.3 MG/DL (ref 8.8–10.4)
CHLORIDE SERPL-SCNC: 98 MMOL/L (ref 98–107)
CREAT SERPL-MCNC: 1.17 MG/DL (ref 0.67–1.17)
CRP SERPL-MCNC: 50.81 MG/L
EGFRCR SERPLBLD CKD-EPI 2021: 64 ML/MIN/1.73M2
ERYTHROCYTE [DISTWIDTH] IN BLOOD BY AUTOMATED COUNT: 15.5 % (ref 10–15)
ERYTHROCYTE [SEDIMENTATION RATE] IN BLOOD BY WESTERGREN METHOD: 71 MM/HR (ref 0–20)
GLUCOSE SERPL-MCNC: 111 MG/DL (ref 70–99)
HCO3 SERPL-SCNC: 24 MMOL/L (ref 22–29)
HCT VFR BLD AUTO: 34 % (ref 40–53)
HGB BLD-MCNC: 11 G/DL (ref 13.3–17.7)
INR PPP: 2.48 (ref 0.85–1.15)
LVEF ECHO: NORMAL
MCH RBC QN AUTO: 28.6 PG (ref 26.5–33)
MCHC RBC AUTO-ENTMCNC: 32.4 G/DL (ref 31.5–36.5)
MCV RBC AUTO: 88 FL (ref 78–100)
PLATELET # BLD AUTO: 207 10E3/UL (ref 150–450)
POTASSIUM SERPL-SCNC: 3.6 MMOL/L (ref 3.4–5.3)
RBC # BLD AUTO: 3.85 10E6/UL (ref 4.4–5.9)
SODIUM SERPL-SCNC: 133 MMOL/L (ref 135–145)
WBC # BLD AUTO: 8.4 10E3/UL (ref 4–11)

## 2025-03-12 PROCEDURE — 99232 SBSQ HOSP IP/OBS MODERATE 35: CPT | Performed by: INTERNAL MEDICINE

## 2025-03-12 PROCEDURE — 99222 1ST HOSP IP/OBS MODERATE 55: CPT | Performed by: SPECIALIST

## 2025-03-12 PROCEDURE — 93312 ECHO TRANSESOPHAGEAL: CPT | Mod: 26 | Performed by: INTERNAL MEDICINE

## 2025-03-12 PROCEDURE — 85041 AUTOMATED RBC COUNT: CPT | Performed by: INTERNAL MEDICINE

## 2025-03-12 PROCEDURE — 83521 IG LIGHT CHAINS FREE EACH: CPT | Performed by: PATHOLOGY

## 2025-03-12 PROCEDURE — 86334 IMMUNOFIX E-PHORESIS SERUM: CPT | Performed by: PATHOLOGY

## 2025-03-12 PROCEDURE — 999N000184 HC STATISTIC TELEMETRY

## 2025-03-12 PROCEDURE — 93320 DOPPLER ECHO COMPLETE: CPT

## 2025-03-12 PROCEDURE — 86335 IMMUNFIX E-PHORSIS/URINE/CSF: CPT | Performed by: PATHOLOGY

## 2025-03-12 PROCEDURE — 999N000183 HC STATISTIC TEE INCLUDES SEDATION

## 2025-03-12 PROCEDURE — 250N000009 HC RX 250: Performed by: STUDENT IN AN ORGANIZED HEALTH CARE EDUCATION/TRAINING PROGRAM

## 2025-03-12 PROCEDURE — 86036 ANCA SCREEN EACH ANTIBODY: CPT | Performed by: PATHOLOGY

## 2025-03-12 PROCEDURE — 93325 DOPPLER ECHO COLOR FLOW MAPG: CPT | Mod: 26 | Performed by: INTERNAL MEDICINE

## 2025-03-12 PROCEDURE — 85652 RBC SED RATE AUTOMATED: CPT | Performed by: INTERNAL MEDICINE

## 2025-03-12 PROCEDURE — 80051 ELECTROLYTE PANEL: CPT | Performed by: INTERNAL MEDICINE

## 2025-03-12 PROCEDURE — 76376 3D RENDER W/INTRP POSTPROCES: CPT | Mod: 26 | Performed by: INTERNAL MEDICINE

## 2025-03-12 PROCEDURE — 86140 C-REACTIVE PROTEIN: CPT | Performed by: INTERNAL MEDICINE

## 2025-03-12 PROCEDURE — 250N000011 HC RX IP 250 OP 636: Mod: JW | Performed by: STUDENT IN AN ORGANIZED HEALTH CARE EDUCATION/TRAINING PROGRAM

## 2025-03-12 PROCEDURE — 250N000011 HC RX IP 250 OP 636: Performed by: INTERNAL MEDICINE

## 2025-03-12 PROCEDURE — 120N000001 HC R&B MED SURG/OB

## 2025-03-12 PROCEDURE — 250N000013 HC RX MED GY IP 250 OP 250 PS 637: Performed by: STUDENT IN AN ORGANIZED HEALTH CARE EDUCATION/TRAINING PROGRAM

## 2025-03-12 PROCEDURE — 93320 DOPPLER ECHO COMPLETE: CPT | Mod: 26 | Performed by: INTERNAL MEDICINE

## 2025-03-12 PROCEDURE — 36415 COLL VENOUS BLD VENIPUNCTURE: CPT | Performed by: PHYSICIAN ASSISTANT

## 2025-03-12 PROCEDURE — 86038 ANTINUCLEAR ANTIBODIES: CPT | Performed by: PATHOLOGY

## 2025-03-12 PROCEDURE — 80048 BASIC METABOLIC PNL TOTAL CA: CPT | Performed by: INTERNAL MEDICINE

## 2025-03-12 PROCEDURE — 99418 PROLNG IP/OBS E/M EA 15 MIN: CPT | Performed by: INTERNAL MEDICINE

## 2025-03-12 PROCEDURE — 250N000013 HC RX MED GY IP 250 OP 250 PS 637: Performed by: PHYSICIAN ASSISTANT

## 2025-03-12 PROCEDURE — 93325 DOPPLER ECHO COLOR FLOW MAPG: CPT

## 2025-03-12 PROCEDURE — 250N000013 HC RX MED GY IP 250 OP 250 PS 637: Performed by: INTERNAL MEDICINE

## 2025-03-12 PROCEDURE — 85014 HEMATOCRIT: CPT | Performed by: INTERNAL MEDICINE

## 2025-03-12 PROCEDURE — 99152 MOD SED SAME PHYS/QHP 5/>YRS: CPT | Performed by: INTERNAL MEDICINE

## 2025-03-12 PROCEDURE — 86334 IMMUNOFIX E-PHORESIS SERUM: CPT | Mod: 26 | Performed by: PATHOLOGY

## 2025-03-12 PROCEDURE — 86335 IMMUNFIX E-PHORSIS/URINE/CSF: CPT | Mod: 26 | Performed by: PATHOLOGY

## 2025-03-12 PROCEDURE — 99233 SBSQ HOSP IP/OBS HIGH 50: CPT | Mod: 25 | Performed by: PHYSICIAN ASSISTANT

## 2025-03-12 PROCEDURE — 85610 PROTHROMBIN TIME: CPT | Performed by: PHYSICIAN ASSISTANT

## 2025-03-12 RX ORDER — GLYCOPYRROLATE 0.2 MG/ML
0.1 INJECTION, SOLUTION INTRAMUSCULAR; INTRAVENOUS ONCE
Status: COMPLETED | OUTPATIENT
Start: 2025-03-12 | End: 2025-03-12

## 2025-03-12 RX ORDER — FLUMAZENIL 0.1 MG/ML
0.2 INJECTION, SOLUTION INTRAVENOUS
Status: DISCONTINUED | OUTPATIENT
Start: 2025-03-12 | End: 2025-03-12

## 2025-03-12 RX ORDER — LIDOCAINE 40 MG/G
CREAM TOPICAL
Status: CANCELLED | OUTPATIENT
Start: 2025-03-12

## 2025-03-12 RX ORDER — FENTANYL CITRATE 50 UG/ML
25 INJECTION, SOLUTION INTRAMUSCULAR; INTRAVENOUS
Status: DISCONTINUED | OUTPATIENT
Start: 2025-03-12 | End: 2025-03-12

## 2025-03-12 RX ORDER — DEXTROSE MONOHYDRATE 25 G/50ML
9.5 INJECTION, SOLUTION INTRAVENOUS
Status: DISCONTINUED | OUTPATIENT
Start: 2025-03-12 | End: 2025-03-12

## 2025-03-12 RX ORDER — NALOXONE HYDROCHLORIDE 0.4 MG/ML
0.4 INJECTION, SOLUTION INTRAMUSCULAR; INTRAVENOUS; SUBCUTANEOUS
Status: DISCONTINUED | OUTPATIENT
Start: 2025-03-12 | End: 2025-03-12

## 2025-03-12 RX ORDER — NALOXONE HYDROCHLORIDE 0.4 MG/ML
0.2 INJECTION, SOLUTION INTRAMUSCULAR; INTRAVENOUS; SUBCUTANEOUS
Status: DISCONTINUED | OUTPATIENT
Start: 2025-03-12 | End: 2025-03-12

## 2025-03-12 RX ORDER — SODIUM CHLORIDE 9 MG/ML
1000 INJECTION, SOLUTION INTRAVENOUS CONTINUOUS
Status: DISCONTINUED | OUTPATIENT
Start: 2025-03-12 | End: 2025-03-12

## 2025-03-12 RX ORDER — WARFARIN SODIUM 2 MG/1
4 TABLET ORAL
Status: COMPLETED | OUTPATIENT
Start: 2025-03-12 | End: 2025-03-12

## 2025-03-12 RX ORDER — LIDOCAINE 50 MG/G
OINTMENT TOPICAL ONCE
Status: COMPLETED | OUTPATIENT
Start: 2025-03-12 | End: 2025-03-12

## 2025-03-12 RX ORDER — LIDOCAINE HYDROCHLORIDE 40 MG/ML
1.5 SOLUTION TOPICAL ONCE
Status: COMPLETED | OUTPATIENT
Start: 2025-03-12 | End: 2025-03-12

## 2025-03-12 RX ADMIN — LIDOCAINE HYDROCHLORIDE 1.5 ML: 40 SOLUTION TOPICAL at 13:16

## 2025-03-12 RX ADMIN — CEFTRIAXONE SODIUM 2 G: 2 INJECTION, POWDER, FOR SOLUTION INTRAMUSCULAR; INTRAVENOUS at 12:13

## 2025-03-12 RX ADMIN — MIDAZOLAM 4 MG: 1 INJECTION INTRAMUSCULAR; INTRAVENOUS at 13:58

## 2025-03-12 RX ADMIN — EZETIMIBE 5 MG: 10 TABLET ORAL at 09:21

## 2025-03-12 RX ADMIN — POTASSIUM CHLORIDE 40 MEQ: 1500 TABLET, EXTENDED RELEASE ORAL at 09:22

## 2025-03-12 RX ADMIN — ALFUZOSIN HYDROCHLORIDE 10 MG: 10 TABLET, EXTENDED RELEASE ORAL at 09:22

## 2025-03-12 RX ADMIN — WARFARIN SODIUM 4 MG: 2 TABLET ORAL at 17:13

## 2025-03-12 RX ADMIN — VALSARTAN 80 MG: 80 TABLET, FILM COATED ORAL at 09:22

## 2025-03-12 RX ADMIN — GLYCOPYRROLATE 0.1 MG: 0.2 INJECTION, SOLUTION INTRAMUSCULAR; INTRAVENOUS at 13:14

## 2025-03-12 RX ADMIN — TOPICAL ANESTHETIC 1 ML: 200 SPRAY DENTAL; PERIODONTAL at 13:16

## 2025-03-12 RX ADMIN — FENTANYL CITRATE 100 MCG: 50 INJECTION, SOLUTION INTRAMUSCULAR; INTRAVENOUS at 13:59

## 2025-03-12 RX ADMIN — OXYCODONE HYDROCHLORIDE AND ACETAMINOPHEN 1000 MG: 500 TABLET ORAL at 09:21

## 2025-03-12 RX ADMIN — Medication 1 TABLET: at 19:53

## 2025-03-12 RX ADMIN — ROSUVASTATIN CALCIUM 40 MG: 20 TABLET, FILM COATED ORAL at 19:53

## 2025-03-12 ASSESSMENT — ACTIVITIES OF DAILY LIVING (ADL)
ADLS_ACUITY_SCORE: 32
DEPENDENT_IADLS:: INDEPENDENT
ADLS_ACUITY_SCORE: 22
ADLS_ACUITY_SCORE: 32
ADLS_ACUITY_SCORE: 22
ADLS_ACUITY_SCORE: 32
ADLS_ACUITY_SCORE: 22
ADLS_ACUITY_SCORE: 32
ADLS_ACUITY_SCORE: 22
ADLS_ACUITY_SCORE: 22
ADLS_ACUITY_SCORE: 32
ADLS_ACUITY_SCORE: 22

## 2025-03-12 NOTE — PROGRESS NOTES
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 Found to be hypoxic in the 80s upon EMS arrival to his home. Also reported chills and was noted to have low-grade fever. Labs showed elevated inflammatory markers (CRP 55), elevated NTproBNP of 3000, minimally elevated and insignificant cardiac troponins, stable creatinine, normal electrolytes, decreased hemoglobin (10.9 from 13.6), leukocytosis (14.7), and decreased hematocrit (33).     Assessment and Plan:  S/p TAVR 2023  Increased aortic valve gradient imaging last fall showed well-seated, normally functioning valve. Imagine 3/10/2025 show well-seated valve with markedly increased gradient across the bioprosthetic aortic valve, an increase from 10 mmHg in the fall to 29 mmHg now. EF I snormal, concentric LVH noted.  In the setting of fevers and chills, even though these have been going on for years, this is concerning for endocarditis.  ARLET needed  Dyspnea, PND, concerning for HFpEF or new cardiomyopathy. NT proBNP 3073. CXR with cardiomegaly and pulmonary vascular congestion. Received IV diuresis yesterday with furosemide 100 mg IV. Output questionable. Weight 85.5 kg --> 85.9 kg this admission. Off oxygen, clinically improved. Echo showed   Furosmide 40 mg BID, potassium 40 mEq  Valsartan 80 mg daily  Atrial Fibrillation, paroxysmal, on warfarin with presenting INR 3.0. LA is severely dilated.    pharmD Liaison for price  check of DOACS-   Severe coronary artery calcifications, on CT 12/2024.   Hypokinesis of the apical septum, no syx of ischemia. Could be due to pacemaker lead. Will continue to evaluate after ARLET.   High-grade AV block S/p PPM, VVIR single-lead ventricular pacemaker  Hypertension, prior to admission on chlorthalidone 50 mg daily, amlodipine 10 mg daily. Amlodipine discontinued in favor of valsartan this admission.   Hyperlipidemia, PTA on rosuvastatin 40 mg daily ane ezetimibe 5 mg MM/W/F as well as fenofibrate.   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 Oncology recommends PET/CT and additional BM biopsy.     Plan:  Transesophageal echocardiogram, ESR, CRP, blood cultures   No further diuresis   I/O, daily weights   Continue warfarin   Avoid Beta-Blockers to avoid increased burden of pacing   Light chains and immunofixation - ID curious about cardiac amyloid. Patient has moderate concentric LVH, no RV hypertrophy. EKG amplitude normal.     High Complexity.     25 minutes spent by me on the date of service doing chart review, history, exam, documentation, coordination and discussion with other care providers & further activities per the note. MD time separate. Discussed directly with Dione Simmons, [facility] medicine.   Ankita Hayden PA-C  [facility]  Pager: through Vocera    Interval History   No events overnight.  Patient feeling quite well this morning.  Very energetic.  No orthopnea or PND overnight.  No chills or shakes overnight.  No chest pain or pressure.  No neurologic symptoms.  No leg swelling.  No blood in stool or urine.      Data   Last 24 hours diagnostics reviewed:  Vital signs:  Temp: 98.4  F (36.9  C) Temp src: Oral BP: 112/65 Pulse: 64   Resp: 16 SpO2: 96 % O2 Device: None (Room air) Oxygen Delivery: 2 LPM Height: 167.6  "cm (5' 5.98\") Weight: 85.9 kg (189 lb 4.8 oz)  Estimated body mass index is 30.57 kg/m  as calculated from the following:    Height as of this encounter: 1.676 m (5' 5.98\").    Weight as of this encounter: 85.9 kg (189 lb 4.8 oz).    BMP, ESR, CRP, ZEINA, CBC, IGG pending     3/10/2025 TTE:  Status post TAVR with a 29mm De Paz Miguel 3 bioprosthetic aortic valve.  There is a well-seated bioprosthesis in aortic position. Valve leaflets were  not well-seen. Mean transvalvular gradient is 28 mmHg which is high for this  valve. Peak velocity is 3.5 m/s and dimensionless index is 0.28. No PVL.  Acceleration time is 100 ms.  The left ventricle is normal in size. There is moderate concentric LVH.Left  ventricular systolic function is low normal. The visual ejection fraction is  50-55%.  There is severe hypokinesis of the apical septum. Differential diagnosis  includes apical insertion of the pacemaker lead vs coronary disease.  The right ventricle is normal in size and function.  The left atrium is severely dilated. The right atrium is mildly dilated.  Compared to the prior study from 11/14/2024, gradient across the bioprosthetic  aortic valve has increased significantly from 10 mmHg to 29 mmHg. Consider ARLET  for further workup. Results discussed with the in-patient care team.    11/2024 Echocardiogram :  The visual ejection fraction is 55-60%. Abnormal septal motion from conduction  abnormality with ventricular dysynchrony. Anteroseptal hypokinesis cannot be  ruled out.  There is mild to moderate concentric left ventricular hypertrophy. Proximal  septal thickening is noted. Severe LA enlargement. Consider PYP scanning for  evaluation of amyloidosis.  The right ventricle is normal in size and function.  The left atrium is severely dilated.  There is a bioprosthetic aortic valve. TAVR 29mm De Paz Miguel 3 by history.  MG is 10 mm Hg.  There is no pericardial effusion.    1/2025  [employer] Accolade L310 (S) Remote " PPM Device Check     Mode: VVIR 60/115  Presenting Rhythm:   Last In-Clinic Underlying Rhythm/Date: permanent AF w/ CHB, no JE at VVI 30 as of 10/1/24     Pacing/Histogram Data since: 10/1/24  AP: n/a  : 94%  Heart Rate: adequate rates per histograms     Sensing: stable  Pacing Threshold: stable  Impedance: stable  Battery Status: 8 years     Arrhythmia Data Since: 10/1/24  Atrial Arrhythmia: n/a  Ventricular Arrhythmia: none       Imaging:   3/11/2025:                                                              IMPRESSION: Cardiomegaly. Pacemaker with lead tip in the region of the right ventricle. Endovascular aortic valve replacement. Mild pulmonary vascular congestion changes. No pneumothorax. No focal lung infiltrates. Degenerative changes in the bilateral   shoulders.      Physical Exam   Temp: 98.4  F (36.9  C) Temp src: Oral BP: 112/65 Pulse: 64   Resp: 16 SpO2: 96 % O2 Device: None (Room air)    Vitals:    03/10/25 2300 03/11/25 0746 03/12/25 0719   Weight: 85.5 kg (188 lb 6.4 oz) 85 kg (187 lb 4.8 oz) 85.9 kg (189 lb 4.8 oz)       GEN: Awake, alert, oriented  HEART: Regular rate and rhythm, systolic ejection murmur noted  LUNGS: lungs clear  EXT: Significant venous dilation, no leg swelling    Medications   Current Facility-Administered Medications   Medication Dose Route Frequency Provider Last Rate Last Admin    Patient is already receiving anticoagulation with heparin, enoxaparin (LOVENOX), warfarin (COUMADIN)  or other anticoagulant medication   Does not apply Continuous PRN Umm Holland PA-C        Reason ACE/ARB/ARNI order not selected   Other DOES NOT GO TO Umm Mtz PA-C        Reason beta blocker not prescribed   Other DOES NOT GO TO Umm Mtz PA-C         Current Facility-Administered Medications   Medication Dose Route Frequency Provider Last Rate Last Admin    alfuzosin ER (UROXATRAL) 24 hr tablet 10 mg  10 mg Oral Daily Umm Holland  PHYLLIS Martinez   10 mg at 03/12/25 0922    cefTRIAXone (ROCEPHIN) 2 g vial to attach to  ml bag for ADULTS or NS 50 ml bag for PEDS  2 g Intravenous Q24H Dione Simmons DO 0 mL/hr at 03/10/25 1300 2 g at 03/11/25 1243    ezetimibe (ZETIA) half-tab 5 mg  5 mg Oral Q Mon Wed Fri AM Umm Holland PA-C   5 mg at 03/12/25 0921    [Held by provider] furosemide (LASIX) injection 40 mg  40 mg Intravenous BID Dione Simmons DO   40 mg at 03/11/25 0840    multivitamin w/minerals (THERA-VIT-M) tablet 1 tablet  1 tablet Oral QPM Umm Holland PA-C   1 tablet at 03/11/25 2012    potassium chloride april ER (KLOR-CON M20) CR tablet 40 mEq  40 mEq Oral Daily Umm Holland PA-C   40 mEq at 03/12/25 0922    rosuvastatin (CRESTOR) tablet 40 mg  40 mg Oral QPM Umm Holland PA-C   40 mg at 03/11/25 2012    sodium chloride (PF) 0.9% PF flush 3 mL  3 mL Intracatheter Q8H Umm Holland PA-C   3 mL at 03/12/25 0915    valsartan (DIOVAN) tablet 80 mg  80 mg Oral Daily Dione Simmons DO   80 mg at 03/12/25 0922    vitamin C (ASCORBIC ACID) tablet 1,000 mg  1,000 mg Oral Daily Umm Holland PA-C   1,000 mg at 03/12/25 0921    Warfarin Dose Required Daily - Pharmacist Managed  1 each Oral See Admin Instructions Umm Holland PA-C

## 2025-03-12 NOTE — PLAN OF CARE
[id number]  Pt doing well today.  Showered in am.  Up moving about independently.  NPO in am for ARLET.  Completed ARLET without issue.  CTA angiogram ordered.  Cardiology, Oncology and ID all involved.  Creat normalized today.  Pt on RA, satting in high 90's.  Vitally stable.  PIV SL'd.

## 2025-03-12 NOTE — CONSULTS
Care Management Initial Consult    General Information  Assessment completed with: Patient, patient  Type of CM/SW Visit: CM Role Introduction    Primary Care Provider verified and updated as needed: Yes   Readmission within the last 30 days: no previous admission in last 30 days      Reason for Consult: discharge planning  Advance Care Planning: Advance Care Planning Reviewed: present on chart          Communication Assessment  Patient's communication style: spoken language (English or Bilingual)    Hearing Difficulty or Deaf: no   Wear Glasses or Blind: no    Cognitive  Cognitive/Neuro/Behavioral: WDL     Arousal Level: opens eyes spontaneously                Living Environment:   People in home: spouse     Current living Arrangements: house      Able to return to prior arrangements: yes       Family/Social Support:  Care provided by: self  Provides care for: no one  Marital Status:   Support system: Wife, Children  Sisi       Description of Support System: Supportive, Involved    Support Assessment: Adequate family and caregiver support, Adequate social supports    Current Resources:   Patient receiving home care services: No        Community Resources: None  Equipment currently used at home: grab bar, toilet, grab bar, tub/shower  Supplies currently used at home: None    Employment/Financial:  Employment Status: retired        Financial Concerns: none   Referral to Financial Worker: No       Does the patient's insurance plan have a 3 day qualifying hospital stay waiver?  No    Lifestyle & Psychosocial Needs:  Social Drivers of Health     Food Insecurity: Low Risk  (3/10/2025)    Food Insecurity     Within the past 12 months, did you worry that your food would run out before you got money to buy more?: No     Within the past 12 months, did the food you bought just not last and you didn t have money to get more?: No   Depression: Not at risk (1/12/2024)    PHQ-2     PHQ-2 Score: 0   Housing Stability: Low  Risk  (3/10/2025)    Housing Stability     Do you have housing? : Yes     Are you worried about losing your housing?: No   Tobacco Use: Medium Risk (12/12/2024)    Received from [employer]    Patient History     Smoking Tobacco Use: Former     Smokeless Tobacco Use: Never     Passive Exposure: Past   Financial Resource Strain: Low Risk  (3/10/2025)    Financial Resource Strain     Within the past 12 months, have you or your family members you live with been unable to get utilities (heat, electricity) when it was really needed?: No   Alcohol Use: Not on file   Transportation Needs: Low Risk  (3/10/2025)    Transportation Needs     Within the past 12 months, has lack of transportation kept you from medical appointments, getting your medicines, non-medical meetings or appointments, work, or from getting things that you need?: No   Physical Activity: Not on file   Interpersonal Safety: Low Risk  (3/10/2025)    Interpersonal Safety     Do you feel physically and emotionally safe where you currently live?: Yes     Within the past 12 months, have you been hit, slapped, kicked or otherwise physically hurt by someone?: No     Within the past 12 months, have you been humiliated or emotionally abused in other ways by your partner or ex-partner?: No   Stress: Not on file   Social Connections: Not on file   Health Literacy: Not on file       Functional Status:  Prior to admission patient needed assistance:   Dependent ADLs:: Independent, Ambulation-no assistive device  Dependent IADLs:: Independent       Mental Health Status:  Mental Health Status: No Current Concerns       Chemical Dependency Status:  Chemical Dependency Status: No Current Concerns             Values/Beliefs:  Spiritual, Cultural Beliefs, Alevism Practices, Values that affect care: no               Discussed  Partnership in Safe Discharge Planning  document with patient/family: No    Additional Information:  Writer met with the patient at the  bedside. Patient is A+Ox4 independent in the room. Writer discussed role and scope of discharge planning.  Prior to this admission patient and spouse reside independently in their home. Patient noted that he does not use any assistive equipment and that they have grab bars for the toilet and shower.  Patient is aware that we are consulted for discharge planning. He is aware that he has a procedure today and Infectious disease is due to see him.  He is aware that he will most likely require follow up with PCP, [facility], and [facility].  He noted he has been established with all of these providers and aware that he will need outpatient follow up.  Patient noted his Spouse will be coming in later today and he is hopeful to discharge soon.  Patient does not identify any needs at discharge, however our team will continue to follow pending procedure(s) and follow up recommendations.          Yoli Albert RN, BSN, [facility]   Care Transitions Specialist  [facility]  Care Transitions Specialist  [address] [place]. [address]  [email or web address]  Office: [phone] Fax: [phone]  [facility]

## 2025-03-12 NOTE — PROGRESS NOTES
[facility] Progress Note          Assessment and Plan:     History of stage IIB nodular sclerosis Hodgkin's lymphoma   -Initial diagnosis of stage IIb nodular sclerosis Hodgkin's lymphoma was in 1997  - Per review of his medical records had CR after being treated with 6 cycles of ABVD under the direction of my colleague Dr. Segura at the time  - REported some fatigue and night sweats.  Onset of symptoms since 9/22/2024 per his report.  Symptoms resolved after a course of Doxycycline per his report.    - Physical exam showed no concerning lymphadenopathy or hepatosplenomegaly.  - CBC unremarkable  - CT chest, abdomen, pelvis with contrast on 10/3/2024 showed no lymphadenopathy in the chest, abdomen or pelvis.  Mildly enlarged prostate.  No obvious masses or concerns for malignancy  - Reports being up-to-date with his colonoscopies.  Denied hematochezia or melena  - CBC on 10/8/2024 showed hemoglobin of 15.8, WBC count elevated at 10,200, absolute neutrophil count of 5900, absolute lymphocyte count of 3000 and platelet count of 265,000  - Comprehensive metabolic panel on 10/8/2024 showed normal kidney and liver function test except for elevated BUN of 28 mg/dL, AST elevated 72, ALT elevated at 60  - Peripheral smear on 10/8/2024 showed minimal absolute monocytosis and mild absolute lymphocytosis.  No morphologic or immunophenotypic evidence of lymphoma.  No findings of dysgranulopoiesis or circulating blasts  -CT chest, abdomen, pelvis with contrast on 10/3/2024 showed no lymphadenopathy in the chest, abdomen or pelvis.   -CT chest, abdomen, pelvis with contrast on 12/27/2024 showed no concerning hepatosplenomegaly or lymphadenopathy.  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  Serum IgG levels  were normal at 1543 mg/dL, IgA levels were normal at 166 mg/dL and IgM levels were elevated at 246 mg/dL.  - Serum kappa free light chains on 10/8/2024 were elevated to 4.18 mg/dL, lambda free light chains were elevated to 3.41 mg/dL and kappa/lambda free light chain ratio was normal at 1.23  - No evidence of anemia, hypercalcemia or renal dysfunction on lab work  - I suggested further evaluation with a skeletal survey and 24-hour urine protein electrophoresis  -24-hour urine protein electrophoresis on 10/24/2024 showed no monoclonal protein in the urine.  - Skeletal survey on 10/25/2024 showed no myeloma lytic bone lesions  -CT chest, abdomen, pelvis with contrast on 10/3/2024 showed no lymphadenopathy in the chest, abdomen or pelvis.   -CT chest, abdomen, pelvis with contrast on 12/27/2024 showed no concerning hepatosplenomegaly or lymphadenopathy.  - Bone marrow aspiration biopsy on 2/13/2025 showed 7 to 8% clonal plasma cells.  No circulating plasma cells.  Congo red stain was negative for amyloid.    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  -I reviewed that MGUS can sometimes progress into a much more advanced condition such as multiple myeloma or lymphoma at about 1 %/year and as such would need continued monitoring  - He is scheduled for a follow-up in the clinic on 3/19/2025.  Per his request we will reschedule this visit.  Plan to see him back in 4 months with repeat monoclonal protein studies.  - He has no findings concerning for lymphoma, multiple myeloma or AL amyloidosis per extensive workup.    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  Please call with questions if any    Abdon Michele MD                  Interval History:     Has has ARLET this afternoon.                Review of Systems:   As per subjective, otherwise 5 systems reviewed and negative.           Physical Exam:   Blood pressure 104/57, pulse 60, temperature 97.7  F (36.5  C), temperature source Oral, resp. rate 16, height 1.676 m (5' 5.98\"), weight 85.9 kg (189 lb 4.8 oz), SpO2 96%.      Vital Sign Ranges  Temperature Temp  Av.2  F (36.8  C)  Min: 97.7  F (36.5  C)  Max: 98.4  F (36.9  C)   Blood pressure Systolic (24hrs), Av , Min:94 , Max:132        Diastolic (24hrs), Av, Min:56, Max:89      Pulse Pulse  Av.9  Min: 57  Max: 64   Respirations Resp  Av  Min: 16  Max: 16   Pulse oximetry SpO2  Av.4 %  Min: 93 %  Max: 98 %         Intake/Output Summary (Last 24 hours) at 3/12/2025 1822  Last data filed at 3/12/2025 1700  Gross per 24 hour   Intake 360 ml   Output 1100 ml   Net -740 ml       Constitutional:   No acute distress.   Skin:   No rashes, petechiae, or ecchymoses.   HEENT:   Normocephalic, atraumatic. Oropharynx clear with no mucosal lesions or thrush.   Neck:   Supple.   Lungs:   Clear to auscultation bilaterally.   Cardiovascular:   Irregular rhythm   Abdomen:   Soft, nontender, nondistended with no palpable hepatosplenomegaly.   Extremities:   No clubbing, cyanosis, or edema.   Neurological:   CN II-XII grossly intact. No focal motor or sensory deficits.            Medications:     No current outpatient medications on file.                Data:     Results for orders placed or performed during the hospital encounter of 03/10/25 (from the past 24 hours)   INR   Result Value Ref Range    INR 2.48 (H) 0.85 - 1.15   Basic metabolic panel   Result Value Ref Range    Sodium 133 (L) 135 - 145 mmol/L    Potassium 3.6 3.4 - 5.3 mmol/L    Chloride 98 98 - 107 mmol/L    Carbon Dioxide (CO2) 24 22 - 29 mmol/L    Anion Gap 11 7 - 15 mmol/L "    Urea Nitrogen 28.5 (H) 8.0 - 23.0 mg/dL    Creatinine 1.17 0.67 - 1.17 mg/dL    GFR Estimate 64 >60 mL/min/1.73m2    Calcium 9.3 8.8 - 10.4 mg/dL    Glucose 111 (H) 70 - 99 mg/dL   CBC with platelets   Result Value Ref Range    WBC Count 8.4 4.0 - 11.0 10e3/uL    RBC Count 3.85 (L) 4.40 - 5.90 10e6/uL    Hemoglobin 11.0 (L) 13.3 - 17.7 g/dL    Hematocrit 34.0 (L) 40.0 - 53.0 %    MCV 88 78 - 100 fL    MCH 28.6 26.5 - 33.0 pg    MCHC 32.4 31.5 - 36.5 g/dL    RDW 15.5 (H) 10.0 - 15.0 %    Platelet Count 207 150 - 450 10e3/uL   Erythrocyte sedimentation rate auto   Result Value Ref Range    Erythrocyte Sedimentation Rate 71 (H) 0 - 20 mm/hr   CRP inflammation   Result Value Ref Range    CRP Inflammation 50.81 (H) <5.00 mg/L   Transesophageal Echocardiogram   Result Value Ref Range    LVEF  55-60%     [facility]    [id number]  Formerly [facility]  [id number]  [id number]^HONORIO^CHARU^S     [facility]  Echocardiography Laboratory  [address] [place] [address]     Name: DIANA MOREIRA  MRN: [id number]  : 1945  Study Date: 2025 01:27 PM  Age: 79 yrs  Gender: Male  Patient Location: [facility]  Reason For Study: Aortic Valve  Ordering Physician: CHARU OLMEDO  Referring Physician: Reid Hogue  Performed By: Deon Hugo     BSA: 1.9 m2  Height: 65 in  Weight: 189 lb  HR: 64  BP: 120/60 mmHg  ______________________________________________________________________________  Procedure  Transesophageal Echocardiogram with two-dimensional, color and spectral  Doppler. 3D image acquisition, reconstruction, and real-time interpretation  was performed. ARLET Probe serial #[id number] was used during the procedure. The  heart rate, respiratory rate and response to care were monitored throughout  the procedure with the assistance of the nurse.  ______________________________________________________________________________  Interpretation Summary     Status post TAVR with a 29mm De [place] Miguel 3 bioprosthetic aortic  valve. No  vegetations seen on prosthetic valve leaflets, no aortic root abscess seen  (sweep image obtained #18). On short-axis view, leaflet motion does not appear  significantly restricted. No significant aortic insufficiency (trivial central  regurgitation seen only with decreased velocity scale), no paravalvular leak.  Limited TTE images obtained at the end of the ARLET study due to suboptimal  Doppler alignment on transgastric view, on TTE VMax 3.5 m/s, mean gradient 32  mmHg, abnormal.     Left ventricular systolic function is normal. The visual ejection fraction is  55-60%.  The right ventricle is mildly dilated. The right ventricular systolic function  is normal.  There is no color Doppler evidence of an atrial shunt. A contrast injection  (Bubble Study) was performed that was negative for flow across the interatrial  septum.  There is an irregular echodensity (1.2 cm x 0.7cm) in the left atrial  appendage, contiguous with other trabeculations (assessed with X-plane) and of  the same echogenicity, overall more consistent with a prominent trabeculation  rather than a thrombus, cardiac CT could be considered for further evaluation  if indicated.  Aortic root dilatation is present. Max diameter of the visualized portion 4.1  cm.     ______________________________________________________________________________  ARLET  I determined this patient to be an appropriate candidate for the planned  sedation and procedure and have reassessed the patient immediately prior to  sedation and procedure. Total sedation time: 24 minutes of continuous bedside  1:1 monitoring. Versed (4mg) was given intravenously. Fentanyl (100mcg) was  given intravenously. Consent to the procedure was obtained prior to sedation.  Prior to the exam, the oral cavity was checked and no overcrowding was noted.  The transesophageal probe was passed without difficulty. There were no  complications associated with this procedure. 3D image  acquisition,  reconstruction, and real-time interpretation was performed.     Left Ventricle  The left ventricle is normal in size. Left ventricular systolic function is  normal. The visual ejection fraction is 55-60%.     Right Ventricle  The right ventricle is mildly dilated. The right ventricular systolic function  is normal.     Atria  The left atrium is severely dilated. The right atrium is moderate to severely  dilated. There is a catheter/pacemaker lead seen in the right atrium. There is  no color Doppler evidence of an atrial shunt. A contrast injection (Bubble  Study) was performed that was negative for flow across the interatrial septum.  There is an irregular echodensity (1.2 cm x 0.7cm) in the left atrial  appendage, contiguous with other trabeculations (assessed with X-plane) and of  the same echogenicity, overall more consistent with a prominent trabeculation  rather than a thrombus, cardiac CT could be considered for further evaluation  if indicated.     Mitral Valve  There is mild mitral annular calcification. There is mild (1+) mitral  regurgitation. There is no mitral valve stenosis.     Tricuspid Valve  There is mild (1+) tricuspid regurgitation. The right ventricular systolic  pressure is approximated at 24mmHg plus the right atrial pressure.     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 No  vegetations seen on prosthetic valve leaflets, no aortic root abscess seen  (sweep image obtained #18). On short-axis view, leaflet motion does not appear  significantly restricted. No significant aortic insufficiency (trivial central  regurgitation seen only with decreased velocity scale), no paravalvular leak.  Limited TTE images obtained at the end of the ARLET study due to suboptimal  Doppler alignment on transgastric view, on TTE VMax 3.5 m/s, mean gradient 32  mmHg, abnormal.     Pulmonic Valve  The pulmonic valve is normal in structure and function.      Vessels  Aortic root dilatation is present. Max diameter of the visualized portion 4.1  cm. Mild atherosclerotic plaque(s) in the descending aorta.     Pericardial/Pleural  There is no pericardial effusion.  ______________________________________________________________________________  Doppler Measurements & Calculations  MV max P.7 mmHg  MV mean P.0 mmHg  MV V2 VTI: 24.8 cm  Ao V2 max: 289.0 cm/sec  Ao max P.0 mmHg  Ao V2 mean: 217.7 cm/sec  Ao mean P.0 mmHg  Ao V2 VTI: 64.3 cm  Ao acc time: 0.11 sec     ______________________________________________________________________________  Report approved by: Wili Guajardo MD on 2025 03:44 PM

## 2025-03-12 NOTE — PLAN OF CARE
Goal Outcome Evaluation:    Orientation: A&Ox4  Aggression Stop Light: Green  Activity: Ind  Diet/BS Checks: NPO  Tele: V paced  IV Access/Drains: R, L PIV SL  Pain Management: Denies  Abnormal VS/Results: VSS on RA  Bowel/Bladder: Cont b/b  Skin/Wounds: WNL  Consults: Hem/onc, ID, cardiology  D/C Disposition: Pending

## 2025-03-12 NOTE — PRE-PROCEDURE
GENERAL PRE-PROCEDURE:   Procedure:  Transesophageal echocardiogram  Date/Time:  3/12/2025 1:30 PM    Verbal consent obtained?: Yes    Written consent obtained?: Yes    Risks and benefits: Risks, benefits and alternatives were discussed    Consent given by:  Patient  Patient states understanding of procedure being performed: Yes    Patient's understanding of procedure matches consent: Yes    Procedure consent matches procedure scheduled: Yes    Expected level of sedation:  Moderate  Appropriately NPO:  Yes  ASA Class:  3  Mallampati  :  Grade 2- soft palate, base of uvula, tonsillar pillars, and portion of posterior pharyngeal wall visible  Lungs:  Lungs clear with good breath sounds bilaterally  Heart:  Systolic murmur and RRR  History & Physical reviewed:  History and physical reviewed and no updates needed  Statement of review:  I have reviewed the lab findings, diagnostic data, medications, and the plan for sedation

## 2025-03-12 NOTE — PLAN OF CARE
Goal Outcome Evaluation:      Plan of Care Reviewed With: patient          Outcome Evaluation: discharge pending ongoing recommendations, procedure and plan of care

## 2025-03-12 NOTE — PROGRESS NOTES
[facility]    Hospitalist Progress Note    Date of Admission: 3/10/2025    Assessment & Plan   Jonny Goldberg is a 79 year old male with PMHx of aortic stenosis s/p TAVR, high degree AVB s/p ppm, PAD, persistent afib on chronic anticoagulation with warfarin, hypertension, HFpEF, DM II, hx of Hodgkin's lymphoma and hx of fever of unknown origin who was admitted on 3/10/2025 with nocturnal shortness of breath and hypoxia suspected secondary to to CHF exacerbation.     Acute exacerbation of chronic HFpEF, with acute hypxoic respiratory failure: Resolved  Elevated troponin, suspect dt demand ischemia in setting of CHF exacerbation  Aortic stenosis s/p TAVR  Hypertension  PAD  Persistent afib, on chronic anticoagulation with warfarin  Hx of high degree AVB s/p ppm  *Known hx of HFpEF. Echo in 11/2024 showed EF 55-60%, mild MR, TAVR  *Presented to ED fore valuation of worsening shortness of breath, dizziness/lightheadedness and worsening peripheral edema. EMS called on the night of admission for worsening shortness of breath. Upon EMS arrival, was noted to be hypoxic with O2 sats in low 80s on RA  *In the ED, he had low grade fever (Tmax 100.8) in setting of known FUO but was otherwise hemodynamically stable. O2 sats stable on 2L NC (though no documented hypoxia). Labs notable for stable lytes/renal function, WBC 14.7,lactate 2.3, procal 0.68 (mod risk), elevated trop with flat trend (37 - 72 - 66 - 57) and no reports of chest pain, proBNP 3k, lytes/renal function stable, viral swab neg. CXR showed pulmonary vascular congestion. Was initially placed on BiPAP but was quickly weaned to 2L NC after receicing IV Lasix.   *Cardiology consulted on admission. In agreement with diuresis with IV Lasix. Meds further adjusted -- stopped amlodipine, started valsartan, reduced chlorthalidone dose; no role for bb.   *Responded well to diuresis. IV Lasix stopped on 3/11. Chlorthalidone also stopped  per  cardiology.  -- ARLET ordered  -- cont  valsartan 80mg daily, statin, Zetia  -- pharmacy managing warfarin dosing while hospitalized, daily INR; consider transition to DOAC at discharge if not cost prohibitive    Fever of unknown origin  Leukocytosis: Resolved  *Has been undergoing workup of fever of unknown origin in the OP setting with reported months long history of fever/night sweats. Extensive workup done per ID was unrevealing. Had been previously treated with a prolonged course of doxycycline.   *Low grade temp on admission this stay, Tmax 100.8. WBC 14 (had been normal on recent labs). Viral swabs neg. Procal mod risk. CXR w/o infiltrate. UA bland. Lactate quickly normalized. Se rate elevated at 72, CRP up to 55. Was placed on empiric ceftriaxone on admission given concern for ddx including endocarditis   *MOHPA consulted given recent OP bone marrow biopsy -- suggestive of MGUS, no evidence of myeloma. Plan is for OP PET/CT.   *ID following this stay.   -- cont ceftriaxone  (3/10-present) for now but no clear evidence of infection  -- ARLET ordered for evaluation of  endocarditis  -- evaluation for rheumatologic conditions ordered (ZEINA, ANCA), ACEI level ordered to assess for sarcoid  -- discussed with ID and would recommend cardiac MRI to evaluate for cardiac amyloid -- discussed with cards, will consider inpatient vs as outpatient  -- patient also considering second opinion from [place]    Remote history Hodgkin lymphoma  Monoclonal gammopathy   *Follows with [facility].  Recently seen by Dr. Michele and underwent a bone marrow biopsy few weeks prior to admission. Seen by oncology this stay. Findings as above. Planning for OP followup.    DM II, well managed  A1c 5.2 this stay. No need to monitor accuchecks or use sliding scale insulin while hospitalized    Elevated PSA, follows with Urology    FEN: no IVFs, lytes stable, regular diet  DVT Prophylaxis: warfarin  Code Status: Full Code    Disposition: Pending findings on  "workup this stay. Anticipate discharge home in next 1-2 days with support from spouse, pending recommendations per specialist, response to treatment and additional testing needed    Medically Ready for Discharge: Anticipated in 2-4 Days    Dione Simmons, DO    Clinically Significant Risk Factors         # Hyponatremia: Lowest Na = 133 mmol/L in last 2 days, will monitor as appropriate  # Hypochloremia: Lowest Cl = 96 mmol/L in last 2 days, will monitor as appropriate      # Hypoalbuminemia: Lowest albumin = 3.4 g/dL at 3/10/2025  6:48 AM, will monitor as appropriate       # Hypertension: Noted on problem list            # Obesity: Estimated body mass index is 30.57 kg/m  as calculated from the following:    Height as of this encounter: 1.676 m (5' 5.98\").    Weight as of this encounter: 85.9 kg (189 lb 4.8 oz)., PRESENT ON ADMISSION     # Financial/Environmental Concerns: none   # Pacemaker present         Medical Decision Making       Please see A&P for additional details of medical decision making.       Interval History   Chart reviewed.  Seen this morning. Resting comfortably in bed. Continues to feel well. No fevers since admission. No specific complaints. Denies cp/sob/cough, abd pain/n/v. Still perplexed as to what is driving his fevers. Plan is for ARLET today.     -Data reviewed today: I reviewed all new labs and imaging results over the last 24 hours. I personally reviewed no images or EKG's today.    Physical Exam   Temp: 98.4  F (36.9  C) Temp src: Oral BP: 112/65 Pulse: 64   Resp: 16 SpO2: 96 % O2 Device: None (Room air)    Vitals:    03/10/25 2300 03/11/25 0746 03/12/25 0719   Weight: 85.5 kg (188 lb 6.4 oz) 85 kg (187 lb 4.8 oz) 85.9 kg (189 lb 4.8 oz)     Vital Signs with Ranges  Temp:  [98.4  F (36.9  C)-99.1  F (37.3  C)] 98.4  F (36.9  C)  Pulse:  [59-64] 64  Resp:  [16-19] 16  BP: (101-112)/(51-65) 112/65  SpO2:  [95 %-96 %] 96 %  I/O last 3 completed shifts:  In: 960 [P.O.:960]  Out: " 1000 [Urine:1000]    Constitutional: Resting comfortably, alert and conversing appropriately, NAD  Respiratory: CTAB, no wheeze, no increased work breathing or  muscle use   Cardiovascular: HRRR, no MGR,+bilateral lower extremity edema  GI: S, NT, ND  Skin/Integumen: Warm/dry  Other:      Medications   Current Facility-Administered Medications   Medication Dose Route Frequency Provider Last Rate Last Admin    Patient is already receiving anticoagulation with heparin, enoxaparin (LOVENOX), warfarin (COUMADIN)  or other anticoagulant medication   Does not apply Continuous PRN Umm Holland PA-C        Reason ACE/ARB/ARNI order not selected   Other DOES NOT GO TO Umm Mtz PA-C        Reason beta blocker not prescribed   Other DOES NOT GO TO Umm Mtz PA-C         Current Facility-Administered Medications   Medication Dose Route Frequency Provider Last Rate Last Admin    alfuzosin ER (UROXATRAL) 24 hr tablet 10 mg  10 mg Oral Daily Umm Holland PA-C   10 mg at 03/12/25 0922    cefTRIAXone (ROCEPHIN) 2 g vial to attach to  ml bag for ADULTS or NS 50 ml bag for PEDS  2 g Intravenous Q24H Dione Simmons DO 0 mL/hr at 03/10/25 1300 2 g at 03/12/25 1213    ezetimibe (ZETIA) half-tab 5 mg  5 mg Oral Q Mon Wed Fri AM Umm Holland PA-C   5 mg at 03/12/25 0921    [Held by provider] furosemide (LASIX) injection 40 mg  40 mg Intravenous BID Dione Simmons DO   40 mg at 03/11/25 0840    multivitamin w/minerals (THERA-VIT-M) tablet 1 tablet  1 tablet Oral QPM Umm Holland PA-C   1 tablet at 03/11/25 2012    potassium chloride april ER (KLOR-CON M20) CR tablet 40 mEq  40 mEq Oral Daily Umm Holland PA-C   40 mEq at 03/12/25 0922    rosuvastatin (CRESTOR) tablet 40 mg  40 mg Oral QPM Umm Holland PA-C   40 mg at 03/11/25 2012    sodium chloride (PF) 0.9% PF flush 3 mL  3 mL  Intracatheter Q8H Umm Holland PA-C   3 mL at 25 0915    valsartan (DIOVAN) tablet 80 mg  80 mg Oral Daily Dione Simmons DO   80 mg at 25 0922    vitamin C (ASCORBIC ACID) tablet 1,000 mg  1,000 mg Oral Daily Umm Holland PA-C   1,000 mg at 25 0921    warfarin ANTICOAGULANT (COUMADIN) tablet 4 mg  4 mg Oral ONCE at 18:00 Vick Rosa MD        Warfarin Dose Required Daily - Pharmacist Managed  1 each Oral See Admin Instructions Umm Holland PA-C           Data   Recent Labs   Lab 25  1052 25  1129 03/10/25  1229 03/10/25  0648   WBC 8.4 8.3  --  14.7*   HGB 11.0* 11.3*  --  10.9*   MCV 88 88  --  89    211  --  237   INR 2.48* 2.61* 2.74* 3.04*   * 134*  --  141   POTASSIUM 3.6 3.5  --  3.7   CHLORIDE 98 96*  --  103   CO2 24 28  --  25   BUN 28.5* 25.0*  --  21.0   CR 1.17 1.23*  --  1.03   ANIONGAP 11 10  --  13   SARA 9.3 9.4  --  9.0   * 114*  --  142*   ALBUMIN  --   --   --  3.4*   PROTTOTAL  --   --   --  7.7   BILITOTAL  --   --   --  0.5   ALKPHOS  --   --   --  77   ALT  --   --   --  18   AST  --   --   --  35       Recent Results (from the past 24 hours)   Echocardiogram Complete   Result Value    LVEF  50-55%    Narrative    [id number]  [id number]  [id number]  [id number]^UMM^MICHAEL     [facility]  Echocardiography Laboratory  [address] [place] [address]     Name: DIANA MOREIRA  MRN: [id number]  : 1945  Study Date: 2025 11:42 AM  Age: 79 yrs  Gender: Male  Patient Location: [facility]  Reason For Study: Heart Failure  Ordering Physician: UMM HOLLAND  Referring Physician: Reid Hogue  Performed By: Deon Hugo     BSA: 1.9 m2  Height: 66 in  Weight: 187 lb  HR: 67  BP: 141/90 mmHg  ______________________________________________________________________________  Procedure  Echocardiogram with two-dimensional, color and spectral Doppler.  Definity (NDC  #06096-609) given intravenously.  ______________________________________________________________________________  Interpretation Summary     Status post TAVR with a 29mm [place] 3 bioprosthetic aortic valve.  There is a well-seated bioprosthesis in aortic position. Valve leaflets were  not well-seen. Mean transvalvular gradient is 28 mmHg which is high for this  valve. Peak velocity is 3.5 m/s and dimensionless index is 0.28. No PVL.  Acceleration time is 100 ms.  The left ventricle is normal in size. There is moderate concentric LVH.Left  ventricular systolic function is low normal. The visual ejection fraction is  50-55%.  There is severe hypokinesis of the apical septum. Differential diagnosis  includes apical insertion of the pacemaker lead vs coronary disease.  The right ventricle is normal in size and function.  The left atrium is severely dilated. The right atrium is mildly dilated.  Compared to the prior study from 11/14/2024, gradient across the bioprosthetic  aortic valve has increased significantly from 10 mmHg to 29 mmHg. Consider ARLET  for further workup. Results discussed with the in-patient care team.  ______________________________________________________________________________  Left Ventricle  The left ventricle is normal in size. There is moderate concentric left  ventricular hypertrophy. Diastolic Doppler findings (E/E' ratio and/or other  parameters) suggest left ventricular filling pressures are increased. Left  ventricular systolic function is low normal. The visual ejection fraction is  50-55%. There is severe hypokinesis of the apical septum. Differential  diagnosis includes apical insertion of the pacemaker lead vs coronary disease.  Septal motion is consistent with conduction abnormality.     Right Ventricle  There is a catheter/pacemaker lead seen in the right ventricle. The right  ventricle is normal in size and function.     Atria  The left atrium is severely  dilated. The right atrium is mildly dilated.     Mitral Valve  The mitral valve leaflets are mildly thickened. There is mild mitral annular  calcification.     Tricuspid Valve  There is mild (1+) tricuspid regurgitation. The right ventricular systolic  pressure is approximated at 22.5 mmHg plus the right atrial pressure.     Aortic Valve  There is a bioprosthetic aortic valve. The prosthetic aortic valve is well-  seated.     Pulmonic Valve  The pulmonic valve is not well visualized.     Vessels  Normal size aorta. Normal size ascending aorta. IVC diameter <2.1 cm  collapsing >50% with sniff suggests a normal RA pressure of 3 mmHg.     Pericardium  There is no pericardial effusion.     Rhythm  The rhythm was atrial fibrillation with wide QRS rhythm.  ______________________________________________________________________________  MMode/2D Measurements & Calculations  IVSd: 1.3 cm     LVIDd: 5.5 cm  LVIDs: 4.0 cm  LVPWd: 1.3 cm  FS: 27.5 %  LV mass(C)d: 306.7 grams  LV mass(C)dI: 157.8 grams/m2  Ao root diam: 3.0 cm  asc Aorta Diam: 3.7 cm  LVOT diam: 2.5 cm  LVOT area: 4.7 cm2  Ao root diam index Ht(cm/m): 1.8  Ao root diam index BSA (cm/m2): 1.6  Asc Ao diam index BSA (cm/m2): 1.9  Asc Ao diam index Ht(cm/m): 2.2  LA Volume (BP): 192.0 ml     LA Volume Index (BP): 99.0 ml/m2  RV Base: 4.3 cm  RWT: 0.47  TAPSE: 2.2 cm     Doppler Measurements & Calculations  MV E max papi: 129.0 cm/sec  MV dec slope: 553.1 cm/sec2  MV dec time: 0.23 sec  Ao V2 max: 346.7 cm/sec  Ao max P.0 mmHg  Ao V2 mean: 260.3 cm/sec  Ao mean P.6 mmHg  Ao V2 VTI: 67.9 cm  JACOBY(I,D): 1.3 cm2  JACOBY(V,D): 1.2 cm2  Ao acc time: 0.10 sec  LV V1 max PG: 3.0 mmHg  LV V1 max: 86.6 cm/sec  LV V1 VTI: 18.7 cm  SV(LVOT): 88.1 ml  SI(LVOT): 45.3 ml/m2  PA acc time: 0.06 sec  TR max papi: 237.0 cm/sec  TR max P.5 mmHg  AV Papi Ratio (DI): 0.25     JACOBY Index (cm2/m2): 0.67  E/E' av.4  Lateral E/e': 17.7  Medial E/e': 17.2  RV S Papi: 12.0 cm/sec      ______________________________________________________________________________  Report approved by: Vladislav Ramirez MD on 03/11/2025 05:33 PM

## 2025-03-12 NOTE — CONSULTS
[facility]    Infectious Disease Consultation     Date of Admission:  3/10/2025  Date of Consult (When I saw the patient): 03/12/25      Assessment:  79YM with history of  TAVR for severe aortic stenosis 12/12/2023, pacemaker for high grade AV block, and remote history of Hodgkin lymphoma in remission, who has had a recent extensive negative work up for infectious etiology and for malignancy due to fever of unknown origin since September, and has now been admitted due to acute onset of shortness of breath. He has been found to be in congestive heart failure    Prior echocardiogram on 11/14/2024 was suggestive of cardiac amyloid. Await ARLET for further direction. If ongoing concern, cardiac MRI may be indicated. Additionally, may need work up for other connective tissue disorders.    -Congestive heart failure  -Fever of unknown origin , ongoing low grade fever, sweats and elevated inflammatory markers  -S/p pacemaker placement in 2022 and TAVR with bioprosthetic aortic valve replacement in 2023 for severe aortic stenosis  -Hodgkin lymphoma in 1997 which was treated with chemotherapy and radiation to the mediastinum and has remained in remission.   -Infra renal abdominal aortic aneurysm  -Chronic medical conditions - diabetes , PAD and hypertension     Recommendations:  ARLET is pending  Follow blood cxs  Work up for infection has included negative blood cxs, negative work up for culture negative endocarditis (Coxiella and Bartonella serology), negative quantiferon gold, syphilis serology, tick borne panel, Lyme test, hist and blasto antigens negative, HIV RNA and CT chest abdomen pelvis  Check ZEINA, ANCA, ACE level, ferritin  Further recommendations will be made based on ARELT results, if ongoing concern for amyloid, consider cardiac MRI.  Recommendations were discussed with the hospitalist service    Brit Treviño MD    Reason for Consult   Reason for consult: I was asked to evaluate this patient  for shortness of breath.    Primary Care Physician   Reid Hogue    Chief Complaint   Shortness of breath    History is obtained from the patient and medical records    History of Present Illness   Jonny Goldberg is a 79 year old male with history of  pacemaker placement and TAVR for severe aortic stenosis 12/12/2023 and remote history of Hodgkin lymphoma in remission, who has had a recent extensive negative work up for infectious etiology due to fever of unknown origin since September, and has now been admitted due to acute onset of shortness of breath. He has been found to be in congestive heart failure    Patient has had an extensive evaluation in the past, ARLET was ordered in December 12th  but was not completed until patient's follow up visit on 1/9 at which time his fever had resolved and ARLET was not pursued further    Since that time patient notes fever of  but has sweats, had lost 20lbs but is gaining that back/ feels poor energy levels and is limited in what he can do.    No has been admitted due to acute onset of shortness of breath and was found to be in congestive heart failure  Past Medical History   I have reviewed this patient's medical history and updated it with pertinent information if needed.   Past Medical History:   Diagnosis Date    Aortic valve stenosis     severe    Blood clot in the legs     left leg after surgery    Gastro-oesophageal reflux disease     Hodgkins lymphoma (H)     bone marrow biopsy    Hypertension     PAD (peripheral artery disease)     persistent atrial fibrillation     AFIB    Unspecified cerebral artery occlusion with cerebral infarction 10/01/2011       Past Surgical History   I have reviewed this patient's surgical history and updated it with pertinent information if needed.  Past Surgical History:   Procedure Laterality Date    APPENDECTOMY      ARTHROPLASTY REVISION HIP  12/27/2011    Procedure:ARTHROPLASTY REVISION HIP; RIGHT TOTAL HIP REVISION WITH BONE  GRAFT  ; Surgeon:ANGIE HARRINGTON; Location: OR    BIOPSY  hodgkins lymphoma    bone marrow biopsy, chemo and radiation    CARDIAC SURGERY      cardiac ablation    COLONOSCOPY      CV CORONARY ANGIOGRAM N/A 9/23/2022    Procedure: Coronary Angiogram;  Surgeon: Gennaro Cook MD;  Location:  HEART CARDIAC CATH LAB    CV PCI N/A 9/23/2022    Procedure: Percutaneous Coronary Intervention;  Surgeon: Gennaro Cook MD;  Location:  HE[facility]ARD[facility] CATH LAB    CV RIGHT HEART CATH MEASUREMENTS RECORDED N/A 9/23/2022    Procedure: Right Heart Catheterization;  Surgeon: Gennaro Cook MD;  Location:  HEART CARDIAC CATH LAB    CV TRANSCATHETER AORTIC VALVE REPLACEMENT-FEMORAL APPROACH N/A 12/12/2023    Procedure: Transcatheter Aortic Valve Replacement-Femoral Approach;  Surgeon: Gennaro Cook MD;  Location:  HEART CARDIAC CATH LAB    EP PACEMAKER DEVICE & LEAD IMPLANT- RIGHT VENTRICULAR N/A 2/27/2023    Procedure: Pacemaker Device & Lead Implant- Right ventricular;  Surgeon: Brian Madrid MD;  Location:  HEART CARDIAC CATH LAB    ILIAC ARTERY ANGIOGRAM LEFT Left 10/10/2023    Procedure: Left Groin Cutdown with Repair of Left Femoral Artery; Aborted Transcatheter Aortic Valve Replacement;  Surgeon: German Patel MD;  Location:  HEART CARDIAC CATH LAB    ORTHOPEDIC SURGERY      hip and knee surgeries    ORTHOPEDIC SURGERY      carpal tunnel  right hand       Prior to Admission Medications   Prior to Admission Medications   Prescriptions Last Dose Informant Patient Reported? Taking?   ALPHA LIPOIC ACID PO 3/9/2025 Morning Self Yes Yes   Sig: Take 600 mg by mouth daily   CINNAMON PO 3/9/2025 Morning Self Yes Yes   Sig: Take 1 capsule by mouth every evening   Cholecalciferol (VITAMIN D) 400 UNITS tablet 3/9/2025 Morning Self Yes Yes   Sig: Take 1 tablet by mouth every evening   acetaminophen (TYLENOL) 500 MG tablet 3/9/2025 Bedtime  Yes Yes   Sig: Take 1,000 mg by mouth 3 times daily as  needed for mild pain.   alfuzosin ER (UROXATRAL) 10 MG 24 hr tablet 3/9/2025 Morning Self No Yes   Sig: Take 1 tablet (10 mg) by mouth daily   amLODIPine (NORVASC) 10 MG tablet 3/9/2025 Morning  No Yes   Sig: Take 1 tablet (10 mg) by mouth daily.   ascorbic acid (VITAMIN C) 1000 MG TABS 3/9/2025 Morning Self Yes Yes   Sig: Take 1 tablet by mouth daily   chlorthalidone (HYGROTON) 50 MG tablet 3/9/2025 Morning Self Yes Yes   Sig: Take 50 mg by mouth daily   clindamycin (CLEOCIN) 300 MG capsule  Self Yes Yes   Sig: Take 2 capsules by mouth once as needed (1 hour prior to dental appointments 2 x 300 mg = 300 mg)   co-enzyme Q-10 100 MG CAPS capsule 3/9/2025 Morning Self Yes Yes   Sig: Take 1 capsule by mouth daily   ezetimibe (ZETIA) 10 MG tablet Past Week  No Yes   Sig: Take 0.5 tablets (5 mg) by mouth Every Mon, Wed, Fri Morning   fenofibrate (TRIGLIDE/LOFIBRA) 160 MG tablet 3/9/2025 Morning  No Yes   Sig: Take 1 tablet (160 mg) by mouth daily.   multivitamin w/minerals (THERA-VIT-M) tablet 3/9/2025 Morning Self Yes Yes   Sig: Take 1 tablet by mouth every evening   omega 3 1000 MG CAPS 3/9/2025 Evening Self Yes Yes   Sig: Take 1 capsule by mouth every evening 180 EPA/ 20 DHH   potassium chloride ER (KLOR-CON M) 20 MEQ CR tablet 3/9/2025 Morning Self No Yes   Sig: Take 2 tablets (40 mEq) by mouth daily   rosuvastatin (CRESTOR) 40 MG tablet 3/9/2025 Evening Self Yes Yes   Sig: Take 40 mg by mouth every evening   warfarin ANTICOAGULANT (COUMADIN) 4 MG tablet 3/9/2025 Evening Self Yes Yes   Sig: Take by mouth See Admin Instructions. Take 2mg Sun, Tue and 4mg all other days      Facility-Administered Medications: None     Allergies   Allergies   Allergen Reactions    Irinotecan     Penicillin G Hives     hives       Immunization History   Immunization History   Administered Date(s) Administered    COVID-19 12+ (MODERNA) 12/13/2024    COVID-19 12+ (Pfizer) 10/03/2023    COVID-19 Bivalent 12+ (Pfizer) 09/27/2022    COVID-19  MONOVALENT 12+ (Pfizer) 03/05/2021, 03/26/2021, 10/07/2021    COVID-19 Monovalent 12+ (Pfizer 2022) 04/21/2022       Social History   I have reviewed this patient's social history and updated it with pertinent information if needed. Jonny Goldberg  reports that he quit smoking about 37 years ago. His smoking use included cigarettes. He has never used smokeless tobacco. He reports current alcohol use. He reports that he does not use drugs.    Family History   I have reviewed this patient's family history and updated it with pertinent information if needed.   Family History   Problem Relation Age of Onset    Hypertension Mother     Heart Failure Mother     Coronary Artery Disease Mother         CABG    Arrhythmia Mother     Cerebrovascular Disease Father     Heart Failure Father     Colon Cancer Father     Coronary Artery Disease Father         CABG    Coronary Artery Disease Brother         CABG    Arrhythmia Brother        Review of Systems   The 10 point Review of Systems is as per HPI    Physical Exam   Temp: 98.4  F (36.9  C) Temp src: Oral BP: 112/65 Pulse: 64   Resp: 16 SpO2: 96 % O2 Device: None (Room air)    Vital Signs with Ranges  Temp:  [98.4  F (36.9  C)-99.1  F (37.3  C)] 98.4  F (36.9  C)  Pulse:  [59-64] 64  Resp:  [16-19] 16  BP: (101-112)/(51-65) 112/65  SpO2:  [95 %-96 %] 96 %  189 lbs 4.8 oz  Body mass index is 30.57 kg/m .    GENERAL APPEARANCE:  awake  EYES: Eyes grossly normal to inspection  NECK: no adenopathy  RESP: lungs clear   CV: S1S2, systolic murmur  LYMPHATICS: normal ant/post cervical and supraclavicular nodes  ABDOMEN: soft, nontender  MS: extremities normal, no clubbing  SKIN: no suspicious lesions or rashes. No peripheral stigmata of infective endocarditis    Data   All laboratory data reviewed  Component      Latest Ref Rng 3/12/2025  10:52 AM   Sodium      135 - 145 mmol/L 133 (L)    Potassium      3.4 - 5.3 mmol/L 3.6    Chloride      98 - 107 mmol/L 98    Carbon Dioxide (CO2)       22 - 29 mmol/L 24    Anion Gap      7 - 15 mmol/L 11    Urea Nitrogen      8.0 - 23.0 mg/dL 28.5 (H)    Creatinine      0.67 - 1.17 mg/dL 1.17    GFR Estimate      >60 mL/min/1.73m2 64    Calcium      8.8 - 10.4 mg/dL 9.3    Glucose      70 - 99 mg/dL 111 (H)    WBC      4.0 - 11.0 10e3/uL 8.4    RBC Count      4.40 - 5.90 10e6/uL 3.85 (L)    Hemoglobin      13.3 - 17.7 g/dL 11.0 (L)    Hematocrit      40.0 - 53.0 % 34.0 (L)    MCV      78 - 100 fL 88    MCH      26.5 - 33.0 pg 28.6    MCHC      31.5 - 36.5 g/dL 32.4    RDW      10.0 - 15.0 % 15.5 (H)    Platelet Count      150 - 450 10e3/uL 207    Sed Rate      0 - 20 mm/hr 71 (H)    CRP Inflammation      <5.00 mg/L 50.81 (H)       Component      Latest Ref Rn 3/10/2025  10:56 AM   Adenovirus      Not Detected  Not Detected    Coronavirus      Not Detected  Not Detected    Human Metapneumovirus      Not Detected  Not Detected    Human Rhin/Enterovirus      Not Detected  Not Detected    Influenza A      Not Detected  Not Detected    Influenza A, H1      Not Detected  Not Detected    Influenza A 2009 H1N1      Not Detected  Not Detected    Influenza A, H3      Not Detected  Not Detected    Influenza B      Not Detected  Not Detected    Parainfluenza Virus 1      Not Detected  Not Detected    Parainfluenza Virus 2      Not Detected  Not Detected    Parainfluenza Virus 3      Not Detected  Not Detected    Parainfluenza Virus 4      Not Detected  Not Detected    Respiratory Syncytial Virus A      Not Detected  Not Detected    Respiratory Syncytial Virus B      Not Detected  Not Detected    Chlamydia pneumoniae      Not Detected  Not Detected    Mycoplasma pneumoniae      Not Detected  Not Detected        Component      Latest Ref Rng 3/10/2025  6:57 AM   Influenza A      Not Detected  Negative    Influenza A, H1      Not Detected     Influenza A 2009 H1N1      Not Detected     Influenza A, H3      Not Detected     Influenza B      Not Detected  Negative     Parainfluenza Virus 1      Not Detected     Parainfluenza Virus 2      Not Detected     Parainfluenza Virus 3      Not Detected     Parainfluenza Virus 4      Not Detected     Respiratory Syncytial Virus A      Not Detected     Respiratory Syncytial Virus B      Not Detected     Chlamydia pneumoniae      Not Detected     Mycoplasma pneumoniae      Not Detected     Resp Syncytial Virus      Negative  Negative    SARS CoV2 PCR      Negative  Negative        Component      Latest Ref Rng 3/10/2025  6:48 AM   N-Terminal Pro BNP Inpatient      0 - 1,800 pg/mL 3,073 (H)       Component      Latest Ref Rng 9/30/2024  12:00 AM 10/3/2024  6:51 PM 10/3/2024  7:01 PM   Anaplasma Phagocytophilum      Not Detected    Not Detected    Babesia Species by PCR      Not Detected    Not Detected    Ehrlichia species by PCR      Not Detected    Not Detected    Lyme Disease Antibodies Serum      <0.90   0.11     Lyme Disease Antibodies Serum (External)      Negative  Negative (E)     CMV IgG Antibody (External)      0.00 - 0.59 U/mL      CMV IgM Antibody (External)      0.0 - 29.9 AU/mL        Component      Latest Ref Rn 10/7/2024  12:00 AM   Anaplasma Phagocytophilum      Not Detected     Babesia Species by PCR      Not Detected     Ehrlichia species by PCR      Not Detected     Lyme Disease Antibodies Serum      <0.90     Lyme Disease Antibodies Serum (External)      Negative     CMV IgG Antibody (External)      0.00 - 0.59 U/mL <0.60 (E)   CMV IgM Antibody (External)      0.0 - 29.9 AU/mL <30.0 (E)     Microbiology  03/10/2025 1022 03/11/2025 1416 Blood Culture Hand, Left [[id number]]   Blood from Hand, Left    Preliminary result Component Value   Culture No growth after 1 day P             03/10/2025 0938 03/11/2025 1405 Blood Culture Peripheral Blood [[id number]]   Peripheral Blood    Preliminary result Component Value   Culture No growth after 1 day P        Imaging  3/10 TTE  Interpretation Summary     Status post TAVR  with a 29mm [place] Miguel 3 bioprosthetic aortic valve.  There is a well-seated bioprosthesis in aortic position. Valve leaflets were  not well-seen. Mean transvalvular gradient is 28 mmHg which is high for this  valve. Peak velocity is 3.5 m/s and dimensionless index is 0.28. No PVL.  Acceleration time is 100 ms.  The left ventricle is normal in size. There is moderate concentric LVH.Left  ventricular systolic function is low normal. The visual ejection fraction is  50-55%.  There is severe hypokinesis of the apical septum. Differential diagnosis  includes apical insertion of the pacemaker lead vs coronary disease.  The right ventricle is normal in size and function.  The left atrium is severely dilated. The right atrium is mildly dilated.  Compared to the prior study from 11/14/2024, gradient across the bioprosthetic  aortic valve has increased significantly from 10 mmHg to 29 mmHg. Consider ARLET  for further workup. Results discussed with the in-patient care team.    CT CHEST ABDOMEN PELVIS W/O & W CONTRAST 12/27/2024 11:49 AM     CLINICAL HISTORY: Hyperpyrexia     TECHNIQUE: CT scan of the chest, abdomen, and pelvis was performed  following injection of IV contrast. Multiplanar reformats were  obtained. Dose reduction techniques were used.   CONTRAST: 99mL [id number]     COMPARISON: 10/3/2024     FINDINGS:   LUNGS AND PLEURA: No infiltrate or pleural effusion. Stable 6 mm  nodule along the right minor fissure (series 4, image 142), previously  6 mm using similar measurement technique. No new or enlarging nodules.     MEDIASTINUM/AXILLAE: No lymphadenopathy. Pacemaker. No central  pulmonary emboli. TAVR. No thoracic aortic aneurysm. Severe coronary  artery calcifications. Mild cardiomegaly. No pericardial effusion.  Small hiatal hernia.     HEPATOBILIARY: Normal.     PANCREAS: Normal.     SPLEEN: Normal.     ADRENAL GLANDS: Normal.     KIDNEYS/BLADDER: Few cysts in the kidneys requiring no specific  follow-up.  Few small nonobstructing right renal calculi measuring up  to 3 mm. No hydronephrosis or perinephric stranding bilaterally. Empty  urinary bladder.     BOWEL: Diverticulosis in the colon. No acute inflammatory change. No  obstruction.      PELVIC ORGANS: Mild prostatomegaly.     ADDITIONAL FINDINGS: Stable mildly aneurysmal infrarenal abdominal  aorta measuring 3.1 x 2.9 cm. Stable ectatic bilateral common iliac  arteries measuring 2.1 cm on the right and 2.0 cm on the left.  Aortobiiliac atherosclerotic calcifications. Left femoral  endarterectomy. No lymphadenopathy. No free fluid or fluid  collections. No free air.     MUSCULOSKELETAL: Right total hip arthroplasty. No suspicious lesions  in the bones. Bilateral L5 pars defects with mild anterolisthesis of  L5 on S1.                                                                      IMPRESSION:  1.  No acute findings in the chest, abdomen and pelvis.  2.  Stable 6 mm lung nodule along the right minor fissure. Please see  follow-up guidelines.  3.  Stable mildly aneurysmal infrarenal abdominal aorta measuring 31  mm.  4.  Colonic diverticulosis.

## 2025-03-13 ENCOUNTER — APPOINTMENT (OUTPATIENT)
Dept: OCCUPATIONAL THERAPY | Facility: CLINIC | Age: 80
DRG: 280 | End: 2025-03-13
Attending: PHYSICIAN ASSISTANT
Payer: COMMERCIAL

## 2025-03-13 VITALS
HEIGHT: 66 IN | WEIGHT: 187.4 LBS | HEART RATE: 63 BPM | BODY MASS INDEX: 30.12 KG/M2 | OXYGEN SATURATION: 98 % | SYSTOLIC BLOOD PRESSURE: 123 MMHG | TEMPERATURE: 97.9 F | RESPIRATION RATE: 18 BRPM | DIASTOLIC BLOOD PRESSURE: 62 MMHG

## 2025-03-13 LAB
ANA SER QL IF: NEGATIVE
ANCA AB PATTERN SER IF-IMP: NORMAL
ANION GAP SERPL CALCULATED.3IONS-SCNC: 10 MMOL/L (ref 7–15)
BACTERIA BLD CULT: NORMAL
BACTERIA BLD CULT: NORMAL
BUN SERPL-MCNC: 24.6 MG/DL (ref 8–23)
C-ANCA TITR SER IF: NORMAL {TITER}
CALCIUM SERPL-MCNC: 9.1 MG/DL (ref 8.8–10.4)
CHLORIDE SERPL-SCNC: 100 MMOL/L (ref 98–107)
CREAT SERPL-MCNC: 1.08 MG/DL (ref 0.67–1.17)
EGFRCR SERPLBLD CKD-EPI 2021: 71 ML/MIN/1.73M2
ERYTHROCYTE [DISTWIDTH] IN BLOOD BY AUTOMATED COUNT: 15.5 % (ref 10–15)
GLUCOSE SERPL-MCNC: 212 MG/DL (ref 70–99)
HCO3 SERPL-SCNC: 24 MMOL/L (ref 22–29)
HCT VFR BLD AUTO: 33.2 % (ref 40–53)
HGB BLD-MCNC: 10.7 G/DL (ref 13.3–17.7)
INR PPP: 2.59 (ref 0.85–1.15)
LOCATION OF TASK: NORMAL
LOCATION OF TASK: NORMAL
MCH RBC QN AUTO: 28.8 PG (ref 26.5–33)
MCHC RBC AUTO-ENTMCNC: 32.2 G/DL (ref 31.5–36.5)
MCV RBC AUTO: 89 FL (ref 78–100)
PLATELET # BLD AUTO: 224 10E3/UL (ref 150–450)
POTASSIUM SERPL-SCNC: 3.6 MMOL/L (ref 3.4–5.3)
PROT ELPH PNL UR ELPH: NORMAL
PROT PATTERN SERPL IFE-IMP: NORMAL
RBC # BLD AUTO: 3.72 10E6/UL (ref 4.4–5.9)
SODIUM SERPL-SCNC: 134 MMOL/L (ref 135–145)
WBC # BLD AUTO: 7.3 10E3/UL (ref 4–11)

## 2025-03-13 PROCEDURE — 250N000013 HC RX MED GY IP 250 OP 250 PS 637: Performed by: STUDENT IN AN ORGANIZED HEALTH CARE EDUCATION/TRAINING PROGRAM

## 2025-03-13 PROCEDURE — 80048 BASIC METABOLIC PNL TOTAL CA: CPT | Performed by: INTERNAL MEDICINE

## 2025-03-13 PROCEDURE — 82164 ANGIOTENSIN I ENZYME TEST: CPT | Performed by: SPECIALIST

## 2025-03-13 PROCEDURE — 99233 SBSQ HOSP IP/OBS HIGH 50: CPT | Performed by: INTERNAL MEDICINE

## 2025-03-13 PROCEDURE — 99418 PROLNG IP/OBS E/M EA 15 MIN: CPT | Performed by: INTERNAL MEDICINE

## 2025-03-13 PROCEDURE — 250N000013 HC RX MED GY IP 250 OP 250 PS 637: Performed by: INTERNAL MEDICINE

## 2025-03-13 PROCEDURE — 36415 COLL VENOUS BLD VENIPUNCTURE: CPT | Performed by: INTERNAL MEDICINE

## 2025-03-13 PROCEDURE — 85027 COMPLETE CBC AUTOMATED: CPT | Performed by: INTERNAL MEDICINE

## 2025-03-13 PROCEDURE — 97530 THERAPEUTIC ACTIVITIES: CPT | Mod: GO

## 2025-03-13 PROCEDURE — 85610 PROTHROMBIN TIME: CPT | Performed by: PHYSICIAN ASSISTANT

## 2025-03-13 PROCEDURE — 250N000013 HC RX MED GY IP 250 OP 250 PS 637: Performed by: PHYSICIAN ASSISTANT

## 2025-03-13 PROCEDURE — 82310 ASSAY OF CALCIUM: CPT | Performed by: INTERNAL MEDICINE

## 2025-03-13 PROCEDURE — 120N000001 HC R&B MED SURG/OB

## 2025-03-13 PROCEDURE — 97165 OT EVAL LOW COMPLEX 30 MIN: CPT | Mod: GO

## 2025-03-13 PROCEDURE — 99233 SBSQ HOSP IP/OBS HIGH 50: CPT | Mod: FS | Performed by: PHYSICIAN ASSISTANT

## 2025-03-13 PROCEDURE — 250N000011 HC RX IP 250 OP 636: Performed by: INTERNAL MEDICINE

## 2025-03-13 PROCEDURE — 99232 SBSQ HOSP IP/OBS MODERATE 35: CPT | Performed by: SPECIALIST

## 2025-03-13 RX ORDER — WARFARIN SODIUM 2 MG/1
4 TABLET ORAL
Status: COMPLETED | OUTPATIENT
Start: 2025-03-13 | End: 2025-03-13

## 2025-03-13 RX ADMIN — Medication 1 TABLET: at 20:02

## 2025-03-13 RX ADMIN — OXYCODONE HYDROCHLORIDE AND ACETAMINOPHEN 1000 MG: 500 TABLET ORAL at 10:15

## 2025-03-13 RX ADMIN — ROSUVASTATIN CALCIUM 40 MG: 20 TABLET, FILM COATED ORAL at 20:02

## 2025-03-13 RX ADMIN — WARFARIN SODIUM 4 MG: 2 TABLET ORAL at 18:03

## 2025-03-13 RX ADMIN — VALSARTAN 80 MG: 80 TABLET, FILM COATED ORAL at 10:15

## 2025-03-13 RX ADMIN — POTASSIUM CHLORIDE 40 MEQ: 1500 TABLET, EXTENDED RELEASE ORAL at 10:14

## 2025-03-13 RX ADMIN — ALFUZOSIN HYDROCHLORIDE 10 MG: 10 TABLET, EXTENDED RELEASE ORAL at 10:14

## 2025-03-13 RX ADMIN — CEFTRIAXONE SODIUM 2 G: 2 INJECTION, POWDER, FOR SOLUTION INTRAMUSCULAR; INTRAVENOUS at 12:29

## 2025-03-13 ASSESSMENT — ACTIVITIES OF DAILY LIVING (ADL)
ADLS_ACUITY_SCORE: 32

## 2025-03-13 NOTE — PROGRESS NOTES
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 Found to be hypoxic in the 80s upon EMS arrival to his home. Also reported chills and was noted to have low-grade fever. Labs showed elevated inflammatory markers (CRP 55), elevated NTproBNP of 3000, minimally elevated and insignificant cardiac troponins, stable creatinine, normal electrolytes, decreased hemoglobin (10.9 from 13.6), leukocytosis (14.7), and decreased hematocrit (33).     Assessment and Plan:  S/p TAVR 2023  Increased aortic valve gradient imaging last fall showed well-seated, normally functioning valve. Imagine 3/10/2025 show well-seated valve with markedly increased gradient across the bioprosthetic aortic valve, an increase from 10 mmHg in the fall to 29 mmHg now. EF is normal, concentric LVH noted.ARLET preformed yesterday revealed no vegetation or aortic root abscess. Leaflet motion does not appear significantly restricted. No AI or paravalvular leak. Vmax 3.5 m/s and gradient 32 mmHg.   CRP 51, Sed rate 71.Differential includes valve thrombosis, endocarditis, prosthetic valve dysfunction.   Continue empiric antibiotics, Bcx no growth to date  Further imaging needed, plan for CTA Heart Structure tomorrow, 3/14  Dyspnea, PND, resolved. Initially concerning for HFpEF or new cardiomyopathy. NT proBNP 3073. CXR with cardiomegaly and pulmonary vascular congestion. Received IV diuresis on  admission with furosemide 100 mg IV. Patient reported marked improvement in symptoms after this, off oxygen, no further PND.   Valsartan 80 mg daily  Atrial Fibrillation, paroxysmal, on warfarin with presenting INR 3.0. LA is severely dilated.   pharmD Liaison for price  check of DOACS-   Severe coronary artery calcifications, on CT 12/2024.   Hypokinesis of the apical septum, no syx of ischemia. Could be due to pacemaker lead. Will continue to evaluate after ARLET.   High-grade AV block S/p PPM, VVIR single-lead ventricular pacemaker. 94% pacing on most recent device interrogation.   Hypertension, prior to admission on chlorthalidone 50 mg daily, amlodipine 10 mg daily. Amlodipine discontinued in favor of valsartan this admission. Chlorthalidone held this admission.   Hyperlipidemia, PTA on rosuvastatin 40 mg daily ane ezetimibe 5 mg MM/W/F as well as fenofibrate.   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 Oncology recommends PET/CT and additional BM biopsy.     Plan:  CT Heart Structural tomorrow  No further diuresis   Continue valsartan 80 mg daily  Continue rosuvastatin 40 mg daily and zetia 5 mg daily   I/O, daily weights   Continue warfarin, goal 2-3  Avoid Beta-Blockers to avoid increased burden of pacing   Light chains and immunofixation pending - ID curious about cardiac amyloid. Patient has moderate concentric LVH, no RV hypertrophy. EKG amplitude normal.         25 minutes spent by me on the date of service doing chart review, history, exam, documentation, coordination and discussion with other care providers & further activities per the note. MD time separate. Discussed directly with Dione Simmons, [facility] medicine.   Ankita Hayden PA-C  [facility]  Pager: through Vocera    Interval History   No acute events overnight.  She continues to feel well.  He has  "had not had orthopnea, PND or night sweats or chills in 3 nights.  Denies dizziness, lightheadedness.  No shortness of breath.  No chest pain.  His wife, Sisi, is with him today.  Answered many questions.      Data   Last 24 hours diagnostics reviewed:  Vital signs:  Temp: 97.5  F (36.4  C) Temp src: Oral BP: 110/65 Pulse: 67   Resp: 16 SpO2: 97 % O2 Device: None (Room air) Oxygen Delivery: 2 LPM Height: 167.6 cm (5' 5.98\") Weight: 85 kg (187 lb 6.4 oz)  Estimated body mass index is 30.26 kg/m  as calculated from the following:    Height as of this encounter: 1.676 m (5' 5.98\").    Weight as of this encounter: 85 kg (187 lb 6.4 oz).    CRP Inflammation   Date Value Ref Range Status   03/12/2025 50.81 (H) <5.00 mg/L Final     Erythrocyte Sedimentation Rate   Date Value Ref Range Status   03/12/2025 71 (H) 0 - 20 mm/hr Final     INR   Date Value Ref Range Status   03/12/2025 2.48 (H) 0.85 - 1.15 Final   12/30/2011 1.15 (H) 0.86 - 1.14 Final     INR (External)   Date Value Ref Range Status   10/23/2024 2.2 1.5 - 3.5 INR Final        3/10/2025 TTE:  Status post TAVR with a 29mm De Paz Miguel 3 bioprosthetic aortic valve.  There is a well-seated bioprosthesis in aortic position. Valve leaflets were  not well-seen. Mean transvalvular gradient is 28 mmHg which is high for this  valve. Peak velocity is 3.5 m/s and dimensionless index is 0.28. No PVL.  Acceleration time is 100 ms.  The left ventricle is normal in size. There is moderate concentric LVH.Left  ventricular systolic function is low normal. The visual ejection fraction is  50-55%.  There is severe hypokinesis of the apical septum. Differential diagnosis  includes apical insertion of the pacemaker lead vs coronary disease.  The right ventricle is normal in size and function.  The left atrium is severely dilated. The right atrium is mildly dilated.  Compared to the prior study from 11/14/2024, gradient across the bioprosthetic  aortic valve has increased " significantly from 10 mmHg to 29 mmHg. Consider ARLET  for further workup. Results discussed with the in-patient care team.    11/2024 Echocardiogram :  The visual ejection fraction is 55-60%. Abnormal septal motion from conduction  abnormality with ventricular dysynchrony. Anteroseptal hypokinesis cannot be  ruled out.  There is mild to moderate concentric left ventricular hypertrophy. Proximal  septal thickening is noted. Severe LA enlargement. Consider PYP scanning for  evaluation of amyloidosis.  The right ventricle is normal in size and function.  The left atrium is severely dilated.  There is a bioprosthetic aortic valve. TAVR 29mm De Paz Miguel 3 by history.  MG is 10 mm Hg.  There is no pericardial effusion.    1/2025  [employer] Accolade L310 (S) Remote PPM Device Check     Mode: VVIR 60/115  Presenting Rhythm:   Last In-Clinic Underlying Rhythm/Date: permanent AF w/ CHB, no JE at VVI 30 as of 10/1/24     Pacing/Histogram Data since: 10/1/24  AP: n/a  : 94%  Heart Rate: adequate rates per histograms     Sensing: stable  Pacing Threshold: stable  Impedance: stable  Battery Status: 8 years     Arrhythmia Data Since: 10/1/24  Atrial Arrhythmia: n/a  Ventricular Arrhythmia: none       Imaging:   3/11/2025:                                                              IMPRESSION: Cardiomegaly. Pacemaker with lead tip in the region of the right ventricle. Endovascular aortic valve replacement. Mild pulmonary vascular congestion changes. No pneumothorax. No focal lung infiltrates. Degenerative changes in the bilateral   shoulders.      Physical Exam   Temp: 97.5  F (36.4  C) Temp src: Oral BP: 110/65 Pulse: 67   Resp: 16 SpO2: 97 % O2 Device: None (Room air) Oxygen Delivery: 2 LPM  Vitals:    03/11/25 0746 03/12/25 0719 03/13/25 0629   Weight: 85 kg (187 lb 4.8 oz) 85.9 kg (189 lb 4.8 oz) 85 kg (187 lb 6.4 oz)       GEN: Awake, alert, oriented  HEART: Regular rate and rhythm, systolic ejection murmur  noted  LUNGS: lungs clear  EXT: Significant venous dilation, no leg swelling    Medications   Current Facility-Administered Medications   Medication Dose Route Frequency Provider Last Rate Last Admin    Patient is already receiving anticoagulation with heparin, enoxaparin (LOVENOX), warfarin (COUMADIN)  or other anticoagulant medication   Does not apply Continuous PRN Umm Holland PA-C        Reason ACE/ARB/ARNI order not selected   Other DOES NOT GO TO Umm Mtz PA-C        Reason beta blocker not prescribed   Other DOES NOT GO TO Umm Mtz PA-C         Current Facility-Administered Medications   Medication Dose Route Frequency Provider Last Rate Last Admin    alfuzosin ER (UROXATRAL) 24 hr tablet 10 mg  10 mg Oral Daily Umm Holland PA-C   10 mg at 03/12/25 0922    cefTRIAXone (ROCEPHIN) 2 g vial to attach to  ml bag for ADULTS or NS 50 ml bag for PEDS  2 g Intravenous Q24H Dione Simmons DO 0 mL/hr at 03/10/25 1300 2 g at 03/12/25 1213    ezetimibe (ZETIA) half-tab 5 mg  5 mg Oral Q Mon Wed Fri AM Umm Holland PA-C   5 mg at 03/12/25 0921    multivitamin w/minerals (THERA-VIT-M) tablet 1 tablet  1 tablet Oral QPM Umm Holland PA-C   1 tablet at 03/12/25 1953    potassium chloride april ER (KLOR-CON M20) CR tablet 40 mEq  40 mEq Oral Daily Umm Holland PA-C   40 mEq at 03/12/25 0922    rosuvastatin (CRESTOR) tablet 40 mg  40 mg Oral QPM Umm Holland PA-C   40 mg at 03/12/25 1953    sodium chloride (PF) 0.9% PF flush 3 mL  3 mL Intracatheter Q8H Umm Holland PA-C   3 mL at 03/12/25 2342    valsartan (DIOVAN) tablet 80 mg  80 mg Oral Daily Doine Simmons, DO   80 mg at 03/12/25 0922    vitamin C (ASCORBIC ACID) tablet 1,000 mg  1,000 mg Oral Daily Umm Holland PA-C   1,000 mg at 03/12/25 0921    Warfarin Dose Required Daily - Pharmacist Managed  1  each Oral See Admin Instructions Umm Holland PA-C

## 2025-03-13 NOTE — PLAN OF CARE
Goal Outcome Evaluation:    Orientation: A&Ox4  Aggression Stop Light: Green  Activity: Ind  Diet/BS Checks: 2 gm Na  Tele: V paced  IV Access/Drains: L PIV SL  Pain Management: Denies  Abnormal VS/Results: VSS on RA  Bowel/Bladder: Cont b/b  Skin/Wounds: WNL  Consults: Hem/onc, ID, cardiology  D/C Disposition: Pending

## 2025-03-13 NOTE — PROGRESS NOTES
"   03/13/25 0800   Appointment Info   Signing Clinician's Name / Credentials (OT) Babita Ross, OTD, OTR/L   Living Environment   People in Home spouse   Current Living Arrangements house   Home Accessibility stairs to enter home;stairs within home   Number of Stairs, Main Entrance 4   Stair Railings, Main Entrance railings on both sides of stairs   Number of Stairs, Within Home, Primary greater than 10 stairs   Stair Railings, Within Home, Primary railings safe and in good condition;railing on left side (ascending)  (going up on L)   Transportation Anticipated family or friend will provide   Living Environment Comments BR set up: tub/shower, shower chair access, RTS, grab bars by toilet/shower   Self-Care   Usual Activity Tolerance good   Current Activity Tolerance good   Equipment Currently Used at Home grab bar, toilet;grab bar, tub/shower   Fall history within last six months no   Activity/Exercise/Self-Care Comment Pt is ind with ADLs at baseline.   Instrumental Activities of Daily Living (IADL)   Previous Responsibilities medication management;driving;shopping   IADL Comments Spouse cooking/cleaning; pt does yardwork   General Information   Onset of Illness/Injury or Date of Surgery 03/10/25   Referring Physician Umm Holland PA-C   Patient/Family Therapy Goal Statement (OT) To get stronger/To go home   Additional Occupational Profile Info/Pertinent History of Current Problem Per chart: \"Jonny Goldberg is a 79 year old male with PMHx of aortic stenosis s/p TAVR, high degree AVB s/p ppm, PAD, persistent afib on chronic anticoagulation with warfarin, hypertension, HFpEF, DM II, hx of Hodgkin's lymphoma and hx of fever of unknown origin who was admitted on 3/10/2025 with nocturnal shortness of breath and hypoxia suspected secondary to to CHF exacerbation.\"   Existing Precautions/Restrictions fall   Cognitive Status Examination   Orientation Status orientation to person, place and time   Visual " Perception   Visual Impairment/Limitations WFL;corrective lenses full-time   Sensory   Sensory Quick Adds sensation intact   Range of Motion Comprehensive   Comment, General Range of Motion BUEs WFL   Strength Comprehensive (MMT)   Comment, General Manual Muscle Testing (MMT) Assessment BUEs WFL   Coordination   Upper Extremity Coordination No deficits were identified   Bed Mobility   Comment (Bed Mobility) NT - pt up in chair at time of OT session   Transfers   Transfers sit-stand transfer   Sit-Stand Transfer   Sit/Stand Transfer Comments Mod I   Balance   Balance Comments Overall good balance on stairs   Activities of Daily Living   BADL Assessment/Intervention lower body dressing   Lower Body Dressing Assessment/Training   Comment, (Lower Body Dressing) Mod I per clinical judgement d/t mild decr activity amira   Clinical Impression   Criteria for Skilled Therapeutic Interventions Met (OT) Yes, treatment indicated   OT Diagnosis Decreased independence with I/ADLs   OT Problem List-Impairments impacting ADL problems related to;activity tolerance impaired   Assessment of Occupational Performance 1-3 Performance Deficits   Identified Performance Deficits taxing IADLs, tub transfer   Planned Therapy Interventions (OT) ADL retraining;IADL retraining;transfer training   Clinical Decision Making Complexity (OT) problem focused assessment/low complexity   Risk & Benefits of therapy have been explained patient;spouse/significant other   OT Total Evaluation Time   OT Eval, Low Complexity Minutes (78239) 10   OT Goals   Therapy Frequency (OT) 3 times/week   OT Predicted Duration/Target Date for Goal Attainment 03/20/25   OT Goals Lower Body Dressing;Transfers   OT: Lower Body Dressing Independent;including set-up/clothing retrieval   OT: Transfer Independent  (tub transfer)   OT: Understanding of cardiac education to maximize quality of life, condition management, and health outcomes Patient;Verbalize   OT: Functional/aerobic  ambulation tolerance with stable cardiovascular response in order to return to home and community environment Independent;Greater than 300 feet   OT: Navigation of stairs simulating home set up with stable cardiovascular response in order to return to home and community environment Modified independent;Greater than 10 stairs;Rail on both sides;Goal Met   Therapeutic Activities   Therapeutic Activity Minutes (66576) 25   Symptoms noted during/after treatment fatigue   Treatment Detail/Skilled Intervention Pt greeted sitting up in chair, agreeable to OT session. Pt and spouse engaged in CHF edu and packet - pt reports he does not know what CHF is/has not been edu on it. Pt stands from chair with Mod I. Pt ambulates at hallway level with Mod I, mild SOB, pt able to tolerate, ambulates approx. 80 ft for incr activity amira. Pt returns to room with Mod I-Ind and sits in chair with Mod I-Ind. Pt edu to go on walks during day and to call nursing staff, care board updated. At end of session, pt seated in chair, spouse present, pt with needs met, items/call light in reach, RN updated.   Stairs   Symptoms Dyspnea  (mild)   Exercise Details Pt completes 10 + stairs with Mod I-Ind with use of rails to simulate home.   Cardiac Education   Education Provided Daily weights;Diagnosis;Diet;Energy conservation;Home exercise program;OMNI Scale;Resuming home activities;Signs and symptoms;Stop light tool   Education Packet Given to Patient Yes   All Patient Education Handouts Reviewed with Patient and/or Family Yes   OT Discharge Planning   OT Plan review CHF edu, tub transfer, LB dressing, ambulate in halls   OT Discharge Recommendation (DC Rec) home with assist   OT Rationale for DC Rec Pt currently functioning below baseline d/t impairments in activity toleramce impacting safety/independence within I/ADLs. Pt at baseline resides with spouse and has baseline assist with cooking/cleaning, pt does yardwork. Currently, pt completes hallway  mobility and stairs Mod I-Ind. Anticipate with continued IP OT, pt will progress for discharge home with assist for taxing IADLs (yardwork initially), once medically ready. OT will continue to follow.   OT Brief overview of current status Goals of therapy will be to address safe mobility and make recs for d/c to next level of care. Pt and RN will continue to follow all falls risk precautions as documented by RN staff while hospitalized  (Mod I-Ind)   OT Total Distance Amb During Session (feet) 100   Total Session Time   Timed Code Treatment Minutes 25   Total Session Time (sum of timed and untimed services) 35

## 2025-03-13 NOTE — PROGRESS NOTES
Oncology chart check:    No new recommendations.  Please see note from 3/12/2025.  Our schedulers will reach out to him to arrange follow up in the clinic.  Please call with specific questions if any.    Abdon Michele MD   [facility]

## 2025-03-13 NOTE — PROGRESS NOTES
[facility]    Internal Medicine Hospitalist Progress Note  03/13/2025  I evaluated patient on the above date.    Von Phelps Jr., MD  [phone] (p)  Text Page  Vocera      [New actions/orders today (03/13/2025) are underlined in the assessment and plan. All lab results in the assessment and plan were reviewed.]  Assessment & Plan   Jonny Goldberg is a 79 year old male with past medical history significant including HTN; CHF; PAD; AF; aortic stenosis status post TAVR (12/2023); PPM; and h/o Hodgkins lymphoma; who presented 3/10/2025 shortness of breath and paroxysmal nocturnal dyspnea and found with evidence of CHF.    On initial evaluation, pt was febrile 100.8, hemodynamically stable, sats 98% on O2 (was 80's per EMS).  Labs notable for CBC with WBC 14.7, hgb 10.9; BMP normal, magnesium 1.6; LFT's unremarkable; lactate 2.3; NTP-BNP 3073; procalcitonin 0.68; troponin 37; CRP 55.83; INR 3.04; UA not suggestive of infection; COVID-19, influenza and RSV negative. CXR showed mild pulmonary vascular congestion changes, no focal lung infiltrates.         # Acute on chronic diastolic CHF exacerbation (HFpEF) with acute hypoxic respiratory failure, question related to aortic valve disease, respiratory failure resolved.  # Aortic stenosis s/p TAVR with abnormal bioprosthetic valve hemodynamics.  # Elevated troponin, suspect demand ischemia (type 2 MI) related to above.  # Hypertension (benign essential).  # Persistent afib, on chronic anticoagulation with warfarin.  # H/o high degree AVB s/p PPM.  [PTA: amlodipine 10 mg daily; chlorthalidone 50 mg daily.]  * Known hx of HFpEF. Echo in 11/2024 showed EF 55-60%; mild MR; TAVR.  * Initial presentation as above. Initially required BiPAP but was quickly weaned to 2L NC with IV furosemide. Cardiology consulted on admit. Also issues with fever of unknown origin for months PTA possibly contributing to CHF exacerbation, worked up as noted.  * 3/10:  Stopped amlodipine, chlorthalidone held.  * 3/11: Started valsartan. Echo showed LVEF 50-55%, moderate cLVH, severe hypokinesia of apical septum, differential diagnosis included apical insertion of the pacemaker lead vs coronary disease; RV OK; noted gradient across the bioprosthetic aortic valve had increased significantly from 10 mmHg to 29 mmHg, consider ARLET for further workup.  * 3/12: Held further diuretics. Weaned off O2. ARLET showed LVEF 55-60%; no vegetations seen on prosthetic valve leaflets, no aortic root abscess seen, no significant aortic insufficiency, no paravalvular leak, Vmax 3.5 m/s and gradient 32 mmHg; noted an irregular echodensity in the left atrial appendage, contiguous with other trabeculations, overall more consistent with a prominent trabeculation rather than a thrombus, cardiac CT could be considered for further evaluation; aortic root dilatation, max diameter 4.1 cm.  Vitals:    03/10/25 1245 03/10/25 2300 03/11/25 0746 03/12/25 0719   Weight: 91.3 kg (201 lb 4.5 oz) 85.5 kg (188 lb 6.4 oz) 85 kg (187 lb 4.8 oz) 85.9 kg (189 lb 4.8 oz)    03/13/25 0629   Weight: 85 kg (187 lb 6.4 oz)     I/O last 3 completed shifts:  In: 360 [P.O.:360]  Out: 650 [Urine:650]  Recent Labs   Lab 03/12/25  1421 03/12/25  1052 03/11/25  1242 03/11/25  1129 03/10/25  1557 03/10/25  1229 03/10/25  0900 03/10/25  0648   NA  --  133*  --  134*  --   --   --  141   CO2  --  24  --  28  --   --   --  25   BUN  --  28.5*  --  25.0*  --   --   --  21.0   CR  --  1.17  --  1.23*  --   --   --  1.03   POTASSIUM  --  3.6  --  3.5  --   --   --  3.7   CTROPT  --   --   --   --  57* 66* 72* 37*   NTBNPI  --   --   --   --   --   --   --  3,073*   LVEF 55-60%  --  50-55%  --   --   --   --   --      Recent Labs   Lab 03/12/25  1052 03/11/25  1129 03/10/25  1229 03/10/25  0648   INR 2.48* 2.61* 2.74* 3.04*   - Plan further cardiac workup (CTA, possible heart catheterization) per Cardiology.  - Continue valsartan 80 mg  daily.  - Continue warfarin per protocol per Pharmacy.  - Continue to hold amlodipine and chlorthalidone.  - Continue to monitor i/o's, daily wts.  - Continue infectious/fever workup as noted.  - Appreciate consultant help.    # Fever of unknown origin, concern related to prosthetic valve or other etiology.  # Leukocytosis, resolved.  * Has been undergoing workup of fever of unknown origin in the OP setting with reported months long history of fever/night sweats. Extensive workup done per ID was unrevealing. Had been previously treated with a prolonged course of doxycycline.   * Initial presentation as above. BC's NGTD. Was started on empiric ceftriaxone on admission given concern for ddx including endocarditis.  * 3/11: MOHPA consulted given recent OP bone marrow biopsy - noted suggestive of MGUS, no evidence of myeloma. Plan was for OP PET/CT.   * 3/12: ID consulted. ARLET as noted w/o vegetations. Discussed with ID and recommended cardiac MRI to evaluate for cardiac amyloid; discussed with Cardiology, consider inpatient vs as outpatient. Lab evaluation for rheumatologic conditions ordered (ZEINA, ANCA), ACEI level ordered to assess for sarcoid. Light chains and immunofixation ordered by Cardiology.  - Continue ceftriaxone (started 3/10).  - Continue to monitor cultures.  - Overall, patient also considering second opinion from [place].  - Continue cardiac workup as above.     Remote history Hodgkin lymphoma.  Monoclonal gammopathy.  * Follows with [facility].  Recently seen by Dr. Michele and underwent a bone marrow biopsy few weeks prior to admission.   * Seen by [facility] this stay as above. Planning for OP followup.  - Lab workup as above.  - Follow[facility]up with [facility].    Anemia, suspect chronic component.  * Hgb 10.9 on admit. No overt clinical signs of major bleeding.  Recent Labs   Lab 03/12/25  1052 03/11/25  1129 03/10/25  0648   HGB 11.0* 11.3* 10.9*   - Continue to monitor CBC - repeat in am.    Hyponatremia,  "question hypervolemia component.  * Sodium 141 on admit.   * Sodium 134 on 3/11.  Recent Labs   Lab 03/12/25  1052 03/11/25  1129 03/10/25  0648   * 134* 141   - Continue to monitor BMP - repeat in am.  - Continue to treat other issues as noted.    DM II, diet controlled.   * A1c 5.2 this stay.   - Continue to monitor clinically.     Elevated PSA.  * Follows with Urology  - Follow-up with Urology outpatient.    Clinically Significant Risk Factors         # Hyponatremia: Lowest Na = 133 mmol/L in last 2 days, will monitor as appropriate  # Hypochloremia: Lowest Cl = 96 mmol/L in last 2 days, will monitor as appropriate      # Hypoalbuminemia: Lowest albumin = 3.4 g/dL at 3/10/2025  6:48 AM, will monitor as appropriate     # Hypertension: Noted on problem list            # Obesity: Estimated body mass index is 30.26 kg/m  as calculated from the following:    Height as of this encounter: 1.676 m (5' 5.98\").    Weight as of this encounter: 85 kg (187 lb 6.4 oz)., PRESENT ON ADMISSION     # Financial/Environmental Concerns: none   # Pacemaker present       Diet: Advance Diet as Tolerated: Clear Liquid Diet; Regular Diet Adult  2 Gram Sodium Diet    Prophylaxis: PCD's, ambulation, and is on warfarin.  Miranda Catheter: Not present  Lines: None     Code Status: Full Code    Disposition Plan   Medically Ready for Discharge: Anticipated in 1-2 Days  Expected discharge to prior living arrangement pending above.    Entered: Von Phelps MD 03/13/2025, 8:30 AM     COMMUNICATION  - I discussed with patient's wife 03/13/25          Interval History   Feeling well.  Basically feels back to normal.  However, many questions about the cause of his recent issues.    * Data reviewed today: I reviewed all new labs and imaging over the last 24 hours. I personally reviewed no images or ECG's today.    Physical Exam   Most recent vitals:   , Blood pressure 110/65, pulse 67, temperature 97.5  F (36.4  C), temperature source " "Oral, resp. rate 16, height 1.676 m (5' 5.98\"), weight 85 kg (187 lb 6.4 oz), SpO2 97%. O2 Device: None (Room air) Oxygen Delivery: 2 LPM  Vitals:    03/11/25 0746 03/12/25 0719 03/13/25 0629   Weight: 85 kg (187 lb 4.8 oz) 85.9 kg (189 lb 4.8 oz) 85 kg (187 lb 6.4 oz)     Vital signs with ranges:  Temp:  [97.5  F (36.4  C)-98.6  F (37  C)] 97.5  F (36.4  C)  Pulse:  [57-67] 67  Resp:  [16] 16  BP: ()/(56-89) 110/65  SpO2:  [93 %-98 %] 97 %  Patient Vitals for the past 24 hrs:   BP Temp Temp src Pulse Resp SpO2 Weight   03/13/25 0739 110/65 97.5  F (36.4  C) Oral 67 16 97 % --   03/13/25 0629 -- -- -- -- -- -- 85 kg (187 lb 6.4 oz)   03/12/25 2341 108/62 98.6  F (37  C) Oral 64 16 97 % --   03/12/25 1609 104/57 97.7  F (36.5  C) Oral 60 16 96 % --   03/12/25 1438 110/56 -- -- -- -- 93 % --   03/12/25 1430 -- -- -- 60 -- 95 % --   03/12/25 1420 107/61 -- -- 60 -- 93 % --   03/12/25 1415 94/63 -- -- 57 -- 93 % --   03/12/25 1410 100/63 -- -- 60 -- 96 % --   03/12/25 1405 116/68 -- -- 60 -- 95 % --   03/12/25 1400 120/71 -- -- 59 -- 96 % --   03/12/25 1355 108/68 -- -- 60 -- 96 % --   03/12/25 1350 112/64 -- -- 60 -- 96 % --   03/12/25 1348 114/71 -- -- 60 -- 97 % --   03/12/25 1344 120/73 -- -- 60 -- -- --   03/12/25 1340 117/73 -- -- 60 -- -- --   03/12/25 1335 132/89 -- -- 60 -- -- --   03/12/25 1330 -- -- -- 60 -- -- --   03/12/25 1310 120/64 -- -- 60 -- 98 % --     I/O's last 24 hours:  I/O last 3 completed shifts:  In: 360 [P.O.:360]  Out: 650 [Urine:650]    Constitutional: awake, alert, oriented, pleasant, conversant   Head:   Eyes:   ENT:   Neck:   Cardiovascular: regular rate and rhythm, +I/VI systolic murmur, no rubs/gallops  Lungs: diminished in the bases, no crackles or wheezes  Gastrointestinal/Abdomen: soft, non-tender, non-distended, positive bowel sounds  :   Musculoskeletal:   Skin/Extremities: no bilateral lower extremity edema  Neurologic:   Psychiatric:   Hematologic/Lymphatic/Immunologic: "        Labs reviewed.  Recent Labs   Lab 25  1052 25  1129 03/10/25  1229 03/10/25  0648   WBC 8.4 8.3  --  14.7*   HGB 11.0* 11.3*  --  10.9*   MCV 88 88  --  89    211  --  237   INR 2.48* 2.61* 2.74* 3.04*   * 134*  --  141   POTASSIUM 3.6 3.5  --  3.7   CHLORIDE 98 96*  --  103   CO2 24 28  --  25   BUN 28.5* 25.0*  --  21.0   CR 1.17 1.23*  --  1.03   ANIONGAP 11 10  --  13   SARA 9.3 9.4  --  9.0   * 114*  --  142*   ALBUMIN  --   --   --  3.4*   PROTTOTAL  --   --   --  7.7   BILITOTAL  --   --   --  0.5   ALKPHOS  --   --   --  77   ALT  --   --   --  18   AST  --   --   --  35     Recent Labs   Lab Test 03/10/25  1557 03/10/25  1229 03/10/25  0900 03/10/25  0648   NT-PROBNP, INPATIENT  --   --   --  3,073*   TROPONIN T HIGH SENSITIVITY 57* 66* 72* 37*     Recent Labs   Lab 25  1052 25  1129 03/10/25  0648   * 114* 142*     Recent Labs   Lab Test 03/10/25  0648 21  0000   A1C 5.2 5.9*       Recent Labs   Lab 25  1052 25  1129 03/10/25  1229 03/10/25  0648   INR 2.48* 2.61* 2.74* 3.04*     Recent Labs   Lab 25  1052 25  1129 03/10/25  0902 03/10/25  0900 03/10/25  0657 03/10/25  0653 03/10/25  0648   WBC 8.4 8.3  --   --   --   --  14.7*   LACT  --   --  0.9  --   --  2.3*  --    PCAL  --   --   --  0.68*  --   --   --    [id number]  --   --   --   --  Negative  --   --        MICRO:  Cultures (including blood and urine):  Recent Labs   Lab 03/10/25  1022 03/10/25  0938   CULTURE No growth after 2 days No growth after 2 days       Recent Results (from the past 24 hours)   Transesophageal Echocardiogram   Result Value    LVEF  55-60%    Narrative    [id number]  [id number]  [id number]^HONORIO^CHARU^S     [facility]  Echocardiography Laboratory  [address] [place], [place] [id number]     Name: DIANA MOREIRA  MRN: [id number]  : 1945  Study Date: 2025 01:27 PM  Age: 79 yrs  Gender: Male  Patient  [facility]  Reason For Study: Aortic Valve  Ordering Physician: CHARU OLMEDO  Referring Physician: Reid Hogue  Performed By: Deon Hugo     BSA: 1.9 m2  Height: 65 in  Weight: 189 lb  HR: 64  BP: 120/60 mmHg  ______________________________________________________________________________  Procedure  Transesophageal Echocardiogram with two-dimensional, color and spectral  Doppler. 3D image acquisition, reconstruction, and real-time interpretation  was performed. ARLET Probe serial #[id number] was used during the procedure. The  heart rate, respiratory rate and response to care were monitored throughout  the procedure with the assistance of the nurse.  ______________________________________________________________________________  Interpretation Summary     Status post TAVR with a 29mm [place] Miguel 3 bioprosthetic aortic valve. No  vegetations seen on prosthetic valve leaflets, no aortic root abscess seen  (sweep image obtained #18). On short-axis view, leaflet motion does not appear  significantly restricted. No significant aortic insufficiency (trivial central  regurgitation seen only with decreased velocity scale), no paravalvular leak.  Limited TTE images obtained at the end of the ARLET study due to suboptimal  Doppler alignment on transgastric view, on TTE VMax 3.5 m/s, mean gradient 32  mmHg, abnormal.     Left ventricular systolic function is normal. The visual ejection fraction is  55-60%.  The right ventricle is mildly dilated. The right ventricular systolic function  is normal.  There is no color Doppler evidence of an atrial shunt. A contrast injection  (Bubble Study) was performed that was negative for flow across the interatrial  septum.  There is an irregular echodensity (1.2 cm x 0.7cm) in the left atrial  appendage, contiguous with other trabeculations (assessed with X-plane) and of  the same echogenicity, overall more consistent with a prominent trabeculation  rather than a thrombus, cardiac CT  could be considered for further evaluation  if indicated.  Aortic root dilatation is present. Max diameter of the visualized portion 4.1  cm.     ______________________________________________________________________________  ARLET  I determined this patient to be an appropriate candidate for the planned  sedation and procedure and have reassessed the patient immediately prior to  sedation and procedure. Total sedation time: 24 minutes of continuous bedside  1:1 monitoring. Versed (4mg) was given intravenously. Fentanyl (100mcg) was  given intravenously. Consent to the procedure was obtained prior to sedation.  Prior to the exam, the oral cavity was checked and no overcrowding was noted.  The transesophageal probe was passed without difficulty. There were no  complications associated with this procedure. 3D image acquisition,  reconstruction, and real-time interpretation was performed.     Left Ventricle  The left ventricle is normal in size. Left ventricular systolic function is  normal. The visual ejection fraction is 55-60%.     Right Ventricle  The right ventricle is mildly dilated. The right ventricular systolic function  is normal.     Atria  The left atrium is severely dilated. The right atrium is moderate to severely  dilated. There is a catheter/pacemaker lead seen in the right atrium. There is  no color Doppler evidence of an atrial shunt. A contrast injection (Bubble  Study) was performed that was negative for flow across the interatrial septum.  There is an irregular echodensity (1.2 cm x 0.7cm) in the left atrial  appendage, contiguous with other trabeculations (assessed with X-plane) and of  the same echogenicity, overall more consistent with a prominent trabeculation  rather than a thrombus, cardiac CT could be considered for further evaluation  if indicated.     Mitral Valve  There is mild mitral annular calcification. There is mild (1+) mitral  regurgitation. There is no mitral valve stenosis.      Tricuspid Valve  There is mild (1+) tricuspid regurgitation. The right ventricular systolic  pressure is approximated at 24mmHg plus the right atrial pressure.     Aortic Valve  Status post TAVR with a 29mm [place] Miguel 3 bioprosthetic aortic valve. No  vegetations seen on prosthetic valve leaflets, no aortic root abscess seen  (sweep image obtained #18). On short-axis view, leaflet motion does not appear  significantly restricted. No significant aortic insufficiency (trivial central  regurgitation seen only with decreased velocity scale), no paravalvular leak.  Limited TTE images obtained at the end of the ARLET study due to suboptimal  Doppler alignment on transgastric view, on TTE VMax 3.5 m/s, mean gradient 32  mmHg, abnormal.     Pulmonic Valve  The pulmonic valve is normal in structure and function.     Vessels  Aortic root dilatation is present. Max diameter of the visualized portion 4.1  cm. Mild atherosclerotic plaque(s) in the descending aorta.     Pericardial/Pleural  There is no pericardial effusion.  ______________________________________________________________________________  Doppler Measurements & Calculations  MV max P.7 mmHg  MV mean P.0 mmHg  MV V2 VTI: 24.8 cm  Ao V2 max: 289.0 cm/sec  Ao max P.0 mmHg  Ao V2 mean: 217.7 cm/sec  Ao mean P.0 mmHg  Ao V2 VTI: 64.3 cm  Ao acc time: 0.11 sec     ______________________________________________________________________________  Report approved by: Wili Guajardo MD on 2025 03:44 PM             Medications   All medications were reviewed. MAR.    Infusions:  Current Facility-Administered Medications   Medication Dose Route Frequency Provider Last Rate Last Admin    Patient is already receiving anticoagulation with heparin, enoxaparin (LOVENOX), warfarin (COUMADIN)  or other anticoagulant medication   Does not apply Continuous PRN Umm Holland PA-C        Reason ACE/ARB/ARNI order not selected   Other DOES NOT GO TO MAR  Umm Holland PA-C        Reason beta blocker not prescribed   Other DOES NOT GO TO Umm Mtz PA-C         Scheduled Medications:  Current Facility-Administered Medications   Medication Dose Route Frequency Provider Last Rate Last Admin    alfuzosin ER (UROXATRAL) 24 hr tablet 10 mg  10 mg Oral Daily Umm Holland PA-C   10 mg at 03/12/25 0922    cefTRIAXone (ROCEPHIN) 2 g vial to attach to  ml bag for ADULTS or NS 50 ml bag for PEDS  2 g Intravenous Q24H Dione Simmons DO 0 mL/hr at 03/10/25 1300 2 g at 03/12/25 1213    ezetimibe (ZETIA) half-tab 5 mg  5 mg Oral Q Mon Wed Fri AM Umm Holland PA-C   5 mg at 03/12/25 0921    multivitamin w/minerals (THERA-VIT-M) tablet 1 tablet  1 tablet Oral QPM Umm Holland PA-C   1 tablet at 03/12/25 1953    potassium chloride april ER (KLOR-CON M20) CR tablet 40 mEq  40 mEq Oral Daily Umm Holland PA-C   40 mEq at 03/12/25 0922    rosuvastatin (CRESTOR) tablet 40 mg  40 mg Oral QPM Umm Holland PA-C   40 mg at 03/12/25 1953    sodium chloride (PF) 0.9% PF flush 3 mL  3 mL Intracatheter Q8H Umm Holland PA-C   3 mL at 03/12/25 2342    valsartan (DIOVAN) tablet 80 mg  80 mg Oral Daily Dione Simmons DO   80 mg at 03/12/25 0922    vitamin C (ASCORBIC ACID) tablet 1,000 mg  1,000 mg Oral Daily Umm Holland PA-C   1,000 mg at 03/12/25 0921    Warfarin Dose Required Daily - Pharmacist Managed  1 each Oral See Admin Instructions Umm Holland PA-C         PRN Medications:  Current Facility-Administered Medications   Medication Dose Route Frequency Provider Last Rate Last Admin    acetaminophen (TYLENOL) tablet 650 mg  650 mg Oral Q4H PRN Holland, Umm Michelle, PA-C        Or    acetaminophen (TYLENOL) Suppository 650 mg  650 mg Rectal Q4H PRN Umm Holland PA-C        calcium carbonate (TUMS) chewable tablet 1,000  mg  1,000 mg Oral 4x Daily PRN Umm Holland PA-C        hydrALAZINE (APRESOLINE) injection 10 mg  10 mg Intravenous Q4H PRN Umm Holland PA-C        HYDROmorphone (DILAUDID) injection 0.2 mg  0.2 mg Intravenous Q2H PRN Umm Holland PA-C        lidocaine (LMX4) cream   Topical Q1H PRN Umm Holland PA-C        lidocaine 1 % 0.1-1 mL  0.1-1 mL Other Q1H PRN Umm Holland PA-C        naloxone (NARCAN) injection 0.2 mg  0.2 mg Intravenous Q2 Min PRN Vick Rosa MD        Or    naloxone (NARCAN) injection 0.4 mg  0.4 mg Intravenous Q2 Min PRN Vick Rosa MD        Or    naloxone (NARCAN) injection 0.2 mg  0.2 mg Intramuscular Q2 Min PRN Vick Rosa MD        Or    naloxone (NARCAN) injection 0.4 mg  0.4 mg Intramuscular Q2 Min PRN Vick Rosa MD        ondansetron (ZOFRAN ODT) ODT tab 4 mg  4 mg Oral Q6H PRN Umm Holland PA-C        Or    ondansetron (ZOFRAN) injection 4 mg  4 mg Intravenous Q6H PRN Umm Holland PA-C        oxyCODONE (ROXICODONE) tablet 5 mg  5 mg Oral Q4H PRN Umm Holland PA-C        oxyCODONE IR (ROXICODONE) half-tab 2.5 mg  2.5 mg Oral Q4H PRN Umm Holland PA-C        Patient is already receiving anticoagulation with heparin, enoxaparin (LOVENOX), warfarin (COUMADIN)  or other anticoagulant medication   Does not apply Continuous PRN Umm Holland PA-C        polyethylene glycol (MIRALAX) Packet 17 g  17 g Oral BID PRN Umm Holland PA-C        prochlorperazine (COMPAZINE) injection 5 mg  5 mg Intravenous Q6H PRN Umm Holland PA-C        Or    prochlorperazine (COMPAZINE) tablet 5 mg  5 mg Oral Q6H PRN Umm Holland PA-C        Reason ACE/ARB/ARNI order not selected   Other DOES NOT GO TO Umm Mtz PA-C        Reason beta blocker not prescribed   Other DOES NOT GO TO Umm Mtz,  PHYLLIS        senna-docusate (SENOKOT-S/PERICOLACE) 8.6-50 MG per tablet 1 tablet  1 tablet Oral BID PRN Umm Holland PA-C        Or    senna-docusate (SENOKOT-S/PERICOLACE) 8.6-50 MG per tablet 2 tablet  2 tablet Oral BID PRN Umm Holland PA-C        sodium chloride (PF) 0.9% PF flush 3 mL  3 mL Intracatheter q1 min prn Umm Holland PA-C

## 2025-03-13 NOTE — PROGRESS NOTES
[facility]    Infectious Disease Progress Note    Date of Service : 03/13/2025       Assessment:  79YM with history of  TAVR for severe aortic stenosis 12/12/2023, pacemaker for high grade AV block, and remote history of Hodgkin lymphoma in remission, who has had a recent extensive negative work up for infectious etiology and for malignancy due to fever of unknown origin since September, and has now been admitted due to acute onset of shortness of breath. He has been found to be in congestive heart failure  ARLET does not show evidence of infective endocarditis     -Congestive heart failure  -Fever of unknown origin , ongoing low grade fever, sweats and elevated inflammatory markers  -S/p pacemaker placement in 2022 and TAVR with bioprosthetic aortic valve replacement in 2023 for severe aortic stenosis  -Hodgkin lymphoma in 1997 which was treated with chemotherapy and radiation to the mediastinum and has remained in remission.   -Infra renal abdominal aortic aneurysm  -Chronic medical conditions - diabetes , PAD and hypertension      Recommendations:  Patient has had ongoing symptoms of sweats, chills and low grade fever for moths with elevated inflammatory markers, though no infection or malignancy has been identified  Consider other diagnoses ? Polymyalgia rheumatica given elevated ESR and complains of muscle weakness may be a consideration and patient could be tried on a short course of steroids to see if improvement in symptoms  Discontinue Ceftriaxone    Recommendations were discussed with the hospitalist service, ID will sign off . Please call us back as needed    Brit Treviño MD    Interval History   Feels better, no further fever. ARLET did not show evidence of endocarditis . Cardiac angiogram is pending      Physical Exam   Temp: 97.5  F (36.4  C) Temp src: Oral BP: 110/65 Pulse: 67   Resp: 16 SpO2: 97 % O2 Device: None (Room air) Oxygen Delivery: 2 LPM  Vitals:    03/11/25 0746 03/12/25  0719 03/13/25 0629   Weight: 85 kg (187 lb 4.8 oz) 85.9 kg (189 lb 4.8 oz) 85 kg (187 lb 6.4 oz)     Vital Signs with Ranges  Temp:  [97.5  F (36.4  C)-98.6  F (37  C)] 97.5  F (36.4  C)  Pulse:  [57-67] 67  Resp:  [16] 16  BP: ()/(56-73) 110/65  SpO2:  [93 %-97 %] 97 %    Constitutional: Awake, alert, cooperative, no apparent distress  Lungs: Clear to auscultation bilaterally, no crackles or wheezing  Cardiovascular: Regular rate and rhythm, normal S1 and S2, and no murmur noted  Abdomen: Normal bowel sounds, soft, non-distended, non-tender  Skin: No rashes, no cyanosis, no edema  Other:    Medications   Current Facility-Administered Medications   Medication Dose Route Frequency Provider Last Rate Last Admin    Patient is already receiving anticoagulation with heparin, enoxaparin (LOVENOX), warfarin (COUMADIN)  or other anticoagulant medication   Does not apply Continuous PRN Umm Holland PA-C        Reason ACE/ARB/ARNI order not selected   Other DOES NOT GO TO Umm Mtz PA-C        Reason beta blocker not prescribed   Other DOES NOT GO TO Umm Mzt PA-C         Current Facility-Administered Medications   Medication Dose Route Frequency Provider Last Rate Last Admin    alfuzosin ER (UROXATRAL) 24 hr tablet 10 mg  10 mg Oral Daily Umm Holland PA-C   10 mg at 03/13/25 1014    cefTRIAXone (ROCEPHIN) 2 g vial to attach to  ml bag for ADULTS or NS 50 ml bag for PEDS  2 g Intravenous Q24H Dione Simmons, DO 0 mL/hr at 03/10/25 1300 2 g at 03/13/25 1229    ezetimibe (ZETIA) half-tab 5 mg  5 mg Oral Q Mon Wed Fri AM Umm Holland PA-C   5 mg at 03/12/25 0921    multivitamin w/minerals (THERA-VIT-M) tablet 1 tablet  1 tablet Oral QPM Umm Holland PA-C   1 tablet at 03/12/25 1953    potassium chloride april ER (KLOR-CON M20) CR tablet 40 mEq  40 mEq Oral Daily Umm Holland PA-C   40 mEq at 03/13/25  1014    rosuvastatin (CRESTOR) tablet 40 mg  40 mg Oral QPM Umm Holland PA-C   40 mg at 03/12/25 1953    sodium chloride (PF) 0.9% PF flush 3 mL  3 mL Intracatheter Q8H Umm Holland PA-C   3 mL at 03/13/25 1241    valsartan (DIOVAN) tablet 80 mg  80 mg Oral Daily Dione Simmons DO   80 mg at 03/13/25 1015    vitamin C (ASCORBIC ACID) tablet 1,000 mg  1,000 mg Oral Daily Umm Holland PA-C   1,000 mg at 03/13/25 1015    warfarin ANTICOAGULANT (COUMADIN) tablet 4 mg  4 mg Oral ONCE at 18:00 Vick Rosa MD        Warfarin Dose Required Daily - Pharmacist Managed  1 each Oral See Admin Instructions Umm Holland PA-C           Data   All microbiology laboratory data reviewed.  Recent Labs   Lab Test 03/13/25  1024 03/12/25  1052 03/11/25  1129   WBC 7.3 8.4 8.3   HGB 10.7* 11.0* 11.3*   HCT 33.2* 34.0* 34.3*   MCV 89 88 88    207 211     Recent Labs   Lab Test 03/13/25  1024 03/12/25  1052 03/11/25  1129   CR 1.08 1.17 1.23*     Recent Labs   Lab Test 03/12/25  1052   SED 71*       Imaging  3/12 ARLET   Interpretation Summary     Status post TAVR with a 29mm [place] 3 bioprosthetic aortic valve. No  vegetations seen on prosthetic valve leaflets, no aortic root abscess seen  (sweep image obtained #18). On short-axis view, leaflet motion does not appear  significantly restricted. No significant aortic insufficiency (trivial central  regurgitation seen only with decreased velocity scale), no paravalvular leak.  Limited TTE images obtained at the end of the ARLET study due to suboptimal  Doppler alignment on transgastric view, on TTE VMax 3.5 m/s, mean gradient 32  mmHg, abnormal.     Left ventricular systolic function is normal. The visual ejection fraction is  55-60%.  The right ventricle is mildly dilated. The right ventricular systolic function  is normal.  There is no color Doppler evidence of an atrial shunt. A contrast injection  (Bubble  Study) was performed that was negative for flow across the interatrial  septum.  There is an irregular echodensity (1.2 cm x 0.7cm) in the left atrial  appendage, contiguous with other trabeculations (assessed with X-plane) and of  the same echogenicity, overall more consistent with a prominent trabeculation  rather than a thrombus, cardiac CT could be considered for further evaluation  if indicated.  Aortic root dilatation is present. Max diameter of the visualized portion 4.1  cm.

## 2025-03-13 NOTE — PLAN OF CARE
Goal Outcome Evaluation:  Orientation: A&Ox4  Aggression Stop Light: Green  Activity: Ind, enc walking in halls  Diet/BS Checks: 2 gm Na  Tele: V paced  IV Access/Drains: L PIV SL  Pain Management: Denies  Abnormal VS/Results: VSS on RA. Creat improved to 1.08, but continue to hold lasix per MD. Marcelo LE trace edema, cecil.   Bowel/Bladder: Cont b/b  Skin/Wounds: WNL  Consults: Hem/onc, ID, cardiology  D/C Disposition: Pending  Other Info: Pt w/up for LGF negative thus far, EEG WDL.  Will get CTA angiogram 3/14. ID suggesting trial of steroids.

## 2025-03-14 ENCOUNTER — APPOINTMENT (OUTPATIENT)
Dept: CARDIOLOGY | Facility: CLINIC | Age: 80
DRG: 280 | End: 2025-03-14
Attending: PHYSICIAN ASSISTANT
Payer: COMMERCIAL

## 2025-03-14 VITALS
DIASTOLIC BLOOD PRESSURE: 66 MMHG | OXYGEN SATURATION: 97 % | WEIGHT: 189.6 LBS | RESPIRATION RATE: 18 BRPM | BODY MASS INDEX: 30.47 KG/M2 | TEMPERATURE: 97.4 F | SYSTOLIC BLOOD PRESSURE: 126 MMHG | HEIGHT: 66 IN | HEART RATE: 63 BPM

## 2025-03-14 LAB
ANION GAP SERPL CALCULATED.3IONS-SCNC: 6 MMOL/L (ref 7–15)
BASOPHILS # BLD AUTO: 0 10E3/UL (ref 0–0.2)
BASOPHILS NFR BLD AUTO: 1 %
BUN SERPL-MCNC: 21.3 MG/DL (ref 8–23)
CALCIUM SERPL-MCNC: 8.8 MG/DL (ref 8.8–10.4)
CHLORIDE SERPL-SCNC: 99 MMOL/L (ref 98–107)
CREAT SERPL-MCNC: 0.98 MG/DL (ref 0.67–1.17)
EGFRCR SERPLBLD CKD-EPI 2021: 79 ML/MIN/1.73M2
EOSINOPHIL # BLD AUTO: 0.1 10E3/UL (ref 0–0.7)
EOSINOPHIL NFR BLD AUTO: 2 %
ERYTHROCYTE [DISTWIDTH] IN BLOOD BY AUTOMATED COUNT: 15.3 % (ref 10–15)
GLUCOSE SERPL-MCNC: 148 MG/DL (ref 70–99)
HCO3 SERPL-SCNC: 25 MMOL/L (ref 22–29)
HCT VFR BLD AUTO: 31.6 % (ref 40–53)
HGB BLD-MCNC: 10.3 G/DL (ref 13.3–17.7)
IMM GRANULOCYTES # BLD: 0 10E3/UL
IMM GRANULOCYTES NFR BLD: 0 %
INR PPP: 2.91 (ref 0.85–1.15)
KAPPA LC FREE SER-MCNC: 5.91 MG/DL (ref 0.33–1.94)
KAPPA LC FREE/LAMBDA FREE SER NEPH: 1.33 {RATIO} (ref 0.26–1.65)
LAMBDA LC FREE SERPL-MCNC: 4.45 MG/DL (ref 0.57–2.63)
LYMPHOCYTES # BLD AUTO: 1.8 10E3/UL (ref 0.8–5.3)
LYMPHOCYTES NFR BLD AUTO: 28 %
MCH RBC QN AUTO: 28.9 PG (ref 26.5–33)
MCHC RBC AUTO-ENTMCNC: 32.6 G/DL (ref 31.5–36.5)
MCV RBC AUTO: 89 FL (ref 78–100)
MONOCYTES # BLD AUTO: 0.6 10E3/UL (ref 0–1.3)
MONOCYTES NFR BLD AUTO: 8 %
NEUTROPHILS # BLD AUTO: 4 10E3/UL (ref 1.6–8.3)
NEUTROPHILS NFR BLD AUTO: 62 %
NRBC # BLD AUTO: 0 10E3/UL
NRBC BLD AUTO-RTO: 0 /100
PLATELET # BLD AUTO: 194 10E3/UL (ref 150–450)
POTASSIUM SERPL-SCNC: 3.9 MMOL/L (ref 3.4–5.3)
RBC # BLD AUTO: 3.57 10E6/UL (ref 4.4–5.9)
SODIUM SERPL-SCNC: 130 MMOL/L (ref 135–145)
WBC # BLD AUTO: 6.5 10E3/UL (ref 4–11)

## 2025-03-14 PROCEDURE — 99239 HOSP IP/OBS DSCHRG MGMT >30: CPT | Performed by: INTERNAL MEDICINE

## 2025-03-14 PROCEDURE — 250N000012 HC RX MED GY IP 250 OP 636 PS 637: Performed by: INTERNAL MEDICINE

## 2025-03-14 PROCEDURE — 250N000013 HC RX MED GY IP 250 OP 250 PS 637: Performed by: PHYSICIAN ASSISTANT

## 2025-03-14 PROCEDURE — 250N000013 HC RX MED GY IP 250 OP 250 PS 637: Performed by: INTERNAL MEDICINE

## 2025-03-14 PROCEDURE — 85014 HEMATOCRIT: CPT | Performed by: INTERNAL MEDICINE

## 2025-03-14 PROCEDURE — 85610 PROTHROMBIN TIME: CPT | Performed by: PHYSICIAN ASSISTANT

## 2025-03-14 PROCEDURE — 75572 CT HRT W/3D IMAGE: CPT | Mod: 26 | Performed by: INTERNAL MEDICINE

## 2025-03-14 PROCEDURE — 250N000013 HC RX MED GY IP 250 OP 250 PS 637: Performed by: STUDENT IN AN ORGANIZED HEALTH CARE EDUCATION/TRAINING PROGRAM

## 2025-03-14 PROCEDURE — 250N000011 HC RX IP 250 OP 636: Performed by: PHYSICIAN ASSISTANT

## 2025-03-14 PROCEDURE — 99233 SBSQ HOSP IP/OBS HIGH 50: CPT | Mod: 25 | Performed by: INTERNAL MEDICINE

## 2025-03-14 PROCEDURE — 75572 CT HRT W/3D IMAGE: CPT

## 2025-03-14 PROCEDURE — 85004 AUTOMATED DIFF WBC COUNT: CPT | Performed by: INTERNAL MEDICINE

## 2025-03-14 PROCEDURE — 80048 BASIC METABOLIC PNL TOTAL CA: CPT | Performed by: INTERNAL MEDICINE

## 2025-03-14 PROCEDURE — 36415 COLL VENOUS BLD VENIPUNCTURE: CPT | Performed by: PHYSICIAN ASSISTANT

## 2025-03-14 RX ORDER — PREDNISONE 5 MG/1
TABLET ORAL
Qty: 46 TABLET | Refills: 0 | Status: SHIPPED | OUTPATIENT
Start: 2025-03-15 | End: 2025-03-14

## 2025-03-14 RX ORDER — VALSARTAN 80 MG/1
80 TABLET ORAL DAILY
Qty: 30 TABLET | Refills: 3 | Status: SHIPPED | OUTPATIENT
Start: 2025-03-15

## 2025-03-14 RX ORDER — PREDNISONE 20 MG/1
20 TABLET ORAL DAILY
Status: DISCONTINUED | OUTPATIENT
Start: 2025-03-14 | End: 2025-03-14 | Stop reason: HOSPADM

## 2025-03-14 RX ORDER — FUROSEMIDE 20 MG/1
20 TABLET ORAL DAILY
Qty: 30 TABLET | Refills: 3 | Status: SHIPPED | OUTPATIENT
Start: 2025-03-14

## 2025-03-14 RX ORDER — PREDNISONE 5 MG/1
5 TABLET ORAL DAILY
Status: DISCONTINUED | OUTPATIENT
Start: 2025-03-29 | End: 2025-03-14 | Stop reason: HOSPADM

## 2025-03-14 RX ORDER — WARFARIN SODIUM 2 MG/1
2 TABLET ORAL
Status: COMPLETED | OUTPATIENT
Start: 2025-03-14 | End: 2025-03-14

## 2025-03-14 RX ORDER — PREDNISONE 5 MG/1
TABLET ORAL
Qty: 46 TABLET | Refills: 0 | Status: SHIPPED | OUTPATIENT
Start: 2025-03-15 | End: 2025-04-03

## 2025-03-14 RX ORDER — PREDNISONE 10 MG/1
10 TABLET ORAL DAILY
Status: DISCONTINUED | OUTPATIENT
Start: 2025-03-24 | End: 2025-03-14 | Stop reason: HOSPADM

## 2025-03-14 RX ORDER — VALSARTAN 80 MG/1
80 TABLET ORAL DAILY
Qty: 30 TABLET | Refills: 3 | Status: SHIPPED | OUTPATIENT
Start: 2025-03-15 | End: 2025-03-14

## 2025-03-14 RX ORDER — IOPAMIDOL 755 MG/ML
500 INJECTION, SOLUTION INTRAVASCULAR ONCE
Status: COMPLETED | OUTPATIENT
Start: 2025-03-14 | End: 2025-03-14

## 2025-03-14 RX ORDER — POTASSIUM CHLORIDE 1500 MG/1
40 TABLET, EXTENDED RELEASE ORAL DAILY
Status: ON HOLD | COMMUNITY
End: 2025-03-14

## 2025-03-14 RX ORDER — PREDNISONE 5 MG/1
15 TABLET ORAL DAILY
Status: DISCONTINUED | OUTPATIENT
Start: 2025-03-19 | End: 2025-03-14 | Stop reason: HOSPADM

## 2025-03-14 RX ADMIN — OXYCODONE HYDROCHLORIDE AND ACETAMINOPHEN 1000 MG: 500 TABLET ORAL at 08:19

## 2025-03-14 RX ADMIN — VALSARTAN 80 MG: 80 TABLET, FILM COATED ORAL at 08:19

## 2025-03-14 RX ADMIN — PREDNISONE 20 MG: 20 TABLET ORAL at 16:06

## 2025-03-14 RX ADMIN — ALFUZOSIN HYDROCHLORIDE 10 MG: 10 TABLET, EXTENDED RELEASE ORAL at 08:19

## 2025-03-14 RX ADMIN — WARFARIN SODIUM 2 MG: 2 TABLET ORAL at 18:21

## 2025-03-14 RX ADMIN — IOPAMIDOL 100 ML: 755 INJECTION, SOLUTION INTRAVENOUS at 15:28

## 2025-03-14 RX ADMIN — POTASSIUM CHLORIDE 40 MEQ: 1500 TABLET, EXTENDED RELEASE ORAL at 08:19

## 2025-03-14 RX ADMIN — EZETIMIBE 5 MG: 10 TABLET ORAL at 08:19

## 2025-03-14 ASSESSMENT — ACTIVITIES OF DAILY LIVING (ADL)
ADLS_ACUITY_SCORE: 32

## 2025-03-14 NOTE — PROGRESS NOTES
[id number]  Orientation: A&Ox4  Aggression Stop Light: Green  Activity: Ind  Diet/BS Checks: 2 gm Na  Tele: V paced  IV Access/Drains: L PIV SL  Pain Management: Denies  Abnormal VS/Results: VSS on RA.   Bowel/Bladder: Cont b/b- no bm  Skin/Wounds: WNL  Consults: Hem/onc, ID, cardiology, OT  D/C Disposition: Pending  Other Info:  Will get CTA angiogram 3/14. Denies nausea. Afebrile

## 2025-03-14 NOTE — PLAN OF CARE
[id number] - [id number]    Orientation: A&Ox4  Aggression Stop Light: Green  Activity: Independent  Diet/BS Checks: 2g Na  Tele: 100% v-paced   IV Access/Drains: L PIV SL  Pain Management: Denies pain  Abnormal VS/Results: VSS on RA  Bowel/Bladder: Continent, uses urinal at bedside  Skin/Wounds: +1 edema, cecil BLE  Consults: Onc, Cardiology, SW  D/C Disposition: Pending improvement    Other Info:   - Plan for CTA angiogram @ 9931

## 2025-03-14 NOTE — DISCHARGE INSTRUCTIONS
You were seen by the following providers during this admission.  They should call you for follow up appointment(s) and location(s).    [facility] Dr. Michele [phone]    [facility] Dr. Hunt [phone]    You are scheduled to see your Rheumatologist Dr. Dumont on Tuesday April 15, 2025 at 10:20 a.m. located at:  [facility]  [address] [place] [id number]  Phone#[phone]  Please call this clinic if you need to reschedule     You wanted to schedule your Primary care team to follow up and INR on your own.  Please call your primary care team upon discharge so that you are able to see your provider with in a week of discharge and also your lab(s) if needed sooner for dosing of your coumadin/warfarin

## 2025-03-14 NOTE — DISCHARGE SUMMARY
[facility]  Discharge Summary        Jonny Goldberg MRN# [id number]   YOB: 1945 Age: 79 year old     Date of Admission: 3/10/2025  Date of Discharge: 03/14/2025  Admitting Physician: Vick Rosa MD  Discharge Physician: Von Phelps MD     Primary Provider: Reid Hogue  Primary Care Physician Phone Number: [phone]         Discharge Diagnoses:   Acute on chronic diastolic CHF exacerbation (HFpEF) with acute hypoxic respiratory failure, question related to aortic valve disease.  Aortic stenosis s/p TAVR with abnormal bioprosthetic valve hemodynamics, unclear etiology.  Elevated troponin, suspect demand ischemia (type 2 MI) related to above.  Fever of unknown origin, unclear etiology, question autoimmune/inflammatory syndrome.  Leukocytosis. Monoclonal gammopathy.  Anemia, suspect chronic component.  Hyponatremia, question hypervolemia component.  Hypertension (benign essential).        Other Chronic Medical Problems:      Persistent afib, on chronic anticoagulation with warfarin.  H/o high degree AVB s/p PPM.  Remote history Hodgkin lymphoma.  DM II, diet controlled.   Elevated PSA.       Allergies:         Allergies   Allergen Reactions    Irinotecan     Penicillin G Hives     hives           Discharge Medications:        Current Discharge Medication List        START taking these medications    Details   furosemide (LASIX) 20 MG tablet Take 1 tablet (20 mg) by mouth daily.  Qty: 30 tablet, Refills: 3    Associated Diagnoses: Acute decompensated heart failure (H)      predniSONE (DELTASONE) 5 MG tablet Take 4 tablets (20 mg) by mouth daily for 4 days, THEN 3 tablets (15 mg) daily for 5 days, THEN 2 tablets (10 mg) daily for 5 days, THEN 1 tablet (5 mg) daily for 5 days.  Qty: 46 tablet, Refills: 0    Associated Diagnoses: Fever, unspecified fever cause      valsartan (DIOVAN) 80 MG tablet Take 1 tablet (80 mg) by mouth daily.  Qty: 30 tablet, Refills: 3     Associated Diagnoses: Primary hypertension           CONTINUE these medications which have NOT CHANGED    Details   acetaminophen (TYLENOL) 500 MG tablet Take 1,000 mg by mouth 3 times daily as needed for mild pain.      alfuzosin ER (UROXATRAL) 10 MG 24 hr tablet Take 1 tablet (10 mg) by mouth daily  Qty: 90 tablet, Refills: 4    Associated Diagnoses: Benign localized prostatic hyperplasia with lower urinary tract symptoms (LUTS)      ALPHA LIPOIC ACID PO Take 600 mg by mouth daily      ascorbic acid (VITAMIN C) 1000 MG TABS Take 1 tablet by mouth daily      Cholecalciferol (VITAMIN D) 400 UNITS tablet Take 1 tablet by mouth every evening      CINNAMON PO Take 1 capsule by mouth every evening      clindamycin (CLEOCIN) 300 MG capsule Take 2 capsules by mouth once as needed (1 hour prior to dental appointments 2 x 300 mg = 300 mg)      co-enzyme Q-10 100 MG CAPS capsule Take 1 capsule by mouth daily      ezetimibe (ZETIA) 10 MG tablet Take 0.5 tablets (5 mg) by mouth Every Mon, Wed, Fri Morning  Qty: 20 tablet, Refills: 3    Associated Diagnoses: PAD (peripheral artery disease)      fenofibrate (TRIGLIDE/LOFIBRA) 160 MG tablet Take 1 tablet (160 mg) by mouth daily.  Qty: 90 tablet, Refills: 2    Associated Diagnoses: Mixed hyperlipidemia      multivitamin w/minerals (THERA-VIT-M) tablet Take 1 tablet by mouth every evening      omega 3 1000 MG CAPS Take 1 capsule by mouth every evening 180 EPA/ 20 DHH      rosuvastatin (CRESTOR) 40 MG tablet Take 40 mg by mouth every evening      warfarin ANTICOAGULANT (COUMADIN) 4 MG tablet Take by mouth See Admin Instructions. Take 2mg Sun, Tue and 4mg all other days           STOP taking these medications       amLODIPine (NORVASC) 10 MG tablet Comments:   Reason for Stopping:         chlorthalidone (HYGROTON) 50 MG tablet Comments:   Reason for Stopping:         potassium chloride april ER (KLOR-CON M20) 20 MEQ CR tablet Comments:   Reason for Stopping:         potassium chloride  ER (KLOR-CON ASIA) 20 MEQ CR tablet Comments:   Reason for Stopping:                   Discharge Instructions and Follow-Up:      Discharge Orders      Primary Care - Care Coordination Referral      Activity    Your activity upon discharge: activity as tolerated     Follow Up    1. Follow-up with [facility] as instructed per Cardiology.  2. Follow-up with primary Rheumatologist, Dr. Dumont at [facility] in 4/2025.     Reason for your hospital stay    1. Acute on chronic diastolic CHF exacerbation (HFpEF) with acute hypoxic respiratory failure, question related to aortic valve disease, respiratory failure resolved.  2. Aortic stenosis s/p TAVR with abnormal bioprosthetic valve hemodynamics, unclear etiology.  3. Elevated troponin, suspect demand ischemia (type 2 MI) related to above.  4. Fever of unknown origin, unclear etiology, question autoimmune/inflammatory syndrome.  5. Leukocytosis. Monoclonal gammopathy.  6. Anemia, suspect chronic component.  7. Hyponatremia, question hypervolemia component.  8. Hypertension (benign essential).     Activity    Your activity upon discharge: activity as tolerated     Follow Up    Need an urgent referral to Oncology through [facility]    You are scheduled to see your Rheumatologist Dr. Dumont on Tuesday April 15, 2025 at 10:20 a.m. located at:  [facility]  [address] [place] [address]  Phone#[phone]  Please call this clinic if you need to reschedule     Diet    Follow this diet upon discharge: Orders Placed This Encounter      Advance Diet as Tolerated: Clear Liquid Diet; Regular Diet Adult      2 Gram Sodium Diet       Diet    Follow this diet upon discharge: Orders Placed This Encounter      Advance Diet as Tolerated: Clear Liquid Diet; Regular Diet Adult      2 Gram Sodium Diet      Diet       Hospital Follow-up with Existing Primary Care Provider (PCP)    Please see details below          Hospital Follow-up with Existing Primary Care Provider (PCP)     Please see details below                Consultations This Hospital Stay:      PHARMACY TO DOSE WARFARIN  OCCUPATIONAL THERAPY ADULT IP CONSULT  CARDIOLOGY IP CONSULT  PHARMACY LIAISON FOR MEDICATION COVERAGE CONSULT  INFECTIOUS DISEASES IP CONSULT  HEMATOLOGY & ONCOLOGY IP CONSULT  CARE MANAGEMENT / SOCIAL WORK IP CONSULT        Admission History:      Please see the H&P by Vick Rosa MD on 3/10/2025 for complete details. Briefly, Jonny Goldberg is a 79 year old male with past medical history significant including HTN; CHF; PAD; AF; aortic stenosis status post TAVR (12/2023); PPM; and h/o Hodgkins lymphoma; who presented 3/10/2025 shortness of breath and paroxysmal nocturnal dyspnea and found with evidence of CHF.    On initial evaluation, pt was febrile 100.8, hemodynamically stable, sats 98% on O2 (was 80's per EMS).  Labs notable for CBC with WBC 14.7, hgb 10.9; BMP normal, magnesium 1.6; LFT's unremarkable; lactate 2.3; NTP-BNP 3073; procalcitonin 0.68; troponin 37; CRP 55.83; INR 3.04; UA not suggestive of infection; COVID-19, influenza and RSV negative. CXR showed mild pulmonary vascular congestion changes, no focal lung infiltrates.         Problem Oriented Hospital Course:        # Acute on chronic diastolic CHF exacerbation (HFpEF) with acute hypoxic respiratory failure, question related to aortic valve disease.  # Aortic stenosis s/p TAVR with abnormal bioprosthetic valve hemodynamics, unclear etiology.  # Elevated troponin, suspect demand ischemia (type 2 MI) related to above.  # Hypertension (benign essential).  # Persistent afib, on chronic anticoagulation with warfarin.  # H/o high degree AVB s/p PPM.  [PTA: amlodipine 10 mg daily; chlorthalidone 50 mg daily.]  * Known hx of HFpEF. Echo in 11/2024 showed EF 55-60%; mild MR; TAVR.  * Initial presentation as above. Initially required BiPAP but was quickly weaned to 2L NC with IV furosemide. Cardiology consulted on admit. Also issues with fever of  unknown origin for months PTA possibly contributing to CHF exacerbation, worked up as noted.  * 3/10: Stopped amlodipine, chlorthalidone held.  * 3/11: Started valsartan. Echo showed LVEF 50-55%, moderate cLVH, severe hypokinesia of apical septum, differential diagnosis included apical insertion of the pacemaker lead vs coronary disease; RV OK; noted gradient across the bioprosthetic aortic valve had increased significantly from 10 mmHg to 29 mmHg, consider ARLET for further workup.  * 3/12: Held further diuretics. Weaned off O2. ARLET showed LVEF 55-60%; no vegetations seen on prosthetic valve leaflets, no aortic root abscess seen, no significant aortic insufficiency, no paravalvular leak, Vmax 3.5 m/s and gradient 32 mmHg; noted an irregular echodensity in the left atrial appendage, contiguous with other trabeculations, overall more consistent with a prominent trabeculation rather than a thrombus, cardiac CT could be considered for further evaluation; aortic root dilatation, max diameter 4.1 cm.  Discussed with ID and recommended cardiac MRI to evaluate for cardiac amyloid; discussed with Cardiology, consider inpatient vs as outpatient.   * 3/14: CT heart structural performed, results pending.  Vitals:    03/10/25 2300 03/11/25 0746 03/12/25 0719 03/13/25 0629   Weight: 85.5 kg (188 lb 6.4 oz) 85 kg (187 lb 4.8 oz) 85.9 kg (189 lb 4.8 oz) 85 kg (187 lb 6.4 oz)    03/14/25 0630   Weight: 86 kg (189 lb 9.6 oz)     I/O last 3 completed shifts:  In: -   Out: 950 [Urine:950]  Recent Labs   Lab 03/14/25  1035 03/13/25  1024 03/12/25  1421 03/12/25  1052 03/11/25  1242 03/11/25  1129 03/10/25  1557 03/10/25  1229 03/10/25  0900 03/10/25  0648   * 134*  --  133*  --  134*  --   --   --  141   CO2 25 24  --  24  --  28  --   --   --  25   BUN 21.3 24.6*  --  28.5*  --  25.0*  --   --   --  21.0   CR 0.98 1.08  --  1.17  --  1.23*  --   --   --  1.03   POTASSIUM 3.9 3.6  --  3.6  --  3.5  --   --   --  3.7   CTROPT  --    --   --   --   --   --  57* 66* 72* 37*   NTBNPI  --   --   --   --   --   --   --   --   --  3,073*   LVEF  --   --  55-60%  --  50-55%  --   --   --   --   --      Recent Labs   Lab 03/14/25  1035 03/13/25  1024 03/12/25  1052 03/11/25  1129 03/10/25  1229 03/10/25  0648   INR 2.91* 2.59* 2.48* 2.61* 2.74* 3.04*   - Cardiology will follow-up CT heart structural results.  - Continue valsartan 80 mg daily (new).  - Start furosemide 20 mg daily (new).  - Stopping amlodipine and chlorthalidone.  - Continue warfarin.  - Continue fever workup as noted.    # Fever of unknown origin, unclear etiology, question autoimmune/inflammatory syndrome.  # Leukocytosis.  * Has been undergoing workup of fever of unknown origin in the OP setting with reported months long history of fever/night sweats. Extensive workup done per ID was unrevealing. Had been previously treated with a prolonged course of doxycycline.   * Initial presentation as above. BC's NGTD. Was started on empiric ceftriaxone on admission given concern for ddx including endocarditis.  * 3/11: MOHPA consulted given recent OP bone marrow biopsy - noted suggestive of MGUS, no evidence of myeloma. Plan was for OP PET/CT.   * 3/12: ID consulted. ARLET as noted w/o vegetations. Discussed with ID and recommended cardiac MRI to evaluate for cardiac amyloid; discussed with Cardiology, consider inpatient vs as outpatient. Lab evaluation for rheumatologic conditions ordered: ZEINA and ANCA negative; ACE level pending. Light chains and immunofixation ordered by Cardiology: UPEP with very small monoclonal IgG immunoglobulin of lambda light chain type and a very small monoclonal IgA immunoglobulin of kappa light chain type; SPEP showed monoclonal IgG immunoglobulin of lambda light chain type.   * 3/13: Ceftriaxone discontinued. ID concerned about auto-immune/inflammatory condition and suggested consideration of short course of steroids.   * 3/14: Discussed with Dr. Michele who said  SPEP/UPEP findings were c/w MGUS diagnosis.  Recent Labs   Lab 03/14/25  1035 03/13/25  1024 03/12/25  1052 03/11/25  1129 03/10/25  0902 03/10/25  0900 03/10/25  0653 03/10/25  0648   WBC 6.5 7.3 8.4 8.3  --   --   --  14.7*   LACT  --   --   --   --  0.9  --  2.3*  --    PCAL  --   --   --   --   --  0.68*  --   --    CRPI  --   --  50.81*  --   --  55.83*  --   --    - On 3/14, discussed informally with 's Rheumatologist, Dr. Dumont who had started some workup for FUO but wanted to try to rule out infection before potentially treating. She was OK with a trial of prednisone - will start prednisone 20 mg daily for 5d, then 15 mg daily for 5d, then 10 mg daily for 5d, then 5 mg daily for 5 days, then stop. She will follow-up with him in April.   - Continue to monitor cultures.  - Overall, patient also considering second opinion from [place].     Remote history Hodgkin lymphoma.  Monoclonal gammopathy.  * Follows with [facility].  Recently seen by Dr. Michele and underwent a bone marrow biopsy few weeks prior to admission.   * Seen by [facility] this stay as above. Planning for OP followup.  * 3/12: UPEP with very small monoclonal IgG immunoglobulin of lambda light chain type and a very small monoclonal IgA immunoglobulin of kappa light chain type; SPEP showed monoclonal IgG immunoglobulin of lambda light chain type.   * 3/14: Discussed with Dr. Michele who said SPEP/UPEP findings were c/w MGUS diagnosis.  - Follow-up with [facility] outpatient.    Anemia, suspect chronic component.  * Hgb 10.9 on admit. No overt clinical signs of major bleeding.  Recent Labs   Lab 03/14/25  1035 03/13/25  1024 03/12/25  1052 03/11/25  1129 03/10/25  0648   HGB 10.3* 10.7* 11.0* 11.3* 10.9*   - Continue to monitor CBC.    Hyponatremia, question hypervolemia component.  * Sodium 141 on admit.   * Sodium 134 on 3/11.  Recent Labs   Lab 03/14/25  1035 03/13/25  1024 03/12/25  1052 03/11/25  1129 03/10/25  0648   * 134* 133* 134* 141   - Continue  "to monitor BMP.  - Continue to treat other issues as noted.    DM II, diet controlled.   * A1c 5.2 this stay.   - Continue to monitor clinically.     Elevated PSA.  * Follows with Urology  - Follow-up with Urology outpatient.      Clinically Significant Risk Factors         # Hyponatremia: Lowest Na = 130 mmol/L in last 2 days, will monitor as appropriate       # Hypoalbuminemia: Lowest albumin = 3.4 g/dL at 3/10/2025  6:48 AM, will monitor as appropriate     # Hypertension: Noted on problem list            # Obesity: Estimated body mass index is 30.62 kg/m  as calculated from the following:    Height as of this encounter: 1.676 m (5' 5.98\").    Weight as of this encounter: 86 kg (189 lb 9.6 oz).        # Financial/Environmental Concerns: none   # Pacemaker present             Pending Results:        Unresulted Labs Ordered in the Past 30 Days of this Admission       Date and Time Order Name Status Description    3/12/2025  6:00 PM Angiotensin converting enzyme In process     3/10/2025  9:13 AM Blood Culture Hand, Left Preliminary     3/10/2025  9:13 AM Blood Culture Peripheral Blood Preliminary                   Discharge Disposition:      Discharge to home.        Discharge Time:      Approximately 40 minutes.          Condition and Physical on Discharge:    See progress note on the same date as this discharge summary.          Key Imaging Studies, Lab Findings and Procedures/Surgeries:        Results for orders placed or performed during the hospital encounter of 03/10/25   XR Chest Port 1 View    Narrative    EXAM: XR CHEST PORT 1 VIEW  LOCATION: [facility]  DATE: 3/10/2025    INDICATION: shortness of breath  COMPARISON: 1/17/2025      Impression    IMPRESSION: Cardiomegaly. Pacemaker with lead tip in the region of the right ventricle. Endovascular aortic valve replacement. Mild pulmonary vascular congestion changes. No pneumothorax. No focal lung infiltrates. Degenerative changes in " the bilateral   shoulders.   X-ray Chest 2 vws    Narrative    EXAM: XR CHEST 2 VIEWS  LOCATION: [facility]  DATE: 3/11/2025    INDICATION: Follow up CXR.  COMPARISON: 3/10/2025.      Impression    IMPRESSION: No acute cardiopulmonary disease. Normal cardiac silhouette. Stable left chest pacer.   Echocardiogram Complete     Value    LVEF  50-55%    Narrative    [id number]  [id number]  [id number]  [id number]^JJ^DON^MICHAEL     [facility]  Echocardiography Laboratory  [address] [place] [address]     Name: DIANA MOREIRA  MRN: [id number]  : 1945  Study Date: 2025 11:42 AM  Age: 79 yrs  Gender: Male  Patient Location: [facility]  Reason For Study: Heart Failure  Ordering Physician: DON GARDNER  Referring Physician: Reid Hogue  Performed By: Deon Hugo     BSA: 1.9 m2  Height: 66 in  Weight: 187 lb  HR: 67  BP: 141/90 mmHg  ______________________________________________________________________________  Procedure  Echocardiogram with two-dimensional, color and spectral Doppler. Definity (NDC  #96915-214) given intravenously.  ______________________________________________________________________________  Interpretation Summary     Status post TAVR with a 29mm De Paz Miguel 3 bioprosthetic aortic valve.  There is a well-seated bioprosthesis in aortic position. Valve leaflets were  not well-seen. Mean transvalvular gradient is 28 mmHg which is high for this  valve. Peak velocity is 3.5 m/s and dimensionless index is 0.28. No PVL.  Acceleration time is 100 ms.  The left ventricle is normal in size. There is moderate concentric LVH.Left  ventricular systolic function is low normal. The visual ejection fraction is  50-55%.  There is severe hypokinesis of the apical septum. Differential diagnosis  includes apical insertion of the pacemaker lead vs coronary disease.  The right ventricle is normal in size and function.  The left atrium is  severely dilated. The right atrium is mildly dilated.  Compared to the prior study from 11/14/2024, gradient across the bioprosthetic  aortic valve has increased significantly from 10 mmHg to 29 mmHg. Consider ARLET  for further workup. Results discussed with the in-patient care team.  ______________________________________________________________________________  Left Ventricle  The left ventricle is normal in size. There is moderate concentric left  ventricular hypertrophy. Diastolic Doppler findings (E/E' ratio and/or other  parameters) suggest left ventricular filling pressures are increased. Left  ventricular systolic function is low normal. The visual ejection fraction is  50-55%. There is severe hypokinesis of the apical septum. Differential  diagnosis includes apical insertion of the pacemaker lead vs coronary disease.  Septal motion is consistent with conduction abnormality.     Right Ventricle  There is a catheter/pacemaker lead seen in the right ventricle. The right  ventricle is normal in size and function.     Atria  The left atrium is severely dilated. The right atrium is mildly dilated.     Mitral Valve  The mitral valve leaflets are mildly thickened. There is mild mitral annular  calcification.     Tricuspid Valve  There is mild (1+) tricuspid regurgitation. The right ventricular systolic  pressure is approximated at 22.5 mmHg plus the right atrial pressure.     Aortic Valve  There is a bioprosthetic aortic valve. The prosthetic aortic valve is well-  seated.     Pulmonic Valve  The pulmonic valve is not well visualized.     Vessels  Normal size aorta. Normal size ascending aorta. IVC diameter <2.1 cm  collapsing >50% with sniff suggests a normal RA pressure of 3 mmHg.     Pericardium  There is no pericardial effusion.     Rhythm  The rhythm was atrial fibrillation with wide QRS rhythm.  ______________________________________________________________________________  MMode/2D Measurements &  Calculations  IVSd: 1.3 cm     LVIDd: 5.5 cm  LVIDs: 4.0 cm  LVPWd: 1.3 cm  FS: 27.5 %  LV mass(C)d: 306.7 grams  LV mass(C)dI: 157.8 grams/m2  Ao root diam: 3.0 cm  asc Aorta Diam: 3.7 cm  LVOT diam: 2.5 cm  LVOT area: 4.7 cm2  Ao root diam index Ht(cm/m): 1.8  Ao root diam index BSA (cm/m2): 1.6  Asc Ao diam index BSA (cm/m2): 1.9  Asc Ao diam index Ht(cm/m): 2.2  LA Volume (BP): 192.0 ml     LA Volume Index (BP): 99.0 ml/m2  RV Base: 4.3 cm  RWT: 0.47  TAPSE: 2.2 cm     Doppler Measurements & Calculations  MV E max papi: 129.0 cm/sec  MV dec slope: 553.1 cm/sec2  MV dec time: 0.23 sec  Ao V2 max: 346.7 cm/sec  Ao max P.0 mmHg  Ao V2 mean: 260.3 cm/sec  Ao mean P.6 mmHg  Ao V2 VTI: 67.9 cm  JACOBY(I,D): 1.3 cm2  JACOBY(V,D): 1.2 cm2  Ao acc time: 0.10 sec  LV V1 max PG: 3.0 mmHg  LV V1 max: 86.6 cm/sec  LV V1 VTI: 18.7 cm  SV(LVOT): 88.1 ml  SI(LVOT): 45.3 ml/m2  PA acc time: 0.06 sec  TR max papi: 237.0 cm/sec  TR max P.5 mmHg  AV Papi Ratio (DI): 0.25     JACOBY Index (cm2/m2): 0.67  E/E' av.4  Lateral E/e': 17.7  Medial E/e': 17.2  RV S Papi: 12.0 cm/sec     ______________________________________________________________________________  Report approved by: Vladislav Ramirez MD on 2025 05:33 PM         Transesophageal Echocardiogram     Value    LVEF  55-60%    Narrative    [id number]  [id number]  [id number]^HONORIO^CHARU^S     [facility]  Echocardiography Laboratory  [address] [place] [address]     Name: DIANA MOREIRA  MRN: [id number]  : 1945  Study Date: 2025 01:27 PM  Age: 79 yrs  Gender: Male  Patient Location: [facility]  Reason For Study: Aortic Valve  Ordering Physician: CHARU OLMEDO  Referring Physician: Reid Hogue  Performed By: Deon Hugo     BSA: 1.9 m2  Height: 65 in  Weight: 189 lb  HR: 64  BP: 120/60 mmHg  ______________________________________________________________________________  Procedure  Transesophageal Echocardiogram with  two-dimensional, color and spectral  Doppler. 3D image acquisition, reconstruction, and real-time interpretation  was performed. ARLET Probe serial #[id number] was used during the procedure. The  heart rate, respiratory rate and response to care were monitored throughout  the procedure with the assistance of the nurse.  ______________________________________________________________________________  Interpretation Summary     Status post TAVR with a 29mm De Paz Miguel 3 bioprosthetic aortic valve. No  vegetations seen on prosthetic valve leaflets, no aortic root abscess seen  (sweep image obtained #18). On short-axis view, leaflet motion does not appear  significantly restricted. No significant aortic insufficiency (trivial central  regurgitation seen only with decreased velocity scale), no paravalvular leak.  Limited TTE images obtained at the end of the ARLET study due to suboptimal  Doppler alignment on transgastric view, on TTE VMax 3.5 m/s, mean gradient 32  mmHg, abnormal.     Left ventricular systolic function is normal. The visual ejection fraction is  55-60%.  The right ventricle is mildly dilated. The right ventricular systolic function  is normal.  There is no color Doppler evidence of an atrial shunt. A contrast injection  (Bubble Study) was performed that was negative for flow across the interatrial  septum.  There is an irregular echodensity (1.2 cm x 0.7cm) in the left atrial  appendage, contiguous with other trabeculations (assessed with X-plane) and of  the same echogenicity, overall more consistent with a prominent trabeculation  rather than a thrombus, cardiac CT could be considered for further evaluation  if indicated.  Aortic root dilatation is present. Max diameter of the visualized portion 4.1  cm.     ______________________________________________________________________________  ARLET  I determined this patient to be an appropriate candidate for the planned  sedation and procedure and have reassessed  the patient immediately prior to  sedation and procedure. Total sedation time: 24 minutes of continuous bedside  1:1 monitoring. Versed (4mg) was given intravenously. Fentanyl (100mcg) was  given intravenously. Consent to the procedure was obtained prior to sedation.  Prior to the exam, the oral cavity was checked and no overcrowding was noted.  The transesophageal probe was passed without difficulty. There were no  complications associated with this procedure. 3D image acquisition,  reconstruction, and real-time interpretation was performed.     Left Ventricle  The left ventricle is normal in size. Left ventricular systolic function is  normal. The visual ejection fraction is 55-60%.     Right Ventricle  The right ventricle is mildly dilated. The right ventricular systolic function  is normal.     Atria  The left atrium is severely dilated. The right atrium is moderate to severely  dilated. There is a catheter/pacemaker lead seen in the right atrium. There is  no color Doppler evidence of an atrial shunt. A contrast injection (Bubble  Study) was performed that was negative for flow across the interatrial septum.  There is an irregular echodensity (1.2 cm x 0.7cm) in the left atrial  appendage, contiguous with other trabeculations (assessed with X-plane) and of  the same echogenicity, overall more consistent with a prominent trabeculation  rather than a thrombus, cardiac CT could be considered for further evaluation  if indicated.     Mitral Valve  There is mild mitral annular calcification. There is mild (1+) mitral  regurgitation. There is no mitral valve stenosis.     Tricuspid Valve  There is mild (1+) tricuspid regurgitation. The right ventricular systolic  pressure is approximated at 24mmHg plus the right atrial pressure.     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 No  vegetations seen on prosthetic valve leaflets, no aortic root abscess seen  (sweep image  obtained #18). On short-axis view, leaflet motion does not appear  significantly restricted. No significant aortic insufficiency (trivial central  regurgitation seen only with decreased velocity scale), no paravalvular leak.  Limited TTE images obtained at the end of the ARLET study due to suboptimal  Doppler alignment on transgastric view, on TTE VMax 3.5 m/s, mean gradient 32  mmHg, abnormal.     Pulmonic Valve  The pulmonic valve is normal in structure and function.     Vessels  Aortic root dilatation is present. Max diameter of the visualized portion 4.1  cm. Mild atherosclerotic plaque(s) in the descending aorta.     Pericardial/Pleural  There is no pericardial effusion.  ______________________________________________________________________________  Doppler Measurements & Calculations  MV max P.7 mmHg  MV mean P.0 mmHg  MV V2 VTI: 24.8 cm  Ao V2 max: 289.0 cm/sec  Ao max P.0 mmHg  Ao V2 mean: 217.7 cm/sec  Ao mean P.0 mmHg  Ao V2 VTI: 64.3 cm  Ao acc time: 0.11 sec     ______________________________________________________________________________  Report approved by: Wili Guajardo MD on 2025 03:44 PM           CT Heart Structural 3/14/2025:  Results pending.

## 2025-03-14 NOTE — PROGRESS NOTES
Care Management Discharge Note    Discharge Date: 03/14/2025       Discharge Disposition: Home    Discharge Services: n/a  Discharge DME: None    Discharge Transportation: family or friend will provide    Private pay costs discussed: Not applicable    Does the patient's insurance plan have a 3 day qualifying hospital stay waiver?  No    PAS Confirmation Code:  n/a  Patient/family educated on Medicare website which has current facility and service quality ratings:  no     Education Provided on the Discharge Plan: Yes  Persons Notified of Discharge Plans: Patient, Rounding provider and bedside RN  Patient/Family in Agreement with the Plan: yes    Handoff Referral Completed: No, handoff not indicated or clinically appropriate    Additional Information:  Writer met with the patient at the bedside. Patient is A+Ox4 up independently in the room and varma.  Patient is aware that he may be medically cleared for discharge to home with follow up's as recommended.  Patient is hopeful to discharge and is aware that he will require specialty follow up's.  Patient is agreeable to have this writer work on a Rheumatology follow up and this has been entered into the AVS.  Patient is aware that [facility] and [facility] will call him with follow up recommendations and writer has put phone numbers for reference in the discharge instructions.  Patient prefers to do his own PCP and INR lab appointment and noted he does not have difficulty with getting these appointments.  No further needs identified.  Updated bedside RN by Lev that all follow up is in the AVS/Dishcarge instructions.          Yoli Albert RN, BSN, ACM   Care Transitions Specialist  [facility]  Care Transitions Specialist  [address] [place]. [address]  [email or web address]  Office: [phone] Fax: [phone]  [facility]

## 2025-03-14 NOTE — PROGRESS NOTES
[facility].  Inpatient Cardiology Progress Note.  Date of Service: March 14, 2025      PRIMARY CARDIOLOGY TEAM: Dr. Gennaro Cook MD.    INTERVAL HISTORY:  There was a delay in the patient's cardiac CT today because of issues with IV access including extravasation, needing replacement.  Therefore, it has not been reported yet.  Clinically, he has remained stable.  His murmur is soft on auscultation.  ARLET findings as documented in detail from [facility] in the attestation note from yesterday (3/13/2023).    DIAGNOSES/ASSESSMENT:    New diagnosis of elevated bioprosthetic valve gradients of 28-30 mmHg with peak velocity of 3 m/s.  Mean gradient of 10 mmHg in November 2024.  Valve excursion largely preserved on ARLET, no leaflet thrombus and patient chronically anticoagulated with warfarin with therapeutic INRs, no valve dehiscence or regurgitation.  Cardiac CT pending.  Status post transcatheter bioprosthetic aortic valve replacement with 29 mm De Paz JOSELYN 3 valve less than 2 years ago in December 2023.    Fever, chills, elevated inflammatory markers of unclear etiology.  Infective endocarditis unlikely per ID, no recurrence of malignancy per hematology.  Continue workup as outpatient.  Permanent rate controlled and warfarin anticoagulated atrial fibrillation.  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  Type 2 diabetes mellitus.  Prior history of Hodgkin's lymphoma, treated.  Follows with oncology.    PLAN:    As detailed in my note yesterday, patient does not have severe valve stenosis at this time that would warrant urgent surgery.  Valve thrombosis, severe valve degeneration, valve dehiscence has been ruled out on ARLET.  CT completed (report pending).  Patient's dyspnea improved with IV diuresis, is LVEF is preserved at 55%.  Reasonable to put him on a stable dose of furosemide 20 mg daily.  Continue uninterrupted warfarin anticoagulation with a goal INR of 2.5.  Will arrange for an  expedited follow-up with his primary cardiologist, Dr. Gennaro Cook.  I had a detailed conversation with patient and explained the above.  He understands.  For his fever of unknown origin and elevated inflammatory markers, outpatient rheumatology, hematology follow-up.  I personally updated patient's RN with the plan.  And have personally liaised with the CT lab several times today - the report is delayed because of logistical issues.  From a cardiac perspective, he is stable for discharge.  Cardiology will sign off. Thank you for consulting us.      Breann Hunt MD, MD [facility]  Cardiology.    Total time today:  50 minutes.    This note was completed in part using dictation via the Dragon voice recognition software. Some word and grammatical errors may occur and must be interpreted in the appropriate clinical context. If there are any questions pertaining to this issue, please contact me for further clarification.       VITAL SIGNS:  Temp: 97.4  F (36.3  C) Temp src: Oral BP: 126/66 Pulse: 63   Resp: 18 SpO2: 97 % O2 Device: None (Room air)    03/09 1500 - 03/14 1459  In: 3080 [P.O.:2970; I.V.:10]  Out: 4675 [Urine:4675]  Net: -1595  Vitals:    03/10/25 0500 03/10/25 1245 03/10/25 2300 03/11/25 0746   Weight: 91.3 kg (201 lb 4.5 oz) 91.3 kg (201 lb 4.5 oz) 85.5 kg (188 lb 6.4 oz) 85 kg (187 lb 4.8 oz)    03/12/25 0719 03/13/25 0629 03/14/25 0630   Weight: 85.9 kg (189 lb 4.8 oz) 85 kg (187 lb 6.4 oz) 86 kg (189 lb 9.6 oz)

## 2025-03-14 NOTE — PROGRESS NOTES
[facility]    Internal Medicine Hospitalist Progress Note  03/14/2025  I evaluated patient on the above date.    Von Phelps Jr., MD  [phone] (p)  Text Page  Vocera      [New actions/orders today (03/14/2025) are underlined in the assessment and plan. All lab results in the assessment and plan were reviewed.]  Assessment & Plan   Jonny Goldberg is a 79 year old male with past medical history significant including HTN; CHF; PAD; AF; aortic stenosis status post TAVR (12/2023); PPM; and h/o Hodgkins lymphoma; who presented 3/10/2025 shortness of breath and paroxysmal nocturnal dyspnea and found with evidence of CHF.    On initial evaluation, pt was febrile 100.8, hemodynamically stable, sats 98% on O2 (was 80's per EMS).  Labs notable for CBC with WBC 14.7, hgb 10.9; BMP normal, magnesium 1.6; LFT's unremarkable; lactate 2.3; NTP-BNP 3073; procalcitonin 0.68; troponin 37; CRP 55.83; INR 3.04; UA not suggestive of infection; COVID-19, influenza and RSV negative. CXR showed mild pulmonary vascular congestion changes, no focal lung infiltrates.         # Acute on chronic diastolic CHF exacerbation (HFpEF) with acute hypoxic respiratory failure, question related to aortic valve disease, respiratory failure resolved.  # Aortic stenosis s/p TAVR with abnormal bioprosthetic valve hemodynamics, unclear etiology.  # Elevated troponin, suspect demand ischemia (type 2 MI) related to above.  # Hypertension (benign essential).  # Persistent afib, on chronic anticoagulation with warfarin.  # H/o high degree AVB s/p PPM.  [PTA: amlodipine 10 mg daily; chlorthalidone 50 mg daily.]  * Known hx of HFpEF. Echo in 11/2024 showed EF 55-60%; mild MR; TAVR.  * Initial presentation as above. Initially required BiPAP but was quickly weaned to 2L NC with IV furosemide. Cardiology consulted on admit. Also issues with fever of unknown origin for months PTA possibly contributing to CHF exacerbation, worked up as  noted.  * 3/10: Stopped amlodipine, chlorthalidone held.  * 3/11: Started valsartan. Echo showed LVEF 50-55%, moderate cLVH, severe hypokinesia of apical septum, differential diagnosis included apical insertion of the pacemaker lead vs coronary disease; RV OK; noted gradient across the bioprosthetic aortic valve had increased significantly from 10 mmHg to 29 mmHg, consider ARLET for further workup.  * 3/12: Held further diuretics. Weaned off O2. ARLET showed LVEF 55-60%; no vegetations seen on prosthetic valve leaflets, no aortic root abscess seen, no significant aortic insufficiency, no paravalvular leak, Vmax 3.5 m/s and gradient 32 mmHg; noted an irregular echodensity in the left atrial appendage, contiguous with other trabeculations, overall more consistent with a prominent trabeculation rather than a thrombus, cardiac CT could be considered for further evaluation; aortic root dilatation, max diameter 4.1 cm.  Discussed with ID and recommended cardiac MRI to evaluate for cardiac amyloid; discussed with Cardiology, consider inpatient vs as outpatient.   Vitals:    03/10/25 2300 03/11/25 0746 03/12/25 0719 03/13/25 0629   Weight: 85.5 kg (188 lb 6.4 oz) 85 kg (187 lb 4.8 oz) 85.9 kg (189 lb 4.8 oz) 85 kg (187 lb 6.4 oz)    03/14/25 0630   Weight: 86 kg (189 lb 9.6 oz)     I/O last 3 completed shifts:  In: -   Out: 950 [Urine:950]  Recent Labs   Lab 03/13/25  1024 03/12/25  1421 03/12/25  1052 03/11/25  1242 03/11/25  1129 03/10/25  1557 03/10/25  1229 03/10/25  0900 03/10/25  0648   *  --  133*  --  134*  --   --   --  141   CO2 24  --  24  --  28  --   --   --  25   BUN 24.6*  --  28.5*  --  25.0*  --   --   --  21.0   CR 1.08  --  1.17  --  1.23*  --   --   --  1.03   POTASSIUM 3.6  --  3.6  --  3.5  --   --   --  3.7   CTROPT  --   --   --   --   --  57* 66* 72* 37*   NTBNPI  --   --   --   --   --   --   --   --  3,073*   LVEF  --  55-60%  --  50-55%  --   --   --   --   --      Recent DIIME   Lab  03/13/25  1024 03/12/25  1052 03/11/25  1129 03/10/25  1229 03/10/25  0648   INR 2.59* 2.48* 2.61* 2.74* 3.04*   - Plan CTA heart structural today 3/14 per Cardiology.  - Continue valsartan 80 mg daily.  - Continue warfarin per protocol per Pharmacy.  - Continue to hold amlodipine and chlorthalidone.  - Continue to monitor i/o's, daily wts.  - Continue infectious/fever workup as noted.  - Appreciate consultant help.  - Discussed with Dr. 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    # Fever of unknown origin, concern related to prosthetic valve or other etiology.  # Leukocytosis, resolved.  * Has been undergoing workup of fever of unknown origin in the OP setting with reported months long history of fever/night sweats. Extensive workup done per ID was unrevealing. Had been previously treated with a prolonged course of doxycycline.   * Initial presentation as above. BC's NGTD. Was started on empiric ceftriaxone on admission given concern for ddx including endocarditis.  * 3/11: MOHPA consulted given recent OP bone marrow biopsy - noted suggestive of MGUS, no evidence of myeloma. Plan was for OP PET/CT.   * 3/12: ID consulted. ARLET as noted w/o vegetations. Discussed with ID and recommended cardiac MRI to evaluate for cardiac amyloid; discussed with Cardiology, consider inpatient vs as outpatient. Lab evaluation for rheumatologic conditions ordered: ZEINA and ANCA negative; ACE level pending. Light chains and immunofixation ordered by Cardiology: UPEP with very small monoclonal IgG immunoglobulin of lambda light chain type and a very small monoclonal IgA immunoglobulin of kappa light chain type; SPEP showed monoclonal IgG immunoglobulin of lambda light chain type.   * 3/13: Ceftriaxone discontinued. ID concerned about auto-immune/inflammatory condition and suggested consideration of short course of steroids.  Recent Labs   Lab 03/13/25  1024 03/12/25  1052 03/11/25  1129 03/10/25  0902 03/10/25  0900  03/10/25  0653 03/10/25  0648   WBC 7.3 8.4 8.3  --   --   --  14.7*   LACT  --   --   --  0.9  --  2.3*  --    PCAL  --   --   --   --  0.68*  --   --    CRPI  --  50.81*  --   --  55.83*  --   --    - I discussed with pt regarding a short trial of steroids (5d course of prednisone); he wanted me to reach out to his Rheumatologist, Dr. Dumont at [facility] (who has not been involved in FUO workup as of yet). Overall, also recommend he follow-up with Dr. Dumont for possible Rheum etiology of FUO.  - Continue to monitor cultures.  - Overall, patient also considering second opinion from [place].  - Continue cardiac workup as above.  - I d/w Dr. Treviño 3/13, appreciate help.    ADDENDUM 11:39:  - I discussed informally with pt's Rheumatologist, Dr. Dumont, and as infection not suspected, she is OK with a trial of prednisone - will start prednisone 20 mg daily for 5d, then 15 mg daily for 5d, then 10 mg daily for 5d, then 5 mg daily for 5 days, then stop. She can follow-up with him in April. Appreciate help.     Remote history Hodgkin lymphoma.  Monoclonal gammopathy.  * Follows with [facility].  Recently seen by Dr. Michele and underwent a bone marrow biopsy few weeks prior to admission.   * Seen by [facility] this stay as above. Planning for OP followup.  * 3/12: UPEP with very small monoclonal IgG immunoglobulin of lambda light chain type and a very small monoclonal IgA immunoglobulin of kappa light chain type; SPEP showed monoclonal IgG immunoglobulin of lambda light chain type.   - Lab workup as above.  - Follow-up with [facility] outpatient.  - Discussed with Dr. 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    Anemia, suspect chronic component.  * Hgb 10.9 on admit. No overt clinical signs of major bleeding.  Recent Labs   Lab 03/13/25  1024 03/12/25  1052 03/11/25  1129 03/10/25  0648   HGB 10.7* 11.0* 11.3* 10.9*   - Continue to monitor CBC - repeat in am.    Hyponatremia, question hypervolemia component.  * Sodium 141  "on admit.   * Sodium 134 on 3/11.  Recent Labs   Lab 03/13/25  1024 03/12/25  1052 03/11/25  1129 03/10/25  0648   * 133* 134* 141   - Continue to monitor BMP - repeat in am.  - Continue to treat other issues as noted.    DM II, diet controlled.   * A1c 5.2 this stay.   - Continue to monitor clinically.     Elevated PSA.  * Follows with Urology  - Follow-up with Urology outpatient.    Clinically Significant Risk Factors         # Hyponatremia: Lowest Na = 133 mmol/L in last 2 days, will monitor as appropriate       # Hypoalbuminemia: Lowest albumin = 3.4 g/dL at 3/10/2025  6:48 AM, will monitor as appropriate     # Hypertension: Noted on problem list            # Obesity: Estimated body mass index is 30.62 kg/m  as calculated from the following:    Height as of this encounter: 1.676 m (5' 5.98\").    Weight as of this encounter: 86 kg (189 lb 9.6 oz)., PRESENT ON ADMISSION       # Financial/Environmental Concerns: none   # Pacemaker present       Diet: Advance Diet as Tolerated: Clear Liquid Diet; Regular Diet Adult  2 Gram Sodium Diet    Prophylaxis: PCD's, ambulation, and is on warfarin.  Miranda Catheter: Not present  Lines: None     Code Status: Full Code    Disposition Plan   Medically Ready for Discharge: Anticipated Today  Expected discharge to prior living arrangement pending above.    Entered: Von Phelps MD 03/14/2025, 7:35 AM                 Interval History   Doing well overall.  Pending CT and Cardiology rec's, pt ready to go home.    * Data reviewed today: I reviewed all new labs and imaging over the last 24 hours. I personally reviewed no images or ECG's today.    Physical Exam   Most recent vitals:   , Blood pressure 123/62, pulse 63, temperature 97.9  F (36.6  C), temperature source Oral, resp. rate 18, height 1.676 m (5' 5.98\"), weight 86 kg (189 lb 9.6 oz), SpO2 98%. O2 Device: None (Room air)    Vitals:    03/12/25 0719 03/13/25 0629 03/14/25 0630   Weight: 85.9 kg (189 lb 4.8 oz) 85 " kg (187 lb 6.4 oz) 86 kg (189 lb 9.6 oz)     Vital signs with ranges:  Temp:  [97.5  F (36.4  C)-97.9  F (36.6  C)] 97.9  F (36.6  C)  Pulse:  [62-67] 63  Resp:  [16-18] 18  BP: (110-123)/(60-65) 123/62  SpO2:  [97 %-98 %] 98 %  Patient Vitals for the past 24 hrs:   BP Temp Temp src Pulse Resp SpO2 Weight   03/14/25 0630 -- -- -- -- -- -- 86 kg (189 lb 9.6 oz)   03/13/25 2351 123/62 97.9  F (36.6  C) Oral 63 18 98 % --   03/13/25 1957 -- 97.7  F (36.5  C) Oral -- -- -- --   03/13/25 1618 111/60 97.8  F (36.6  C) Oral 62 -- 97 % --   03/13/25 0739 110/65 97.5  F (36.4  C) Oral 67 16 97 % --     I/O's last 24 hours:  I/O last 3 completed shifts:  In: -   Out: 950 [Urine:950]    Constitutional: awake, alert, oriented, pleasant, conversant   Head:   Eyes:   ENT:   Neck:   Cardiovascular: regular rate and rhythm, +I/VI systolic murmur, no rubs/gallops  Lungs: diminished in the bases, no crackles or wheezes  Gastrointestinal/Abdomen: soft, non-tender, non-distended, positive bowel sounds  :   Musculoskeletal:   Skin/Extremities: no bilateral lower extremity edema  Neurologic:   Psychiatric:   Hematologic/Lymphatic/Immunologic:        Labs reviewed.  Recent Labs   Lab 03/13/25  1024 03/12/25  1052 03/11/25  1129 03/10/25  1229 03/10/25  0648   WBC 7.3 8.4 8.3  --  14.7*   HGB 10.7* 11.0* 11.3*  --  10.9*   MCV 89 88 88  --  89    207 211  --  237   INR 2.59* 2.48* 2.61*   < > 3.04*   * 133* 134*  --  141   POTASSIUM 3.6 3.6 3.5  --  3.7   CHLORIDE 100 98 96*  --  103   CO2 24 24 28  --  25   BUN 24.6* 28.5* 25.0*  --  21.0   CR 1.08 1.17 1.23*  --  1.03   ANIONGAP 10 11 10  --  13   SARA 9.1 9.3 9.4  --  9.0   * 111* 114*  --  142*   ALBUMIN  --   --   --   --  3.4*   PROTTOTAL  --   --   --   --  7.7   BILITOTAL  --   --   --   --  0.5   ALKPHOS  --   --   --   --  77   ALT  --   --   --   --  18   AST  --   --   --   --  35    < > = values in this interval not displayed.     Recent Labs   Lab Test  03/10/25  1557 03/10/25  1229 03/10/25  0900 03/10/25  0648   NT-PROBNP, INPATIENT  --   --   --  3,073*   TROPONIN T HIGH SENSITIVITY 57* 66* 72* 37*     Recent Labs   Lab 03/13/25  1024 03/12/25  1052 03/11/25  1129 03/10/25  0648   * 111* 114* 142*     Recent Labs   Lab Test 03/10/25  0648 05/06/21  0000   A1C 5.2 5.9*       Recent Labs   Lab 03/13/25  1024 03/12/25  1052 03/11/25  1129 03/10/25  1229 03/10/25  0648   INR 2.59* 2.48* 2.61* 2.74* 3.04*     Recent Labs   Lab 03/13/25  1024 03/12/25  1052 03/11/25  1129 03/10/25  0902 03/10/25  0900 03/10/25  0657 03/10/25  0653 03/10/25  0648   WBC 7.3 8.4 8.3  --   --   --   --  14.7*   LACT  --   --   --  0.9  --   --  2.3*  --    PCAL  --   --   --   --  0.68*  --   --   --    [id number]  --   --   --   --   --  Negative  --   --        MICRO:  Cultures (including blood and urine):  Recent Labs   Lab 03/10/25  1022 03/10/25  0938   CULTURE No growth after 3 days No growth after 3 days       No results found for this or any previous visit (from the past 24 hours).      Medications   All medications were reviewed. MAR.    Infusions:  Current Facility-Administered Medications   Medication Dose Route Frequency Provider Last Rate Last Admin    Patient is already receiving anticoagulation with heparin, enoxaparin (LOVENOX), warfarin (COUMADIN)  or other anticoagulant medication   Does not apply Continuous PRN Umm Holland PA-C        Reason ACE/ARB/ARNI order not selected   Other DOES NOT GO TO Umm Mtz PA-C        Reason beta blocker not prescribed   Other DOES NOT GO TO Umm Mtz PA-C         Scheduled Medications:  Current Facility-Administered Medications   Medication Dose Route Frequency Provider Last Rate Last Admin    alfuzosin ER (UROXATRAL) 24 hr tablet 10 mg  10 mg Oral Daily Umm Holland PA-C   10 mg at 03/13/25 1014    ezetimibe (ZETIA) half-tab 5 mg  5 mg Oral Q Mon Wed Fri AM  Umm Holland PA-C   5 mg at 03/12/25 0921    multivitamin w/minerals (THERA-VIT-M) tablet 1 tablet  1 tablet Oral QPM Umm Holland PA-C   1 tablet at 03/13/25 2002    potassium chloride april ER (KLOR-CON M20) CR tablet 40 mEq  40 mEq Oral Daily Umm Holland PA-C   40 mEq at 03/13/25 1014    rosuvastatin (CRESTOR) tablet 40 mg  40 mg Oral QPM Umm Holland PA-C   40 mg at 03/13/25 2002    sodium chloride (PF) 0.9% PF flush 3 mL  3 mL Intracatheter Q8H Umm Holland PA-C   3 mL at 03/13/25 2356    valsartan (DIOVAN) tablet 80 mg  80 mg Oral Daily Dione Simmons DO   80 mg at 03/13/25 1015    vitamin C (ASCORBIC ACID) tablet 1,000 mg  1,000 mg Oral Daily Umm Holland PA-C   1,000 mg at 03/13/25 1015    Warfarin Dose Required Daily - Pharmacist Managed  1 each Oral See Admin Instructions Umm Holland PA-C         PRN Medications:  Current Facility-Administered Medications   Medication Dose Route Frequency Provider Last Rate Last Admin    acetaminophen (TYLENOL) tablet 650 mg  650 mg Oral Q4H PRN Umm Holland PA-C        Or    acetaminophen (TYLENOL) Suppository 650 mg  650 mg Rectal Q4H PRN Umm Holland PA-C        calcium carbonate (TUMS) chewable tablet 1,000 mg  1,000 mg Oral 4x Daily PRN Umm Holland PA-C        hydrALAZINE (APRESOLINE) injection 10 mg  10 mg Intravenous Q4H PRN Umm Holland PA-C        HYDROmorphone (DILAUDID) injection 0.2 mg  0.2 mg Intravenous Q2H PRN Umm Holland PA-C        lidocaine (LMX4) cream   Topical Q1H PRN Umm Holland PA-C        lidocaine 1 % 0.1-1 mL  0.1-1 mL Other Q1H PRN Umm Holland PA-C        naloxone (NARCAN) injection 0.2 mg  0.2 mg Intravenous Q2 Min PRN Vick Rosa MD        Or    naloxone (NARCAN) injection 0.4 mg  0.4 mg Intravenous Q2 Min PRN Vick Rosa MD        Or     naloxone (NARCAN) injection 0.2 mg  0.2 mg Intramuscular Q2 Min PRN Vick Rosa MD        Or    naloxone (NARCAN) injection 0.4 mg  0.4 mg Intramuscular Q2 Min PRN Vick Rosa MD        ondansetron (ZOFRAN ODT) ODT tab 4 mg  4 mg Oral Q6H PRN Umm Holland PA-C        Or    ondansetron (ZOFRAN) injection 4 mg  4 mg Intravenous Q6H PRN Umm Holland PA-C        oxyCODONE (ROXICODONE) tablet 5 mg  5 mg Oral Q4H PRN Umm Holland PA-C        oxyCODONE IR (ROXICODONE) half-tab 2.5 mg  2.5 mg Oral Q4H PRN Umm Holland PA-C        Patient is already receiving anticoagulation with heparin, enoxaparin (LOVENOX), warfarin (COUMADIN)  or other anticoagulant medication   Does not apply Continuous PRN Umm Holland PA-C        polyethylene glycol (MIRALAX) Packet 17 g  17 g Oral BID PRN Umm Holland PA-C        prochlorperazine (COMPAZINE) injection 5 mg  5 mg Intravenous Q6H PRN Umm Holland PA-C        Or    prochlorperazine (COMPAZINE) tablet 5 mg  5 mg Oral Q6H PRN mUm Holland PA-C        Reason ACE/ARB/ARNI order not selected   Other DOES NOT GO TO Umm Mtz PA-C        Reason beta blocker not prescribed   Other DOES NOT GO TO Umm Mtz PA-C        senna-docusate (SENOKOT-S/PERICOLACE) 8.6-50 MG per tablet 1 tablet  1 tablet Oral BID PRN Umm Holland PA-C        Or    senna-docusate (SENOKOT-S/PERICOLACE) 8.6-50 MG per tablet 2 tablet  2 tablet Oral BID PRN Umm Holland PA-C        sodium chloride (PF) 0.9% PF flush 3 mL  3 mL Intracatheter q1 min prn Umm Holland PA-C

## 2025-03-15 LAB
ACE SERPL-CCNC: 25 U/L
BACTERIA BLD CULT: NO GROWTH
BACTERIA BLD CULT: NO GROWTH

## 2025-03-15 NOTE — PLAN OF CARE
Occupational Therapy Discharge Summary    Reason for therapy discharge:    Discharged to home.    Progress towards therapy goal(s). See goals on Care Plan in [facility] electronic health record for goal details.  Goals met  Goals partially met.  Barriers to achieving goals:   discharge from facility.    Therapy recommendation(s):      Continue home exercise program.  Pt currently functioning below baseline d/t impairments in activity toleramce impacting safety/independence within I/ADLs. Pt at baseline resides with spouse and has baseline assist with cooking/cleaning, pt does yardwork. Currently, pt completes hallway mobility and stairs Mod I-Ind. Anticipate with continued IP OT, pt will progress for discharge home with assist for taxing IADLs (yardwork initially), once medically ready. OT will continue to follow.      Writing therapist did not treat pt, note written based on previous treating therapist notes and recommendations. Please see chart and flow sheet for additional details.

## 2025-03-17 DIAGNOSIS — T82.6XXA HYPOATTENUATED LEAFLET THICKENING (HALT): Primary | ICD-10-CM

## 2025-03-17 DIAGNOSIS — I35.8 HYPOATTENUATED LEAFLET THICKENING (HALT): Primary | ICD-10-CM

## 2025-03-18 ENCOUNTER — TELEPHONE (OUTPATIENT)
Dept: CARDIOLOGY | Facility: CLINIC | Age: 80
End: 2025-03-18
Payer: COMMERCIAL

## 2025-03-18 NOTE — TELEPHONE ENCOUNTER
"Patient was admitted to [facility] on 3/10/25 with shortness of breath and paroxysmal nocturnal dyspnea. Found to be hypoxic in the 80's upon EMS arrival to his home. Also reported chills and was noted to have low-grade fever. Started on empiric ABX's.    PMH: aortic stenosis status post TAVR with a 29mm [place] 3 valve in 12/2023, high-grade AV block status post pacemaker implantation, peripheral arterial disease, type 2 diabetes, persistent atrial fibrillation, hx of Hodgkins lymphoma, remote tobacco use (quit 37 years ago) and hypertension.     3/11/25: Echo showed EF of 55%, with abnormal bioprosthetic TAVR valve hemodynamics with a peak velocity of 3.5 m/s and the mean gradient 28 mmHg, increased acceleration time of 100 ms without valvular regurgitation. Normal right ventricle. This is a significant increase in gradient from his recent study in November 2024 where his bioprosthetic aortic valve mean gradient was 10 mmHg. \"The differential includes valve thrombosis, prostatic valve dysfunction. With his frequent chills and night sweats, we have to consider the possibility of subacute endocarditis. Unfortunately, blood cultures were not obtained before he was started on empiric antibiotics on this admission.\"    3/12/25: ARLET showed the transcatheter bioprosthetic aortic valve leaflets open well, appear mildly sclerotic, there is no thrombus, no vegetations, no obvious structural degeneration, no significant regurgitation. However, the Doppler signals are clearly abnormal with a peak transaortic velocity of 3.2-3.5 m/s, mean gradient 28-30 mmHg valve acceleration time is increased at 100 ms, LVEF is normal. Patient has permanent atrial fibrillation and both atria severely enlarged, no intracardiac thrombus. \"At this time, there is no adequate answer for the patient's newly elevated gradients across his bioprosthetic aortic valve.\"    3/14/25: CTA heart showed a bioprosthetic aortic valve is identified. The " noncoronary cusp and appears normal. Mild HALT is seen on the left coronary cusp. Moderate HALT is present on the noncoronary cusp. Leaflet mobility is only mildly restricted.     No thrombus is seen in the left atrial appendage.     Pacing Wire seen in right heart.     Severe coronary calcification is noted.    Fever of unknown origin.    IV Lasix diuresed.    Pt was started on Lasix, Prednisone and Valsartan. PTA Norvasc, KCL and Hygroton were discontinued at time of discharge.    Called patient to discuss any post hospital d/c questions he may have, review medication changes, and confirm f/u appts. Phone answered by his wife. She denied any questions regarding new medications or changes with their PTA medications.    Patient reportedly has not had any SOB, chest pain or lightheadedness. Continues to have some night sweats, without fever.    RN confirmed with wife that pt is scheduled for an echo on 4/8/25 at 1045 OV on 4/9/25 at 1135 with Dr. Cook at our [place] Office.    Advised to call clinic with any cardiac related questions or concerns prior to this waylon't, and she verbalized understanding and agreed with plan. JOHNNA Dennis RN.

## 2025-03-31 DIAGNOSIS — I73.9 PAD (PERIPHERAL ARTERY DISEASE): ICD-10-CM

## 2025-03-31 RX ORDER — EZETIMIBE 10 MG/1
5 TABLET ORAL
Qty: 20 TABLET | Refills: 2 | Status: SHIPPED | OUTPATIENT
Start: 2025-03-31

## 2025-04-08 ENCOUNTER — HOSPITAL ENCOUNTER (OUTPATIENT)
Dept: CARDIOLOGY | Facility: CLINIC | Age: 80
Discharge: HOME | End: 2025-04-08
Attending: NURSE PRACTITIONER | Admitting: NURSE PRACTITIONER
Payer: COMMERCIAL

## 2025-04-08 DIAGNOSIS — I35.8 HYPOATTENUATED LEAFLET THICKENING (HALT): ICD-10-CM

## 2025-04-08 DIAGNOSIS — T82.6XXA HYPOATTENUATED LEAFLET THICKENING (HALT): ICD-10-CM

## 2025-04-08 LAB — LVEF ECHO: NORMAL

## 2025-04-08 PROCEDURE — 93306 TTE W/DOPPLER COMPLETE: CPT

## 2025-04-08 PROCEDURE — 93306 TTE W/DOPPLER COMPLETE: CPT | Mod: 26 | Performed by: INTERNAL MEDICINE

## 2025-04-09 ENCOUNTER — OFFICE VISIT (OUTPATIENT)
Dept: CARDIOLOGY | Facility: CLINIC | Age: 80
End: 2025-04-09
Payer: COMMERCIAL

## 2025-04-09 VITALS
OXYGEN SATURATION: 97 % | WEIGHT: 195 LBS | BODY MASS INDEX: 31.34 KG/M2 | HEART RATE: 60 BPM | SYSTOLIC BLOOD PRESSURE: 127 MMHG | DIASTOLIC BLOOD PRESSURE: 72 MMHG | HEIGHT: 66 IN

## 2025-04-09 DIAGNOSIS — Z95.2 HEART VALVE REPLACED: ICD-10-CM

## 2025-04-09 DIAGNOSIS — R61 GENERALIZED HYPERHIDROSIS: ICD-10-CM

## 2025-04-09 DIAGNOSIS — Z95.2 S/P TAVR (TRANSCATHETER AORTIC VALVE REPLACEMENT): ICD-10-CM

## 2025-04-09 NOTE — LETTER
4/9/2025    Reid Hogue MD  [address]  [facility] [address]    RE: Jonny Goldberg       Dear Colleague,     I had the pleasure of seeing Jonny Goldberg in the [facility].  CARDIOLOGY CLINIC CONSULTATION    PRIMARY CARE PHYSICIAN:  Reid Hogue    HISTORY OF PRESENT ILLNESS:  Mr. Goldberg is a very pleasant 79-year-old gentleman with history of severe symptomatic aortic stenosis status post TAVR with a 29 mm De Paz Resilia valve in December 2023, high-grade AV block status post permanent pacemaker implantation, peripheral arterial disease, type 2 diabetes, persistent atrial fibrillation and hypertension who is here for follow-up.    He was last seen by structural [facility]NA in November of last year.  At that time he was stable from cardiac point of view.  Echocardiogram noted well-seated valve with mean gradient of 10 mmHg.    The past 3 to 4 months the patient has been noticing episodes of fevers, chills and shortness of breath.  He was hospitalized last month with heart failure.  Echocardiogram demonstrated preserved LV function, well-seated valve with elevated mean gradient of 28 mmHg.  He underwent transesophageal echocardiogram which showed no evidence of endocarditis.  CT of the chest demonstrated mild HALT the left coronary cusp and moderate HALT on the noncoronary cusp.  Leaflet mobility was mildly restricted.  No left atrial appendage thrombus was noted.  He was seen by infectious disease and no evidence of infection was found.  No recurrence of lymphoma per hematology.  Given elevated inflammatory markers, he was discharged on prednisone taper.  He is currently off of prednisone.  While he was on the prednisone the patient felt well.  No recurrent fever since his discharge.    The patient is chronically on warfarin with therapeutic INRs.  He had a repeat echocardiogram yesterday which I reviewed.  The valve and is well-seated.  The gradients across the valve are still elevated at  28 mmHg.  He is compensated from heart failure point of view    PAST MEDICAL HISTORY:  Past Medical History:   Diagnosis Date     Aortic valve stenosis     severe     Blood clot in the legs     left leg after surgery     Gastro-oesophageal reflux disease      Hodgkins lymphoma (H)     bone marrow biopsy     Hypertension      PAD (peripheral artery disease)      persistent atrial fibrillation     AFIB     Unspecified cerebral artery occlusion with cerebral infarction 10/01/2011       MEDICATIONS:  Current Outpatient Medications   Medication Sig Dispense Refill     acetaminophen (TYLENOL) 500 MG tablet Take 1,000 mg by mouth 3 times daily as needed for mild pain.       alfuzosin ER (UROXATRAL) 10 MG 24 hr tablet Take 1 tablet (10 mg) by mouth daily 90 tablet 4     ALPHA LIPOIC ACID PO Take 600 mg by mouth daily       ascorbic acid (VITAMIN C) 1000 MG TABS Take 1 tablet by mouth daily       Cholecalciferol (VITAMIN D) 400 UNITS tablet Take 1 tablet by mouth every evening       CINNAMON PO Take 1 capsule by mouth every evening       clindamycin (CLEOCIN) 300 MG capsule Take 2 capsules by mouth once as needed (1 hour prior to dental appointments 2 x 300 mg = 300 mg)       co-enzyme Q-10 100 MG CAPS capsule Take 1 capsule by mouth daily       ezetimibe (ZETIA) 10 MG tablet Take 0.5 tablets (5 mg) by mouth Every Mon, Wed, Fri Morning. 20 tablet 2     fenofibrate (TRIGLIDE/LOFIBRA) 160 MG tablet Take 1 tablet (160 mg) by mouth daily. 90 tablet 2     furosemide (LASIX) 20 MG tablet Take 1 tablet (20 mg) by mouth daily. 30 tablet 3     multivitamin w/minerals (THERA-VIT-M) tablet Take 1 tablet by mouth every evening       omega 3 1000 MG CAPS Take 1 capsule by mouth every evening 180 EPA/ 20 DHH       rosuvastatin (CRESTOR) 40 MG tablet Take 40 mg by mouth every evening       valsartan (DIOVAN) 80 MG tablet Take 1 tablet (80 mg) by mouth daily. 30 tablet 3     warfarin ANTICOAGULANT (COUMADIN) 4 MG tablet Take by mouth See  Admin Instructions. Take 2mg Sun, Tue and 4mg all other days       No current facility-administered medications for this visit.       SOCIAL HISTORY:  I have reviewed this patient's social history and updated it with pertinent information if needed. Jonny Goldberg  reports that he quit smoking about 38 years ago. His smoking use included cigarettes. He has never used smokeless tobacco. He reports current alcohol use. He reports that he does not use drugs.    PHYSICAL EXAM:  Pulse:  [60] 60  BP: (127)/(72) 127/72  SpO2:  [97 %] 97 %  195 lbs 0 oz    Constitutional: alert, no distress  Respiratory: Good bilateral air entry  Cardiovascular: Regular rhythm, faint systolic murmur  GI: nondistended  Neuropsychiatric: appropriate affact    ASSESSMENT: Pertinent issues addressed/ reviewed during this cardiology visit    History of severe aortic stenosis status post TAVR with a 29 mm De Paz JOSELYN valve  New diagnosis of elevated bioprosthetic valve gradients  Prosthetic valve HALT  Fevers, chills and elevated inflammatory markers with recent steroid use, now off of prednisone  Permanent atrial fibrillation on chronic anticoagulation  History of 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  This is in the setting of constitutional symptoms including fevers, chills and elevated inflammatory markers.  He underwent extensive workup during his recent hospitalization and was found to have evidence of HALT.  No evidence of endocarditis.  Whether his systemic illness and prosthetic valve dysfunction are related is unclear at this point.  Recent bone marrow biopsy was negative per the patient.  He is scheduled to see a rheumatologist early next week.    We reviewed management of HALT.  I went over options including more aggressive  therapeutic INR of 3.0-3.5 versus switching him to DOAC's. HE will try higher INRs for now and repeat echo in 3 months.  If the gradients are persistently elevated, we will switch him to DOAC.  No indication for valve intervention at this point    Finally, I suggested referral to [facility] for further evaluation given the above systemic symptoms and valve dysfunction.  Will await the results of his rheumatology evaluation before considering referral.    He is stable from heart failure point of view.  He is clinically euvolemic.  He will continue current regimen including low-dose furosemide.    It was a pleasure seeing this patient in clinic today. Please do not hesitate to contact me with any future questions.     Gennaro Cook MD, [facility]  Cardiology - [facility]  April 9, 2025    Review of the result(s) of each unique test - Last CT chest, ECG, echocardiogram, ARLET     The level of medical decision making during this visit was of moderate complexity.    The longitudinal plan of care for the diagnosis(es)/condition(s) as documented were addressed during this visit. Due to the added complexity in care, I will continue to support Jonny in the subsequent management and with ongoing continuity of care.     This note was completed in part using dictation via the Dragon voice recognition software. Some word and grammatical errors may occur and must be interpreted in the appropriate clinical context.  If there are any questions pertaining to this issue, please contact me for further clarification.      Thank you for allowing me to participate in the care of your patient.      Sincerely,     Gennaro Cook MD, MD     [facility]  cc:   Bere Jackson, CNP  [address] [place] [address]

## 2025-04-09 NOTE — PROGRESS NOTES
CARDIOLOGY CLINIC CONSULTATION    PRIMARY CARE PHYSICIAN:  Reid Hogue    HISTORY OF PRESENT ILLNESS:  Mr. Goldberg is a very pleasant 79-year-old gentleman with history of severe symptomatic aortic stenosis status post TAVR with a 29 mm De Paz Resilia valve in December 2023, high-grade AV block status post permanent pacemaker implantation, peripheral arterial disease, type 2 diabetes, persistent atrial fibrillation and hypertension who is here for follow-up.    He was last seen by structural MORENA in November of last year.  At that time he was stable from cardiac point of view.  Echocardiogram noted well-seated valve with mean gradient of 10 mmHg.    The past 3 to 4 months the patient has been noticing episodes of fevers, chills and shortness of breath.  He was hospitalized last month with heart failure.  Echocardiogram demonstrated preserved LV function, well-seated valve with elevated mean gradient of 28 mmHg.  He underwent transesophageal echocardiogram which showed no evidence of endocarditis.  CT of the chest demonstrated mild HALT the left coronary cusp and moderate HALT on the noncoronary cusp.  Leaflet mobility was mildly restricted.  No left atrial appendage thrombus was noted.  He was seen by infectious disease and no evidence of infection was found.  No recurrence of lymphoma per hematology.  Given elevated inflammatory markers, he was discharged on prednisone taper.  He is currently off of prednisone.  While he was on the prednisone the patient felt well.  No recurrent fever since his discharge.    The patient is chronically on warfarin with therapeutic INRs.  He had a repeat echocardiogram yesterday which I reviewed.  The valve and is well-seated.  The gradients across the valve are still elevated at 28 mmHg.  He is compensated from heart failure point of view    PAST MEDICAL HISTORY:  Past Medical History:   Diagnosis Date    Aortic valve stenosis     severe    Blood clot in the legs     left leg after  surgery    Gastro-oesophageal reflux disease     Hodgkins lymphoma (H)     bone marrow biopsy    Hypertension     PAD (peripheral artery disease)     persistent atrial fibrillation     AFIB    Unspecified cerebral artery occlusion with cerebral infarction 10/01/2011       MEDICATIONS:  Current Outpatient Medications   Medication Sig Dispense Refill    acetaminophen (TYLENOL) 500 MG tablet Take 1,000 mg by mouth 3 times daily as needed for mild pain.      alfuzosin ER (UROXATRAL) 10 MG 24 hr tablet Take 1 tablet (10 mg) by mouth daily 90 tablet 4    ALPHA LIPOIC ACID PO Take 600 mg by mouth daily      ascorbic acid (VITAMIN C) 1000 MG TABS Take 1 tablet by mouth daily      Cholecalciferol (VITAMIN D) 400 UNITS tablet Take 1 tablet by mouth every evening      CINNAMON PO Take 1 capsule by mouth every evening      clindamycin (CLEOCIN) 300 MG capsule Take 2 capsules by mouth once as needed (1 hour prior to dental appointments 2 x 300 mg = 300 mg)      co-enzyme Q-10 100 MG CAPS capsule Take 1 capsule by mouth daily      ezetimibe (ZETIA) 10 MG tablet Take 0.5 tablets (5 mg) by mouth Every Mon, Wed, Fri Morning. 20 tablet 2    fenofibrate (TRIGLIDE/LOFIBRA) 160 MG tablet Take 1 tablet (160 mg) by mouth daily. 90 tablet 2    furosemide (LASIX) 20 MG tablet Take 1 tablet (20 mg) by mouth daily. 30 tablet 3    multivitamin w/minerals (THERA-VIT-M) tablet Take 1 tablet by mouth every evening      omega 3 1000 MG CAPS Take 1 capsule by mouth every evening 180 EPA/ 20 DHH      rosuvastatin (CRESTOR) 40 MG tablet Take 40 mg by mouth every evening      valsartan (DIOVAN) 80 MG tablet Take 1 tablet (80 mg) by mouth daily. 30 tablet 3    warfarin ANTICOAGULANT (COUMADIN) 4 MG tablet Take by mouth See Admin Instructions. Take 2mg Sun, Tue and 4mg all other days       No current facility-administered medications for this visit.       SOCIAL HISTORY:  I have reviewed this patient's social history and updated it with pertinent  information if needed. Jonny Goldberg  reports that he quit smoking about 38 years ago. His smoking use included cigarettes. He has never used smokeless tobacco. He reports current alcohol use. He reports that he does not use drugs.    PHYSICAL EXAM:  Pulse:  [60] 60  BP: (127)/(72) 127/72  SpO2:  [97 %] 97 %  195 lbs 0 oz    Constitutional: alert, no distress  Respiratory: Good bilateral air entry  Cardiovascular: Regular rhythm, faint systolic murmur  GI: nondistended  Neuropsychiatric: appropriate affact    ASSESSMENT: Pertinent issues addressed/ reviewed during this cardiology visit    History of severe aortic stenosis status post TAVR with a 29 mm De Paz JOSELYN valve  New diagnosis of elevated bioprosthetic valve gradients  Prosthetic valve HALT  Fevers, chills and elevated inflammatory markers with recent steroid use, now off of prednisone  Permanent atrial fibrillation on chronic anticoagulation  History of 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  This is in the setting of constitutional symptoms including fevers, chills and elevated inflammatory markers.  He underwent extensive workup during his recent hospitalization and was found to have evidence of HALT.  No evidence of endocarditis.  Whether his systemic illness and prosthetic valve dysfunction are related is unclear at this point.  Recent bone marrow biopsy was negative per the patient.  He is scheduled to see a rheumatologist early next week.    We reviewed management of HALT.  I went over options including more aggressive therapeutic INR of 3.0-3.5 versus switching him to DOAC's. HE will try higher INRs for now and repeat echo in 3 months.  If the gradients are persistently elevated, we will switch him to DOAC.  No indication for valve intervention  at this point    Finally, I suggested referral to [facility] for further evaluation given the above systemic symptoms and valve dysfunction.  Will await the results of his rheumatology evaluation before considering referral.    He is stable from heart failure point of view.  He is clinically euvolemic.  He will continue current regimen including low-dose furosemide.    It was a pleasure seeing this patient in clinic today. Please do not hesitate to contact me with any future questions.     Gennaro Cook MD, [facility]  Cardiology - [facility]  April 9, 2025    Review of the result(s) of each unique test - Last CT chest, ECG, echocardiogram, ARLET     The level of medical decision making during this visit was of moderate complexity.    The longitudinal plan of care for the diagnosis(es)/condition(s) as documented were addressed during this visit. Due to the added complexity in care, I will continue to support Jonny in the subsequent management and with ongoing continuity of care.     This note was completed in part using dictation via the Dragon voice recognition software. Some word and grammatical errors may occur and must be interpreted in the appropriate clinical context.  If there are any questions pertaining to this issue, please contact me for further clarification.

## 2025-04-10 ENCOUNTER — ANCILLARY PROCEDURE (OUTPATIENT)
Dept: CARDIOLOGY | Facility: CLINIC | Age: 80
End: 2025-04-10
Attending: INTERNAL MEDICINE
Payer: COMMERCIAL

## 2025-04-10 DIAGNOSIS — I44.2 COMPLETE HEART BLOCK (H): ICD-10-CM

## 2025-04-10 DIAGNOSIS — Z95.0 CARDIAC PACEMAKER IN SITU: ICD-10-CM

## 2025-04-10 DIAGNOSIS — I49.5 SICK SINUS SYNDROME (H): ICD-10-CM

## 2025-04-10 LAB
MDC_IDC_EPISODE_DTM: NORMAL
MDC_IDC_EPISODE_DTM: NORMAL
MDC_IDC_EPISODE_ID: NORMAL
MDC_IDC_EPISODE_ID: NORMAL
MDC_IDC_EPISODE_TYPE: NORMAL
MDC_IDC_EPISODE_TYPE: NORMAL
MDC_IDC_LEAD_CONNECTION_STATUS: NORMAL
MDC_IDC_LEAD_IMPLANT_DT: NORMAL
MDC_IDC_LEAD_LOCATION: NORMAL
MDC_IDC_LEAD_LOCATION_DETAIL_1: NORMAL
MDC_IDC_LEAD_MFG: NORMAL
MDC_IDC_LEAD_MODEL: NORMAL
MDC_IDC_LEAD_POLARITY_TYPE: NORMAL
MDC_IDC_LEAD_SERIAL: NORMAL
MDC_IDC_MSMT_BATTERY_DTM: NORMAL
MDC_IDC_MSMT_BATTERY_REMAINING_LONGEVITY: 96 MO
MDC_IDC_MSMT_BATTERY_REMAINING_PERCENTAGE: 100 %
MDC_IDC_MSMT_BATTERY_STATUS: NORMAL
MDC_IDC_MSMT_LEADCHNL_RV_IMPEDANCE_VALUE: 521 OHM
MDC_IDC_MSMT_LEADCHNL_RV_PACING_THRESHOLD_AMPLITUDE: 0.9 V
MDC_IDC_MSMT_LEADCHNL_RV_PACING_THRESHOLD_PULSEWIDTH: 0.4 MS
MDC_IDC_PG_IMPLANT_DTM: NORMAL
MDC_IDC_PG_MFG: NORMAL
MDC_IDC_PG_MODEL: NORMAL
MDC_IDC_PG_SERIAL: NORMAL
MDC_IDC_PG_TYPE: NORMAL
MDC_IDC_SESS_CLINIC_NAME: NORMAL
MDC_IDC_SESS_DTM: NORMAL
MDC_IDC_SESS_TYPE: NORMAL
MDC_IDC_SET_BRADY_LOWRATE: 60 {BEATS}/MIN
MDC_IDC_SET_BRADY_MAX_SENSOR_RATE: 115 {BEATS}/MIN
MDC_IDC_SET_BRADY_MODE: NORMAL
MDC_IDC_SET_LEADCHNL_RV_PACING_AMPLITUDE: 1.3 V
MDC_IDC_SET_LEADCHNL_RV_PACING_CAPTURE_MODE: NORMAL
MDC_IDC_SET_LEADCHNL_RV_PACING_POLARITY: NORMAL
MDC_IDC_SET_LEADCHNL_RV_PACING_PULSEWIDTH: 0.4 MS
MDC_IDC_SET_LEADCHNL_RV_SENSING_ADAPTATION_MODE: NORMAL
MDC_IDC_SET_LEADCHNL_RV_SENSING_POLARITY: NORMAL
MDC_IDC_SET_LEADCHNL_RV_SENSING_SENSITIVITY: 2.5 MV
MDC_IDC_SET_ZONE_DETECTION_INTERVAL: 375 MS
MDC_IDC_SET_ZONE_STATUS: NORMAL
MDC_IDC_SET_ZONE_TYPE: NORMAL
MDC_IDC_SET_ZONE_VENDOR_TYPE: NORMAL
MDC_IDC_STAT_BRADY_DTM_END: NORMAL
MDC_IDC_STAT_BRADY_DTM_START: NORMAL
MDC_IDC_STAT_BRADY_RV_PERCENT_PACED: 96 %
MDC_IDC_STAT_EPISODE_RECENT_COUNT: 0
MDC_IDC_STAT_EPISODE_RECENT_COUNT_DTM_END: NORMAL
MDC_IDC_STAT_EPISODE_RECENT_COUNT_DTM_START: NORMAL
MDC_IDC_STAT_EPISODE_TYPE: NORMAL
MDC_IDC_STAT_EPISODE_VENDOR_TYPE: NORMAL
MDC_IDC_STAT_EPISODE_VENDOR_TYPE: NORMAL

## 2025-04-10 PROCEDURE — 93296 REM INTERROG EVL PM/IDS: CPT | Performed by: INTERNAL MEDICINE

## 2025-04-10 PROCEDURE — 93294 REM INTERROG EVL PM/LDLS PM: CPT | Performed by: INTERNAL MEDICINE

## 2025-04-15 ENCOUNTER — TRANSFERRED RECORDS (OUTPATIENT)
Dept: HEALTH INFORMATION MANAGEMENT | Facility: CLINIC | Age: 80
End: 2025-04-15
Payer: COMMERCIAL

## 2025-04-22 ENCOUNTER — TELEPHONE (OUTPATIENT)
Dept: CARDIOLOGY | Facility: CLINIC | Age: 80
End: 2025-04-22
Payer: COMMERCIAL

## 2025-04-22 DIAGNOSIS — I48.21 PERMANENT ATRIAL FIBRILLATION (H): ICD-10-CM

## 2025-04-22 DIAGNOSIS — I35.8 HYPOATTENUATED LEAFLET THICKENING (HALT): Primary | ICD-10-CM

## 2025-04-22 DIAGNOSIS — T82.6XXA HYPOATTENUATED LEAFLET THICKENING (HALT): Primary | ICD-10-CM

## 2025-04-22 NOTE — TELEPHONE ENCOUNTER
Received call from patient today to follow up after his visit with Dr. Cook on 4/9/25. Patient states that he saw Dr. Dumont at [facility] for Rheumatology follow up last week. He states that he had labs drawn but does not know the result. He states that he was started on an anti-inflammatory diet and has been drinking anti-inflammatory smoothies for most of his meals. He is states that he is scheduled for follow up with rheumatology 5/24/25. Faxed records request to rheumatologist. Discussed with Dr. Cook who wants to review rheumatology note before determining next steps.     Dr. Cook also noted that he would like to change patient from warfarin to eliquis.     Xarelto/Eliquis:   --$35/mo ($70 for 3 mo at [facility]).   --If/when total out of pocket drug costs exceed $2000, patient will pay $0 for all covered drugs for the remainder of the year.     Spoke with patient and reviewed recommendations. He is in agreement with switching to eliquis. He would prefer to get this through [facility] mail order. Sent prescription. Spoke with RN at [facility] who manage his INRs. Informed them of plan to transition to eliquis. Patient will call his PCP's office once he receives eliquis in the mail to discuss transition. Will await notes from rheumatology to determine next steps.

## 2025-04-29 ENCOUNTER — TELEPHONE (OUTPATIENT)
Dept: CARDIOLOGY | Facility: CLINIC | Age: 80
End: 2025-04-29
Payer: COMMERCIAL

## 2025-04-29 NOTE — TELEPHONE ENCOUNTER
Health Call Center    Phone Message    May a detailed message be left on voicemail: yes     Reason for Call: Other: Patient's spouse Sisi requesting to speak with nurse Ingrid but did not specify on why. Please call Sisi back at [phone] to further discuss.     Action Taken: Other: Cardiology    Travel Screening: Not Applicable     Thank you!  [facility]

## 2025-04-29 NOTE — TELEPHONE ENCOUNTER
Returned call to patient's wife Sisi. She states that patient's night sweats have returned. Informed Sisi that rheumatology notes were received today and these will be reviewed with Dr. Cook when he returns next week. Faxed referral to [facility] for both cardiology and rheumatology today per Dr. Cook.

## 2025-05-01 ENCOUNTER — OFFICE VISIT (OUTPATIENT)
Dept: UROLOGY | Facility: CLINIC | Age: 80
End: 2025-05-01
Payer: COMMERCIAL

## 2025-05-01 VITALS
BODY MASS INDEX: 30.53 KG/M2 | WEIGHT: 190 LBS | DIASTOLIC BLOOD PRESSURE: 67 MMHG | SYSTOLIC BLOOD PRESSURE: 134 MMHG | HEIGHT: 66 IN

## 2025-05-01 DIAGNOSIS — R97.20 ELEVATED PROSTATE SPECIFIC ANTIGEN (PSA): ICD-10-CM

## 2025-05-01 LAB — PSA SERPL-MCNC: 7.3 UG/L (ref 0–4)

## 2025-05-01 ASSESSMENT — PAIN SCALES - GENERAL: PAINLEVEL_OUTOF10: MILD PAIN (2)

## 2025-05-01 NOTE — NURSING NOTE
Chief Complaint   Patient presents with    Elevated PSA     Here for PSA.    Victorina Andre, LILYN

## 2025-05-01 NOTE — LETTER
5/1/2025       RE: Jonny Goldberg  [address]  [facility] [address]     Dear Colleague,    Thank you for referring your patient, Jonny Goldberg, to the [facility] at [facility]. Please see a copy of my visit note below.    Office Visit Note  [facility]  [phone]    UROLOGIC DIAGNOSES:   Enlarged prostate  Elevated PSA    CURRENT INTERVENTIONS:   Negative prostate biopsy 2023  MRI prostate x 2    HISTORY:   Jonny underwent MRI of the prostate in December and it showed a 68 g prostate with no areas concerning for prostate cancer.  The MRI was read as PI-RADS 2.  His PSA is 7.3, unchanged from 6 months ago.  He has no urinary symptoms or complaints at this time.      PAST MEDICAL HISTORY:   Past Medical History:   Diagnosis Date     Aortic valve stenosis     severe     Blood clot in the legs     left leg after surgery     Gastro-oesophageal reflux disease      Hodgkins lymphoma (H)     bone marrow biopsy     Hypertension      PAD (peripheral artery disease)      persistent atrial fibrillation     AFIB     Unspecified cerebral artery occlusion with cerebral infarction 10/01/2011       PAST SURGICAL HISTORY:   Past Surgical History:   Procedure Laterality Date     APPENDECTOMY       ARTHROPLASTY REVISION HIP  12/27/2011    Procedure:ARTHROPLASTY REVISION HIP; RIGHT TOTAL HIP REVISION WITH BONE GRAFT  ; Surgeon:ANGIE HARRINGTON; Location: OR     BIOPSY  hodgkins lymphoma    bone marrow biopsy, chemo and radiation     CARDIAC SURGERY      cardiac ablation     COLONOSCOPY       CV CORONARY ANGIOGRAM N/A 9/23/2022    Procedure: Coronary Angiogram;  Surgeon: Gennaro Cook MD;  Location:  HEART CARDIAC CATH LAB     CV PCI N/A 9/23/2022    Procedure: Percutaneous Coronary Intervention;  Surgeon: Gennaro Cook MD;  Location:  HEART CARDIAC CATH LAB     CV RIGHT HEART CATH MEASUREMENTS RECORDED N/A 9/23/2022     Procedure: Right Heart Catheterization;  Surgeon: Gennaro Cook MD;  Location: [facility] CARDIAC CATH LAB     CV TRANSCATHETER AORTIC VALVE REPLACEMENT-FEMORAL APPROACH N/A 2023    Procedure: Transcatheter Aortic Valve Replacement-Femoral Approach;  Surgeon: Gennaro Cook MD;  Location: [facility] CARDIAC CATH LAB     EP PACEMAKER DEVICE & LEAD IMPLANT- RIGHT VENTRICULAR N/A 2023    Procedure: Pacemaker Device & Lead Implant- Right ventricular;  Surgeon: Brian Madrid MD;  Location: [facility] CARDIAC CATH LAB     ILIAC ARTERY ANGIOGRAM LEFT Left 10/10/2023    Procedure: Left Groin Cutdown with Repair of Left Femoral Artery; Aborted Transcatheter Aortic Valve Replacement;  Surgeon: German Patel MD;  Location:  HEART CARDIAC CATH LAB     ORTHOPEDIC SURGERY      hip and knee surgeries     ORTHOPEDIC SURGERY      carpal tunnel  right hand       FAMILY HISTORY:   Family History   Problem Relation Age of Onset     Hypertension Mother      Heart Failure Mother      Coronary Artery Disease Mother         CABG     Arrhythmia Mother      Cerebrovascular Disease Father      Heart Failure Father      Colon Cancer Father      Coronary Artery Disease Father         CABG     Coronary Artery Disease Brother         CABG     Arrhythmia Brother        SOCIAL HISTORY:   Social History     Socioeconomic History     Marital status:    Tobacco Use     Smoking status: Former     Current packs/day: 0.00     Types: Cigarettes     Quit date: 1987     Years since quittin.0     Smokeless tobacco: Never   Vaping Use     Vaping status: Never Used   Substance and Sexual Activity     Alcohol use: Yes     Comment: 1 glass daily     Drug use: No   Other Topics Concern     Caffeine Concern No     Comment: 2-3 a day     Sleep Concern Yes     Comment: depends     Weight Concern No     Comment: weight loss     Special Diet No     Exercise No     Comment: limited due to knee pain     Seat Belt Yes      Social Drivers of Health     Financial Resource Strain: Low Risk  (3/10/2025)    Financial Resource Strain      Within the past 12 months, have you or your family members you live with been unable to get utilities (heat, electricity) when it was really needed?: No   Food Insecurity: Low Risk  (3/10/2025)    Food Insecurity      Within the past 12 months, did you worry that your food would run out before you got money to buy more?: No      Within the past 12 months, did the food you bought just not last and you didn t have money to get more?: No   Transportation Needs: Low Risk  (3/10/2025)    Transportation Needs      Within the past 12 months, has lack of transportation kept you from medical appointments, getting your medicines, non-medical meetings or appointments, work, or from getting things that you need?: No   Interpersonal Safety: Low Risk  (3/10/2025)    Interpersonal Safety      Do you feel physically and emotionally safe where you currently live?: Yes      Within the past 12 months, have you been hit, slapped, kicked or otherwise physically hurt by someone?: No      Within the past 12 months, have you been humiliated or emotionally abused in other ways by your partner or ex-partner?: No   Housing Stability: Low Risk  (3/10/2025)    Housing Stability      Do you have housing? : Yes      Are you worried about losing your housing?: No       Review Of Systems:  Skin: No rash, pruritis, or skin pigmentation  Eyes: No changes in vision  Ears/Nose/Throat: No changes in hearing, no nosebleeds  Respiratory: No shortness of breath, dyspnea on exertion, cough, or hemoptysis  Cardiovascular: No chest pain or palpitations  Gastrointestinal: No diarrhea or constipation. No abdominal pain. No hematochezia  Genitourinary: see HPI  Musculoskeletal: No pain or swelling of joints, normal range of motion  Neurologic: No weakness or tremors  Psychiatric: No recent changes in memory or  "mood  Hematologic/Lymphatic/Immunologic: No easy bruising or enlarged lymph nodes  Endocrine: No weight gain or loss      PHYSICAL EXAM:    /67   Ht 1.676 m (5' 5.98\")   Wt 86.2 kg (190 lb)   BMI 30.68 kg/m      Constitutional: Well developed. Conversant and in no acute distress  Eyes: Anicteric sclera, conjunctiva clear, normal extraocular movements  ENT: Normocephalic and atraumatic,   Skin: Warm and dry. No rashes or lesions  Cardiac: No peripheral edema  Back/Flank: Not done  CNS/PNS: Normal musculature and movements, moves all extremities normally  Respiratory: Normal non-labored breathing  Abdomen: Soft nontender and nondistended  Peripheral Vascular: No peripheral edema  Mental Status/Psych: Alert and Oriented x 3. Normal mood and affect    Penis: Not done  Scrotal Skin: Not done  Testicles: Not done  Epididymis: Not done  Digital Rectal Exam:     Cystoscopy: Not done    Imaging: None    Urinalysis: UA RESULTS:  Recent Labs   Lab Test 03/10/25  0939   COLOR Light Yellow   APPEARANCE Clear   URINEGLC Negative   URINEBILI Negative   URINEKETONE Negative   SG 1.008   UBLD Negative   URINEPH 5.0   PROTEIN Negative   NITRITE Negative   LEUKEST Negative   RBCU 1   WBCU 1       PSA: 7.30    Post Void Residual:     Other labs: None today      IMPRESSION:  Elevated PSA and enlarged prostate    PLAN:  His PSA is stable.  I recommended continued annual monitoring of his PSA.  Jonny is currently seen multiple physicians and is also seeing an oncologist for possible myeloma.  He asked me if he could simply do his PSA with his primary care physician at his annual physical instead of coming to the urology clinic and I think that would work well.  He should have his PSA checked once a year and come back and see urology if it ever should rise substantially in the future.        Yuan Meredith M.D.              Again, thank you for allowing me to participate in the care of your patient.      Sincerely,    Yuan" Dany Meredith MD

## 2025-05-01 NOTE — PROGRESS NOTES
Office Visit Note  [facility]  [phone]    UROLOGIC DIAGNOSES:   Enlarged prostate  Elevated PSA    CURRENT INTERVENTIONS:   Negative prostate biopsy 2023  MRI prostate x 2    HISTORY:   Jonny underwent MRI of the prostate in December and it showed a 68 g prostate with no areas concerning for prostate cancer.  The MRI was read as PI-RADS 2.  His PSA is 7.3, unchanged from 6 months ago.  He has no urinary symptoms or complaints at this time.      PAST MEDICAL HISTORY:   Past Medical History:   Diagnosis Date    Aortic valve stenosis     severe    Blood clot in the legs     left leg after surgery    Gastro-oesophageal reflux disease     Hodgkins lymphoma (H)     bone marrow biopsy    Hypertension     PAD (peripheral artery disease)     persistent atrial fibrillation     AFIB    Unspecified cerebral artery occlusion with cerebral infarction 10/01/2011       PAST SURGICAL HISTORY:   Past Surgical History:   Procedure Laterality Date    APPENDECTOMY      ARTHROPLASTY REVISION HIP  12/27/2011    Procedure:ARTHROPLASTY REVISION HIP; RIGHT TOTAL HIP REVISION WITH BONE GRAFT  ; Surgeon:ANGIE HARRINGTON; Location: OR    BIOPSY  hodgkins lymphoma    bone marrow biopsy, chemo and radiation    CARDIAC SURGERY      cardiac ablation    COLONOSCOPY      CV CORONARY ANGIOGRAM N/A 9/23/2022    Procedure: Coronary Angiogram;  Surgeon: Gennaro Cook MD;  Location:  HEART CARDIAC CATH LAB    CV PCI N/A 9/23/2022    Procedure: Percutaneous Coronary Intervention;  Surgeon: Gennaro Cook MD;  Location:  HEART CARDIAC CATH LAB    CV RIGHT HEART CATH MEASUREMENTS RECORDED N/A 9/23/2022    Procedure: Right Heart Catheterization;  Surgeon: Gennaro Cook MD;  Location:  HEART CARDIAC CATH LAB    CV TRANSCATHETER AORTIC VALVE REPLACEMENT-FEMORAL APPROACH N/A 12/12/2023    Procedure: Transcatheter Aortic Valve Replacement-Femoral Approach;  Surgeon: Gennaro Cook MD;  Location:  HEART CARDIAC CATH  LAB    EP PACEMAKER DEVICE & LEAD IMPLANT- RIGHT VENTRICULAR N/A 2023    Procedure: Pacemaker Device & Lead Implant- Right ventricular;  Surgeon: Brian Madrid MD;  Location:  HEART CARDIAC CATH LAB    ILIAC ARTERY ANGIOGRAM LEFT Left 10/10/2023    Procedure: Left Groin Cutdown with Repair of Left Femoral Artery; Aborted Transcatheter Aortic Valve Replacement;  Surgeon: German Patel MD;  Location:  HEART CARDIAC CATH LAB    ORTHOPEDIC SURGERY      hip and knee surgeries    ORTHOPEDIC SURGERY      carpal tunnel  right hand       FAMILY HISTORY:   Family History   Problem Relation Age of Onset    Hypertension Mother     Heart Failure Mother     Coronary Artery Disease Mother         CABG    Arrhythmia Mother     Cerebrovascular Disease Father     Heart Failure Father     Colon Cancer Father     Coronary Artery Disease Father         CABG    Coronary Artery Disease Brother         CABG    Arrhythmia Brother        SOCIAL HISTORY:   Social History     Socioeconomic History    Marital status:    Tobacco Use    Smoking status: Former     Current packs/day: 0.00     Types: Cigarettes     Quit date: 1987     Years since quittin.0    Smokeless tobacco: Never   Vaping Use    Vaping status: Never Used   Substance and Sexual Activity    Alcohol use: Yes     Comment: 1 glass daily    Drug use: No   Other Topics Concern    Caffeine Concern No     Comment: 2-3 a day    Sleep Concern Yes     Comment: depends    Weight Concern No     Comment: weight loss    Special Diet No    Exercise No     Comment: limited due to knee pain    Seat Belt Yes     Social Drivers of Health     Financial Resource Strain: Low Risk  (3/10/2025)    Financial Resource Strain     Within the past 12 months, have you or your family members you live with been unable to get utilities (heat, electricity) when it was really needed?: No   Food Insecurity: Low Risk  (3/10/2025)    Food Insecurity     Within the past 12  "months, did you worry that your food would run out before you got money to buy more?: No     Within the past 12 months, did the food you bought just not last and you didn t have money to get more?: No   Transportation Needs: Low Risk  (3/10/2025)    Transportation Needs     Within the past 12 months, has lack of transportation kept you from medical appointments, getting your medicines, non-medical meetings or appointments, work, or from getting things that you need?: No   Interpersonal Safety: Low Risk  (3/10/2025)    Interpersonal Safety     Do you feel physically and emotionally safe where you currently live?: Yes     Within the past 12 months, have you been hit, slapped, kicked or otherwise physically hurt by someone?: No     Within the past 12 months, have you been humiliated or emotionally abused in other ways by your partner or ex-partner?: No   Housing Stability: Low Risk  (3/10/2025)    Housing Stability     Do you have housing? : Yes     Are you worried about losing your housing?: No       Review Of Systems:  Skin: No rash, pruritis, or skin pigmentation  Eyes: No changes in vision  Ears/Nose/Throat: No changes in hearing, no nosebleeds  Respiratory: No shortness of breath, dyspnea on exertion, cough, or hemoptysis  Cardiovascular: No chest pain or palpitations  Gastrointestinal: No diarrhea or constipation. No abdominal pain. No hematochezia  Genitourinary: see HPI  Musculoskeletal: No pain or swelling of joints, normal range of motion  Neurologic: No weakness or tremors  Psychiatric: No recent changes in memory or mood  Hematologic/Lymphatic/Immunologic: No easy bruising or enlarged lymph nodes  Endocrine: No weight gain or loss      PHYSICAL EXAM:    /67   Ht 1.676 m (5' 5.98\")   Wt 86.2 kg (190 lb)   BMI 30.68 kg/m      Constitutional: Well developed. Conversant and in no acute distress  Eyes: Anicteric sclera, conjunctiva clear, normal extraocular movements  ENT: Normocephalic and atraumatic, "   Skin: Warm and dry. No rashes or lesions  Cardiac: No peripheral edema  Back/Flank: Not done  CNS/PNS: Normal musculature and movements, moves all extremities normally  Respiratory: Normal non-labored breathing  Abdomen: Soft nontender and nondistended  Peripheral Vascular: No peripheral edema  Mental Status/Psych: Alert and Oriented x 3. Normal mood and affect    Penis: Not done  Scrotal Skin: Not done  Testicles: Not done  Epididymis: Not done  Digital Rectal Exam:     Cystoscopy: Not done    Imaging: None    Urinalysis: UA RESULTS:  Recent Labs   Lab Test 03/10/25  0939   COLOR Light Yellow   APPEARANCE Clear   URINEGLC Negative   URINEBILI Negative   URINEKETONE Negative   SG 1.008   UBLD Negative   URINEPH 5.0   PROTEIN Negative   NITRITE Negative   LEUKEST Negative   RBCU 1   WBCU 1       PSA: 7.30    Post Void Residual:     Other labs: None today      IMPRESSION:  Elevated PSA and enlarged prostate    PLAN:  His PSA is stable.  I recommended continued annual monitoring of his PSA.  Jonny is currently seen multiple physicians and is also seeing an oncologist for possible myeloma.  He asked me if he could simply do his PSA with his primary care physician at his annual physical instead of coming to the urology clinic and I think that would work well.  He should have his PSA checked once a year and come back and see urology if it ever should rise substantially in the future.        Yuan Meredith M.D.

## 2025-05-07 ENCOUNTER — TELEPHONE (OUTPATIENT)
Dept: CARDIOLOGY | Facility: CLINIC | Age: 80
End: 2025-05-07
Payer: COMMERCIAL

## 2025-05-07 NOTE — TELEPHONE ENCOUNTER
[facility] Call Center    Phone Message    May a detailed message be left on voicemail: yes     Reason for Call: Patient wife called, would prefer a call back form Felisa NASCIMENTO regarding [place]. Please call back to further discuss.     Action Taken: Other: Cardio     Travel Screening: Not Applicable     Date of Service:   Thank you!  [facility]

## 2025-05-07 NOTE — TELEPHONE ENCOUNTER
Returned call to patient's wife Sisi. She is wondering about patient's referral today. Informed her that referral was sent 4/29/25. Advised that he contact [place] directly for scheduling. Ssii states understanding and agreement with plan.

## 2025-05-08 ENCOUNTER — TELEPHONE (OUTPATIENT)
Dept: CARDIOLOGY | Facility: CLINIC | Age: 80
End: 2025-05-08
Payer: COMMERCIAL

## 2025-05-08 NOTE — TELEPHONE ENCOUNTER
Returned call to patient. Informed him that form was updated and resent to [facility]. Patient states understanding.

## 2025-05-08 NOTE — TELEPHONE ENCOUNTER
Health Call Center    Phone Message    May a detailed message be left on voicemail: yes     Reason for Call: Other: spouse stated that [facility] did receive a diagnosis w/the referral they received.  Without the Diagnosis the referral is considered not useable based on what spouse stated.  Please call [facility] at [phone] to provide the Diagnosis.  Thank You     Action Taken: Message routed to:  [facility]): cardio    Travel Screening: Not Applicable    Thank you!  [facility]      Date of Service:

## 2025-06-09 ENCOUNTER — RESULTS FOLLOW-UP (OUTPATIENT)
Dept: CARDIOLOGY | Facility: CLINIC | Age: 80
End: 2025-06-09

## 2025-07-07 ENCOUNTER — TELEPHONE (OUTPATIENT)
Dept: CARDIOLOGY | Facility: CLINIC | Age: 80
End: 2025-07-07
Payer: COMMERCIAL

## 2025-07-07 DIAGNOSIS — E78.2 MIXED HYPERLIPIDEMIA: ICD-10-CM

## 2025-07-07 DIAGNOSIS — I50.9 ACUTE DECOMPENSATED HEART FAILURE (H): ICD-10-CM

## 2025-07-07 RX ORDER — FENOFIBRATE 160 MG/1
160 TABLET ORAL DAILY
Qty: 90 TABLET | Refills: 0 | Status: SHIPPED | OUTPATIENT
Start: 2025-07-07

## 2025-07-07 RX ORDER — FUROSEMIDE 20 MG/1
20 TABLET ORAL
Qty: 180 TABLET | Refills: 0 | Status: SHIPPED | OUTPATIENT
Start: 2025-07-07

## 2025-07-07 NOTE — TELEPHONE ENCOUNTER
Received call from patient requesting medication refills following his discharge from [facility]. Fenofibrate and furosemide refilled at doses confirmed in his discharge summary from [facility]. Sent prescriptions to preferred pharmacy. Jonny had close follow up at [facility] and is scheduled to see Dr Cook in September. Asked that he call in the meantime with any questions or concerns.

## 2025-07-08 ENCOUNTER — TRANSCRIBE ORDERS (OUTPATIENT)
Dept: OTHER | Age: 80
End: 2025-07-08

## 2025-07-08 DIAGNOSIS — Z95.4 S/P AORTIC VALVE ALLOGRAFT: Primary | ICD-10-CM

## 2025-07-17 DIAGNOSIS — Z95.0 CARDIAC PACEMAKER IN SITU: ICD-10-CM

## 2025-07-17 DIAGNOSIS — R55 SYNCOPE: Primary | ICD-10-CM

## 2025-07-24 ENCOUNTER — HOSPITAL ENCOUNTER (OUTPATIENT)
Dept: CARDIAC REHAB | Facility: CLINIC | Age: 80
Discharge: HOME | End: 2025-07-24
Attending: THORACIC SURGERY (CARDIOTHORACIC VASCULAR SURGERY)
Payer: COMMERCIAL

## 2025-07-24 PROCEDURE — 93797 PHYS/QHP OP CAR RHAB WO ECG: CPT

## 2025-07-24 PROCEDURE — 93798 PHYS/QHP OP CAR RHAB W/ECG: CPT

## 2025-08-05 ENCOUNTER — HOSPITAL ENCOUNTER (OUTPATIENT)
Dept: CARDIAC REHAB | Facility: CLINIC | Age: 80
Discharge: HOME | End: 2025-08-05
Attending: THORACIC SURGERY (CARDIOTHORACIC VASCULAR SURGERY)
Payer: COMMERCIAL

## 2025-08-05 PROCEDURE — 93798 PHYS/QHP OP CAR RHAB W/ECG: CPT | Performed by: REHABILITATION PRACTITIONER

## 2025-08-12 ENCOUNTER — HOSPITAL ENCOUNTER (OUTPATIENT)
Dept: CARDIAC REHAB | Facility: CLINIC | Age: 80
Discharge: HOME | End: 2025-08-12
Attending: THORACIC SURGERY (CARDIOTHORACIC VASCULAR SURGERY)
Payer: COMMERCIAL

## 2025-08-12 PROCEDURE — 93798 PHYS/QHP OP CAR RHAB W/ECG: CPT | Performed by: REHABILITATION PRACTITIONER

## 2025-08-19 ENCOUNTER — HOSPITAL ENCOUNTER (OUTPATIENT)
Dept: CARDIAC REHAB | Facility: CLINIC | Age: 80
Discharge: HOME | End: 2025-08-19
Attending: THORACIC SURGERY (CARDIOTHORACIC VASCULAR SURGERY)
Payer: COMMERCIAL

## 2025-08-19 PROCEDURE — 93798 PHYS/QHP OP CAR RHAB W/ECG: CPT

## 2025-09-02 ENCOUNTER — HOSPITAL ENCOUNTER (OUTPATIENT)
Dept: CARDIAC REHAB | Facility: CLINIC | Age: 80
Discharge: HOME | End: 2025-09-02
Attending: THORACIC SURGERY (CARDIOTHORACIC VASCULAR SURGERY)
Payer: COMMERCIAL

## 2025-09-02 ENCOUNTER — OFFICE VISIT (OUTPATIENT)
Dept: PHARMACY | Facility: PHYSICIAN GROUP | Age: 80
End: 2025-09-02
Payer: COMMERCIAL

## 2025-09-02 VITALS
DIASTOLIC BLOOD PRESSURE: 70 MMHG | SYSTOLIC BLOOD PRESSURE: 134 MMHG | HEIGHT: 66 IN | WEIGHT: 191 LBS | BODY MASS INDEX: 30.7 KG/M2 | HEART RATE: 83 BPM

## 2025-09-02 DIAGNOSIS — E78.2 MIXED HYPERLIPIDEMIA: ICD-10-CM

## 2025-09-02 DIAGNOSIS — I10 PRIMARY HYPERTENSION: ICD-10-CM

## 2025-09-02 DIAGNOSIS — N40.0 BENIGN PROSTATIC HYPERPLASIA, UNSPECIFIED WHETHER LOWER URINARY TRACT SYMPTOMS PRESENT: ICD-10-CM

## 2025-09-02 DIAGNOSIS — J30.1 ALLERGIC RHINITIS DUE TO POLLEN, UNSPECIFIED SEASONALITY: ICD-10-CM

## 2025-09-02 DIAGNOSIS — A48.1 LEGIONELLA INFECTION (H): ICD-10-CM

## 2025-09-02 DIAGNOSIS — I48.20 CHRONIC ATRIAL FIBRILLATION (H): ICD-10-CM

## 2025-09-02 DIAGNOSIS — I10 ESSENTIAL HYPERTENSION WITH GOAL BLOOD PRESSURE LESS THAN 140/90: Primary | ICD-10-CM

## 2025-09-02 DIAGNOSIS — Z78.9 TAKES DIETARY SUPPLEMENTS: ICD-10-CM

## 2025-09-02 PROCEDURE — 3078F DIAST BP <80 MM HG: CPT | Performed by: PHARMACIST

## 2025-09-02 PROCEDURE — 99605 MTMS BY PHARM NP 15 MIN: CPT | Performed by: PHARMACIST

## 2025-09-02 PROCEDURE — 3075F SYST BP GE 130 - 139MM HG: CPT | Performed by: PHARMACIST

## 2025-09-02 PROCEDURE — 93798 PHYS/QHP OP CAR RHAB W/ECG: CPT | Performed by: OCCUPATIONAL THERAPIST

## 2025-09-02 RX ORDER — ASPIRIN 81 MG/1
81 TABLET ORAL DAILY
COMMUNITY

## 2025-09-02 RX ORDER — IRON,CARBONYL/ASCORBIC ACID 65MG-125MG
1 TABLET ORAL DAILY
COMMUNITY

## 2025-09-02 RX ORDER — DOXYCYCLINE HYCLATE 100 MG/1
1 TABLET, DELAYED RELEASE ORAL 2 TIMES DAILY
COMMUNITY

## 2025-09-02 RX ORDER — AZELASTINE 1 MG/ML
2 SPRAY, METERED NASAL 2 TIMES DAILY
COMMUNITY

## (undated) DEVICE — Device

## (undated) DEVICE — SYR LOCKING ATRION QL38

## (undated) DEVICE — 5-0 PROLENE

## (undated) DEVICE — MANIFOLD KIT ANGIO AUTOMATED 014613

## (undated) DEVICE — DEFIB PRO-PADZ LVP LQD GEL ADULT 8900-2105-01

## (undated) DEVICE — WIRE GUIDE LUNDERQUIST 0.035"X260CM DBL CVD

## (undated) DEVICE — KIT HAND CONTROL ANGIOTOUCH ACIST 65CM AT-P65

## (undated) DEVICE — LINE MONITOR NASAL SMART CAPNOLINE ADULT LONG 12463

## (undated) DEVICE — DEVICE CLOSURE VASCULAR MANTA 18FR 2115: Type: IMPLANTABLE DEVICE | Status: NON-FUNCTIONAL

## (undated) DEVICE — INTRODUCER CATH VASC 5FRX10CM  MPIS-501-NT-U-SST

## (undated) DEVICE — CATH ANGIO SUPERTORQUE AL1 6FRX100CM 532-645

## (undated) DEVICE — 6-0 PROLENE

## (undated) DEVICE — WIRE GUIDE 0.035"X260CM SAFE-T-J EXCHANGE G00517

## (undated) DEVICE — WIRE GUIDE 0.035"X150CM EMERALD STR 502542

## (undated) DEVICE — INTRO TERUMO 7FRX25CM W/MARKER RSB703

## (undated) DEVICE — RAD CLOSURE ANGIOSEAL 8FR  610131

## (undated) DEVICE — RAD INTRODUCER KIT MICRO 5FRX10CM .018 NITINOL G/W

## (undated) DEVICE — PACK PCMKR PERM SRG PROC LF SAN32PC573

## (undated) DEVICE — CABLE PCNG 12FT REMINGTON PACI

## (undated) DEVICE — CATH ANGIO INFINITI PIGTAIL 145 6 SH 6FRX110CM  534-652S

## (undated) DEVICE — INTRO TERUMO 6FRX25CM W/MARKER RSB603

## (undated) DEVICE — RAD G/W INQWIRE .035X260CM J-TIP EXCHANGE IQ35F260J1O5RS

## (undated) DEVICE — 3-0 VICRYL

## (undated) DEVICE — DELIVERY SYSTEM VALVE AORTIC 29MM RESILIA COMMANDER 9750CM29

## (undated) DEVICE — GUIDEWIRE VASC SAFARI2 0.035X275CM H74939406XS1

## (undated) DEVICE — SLEEVE TR BAND RADIAL COMPRESSION DEVICE 24CM TRB24-REG

## (undated) DEVICE — INTRO SHEATH 6FRX10CM PINNACLE RSS602

## (undated) DEVICE — INTRO GLIDESHEATH SLENDER 6FR 10X45CM 60-1060

## (undated) DEVICE — SHEATH PRELUDE SNAP 13CM 6FR

## (undated) DEVICE — TOTE ANGIO CORP PC15AT SAN32CC83O

## (undated) DEVICE — CATH EP PACEL 5FRX110CM 1MM RIGHT HEART CURVE 401763

## (undated) DEVICE — SAVVYWIRE XS

## (undated) DEVICE — SYR ANGIOGRAPHY MULTIUSE KIT ACIST 014612

## (undated) DEVICE — CABLE PACING ALLIGATOR CLIP 12FT 5833SL

## (undated) DEVICE — KIT VALVE AORTIC SAPIEN 3 ULTRA RESILIA OD29 MM S3URCM29A

## (undated) DEVICE — 2-0 VICRYL

## (undated) DEVICE — TUBING PRESSURE 72" FEMALE TO MALE LL H965907017221

## (undated) DEVICE — INTRO SHEATH 7FRX10CM PINNACLE RSS702

## (undated) DEVICE — CATH DIAGNOSTIC RADIAL 5FR TIG 4.0

## (undated) DEVICE — CLOSURE DEVICE 6FR VASC PROGLIDE MEDICATED SUTURE 12673-03

## (undated) DEVICE — CABLE PACING ALLIGATOR CLIP 301-CG

## (undated) RX ORDER — VERAPAMIL HYDROCHLORIDE 2.5 MG/ML
INJECTION, SOLUTION INTRAVENOUS
Status: DISPENSED
Start: 2022-09-23

## (undated) RX ORDER — HEPARIN SODIUM 1000 [USP'U]/ML
INJECTION, SOLUTION INTRAVENOUS; SUBCUTANEOUS
Status: DISPENSED
Start: 2023-12-12

## (undated) RX ORDER — HEPARIN SODIUM 200 [USP'U]/100ML
INJECTION, SOLUTION INTRAVENOUS
Status: DISPENSED
Start: 2022-09-23

## (undated) RX ORDER — POTASSIUM CHLORIDE 1.5 G/1.58G
POWDER, FOR SOLUTION ORAL
Status: DISPENSED
Start: 2022-09-23

## (undated) RX ORDER — NALOXONE HYDROCHLORIDE 0.4 MG/ML
INJECTION, SOLUTION INTRAMUSCULAR; INTRAVENOUS; SUBCUTANEOUS
Status: DISPENSED
Start: 2025-03-12

## (undated) RX ORDER — LIDOCAINE HYDROCHLORIDE 10 MG/ML
INJECTION, SOLUTION EPIDURAL; INFILTRATION; INTRACAUDAL; PERINEURAL
Status: DISPENSED
Start: 2023-10-10

## (undated) RX ORDER — HEPARIN SODIUM 1000 [USP'U]/ML
INJECTION, SOLUTION INTRAVENOUS; SUBCUTANEOUS
Status: DISPENSED
Start: 2023-10-10

## (undated) RX ORDER — FENTANYL CITRATE 50 UG/ML
INJECTION, SOLUTION INTRAMUSCULAR; INTRAVENOUS
Status: DISPENSED
Start: 2023-12-12

## (undated) RX ORDER — HEPARIN SODIUM 1000 [USP'U]/ML
INJECTION, SOLUTION INTRAVENOUS; SUBCUTANEOUS
Status: DISPENSED
Start: 2022-09-23

## (undated) RX ORDER — CLINDAMYCIN PHOSPHATE 900 MG/50ML
INJECTION, SOLUTION INTRAVENOUS
Status: DISPENSED
Start: 2023-10-10

## (undated) RX ORDER — FENTANYL CITRATE 50 UG/ML
INJECTION, SOLUTION INTRAMUSCULAR; INTRAVENOUS
Status: DISPENSED
Start: 2023-10-10

## (undated) RX ORDER — CLINDAMYCIN PHOSPHATE 900 MG/50ML
INJECTION, SOLUTION INTRAVENOUS
Status: DISPENSED
Start: 2023-02-27

## (undated) RX ORDER — HEPARIN SODIUM 200 [USP'U]/100ML
INJECTION, SOLUTION INTRAVENOUS
Status: DISPENSED
Start: 2023-10-10

## (undated) RX ORDER — HEPARIN SODIUM 200 [USP'U]/100ML
INJECTION, SOLUTION INTRAVENOUS
Status: DISPENSED
Start: 2023-12-12

## (undated) RX ORDER — GLYCOPYRROLATE 0.2 MG/ML
INJECTION, SOLUTION INTRAMUSCULAR; INTRAVENOUS
Status: DISPENSED
Start: 2025-03-12

## (undated) RX ORDER — POTASSIUM CHLORIDE 1500 MG/1
TABLET, EXTENDED RELEASE ORAL
Status: DISPENSED
Start: 2023-10-10

## (undated) RX ORDER — FENTANYL CITRATE 0.05 MG/ML
INJECTION, SOLUTION INTRAMUSCULAR; INTRAVENOUS
Status: DISPENSED
Start: 2023-10-10

## (undated) RX ORDER — POTASSIUM CHLORIDE 1500 MG/1
TABLET, EXTENDED RELEASE ORAL
Status: DISPENSED
Start: 2022-09-23

## (undated) RX ORDER — CLINDAMYCIN PHOSPHATE 900 MG/50ML
INJECTION, SOLUTION INTRAVENOUS
Status: DISPENSED
Start: 2023-12-12

## (undated) RX ORDER — LIDOCAINE HYDROCHLORIDE 40 MG/ML
SOLUTION TOPICAL
Status: DISPENSED
Start: 2025-03-12

## (undated) RX ORDER — PROTAMINE SULFATE 10 MG/ML
INJECTION, SOLUTION INTRAVENOUS
Status: DISPENSED
Start: 2023-12-12

## (undated) RX ORDER — FLUMAZENIL 0.1 MG/ML
INJECTION, SOLUTION INTRAVENOUS
Status: DISPENSED
Start: 2025-03-12

## (undated) RX ORDER — FENTANYL CITRATE 50 UG/ML
INJECTION, SOLUTION INTRAMUSCULAR; INTRAVENOUS
Status: DISPENSED
Start: 2025-03-12

## (undated) RX ORDER — LIDOCAINE HYDROCHLORIDE 10 MG/ML
INJECTION, SOLUTION EPIDURAL; INFILTRATION; INTRACAUDAL; PERINEURAL
Status: DISPENSED
Start: 2022-09-23

## (undated) RX ORDER — NITROGLYCERIN 5 MG/ML
VIAL (ML) INTRAVENOUS
Status: DISPENSED
Start: 2022-09-23

## (undated) RX ORDER — FENTANYL CITRATE 50 UG/ML
INJECTION, SOLUTION INTRAMUSCULAR; INTRAVENOUS
Status: DISPENSED
Start: 2023-02-27

## (undated) RX ORDER — BUPIVACAINE HYDROCHLORIDE 5 MG/ML
INJECTION, SOLUTION EPIDURAL; INTRACAUDAL
Status: DISPENSED
Start: 2023-12-12

## (undated) RX ORDER — LIDOCAINE HYDROCHLORIDE 10 MG/ML
INJECTION, SOLUTION EPIDURAL; INFILTRATION; INTRACAUDAL; PERINEURAL
Status: DISPENSED
Start: 2023-12-12

## (undated) RX ORDER — FENTANYL CITRATE 50 UG/ML
INJECTION, SOLUTION INTRAMUSCULAR; INTRAVENOUS
Status: DISPENSED
Start: 2022-09-23